# Patient Record
Sex: FEMALE | Race: WHITE | Employment: FULL TIME | URBAN - METROPOLITAN AREA
[De-identification: names, ages, dates, MRNs, and addresses within clinical notes are randomized per-mention and may not be internally consistent; named-entity substitution may affect disease eponyms.]

---

## 2017-02-01 ENCOUNTER — APPOINTMENT (OUTPATIENT)
Dept: GENERAL RADIOLOGY | Facility: HOSPITAL | Age: 52
End: 2017-02-01
Attending: EMERGENCY MEDICINE
Payer: COMMERCIAL

## 2017-02-01 ENCOUNTER — HOSPITAL ENCOUNTER (EMERGENCY)
Facility: HOSPITAL | Age: 52
Discharge: HOME | End: 2017-02-01
Attending: EMERGENCY MEDICINE
Payer: COMMERCIAL

## 2017-02-01 VITALS
BODY MASS INDEX: 49.69 KG/M2 | DIASTOLIC BLOOD PRESSURE: 86 MMHG | WEIGHT: 270 LBS | RESPIRATION RATE: 16 BRPM | HEIGHT: 62 IN | OXYGEN SATURATION: 99 % | HEART RATE: 75 BPM | SYSTOLIC BLOOD PRESSURE: 143 MMHG | TEMPERATURE: 98 F

## 2017-02-01 DIAGNOSIS — S39.012A BACK STRAIN, INITIAL ENCOUNTER: Primary | ICD-10-CM

## 2017-02-01 LAB — B-HCG UR QL: NEGATIVE

## 2017-02-01 PROCEDURE — 81025 URINE PREGNANCY TEST: CPT

## 2017-02-01 PROCEDURE — 99283 EMERGENCY DEPT VISIT LOW MDM: CPT

## 2017-02-01 PROCEDURE — 72120 X-RAY BEND ONLY L-S SPINE: CPT

## 2017-02-01 RX ORDER — METHOCARBAMOL 750 MG/1
750 TABLET, FILM COATED ORAL 4 TIMES DAILY
Qty: 40 TABLET | Refills: 0 | Status: SHIPPED | OUTPATIENT
Start: 2017-02-01 | End: 2017-02-11

## 2017-02-01 RX ORDER — HYDROCODONE BITARTRATE AND ACETAMINOPHEN 5; 325 MG/1; MG/1
1-2 TABLET ORAL EVERY 4 HOURS PRN
Qty: 20 TABLET | Refills: 0 | Status: SHIPPED | OUTPATIENT
Start: 2017-02-01 | End: 2017-02-08

## 2017-02-01 RX ORDER — DIAZEPAM 2 MG/1
2 TABLET ORAL ONCE
Status: COMPLETED | OUTPATIENT
Start: 2017-02-01 | End: 2017-02-01

## 2017-02-01 RX ORDER — HYDROCODONE BITARTRATE AND ACETAMINOPHEN 5; 325 MG/1; MG/1
1 TABLET ORAL ONCE
Status: COMPLETED | OUTPATIENT
Start: 2017-02-01 | End: 2017-02-01

## 2017-02-01 RX ORDER — LEVOTHYROXINE SODIUM 0.1 MG/1
TABLET ORAL
COMMUNITY
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
## 2017-02-01 NOTE — ED NOTES
Pt sent home with medication, told no driving, working, or operating heavy machinery with these medications because they may cause drowsiness. Pt verbalized knowledge. Out ambulatory with steady gait.   Told to return to ER with any new or worsening sympto
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
## 2017-02-11 ENCOUNTER — HOSPITAL ENCOUNTER (OUTPATIENT)
Dept: MRI IMAGING | Facility: HOSPITAL | Age: 52
Discharge: HOME | End: 2017-02-11
Attending: INTERNAL MEDICINE
Payer: COMMERCIAL

## 2017-02-11 DIAGNOSIS — M54.10 RADICULOPATHY: ICD-10-CM

## 2017-02-11 PROCEDURE — 72148 MRI LUMBAR SPINE W/O DYE: CPT

## 2017-02-11 PROCEDURE — 74177 CT ABD & PELVIS W/CONTRAST: CPT

## 2017-02-11 PROCEDURE — 82565 ASSAY OF CREATININE: CPT

## 2017-02-16 ENCOUNTER — HOSPITAL ENCOUNTER (OUTPATIENT)
Dept: CT IMAGING | Facility: HOSPITAL | Age: 52
Discharge: HOME | End: 2017-02-16
Attending: INTERNAL MEDICINE
Payer: COMMERCIAL

## 2017-02-16 DIAGNOSIS — R10.9 ABDOMINAL PAIN: ICD-10-CM

## 2017-02-16 LAB — CREAT BLD-MCNC: 0.8 MG/DL (ref 0.5–1.5)

## 2017-02-16 PROCEDURE — 74177 CT ABD & PELVIS W/CONTRAST: CPT

## 2017-02-16 PROCEDURE — 82565 ASSAY OF CREATININE: CPT

## 2017-04-22 ENCOUNTER — LAB ENCOUNTER (OUTPATIENT)
Dept: LAB | Facility: HOSPITAL | Age: 52
End: 2017-04-22
Attending: PHYSICAL MEDICINE & REHABILITATION
Payer: COMMERCIAL

## 2017-04-22 DIAGNOSIS — Z51.81 ENCOUNTER FOR THERAPEUTIC DRUG MONITORING: Primary | ICD-10-CM

## 2017-04-22 PROCEDURE — 36415 COLL VENOUS BLD VENIPUNCTURE: CPT

## 2017-04-22 PROCEDURE — 85730 THROMBOPLASTIN TIME PARTIAL: CPT

## 2017-04-22 PROCEDURE — 85610 PROTHROMBIN TIME: CPT

## 2017-06-05 ENCOUNTER — HOSPITAL ENCOUNTER (EMERGENCY)
Facility: HOSPITAL | Age: 52
Discharge: HOME | End: 2017-06-05
Attending: EMERGENCY MEDICINE
Payer: COMMERCIAL

## 2017-06-05 ENCOUNTER — APPOINTMENT (OUTPATIENT)
Dept: CT IMAGING | Facility: HOSPITAL | Age: 52
End: 2017-06-05
Attending: EMERGENCY MEDICINE
Payer: COMMERCIAL

## 2017-06-05 VITALS
SYSTOLIC BLOOD PRESSURE: 126 MMHG | TEMPERATURE: 98 F | DIASTOLIC BLOOD PRESSURE: 78 MMHG | HEART RATE: 68 BPM | HEIGHT: 62 IN | OXYGEN SATURATION: 98 % | RESPIRATION RATE: 14 BRPM | BODY MASS INDEX: 51.53 KG/M2 | WEIGHT: 280 LBS

## 2017-06-05 DIAGNOSIS — N20.0 KIDNEY STONE: Primary | ICD-10-CM

## 2017-06-05 DIAGNOSIS — N30.00 ACUTE CYSTITIS WITHOUT HEMATURIA: ICD-10-CM

## 2017-06-05 PROCEDURE — 87086 URINE CULTURE/COLONY COUNT: CPT | Performed by: EMERGENCY MEDICINE

## 2017-06-05 PROCEDURE — 83690 ASSAY OF LIPASE: CPT

## 2017-06-05 PROCEDURE — 81001 URINALYSIS AUTO W/SCOPE: CPT | Performed by: EMERGENCY MEDICINE

## 2017-06-05 PROCEDURE — 99284 EMERGENCY DEPT VISIT MOD MDM: CPT

## 2017-06-05 PROCEDURE — 80053 COMPREHEN METABOLIC PANEL: CPT

## 2017-06-05 PROCEDURE — 96361 HYDRATE IV INFUSION ADD-ON: CPT

## 2017-06-05 PROCEDURE — 96374 THER/PROPH/DIAG INJ IV PUSH: CPT

## 2017-06-05 PROCEDURE — 74176 CT ABD & PELVIS W/O CONTRAST: CPT | Performed by: EMERGENCY MEDICINE

## 2017-06-05 PROCEDURE — 85025 COMPLETE CBC W/AUTO DIFF WBC: CPT

## 2017-06-05 RX ORDER — HYDROMORPHONE HYDROCHLORIDE 1 MG/ML
0.5 INJECTION, SOLUTION INTRAMUSCULAR; INTRAVENOUS; SUBCUTANEOUS ONCE
Status: COMPLETED | OUTPATIENT
Start: 2017-06-05 | End: 2017-06-05

## 2017-06-05 RX ORDER — SULFAMETHOXAZOLE AND TRIMETHOPRIM 800; 160 MG/1; MG/1
1 TABLET ORAL 2 TIMES DAILY
Qty: 6 TABLET | Refills: 0 | Status: SHIPPED | OUTPATIENT
Start: 2017-06-05 | End: 2017-06-08

## 2017-06-05 RX ORDER — KETOROLAC TROMETHAMINE 30 MG/ML
30 INJECTION, SOLUTION INTRAMUSCULAR; INTRAVENOUS ONCE
Status: COMPLETED | OUTPATIENT
Start: 2017-06-05 | End: 2017-06-05

## 2017-06-05 NOTE — ED PROVIDER NOTES
Patient Seen in: [facility] AND [facility] Emergency Department    History   Patient presents with:  Flank Pain    Stated Complaint: Kidney stones    HPI    The patient is a 51-year-old female who presents with sudden onset of left flank pain 3 AM today.   The pa supple. Cardiovascular: Normal rate, regular rhythm, normal heart sounds and intact distal pulses. No murmur heard. Pulmonary/Chest: Effort normal and breath sounds normal.   Abdominal: Soft.  Normal appearance and bowel sounds are normal. She exhibit Abdomen+pelvis Kidneystone 2d Rndr(no Iv,no Oral)(cpt=74176)    6/5/2017  CONCLUSION: Mild left hydroureteronephrosis without a radiopaque calculus visualized. Findings are suggestive of a recently passed stone.            Radiology exams  Viewed and review

## 2017-11-18 ENCOUNTER — APPOINTMENT (OUTPATIENT)
Dept: CT IMAGING | Facility: HOSPITAL | Age: 52
End: 2017-11-18
Attending: PHYSICIAN ASSISTANT
Payer: COMMERCIAL

## 2017-11-18 ENCOUNTER — HOSPITAL ENCOUNTER (EMERGENCY)
Facility: HOSPITAL | Age: 52
Discharge: HOME | End: 2017-11-18
Attending: PHYSICIAN ASSISTANT
Payer: COMMERCIAL

## 2017-11-18 VITALS
WEIGHT: 280 LBS | SYSTOLIC BLOOD PRESSURE: 160 MMHG | TEMPERATURE: 98 F | DIASTOLIC BLOOD PRESSURE: 72 MMHG | RESPIRATION RATE: 18 BRPM | BODY MASS INDEX: 51 KG/M2 | HEART RATE: 90 BPM | OXYGEN SATURATION: 96 %

## 2017-11-18 DIAGNOSIS — R11.0 NAUSEA: ICD-10-CM

## 2017-11-18 DIAGNOSIS — R10.12 ABDOMINAL PAIN, LEFT UPPER QUADRANT: Primary | ICD-10-CM

## 2017-11-18 PROCEDURE — 96360 HYDRATION IV INFUSION INIT: CPT

## 2017-11-18 PROCEDURE — 99284 EMERGENCY DEPT VISIT MOD MDM: CPT

## 2017-11-18 PROCEDURE — 80048 BASIC METABOLIC PNL TOTAL CA: CPT

## 2017-11-18 PROCEDURE — 80076 HEPATIC FUNCTION PANEL: CPT | Performed by: PHYSICIAN ASSISTANT

## 2017-11-18 PROCEDURE — 80048 BASIC METABOLIC PNL TOTAL CA: CPT | Performed by: PHYSICIAN ASSISTANT

## 2017-11-18 PROCEDURE — 81001 URINALYSIS AUTO W/SCOPE: CPT | Performed by: PHYSICIAN ASSISTANT

## 2017-11-18 PROCEDURE — 83690 ASSAY OF LIPASE: CPT | Performed by: PHYSICIAN ASSISTANT

## 2017-11-18 PROCEDURE — 74177 CT ABD & PELVIS W/CONTRAST: CPT | Performed by: PHYSICIAN ASSISTANT

## 2017-11-18 PROCEDURE — 96361 HYDRATE IV INFUSION ADD-ON: CPT

## 2017-11-18 PROCEDURE — 85025 COMPLETE CBC W/AUTO DIFF WBC: CPT

## 2017-11-18 PROCEDURE — 85025 COMPLETE CBC W/AUTO DIFF WBC: CPT | Performed by: PHYSICIAN ASSISTANT

## 2017-11-18 RX ORDER — ONDANSETRON 4 MG/1
4 TABLET, ORALLY DISINTEGRATING ORAL EVERY 6 HOURS PRN
Qty: 10 TABLET | Refills: 0 | Status: SHIPPED | OUTPATIENT
Start: 2017-11-18 | End: 2017-11-21

## 2017-11-18 RX ORDER — FAMOTIDINE 20 MG/1
20 TABLET ORAL 2 TIMES DAILY
Qty: 28 TABLET | Refills: 0 | Status: SHIPPED | OUTPATIENT
Start: 2017-11-18 | End: 2017-12-02

## 2017-11-18 NOTE — ED NOTES
Pt to ED via steady gait with c/o LUQ pain and tenderness x 1 week- states hernia repair with mesh in hx and has hx of diverticulitis- wants to make sure it is not that. +nausea, - vomiting. Denies fevers, chills, urinary complaints.  Pt refusing any medica

## 2017-11-18 NOTE — ED PROVIDER NOTES
Patient Seen in: [facility] AND [facility] Emergency Department    History   Patient presents with:  Abdomen/Flank Pain (GI/)    Stated Complaint:     HPI    Kyle Valdez is a 46year old female who presents with chief complaint of left upper quadrant abd 134/88   Pulse 77   Temp 98.2 °F (36.8 °C) (Tympanic)   Resp 20   Wt 127 kg   SpO2 98%   BMI 51.21 kg/m²     PULSE OX within normal limits on room air as interpreted by this provider. Physical Exam    Constitutional: The patient is cooperative.  Appe PLATELET    Narrative: The following orders were created for panel order CBC WITH DIFFERENTIAL WITH PLATELET.   Procedure                               Abnormality         Status                     ---------                               ----------- tablet (4 mg total) by mouth every 6 (six) hours as needed for Nausea.   Qty: 10 tablet Refills: 0

## 2018-02-10 ENCOUNTER — APPOINTMENT (OUTPATIENT)
Dept: GENERAL RADIOLOGY | Age: 53
End: 2018-02-10
Attending: NURSE PRACTITIONER
Payer: COMMERCIAL

## 2018-02-10 ENCOUNTER — HOSPITAL ENCOUNTER (OUTPATIENT)
Age: 53
Discharge: HOME | End: 2018-02-10
Payer: COMMERCIAL

## 2018-02-10 VITALS
WEIGHT: 270 LBS | SYSTOLIC BLOOD PRESSURE: 165 MMHG | TEMPERATURE: 98 F | DIASTOLIC BLOOD PRESSURE: 96 MMHG | BODY MASS INDEX: 49 KG/M2 | HEART RATE: 77 BPM | RESPIRATION RATE: 18 BRPM | OXYGEN SATURATION: 97 %

## 2018-02-10 DIAGNOSIS — M25.552 PAIN OF LEFT HIP JOINT: ICD-10-CM
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
## 2018-02-10 PROCEDURE — 99213 OFFICE O/P EST LOW 20 MIN: CPT

## 2018-02-10 PROCEDURE — 73552 X-RAY EXAM OF FEMUR 2/>: CPT | Performed by: NURSE PRACTITIONER

## 2018-02-10 PROCEDURE — 73502 X-RAY EXAM HIP UNI 2-3 VIEWS: CPT | Performed by: NURSE PRACTITIONER

## 2018-02-10 PROCEDURE — 99214 OFFICE O/P EST MOD 30 MIN: CPT

## 2018-02-10 RX ORDER — CYCLOBENZAPRINE HCL 10 MG
10 TABLET ORAL 3 TIMES DAILY PRN
Qty: 20 TABLET | Refills: 0 | Status: SHIPPED | OUTPATIENT
Start: 2018-02-10 | End: 2018-02-17

## 2018-02-10 NOTE — ED NOTES
xrays negative for fx. Prescriptions given and reviewed aleve for pain rest call and follow up with pcp on Monday for eval and recheck. If worse go to the emergency department for pain swelling increased immobility.

## 2018-02-10 NOTE — ED PROVIDER NOTES
Patient presents with:  Fall (musculoskeletal, neurologic)      HPI:     Jackelin Munroe is a 46year old female presents with left hip and femur pain following a fall on Thursday.   Patient reports she slipped on ice on Thursday night landing on her left Min 2 Views Left (ynj=[id number])    Result Date: 2/10/2018  CONCLUSION: Normal examination. Xr Hip W Or Wo Pelvis 2 Or 3 Views, Left (cpt=73502)    Result Date: 2/10/2018  CONCLUSION: Normal examination.           Discussed with patient pain control as

## 2018-07-24 ENCOUNTER — HOSPITAL ENCOUNTER (OUTPATIENT)
Age: 53
Discharge: HOME | End: 2018-07-24
Attending: EMERGENCY MEDICINE
Payer: COMMERCIAL

## 2018-07-24 ENCOUNTER — APPOINTMENT (OUTPATIENT)
Dept: GENERAL RADIOLOGY | Age: 53
End: 2018-07-24
Attending: EMERGENCY MEDICINE
Payer: COMMERCIAL

## 2018-07-24 VITALS
TEMPERATURE: 98 F | OXYGEN SATURATION: 97 % | RESPIRATION RATE: 18 BRPM | DIASTOLIC BLOOD PRESSURE: 95 MMHG | HEART RATE: 78 BPM | SYSTOLIC BLOOD PRESSURE: 155 MMHG

## 2018-07-24 DIAGNOSIS — S93.601A RIGHT FOOT SPRAIN, INITIAL ENCOUNTER: Primary | ICD-10-CM

## 2018-07-24 PROCEDURE — 99213 OFFICE O/P EST LOW 20 MIN: CPT

## 2018-07-24 PROCEDURE — 73630 X-RAY EXAM OF FOOT: CPT | Performed by: EMERGENCY MEDICINE

## 2018-07-24 NOTE — ED PROVIDER NOTES
Patient Seen in: [facility] AND [facility] [address] [place] [address]    History   Patient presents with:  Lower Extremity Injury (musculoskeletal)    Stated Complaint: r foot injury    HPI    The patient is a 26-year-old female with a past history of thyroid pr bilaterally  Extremities: There is minimal tenderness and swelling to the dorsal aspect of the right foot, most pronounced over the right second/third/fourth metatarsals.   Skin: No pallor, no redness or warmth to the touch      ED Course   Labs Reviewed -

## 2019-01-21 ENCOUNTER — LAB REQUISITION (OUTPATIENT)
Dept: LAB | Facility: HOSPITAL | Age: 54
End: 2019-01-21
Payer: COMMERCIAL

## 2019-01-21 DIAGNOSIS — Z01.89 ENCOUNTER FOR OTHER SPECIFIED SPECIAL EXAMINATIONS: ICD-10-CM

## 2019-01-21 PROCEDURE — 88312 SPECIAL STAINS GROUP 1: CPT | Performed by: SURGERY

## 2019-01-21 PROCEDURE — 88305 TISSUE EXAM BY PATHOLOGIST: CPT | Performed by: SURGERY

## 2020-08-31 ENCOUNTER — LAB REQUISITION (OUTPATIENT)
Dept: LAB | Facility: HOSPITAL | Age: 55
End: 2020-08-31
Payer: COMMERCIAL

## 2020-08-31 DIAGNOSIS — Z20.828 CONTACT WITH AND (SUSPECTED) EXPOSURE TO OTHER VIRAL COMMUNICABLE DISEASES: ICD-10-CM

## 2020-09-01 LAB — SARS-COV-2 RNA RESP QL NAA+PROBE: NOT DETECTED

## 2020-09-02 ENCOUNTER — LAB REQUISITION (OUTPATIENT)
Dept: LAB | Facility: HOSPITAL | Age: 55
End: 2020-09-02
Payer: COMMERCIAL

## 2020-09-02 DIAGNOSIS — K80.10 CALCULUS OF GALLBLADDER WITH CHRONIC CHOLECYSTITIS WITHOUT OBSTRUCTION: ICD-10-CM

## 2020-09-02 PROCEDURE — 88307 TISSUE EXAM BY PATHOLOGIST: CPT | Performed by: SURGERY

## 2020-09-02 PROCEDURE — 88304 TISSUE EXAM BY PATHOLOGIST: CPT | Performed by: SURGERY

## 2020-11-17 ENCOUNTER — HOSPITAL ENCOUNTER (OUTPATIENT)
Age: 55
Discharge: HOME | End: 2020-11-17
Attending: EMERGENCY MEDICINE
Payer: COMMERCIAL

## 2020-11-17 VITALS
OXYGEN SATURATION: 99 % | HEART RATE: 72 BPM | HEIGHT: 62 IN | TEMPERATURE: 98 F | WEIGHT: 280 LBS | RESPIRATION RATE: 20 BRPM | DIASTOLIC BLOOD PRESSURE: 88 MMHG | SYSTOLIC BLOOD PRESSURE: 145 MMHG | BODY MASS INDEX: 51.53 KG/M2

## 2020-11-17 DIAGNOSIS — B34.9 VIRAL SYNDROME: ICD-10-CM

## 2020-11-17 DIAGNOSIS — Z20.822 ENCOUNTER FOR LABORATORY TESTING FOR COVID-19 VIRUS: Primary | ICD-10-CM

## 2020-11-17 PROCEDURE — 99213 OFFICE O/P EST LOW 20 MIN: CPT | Performed by: EMERGENCY MEDICINE

## 2020-11-17 NOTE — ED PROVIDER NOTES
Patient Seen in: [facility]      History   Patient presents with:  Headache    Stated Complaint: HA CHILLS     HPI  Patient's had 2-3 days of a runny nose, postnasal drip, headache, chills, body aches and fatigue. She works at a school.   No erythema. Eyes:      Conjunctiva/sclera: Conjunctivae normal.   Neck:      Musculoskeletal: Normal range of motion and neck supple. Cardiovascular:      Rate and Rhythm: Normal rate and regular rhythm. Heart sounds: Normal heart sounds.    Pulmonar

## 2020-11-17 NOTE — ED INITIAL ASSESSMENT (HPI)
Pt presents to the IC with c/o headaches and chills for a couple days. No known positive exposure. Pt has a hx of ear infections.

## 2021-04-18 ENCOUNTER — LAB REQUISITION (OUTPATIENT)
Dept: LAB | Facility: HOSPITAL | Age: 56
End: 2021-04-18
Payer: COMMERCIAL

## 2021-04-18 DIAGNOSIS — Z01.818 ENCOUNTER FOR OTHER PREPROCEDURAL EXAMINATION: ICD-10-CM

## 2021-09-27 ENCOUNTER — APPOINTMENT (OUTPATIENT)
Dept: CT IMAGING | Facility: HOSPITAL | Age: 56
End: 2021-09-27
Attending: EMERGENCY MEDICINE
Payer: COMMERCIAL

## 2021-09-27 ENCOUNTER — HOSPITAL ENCOUNTER (EMERGENCY)
Facility: HOSPITAL | Age: 56
Discharge: HOME | End: 2021-09-27
Attending: EMERGENCY MEDICINE
Payer: COMMERCIAL

## 2021-09-27 VITALS
OXYGEN SATURATION: 98 % | SYSTOLIC BLOOD PRESSURE: 127 MMHG | BODY MASS INDEX: 52 KG/M2 | TEMPERATURE: 98 F | DIASTOLIC BLOOD PRESSURE: 50 MMHG | HEART RATE: 80 BPM | WEIGHT: 285 LBS | RESPIRATION RATE: 18 BRPM

## 2021-09-27 DIAGNOSIS — R10.9 ABDOMINAL PAIN, ACUTE: Primary | ICD-10-CM

## 2021-09-27 LAB
ALBUMIN SERPL-MCNC: 3.4 G/DL (ref 3.4–5)
ALP LIVER SERPL-CCNC: 122 U/L
ALT SERPL-CCNC: 46 U/L
ANION GAP SERPL CALC-SCNC: 5 MMOL/L (ref 0–18)
AST SERPL-CCNC: 31 U/L (ref 15–37)
BASOPHILS # BLD AUTO: 0.05 X10(3) UL (ref 0–0.2)
BASOPHILS NFR BLD AUTO: 0.8 %
BILIRUB DIRECT SERPL-MCNC: <0.1 MG/DL (ref 0–0.2)
BILIRUB SERPL-MCNC: 0.3 MG/DL (ref 0.1–2)
BILIRUB UR QL: NEGATIVE
BUN BLD-MCNC: 15 MG/DL (ref 7–18)
BUN/CREAT SERPL: 16.9 (ref 10–20)
CALCIUM BLD-MCNC: 8.9 MG/DL (ref 8.5–10.1)
CHLORIDE SERPL-SCNC: 109 MMOL/L (ref 98–112)
CLARITY UR: CLEAR
CO2 SERPL-SCNC: 25 MMOL/L (ref 21–32)
COLOR UR: YELLOW
CREAT BLD-MCNC: 0.89 MG/DL
DEPRECATED RDW RBC AUTO: 40.5 FL (ref 35.1–46.3)
EOSINOPHIL # BLD AUTO: 0.24 X10(3) UL (ref 0–0.7)
EOSINOPHIL NFR BLD AUTO: 3.6 %
ERYTHROCYTE [DISTWIDTH] IN BLOOD BY AUTOMATED COUNT: 13 % (ref 11–15)
GLUCOSE BLD-MCNC: 96 MG/DL (ref 70–99)
GLUCOSE UR-MCNC: NEGATIVE MG/DL
HCT VFR BLD AUTO: 42.2 %
HGB BLD-MCNC: 13.9 G/DL
HGB UR QL STRIP.AUTO: NEGATIVE
IMM GRANULOCYTES # BLD AUTO: 0.02 X10(3) UL (ref 0–1)
IMM GRANULOCYTES NFR BLD: 0.3 %
LIPASE SERPL-CCNC: 93 U/L (ref 73–393)
LYMPHOCYTES # BLD AUTO: 3.06 X10(3) UL (ref 1–4)
LYMPHOCYTES NFR BLD AUTO: 46.1 %
MCH RBC QN AUTO: 28.5 PG (ref 26–34)
MCHC RBC AUTO-ENTMCNC: 32.9 G/DL (ref 31–37)
MCV RBC AUTO: 86.7 FL
MONOCYTES # BLD AUTO: 0.69 X10(3) UL (ref 0.1–1)
MONOCYTES NFR BLD AUTO: 10.4 %
NEUTROPHILS # BLD AUTO: 2.58 X10 (3) UL (ref 1.5–7.7)
NEUTROPHILS # BLD AUTO: 2.58 X10(3) UL (ref 1.5–7.7)
NEUTROPHILS NFR BLD AUTO: 38.8 %
NITRITE UR QL STRIP.AUTO: NEGATIVE
OSMOLALITY SERPL CALC.SUM OF ELEC: 289 MOSM/KG (ref 275–295)
PH UR: 5 [PH] (ref 5–8)
PLATELET # BLD AUTO: 311 10(3)UL (ref 150–450)
POTASSIUM SERPL-SCNC: 4 MMOL/L (ref 3.5–5.1)
PROT SERPL-MCNC: 7.2 G/DL (ref 6.4–8.2)
PROT UR-MCNC: NEGATIVE MG/DL
RBC # BLD AUTO: 4.87 X10(6)UL
SODIUM SERPL-SCNC: 139 MMOL/L (ref 136–145)
SP GR UR STRIP: 1.01 (ref 1–1.03)
UROBILINOGEN UR STRIP-ACNC: <2
WBC # BLD AUTO: 6.6 X10(3) UL (ref 4–11)

## 2021-09-27 PROCEDURE — 36415 COLL VENOUS BLD VENIPUNCTURE: CPT

## 2021-09-27 PROCEDURE — 99284 EMERGENCY DEPT VISIT MOD MDM: CPT

## 2021-09-27 PROCEDURE — 81001 URINALYSIS AUTO W/SCOPE: CPT | Performed by: EMERGENCY MEDICINE

## 2021-09-27 PROCEDURE — 85025 COMPLETE CBC W/AUTO DIFF WBC: CPT | Performed by: EMERGENCY MEDICINE

## 2021-09-27 PROCEDURE — 74177 CT ABD & PELVIS W/CONTRAST: CPT | Performed by: EMERGENCY MEDICINE

## 2021-09-27 PROCEDURE — 80048 BASIC METABOLIC PNL TOTAL CA: CPT | Performed by: EMERGENCY MEDICINE

## 2021-09-27 PROCEDURE — 83690 ASSAY OF LIPASE: CPT | Performed by: EMERGENCY MEDICINE

## 2021-09-27 PROCEDURE — 80076 HEPATIC FUNCTION PANEL: CPT | Performed by: EMERGENCY MEDICINE

## 2021-09-27 RX ORDER — IBUPROFEN 600 MG/1
600 TABLET ORAL EVERY 8 HOURS PRN
Qty: 15 TABLET | Refills: 0 | Status: SHIPPED | OUTPATIENT
Start: 2021-09-27 | End: 2021-10-02

## 2021-09-27 RX ORDER — DICYCLOMINE HCL 20 MG
20 TABLET ORAL 4 TIMES DAILY PRN
Qty: 20 TABLET | Refills: 0 | Status: SHIPPED | OUTPATIENT
Start: 2021-09-27 | End: 2021-10-17

## 2021-09-27 NOTE — ED INITIAL ASSESSMENT (HPI)
Patient complains of abd pain for about a week, states she was placed on abs for this four days ago, states it is getting worse

## 2021-09-27 NOTE — ED PROVIDER NOTES
Patient Seen in: [facility] AND [facility] Emergency Department      History   Patient presents with:  Abdomen/Flank Pain    Stated Complaint: abd pain    Subjective:   HPI    55-year-old female with remote history of hysterectomy as well as cholecystectomy 1 ye Soft.  Mild diffuse more focal upper abdominal pain. No guarding or peritoneal signs. Musculoskeletal: Normal range of motion. No edema or tenderness. Neurological: Alert and oriented to person, place, and time. Skin: Skin is warm and dry.    Nursing dose reduction was used. Adjustment of the mA and/or kV was done based on the patient's size. Use of iterative reconstruction technique for dose reduction was used.   Dose information is transmitted to the [facility]) Ankita Villanueva [address] [place] [address] BASES: No visible pulmonary or pleural disease. CONCLUSION:   Hepatic steatosis. Mild wall thickening of the distal esophagus at the gastroesophageal junction may be related to esophagitis.  If clinically indicated, further evaluation with direct v

## 2021-11-27 ENCOUNTER — LAB REQUISITION (OUTPATIENT)
Dept: SURGERY | Age: 56
End: 2021-11-27
Payer: COMMERCIAL

## 2021-11-27 DIAGNOSIS — Z01.818 PREOP EXAMINATION: ICD-10-CM

## 2021-12-15 ENCOUNTER — LAB ENCOUNTER (OUTPATIENT)
Dept: LAB | Facility: HOSPITAL | Age: 56
End: 2021-12-15
Attending: INTERNAL MEDICINE
Payer: COMMERCIAL

## 2021-12-15 ENCOUNTER — OFFICE VISIT (OUTPATIENT)
Dept: GASTROENTEROLOGY | Facility: CLINIC | Age: 56
End: 2021-12-15
Payer: COMMERCIAL

## 2021-12-15 VITALS
SYSTOLIC BLOOD PRESSURE: 136 MMHG | DIASTOLIC BLOOD PRESSURE: 77 MMHG | BODY MASS INDEX: 53 KG/M2 | HEIGHT: 62 IN | WEIGHT: 288 LBS | HEART RATE: 80 BPM

## 2021-12-15 DIAGNOSIS — R19.7 DIARRHEA, UNSPECIFIED TYPE: ICD-10-CM

## 2021-12-15 DIAGNOSIS — K58.2 IRRITABLE BOWEL SYNDROME WITH BOTH CONSTIPATION AND DIARRHEA: ICD-10-CM

## 2021-12-15 DIAGNOSIS — R14.0 BLOATING: ICD-10-CM

## 2021-12-15 DIAGNOSIS — R14.0 BLOATING: Primary | ICD-10-CM

## 2021-12-15 PROCEDURE — 83516 IMMUNOASSAY NONANTIBODY: CPT

## 2021-12-15 PROCEDURE — 3075F SYST BP GE 130 - 139MM HG: CPT | Performed by: INTERNAL MEDICINE

## 2021-12-15 PROCEDURE — 3078F DIAST BP <80 MM HG: CPT | Performed by: INTERNAL MEDICINE

## 2021-12-15 PROCEDURE — 82784 ASSAY IGA/IGD/IGG/IGM EACH: CPT

## 2021-12-15 PROCEDURE — 99243 OFF/OP CNSLTJ NEW/EST LOW 30: CPT | Performed by: INTERNAL MEDICINE

## 2021-12-15 PROCEDURE — 36415 COLL VENOUS BLD VENIPUNCTURE: CPT

## 2021-12-15 PROCEDURE — 3008F BODY MASS INDEX DOCD: CPT | Performed by: INTERNAL MEDICINE

## 2021-12-15 RX ORDER — ESOMEPRAZOLE MAGNESIUM 40 MG/1
CAPSULE, DELAYED RELEASE ORAL
COMMUNITY
Start: 2021-12-03

## 2021-12-15 RX ORDER — NAPROXEN 500 MG/1
500 TABLET ORAL AS DIRECTED
COMMUNITY

## 2021-12-15 RX ORDER — CEPHALEXIN 500 MG/1
CAPSULE ORAL EVERY 6 HOURS
COMMUNITY

## 2021-12-15 RX ORDER — DICYCLOMINE HYDROCHLORIDE 10 MG/1
10 CAPSULE ORAL 4 TIMES DAILY PRN
Qty: 30 CAPSULE | Refills: 1 | Status: SHIPPED | OUTPATIENT
Start: 2021-12-15

## 2021-12-15 RX ORDER — FLUTICASONE PROPIONATE 50 MCG
2 SPRAY, SUSPENSION (ML) NASAL DAILY
COMMUNITY

## 2021-12-16 ENCOUNTER — TELEPHONE (OUTPATIENT)
Dept: GASTROENTEROLOGY | Facility: CLINIC | Age: 56
End: 2021-12-16

## 2021-12-16 NOTE — PROGRESS NOTES
Subjective:   Patient ID: Nathalie Goins is a 64year old female. HPI  The patient is seen today at the request of Dr. Dave Rosas for evaluation of chronic abdominal symptoms. She has been under the gastrointestinal and surgical care of Dr. Leann Leal. nocturnal symptoms. She denies dysphagia. Stools at times can be \"squiggly\", hard or loose. She has noted no bleeding.     The patient was treated in October with Linzess which resulted in worsening abdominal cramping and twisting \"horrible pain\" in t No oropharyngeal exudate. Eyes:      General: No scleral icterus. Conjunctiva/sclera: Conjunctivae normal.   Neck:      Thyroid: No thyromegaly. Cardiovascular:      Rate and Rhythm: Normal rate and regular rhythm.       Heart sounds: Normal heart s finding post cholecystectomy. No gross intra-or extrahepatic biliary ductal dilation. SPLEEN: Unremarkable   PANCREAS: The pancreas enhances symmetrically. No ductal dilation. ADRENALS: Unremarkable   KIDNEYS: The kidneys enhance symmetrically.  There i     Finalized by (CST): Jakob Gibbons MD on 9/27/2021 at 5:37 PM        Component      Latest Ref Rng & Units 9/27/2021   WBC      4.0 - 11.0 x10(3) uL 6.6   RBC      3.80 - 5.30 x10(6)uL 4.87   Hemoglobin      12.0 - 16.0 g/dL 13.9   Hematocrit      35.0 - type  Irritable bowel syndrome with both constipation and diarrhea  The patient presents with postprandial upper abdominal pain, bloating and erratic bowel movements with alternating constipation and diarrhea.   The patient's symptoms are likely due to IBS-

## 2021-12-16 NOTE — TELEPHONE ENCOUNTER
GI RNs: Can we obtain a copy of the patient's upper and lower endoscopy reports from Dr. Tana Hartmann office.

## 2021-12-16 NOTE — PATIENT INSTRUCTIONS
1.  Please obtain blood work. 2.  May take dicyclomine/Bentyl as needed for abdominal pain. 3.  Begin a fiber supplement containing methylcellulose, calcium polycarbophil or wheat dextrin once daily. 4.  We will obtain records from Dr. Ange Bahena.   5.

## 2021-12-17 NOTE — TELEPHONE ENCOUNTER
Fax sent to Dr. Addi Hanson requesting upper and lower endoscopy medical records to be faxed back to GI office.     Fax #: [phone]

## 2021-12-21 NOTE — TELEPHONE ENCOUNTER
Fax received from Dr. Hargis Apgar. Operative report placed on Dr. Cas Alcantar' desk to be reviewed upon return.

## 2022-01-01 NOTE — TELEPHONE ENCOUNTER
I reviewed the patient's colonoscopy report which will be sent for scanning. The colonoscopy was performed on November 30, 2021 for screening and epigastric abdominal pain. The examination was complete by the report to the cecum. The ileum was not visualized. Random biopsies were not performed. A 10-year screening examination was advised. I do not see results of an upper endoscopy. GI RNs: Did the patient have an upper endoscopy performed at some point by Dr. Beverly Peter?

## 2022-01-03 NOTE — TELEPHONE ENCOUNTER
I contacted Dr. Frankey Conrad office(Phone #:[phone]) and spoke with Dottie Landry who assisted me in obtaining information regarding patient's previously performed endoscopic procedures with Dr. Rajesh Guy. Patient had a colonoscopy performed on 11-30-21 (as received via fax as seen below). She also had an upper endoscopy performed in May 2020. Dottie Landry will be faxing the upper endoscopy and pathology reports to the GI office fax number: [phone]. Will await the faxed operative & pathology reports from upper endoscopy from May 2020 and provide to Dr. Rk Fernandez for review as requested.

## 2022-01-03 NOTE — TELEPHONE ENCOUNTER
Dr. Randi Greenwood    Records received from Dr. Joel Barker and placed on you keyboard for review.     Thank you

## 2022-04-23 NOTE — TELEPHONE ENCOUNTER
Please see 12/15/2021 result note with documentation from today's date: \"Hi Ms. Jona Dakins,    In reviewing our records, I realize that I did not formally get back to you regarding your blood test results. I sincerely apologize for the delay. As you know the blood test for celiac disease (gluten allergy) was negative. I reviewed an endoscopy report from Dr. Rosales Potter performed in January 2019 which was normal including a negative biopsy for H. pylori. I believe you had mentioned to me that you had a colonoscopy performed sometime in 2021 which I presume was negative/normal, however, I do not have a copy of this report. If your symptoms have improved/resolved, I would not recommend any further testing. If, however, you are having continued symptoms or if you have any questions, please do not hesitate to contact me. I again apologize for the delay in getting back to you.     Sincerely,    Dr. Sayra Brock"

## 2022-09-18 ENCOUNTER — APPOINTMENT (OUTPATIENT)
Dept: CT IMAGING | Facility: HOSPITAL | Age: 57
End: 2022-09-18
Attending: EMERGENCY MEDICINE

## 2022-09-18 ENCOUNTER — HOSPITAL ENCOUNTER (EMERGENCY)
Facility: HOSPITAL | Age: 57
Discharge: HOME | End: 2022-09-18
Attending: EMERGENCY MEDICINE

## 2022-09-18 VITALS
HEIGHT: 62 IN | HEART RATE: 70 BPM | WEIGHT: 285 LBS | SYSTOLIC BLOOD PRESSURE: 132 MMHG | RESPIRATION RATE: 16 BRPM | DIASTOLIC BLOOD PRESSURE: 76 MMHG | TEMPERATURE: 98 F | BODY MASS INDEX: 52.44 KG/M2 | OXYGEN SATURATION: 97 %

## 2022-09-18 DIAGNOSIS — R10.11 RUQ ABDOMINAL PAIN: Primary | ICD-10-CM

## 2022-09-18 LAB
ALBUMIN SERPL-MCNC: 3.4 G/DL (ref 3.4–5)
ALP LIVER SERPL-CCNC: 131 U/L
ALT SERPL-CCNC: 50 U/L
ANION GAP SERPL CALC-SCNC: 6 MMOL/L (ref 0–18)
AST SERPL-CCNC: 45 U/L (ref 15–37)
BASOPHILS # BLD AUTO: 0.06 X10(3) UL (ref 0–0.2)
BASOPHILS NFR BLD AUTO: 1.2 %
BILIRUB DIRECT SERPL-MCNC: <0.1 MG/DL (ref 0–0.2)
BILIRUB SERPL-MCNC: 0.3 MG/DL (ref 0.1–2)
BILIRUB UR QL: NEGATIVE
BUN BLD-MCNC: 13 MG/DL (ref 7–18)
BUN/CREAT SERPL: 11 (ref 10–20)
CALCIUM BLD-MCNC: 8.8 MG/DL (ref 8.5–10.1)
CHLORIDE SERPL-SCNC: 108 MMOL/L (ref 98–112)
CLARITY UR: CLEAR
CO2 SERPL-SCNC: 27 MMOL/L (ref 21–32)
COLOR UR: YELLOW
CREAT BLD-MCNC: 1.18 MG/DL
DEPRECATED RDW RBC AUTO: 40.1 FL (ref 35.1–46.3)
EOSINOPHIL # BLD AUTO: 0.18 X10(3) UL (ref 0–0.7)
EOSINOPHIL NFR BLD AUTO: 3.5 %
ERYTHROCYTE [DISTWIDTH] IN BLOOD BY AUTOMATED COUNT: 12.7 % (ref 11–15)
GFR SERPLBLD BASED ON 1.73 SQ M-ARVRAT: 54 ML/MIN/1.73M2 (ref 60–?)
GLUCOSE BLD-MCNC: 114 MG/DL (ref 70–99)
GLUCOSE UR-MCNC: NEGATIVE MG/DL
HCT VFR BLD AUTO: 42.2 %
HGB BLD-MCNC: 14 G/DL
HGB UR QL STRIP.AUTO: NEGATIVE
IMM GRANULOCYTES # BLD AUTO: 0.01 X10(3) UL (ref 0–1)
IMM GRANULOCYTES NFR BLD: 0.2 %
KETONES UR-MCNC: NEGATIVE MG/DL
LIPASE SERPL-CCNC: 127 U/L (ref 73–393)
LYMPHOCYTES # BLD AUTO: 2.5 X10(3) UL (ref 1–4)
LYMPHOCYTES NFR BLD AUTO: 48.4 %
MCH RBC QN AUTO: 28.7 PG (ref 26–34)
MCHC RBC AUTO-ENTMCNC: 33.2 G/DL (ref 31–37)
MCV RBC AUTO: 86.5 FL
MONOCYTES # BLD AUTO: 0.58 X10(3) UL (ref 0.1–1)
MONOCYTES NFR BLD AUTO: 11.2 %
NEUTROPHILS # BLD AUTO: 1.83 X10 (3) UL (ref 1.5–7.7)
NEUTROPHILS # BLD AUTO: 1.83 X10(3) UL (ref 1.5–7.7)
NEUTROPHILS NFR BLD AUTO: 35.5 %
NITRITE UR QL STRIP.AUTO: NEGATIVE
OSMOLALITY SERPL CALC.SUM OF ELEC: 293 MOSM/KG (ref 275–295)
PH UR: 5.5 [PH] (ref 5–8)
PLATELET # BLD AUTO: 284 10(3)UL (ref 150–450)
POTASSIUM SERPL-SCNC: 4.2 MMOL/L (ref 3.5–5.1)
PROT SERPL-MCNC: 7.2 G/DL (ref 6.4–8.2)
PROT UR-MCNC: NEGATIVE MG/DL
RBC # BLD AUTO: 4.88 X10(6)UL
SODIUM SERPL-SCNC: 141 MMOL/L (ref 136–145)
SP GR UR STRIP: >=1.03 (ref 1–1.03)
UROBILINOGEN UR STRIP-ACNC: 0.2
WBC # BLD AUTO: 5.2 X10(3) UL (ref 4–11)

## 2022-09-18 PROCEDURE — 81015 MICROSCOPIC EXAM OF URINE: CPT | Performed by: EMERGENCY MEDICINE

## 2022-09-18 PROCEDURE — 87086 URINE CULTURE/COLONY COUNT: CPT | Performed by: EMERGENCY MEDICINE

## 2022-09-18 PROCEDURE — 99284 EMERGENCY DEPT VISIT MOD MDM: CPT

## 2022-09-18 PROCEDURE — 80048 BASIC METABOLIC PNL TOTAL CA: CPT | Performed by: EMERGENCY MEDICINE

## 2022-09-18 PROCEDURE — 81001 URINALYSIS AUTO W/SCOPE: CPT | Performed by: EMERGENCY MEDICINE

## 2022-09-18 PROCEDURE — 96374 THER/PROPH/DIAG INJ IV PUSH: CPT

## 2022-09-18 PROCEDURE — 96361 HYDRATE IV INFUSION ADD-ON: CPT

## 2022-09-18 PROCEDURE — 85025 COMPLETE CBC W/AUTO DIFF WBC: CPT | Performed by: EMERGENCY MEDICINE

## 2022-09-18 PROCEDURE — 83690 ASSAY OF LIPASE: CPT | Performed by: EMERGENCY MEDICINE

## 2022-09-18 PROCEDURE — 74177 CT ABD & PELVIS W/CONTRAST: CPT | Performed by: EMERGENCY MEDICINE

## 2022-09-18 PROCEDURE — 80076 HEPATIC FUNCTION PANEL: CPT | Performed by: EMERGENCY MEDICINE

## 2022-09-18 RX ORDER — DICYCLOMINE HCL 20 MG
20 TABLET ORAL 4 TIMES DAILY PRN
Qty: 30 TABLET | Refills: 0 | Status: SHIPPED | OUTPATIENT
Start: 2022-09-18 | End: 2022-10-18

## 2022-09-18 RX ORDER — ONDANSETRON 4 MG/1
4 TABLET, ORALLY DISINTEGRATING ORAL EVERY 8 HOURS PRN
Qty: 10 TABLET | Refills: 0 | Status: SHIPPED | OUTPATIENT
Start: 2022-09-18 | End: 2022-09-25

## 2022-09-18 RX ORDER — ONDANSETRON 2 MG/ML
4 INJECTION INTRAMUSCULAR; INTRAVENOUS ONCE
Status: COMPLETED | OUTPATIENT
Start: 2022-09-18 | End: 2022-09-18

## 2022-09-18 NOTE — ED INITIAL ASSESSMENT (HPI)
Patient to ER from home with c/o right upper quadrant abdominal pain starting on tuesday. Patient saw her doctor and prescribed ciproflaxacin, metronidazole, and famotidine for diverticulitis. + nausea, denies vomiting diarrhea. Also c/o headache.  History of gall bladder removal.

## 2022-09-19 ENCOUNTER — TELEPHONE (OUTPATIENT)
Dept: GASTROENTEROLOGY | Facility: CLINIC | Age: 57
End: 2022-09-19

## 2022-09-19 NOTE — ED QUICK NOTES
Patient safe to discharge home per ER MD. Discharge education provided, including follow up instructions. IV removed by this RN. Patient verbalizes understanding.

## 2022-09-22 ENCOUNTER — OFFICE VISIT (OUTPATIENT)
Dept: GASTROENTEROLOGY | Facility: CLINIC | Age: 57
End: 2022-09-22

## 2022-09-22 VITALS
DIASTOLIC BLOOD PRESSURE: 92 MMHG | SYSTOLIC BLOOD PRESSURE: 138 MMHG | WEIGHT: 288 LBS | HEART RATE: 77 BPM | HEIGHT: 62 IN | BODY MASS INDEX: 53 KG/M2

## 2022-09-22 DIAGNOSIS — R10.11 RUQ PAIN: Primary | ICD-10-CM

## 2022-09-22 DIAGNOSIS — R74.8 ELEVATED LIVER ENZYMES: ICD-10-CM

## 2022-09-22 PROCEDURE — 3008F BODY MASS INDEX DOCD: CPT | Performed by: INTERNAL MEDICINE

## 2022-09-22 PROCEDURE — 3075F SYST BP GE 130 - 139MM HG: CPT | Performed by: INTERNAL MEDICINE

## 2022-09-22 PROCEDURE — 3080F DIAST BP >= 90 MM HG: CPT | Performed by: INTERNAL MEDICINE

## 2022-09-22 PROCEDURE — 99213 OFFICE O/P EST LOW 20 MIN: CPT | Performed by: INTERNAL MEDICINE

## 2022-09-22 RX ORDER — DICYCLOMINE HYDROCHLORIDE 10 MG/1
10 CAPSULE ORAL 4 TIMES DAILY PRN
Qty: 30 CAPSULE | Refills: 1 | Status: SHIPPED | OUTPATIENT
Start: 2022-09-22

## 2022-09-25 NOTE — PATIENT INSTRUCTIONS
1.  May use dicyclomine for the symptoms. 2.  Schedule MRI/MRCP to exclude a stone in the bile duct. 3.  Further recommendations pending the above result.

## 2022-09-30 ENCOUNTER — TELEPHONE (OUTPATIENT)
Dept: GASTROENTEROLOGY | Facility: CLINIC | Age: 57
End: 2022-09-30

## 2022-09-30 NOTE — TELEPHONE ENCOUNTER
Managed Care-    Please advise on status of referral placed for MRI MRCP by Dr. Gali Katz.     Dx: RUQ pain, elevated liver enzymes     Thank you

## 2022-09-30 NOTE — TELEPHONE ENCOUNTER
Patient is calling to follow up on referral for mri. Patient is unable to schedule at this time and would like to know what the status.

## 2022-10-07 ENCOUNTER — TELEPHONE (OUTPATIENT)
Dept: CASE MANAGEMENT | Age: 57
End: 2022-10-07

## 2022-10-07 NOTE — TELEPHONE ENCOUNTER
I conducted a peer to peer review. The [facility]/MRCP was approved. Approval #[id number] valid at [address] [place] [address] until 12-1-2021.

## 2022-10-07 NOTE — TELEPHONE ENCOUNTER
Hi Dr. Ken Arreola,    The MRI/MRCP you ordered for Paola Waldrop has been denied by her insurance. If you would like to do a peer to peer you can call [facility]/[phone], and use Order# [id number] as your reference.     Thank you  Christophe Rubalcava

## 2022-10-07 NOTE — TELEPHONE ENCOUNTER
Radha Olivas's MRI/MRCP has been denied. I sent Dr. Kevin Brunson a message.     Thank you  Uriel Hdez

## 2022-10-11 ENCOUNTER — PATIENT MESSAGE (OUTPATIENT)
Dept: GASTROENTEROLOGY | Facility: CLINIC | Age: 57
End: 2022-10-11

## 2022-10-11 NOTE — TELEPHONE ENCOUNTER
From: Siva Langston  To: Petey Greenfield MD  Sent: 10/11/2022 9:35 AM CDT  Subject: MRI    Morning Doctor, My question to you is may I please leave the order to do an MRI open for a couple of week/ months. I currently do not have any pain and I'm not taking any medicine. I would like to take the test when there is a flare up so we can actually see what is going on. Please let me know. I appreciate all that you do!   Cecy Hudson

## 2022-11-08 ENCOUNTER — HOSPITAL ENCOUNTER (OUTPATIENT)
Dept: MRI IMAGING | Facility: HOSPITAL | Age: 57
Discharge: HOME | End: 2022-11-08
Attending: INTERNAL MEDICINE
Payer: COMMERCIAL

## 2022-11-08 DIAGNOSIS — R74.8 ELEVATED LIVER ENZYMES: ICD-10-CM

## 2022-11-08 DIAGNOSIS — R10.11 RUQ PAIN: ICD-10-CM

## 2022-11-08 PROCEDURE — 74183 MRI ABD W/O CNTR FLWD CNTR: CPT | Performed by: INTERNAL MEDICINE

## 2022-11-08 PROCEDURE — A9575 INJ GADOTERATE MEGLUMI 0.1ML: HCPCS | Performed by: INTERNAL MEDICINE

## 2022-11-08 RX ORDER — GADOTERATE MEGLUMINE 376.9 MG/ML
20 INJECTION INTRAVENOUS
Status: COMPLETED | OUTPATIENT
Start: 2022-11-08 | End: 2022-11-08

## 2022-11-08 RX ADMIN — GADOTERATE MEGLUMINE 20 ML: 376.9 INJECTION INTRAVENOUS at 08:14:00

## 2022-11-18 ENCOUNTER — APPOINTMENT (OUTPATIENT)
Dept: CT IMAGING | Facility: HOSPITAL | Age: 57
End: 2022-11-18
Attending: STUDENT IN AN ORGANIZED HEALTH CARE EDUCATION/TRAINING PROGRAM
Payer: COMMERCIAL

## 2022-11-18 ENCOUNTER — HOSPITAL ENCOUNTER (EMERGENCY)
Facility: HOSPITAL | Age: 57
Discharge: HOME | End: 2022-11-18
Attending: STUDENT IN AN ORGANIZED HEALTH CARE EDUCATION/TRAINING PROGRAM
Payer: COMMERCIAL

## 2022-11-18 VITALS
OXYGEN SATURATION: 99 % | DIASTOLIC BLOOD PRESSURE: 83 MMHG | HEART RATE: 54 BPM | BODY MASS INDEX: 51.53 KG/M2 | RESPIRATION RATE: 18 BRPM | SYSTOLIC BLOOD PRESSURE: 145 MMHG | HEIGHT: 62 IN | TEMPERATURE: 98 F | WEIGHT: 280 LBS

## 2022-11-18 DIAGNOSIS — M54.50 ACUTE LEFT-SIDED LOW BACK PAIN WITHOUT SCIATICA: Primary | ICD-10-CM

## 2022-11-18 LAB
ALBUMIN SERPL-MCNC: 3.4 G/DL (ref 3.4–5)
ALP LIVER SERPL-CCNC: 122 U/L
ALT SERPL-CCNC: 42 U/L
ANION GAP SERPL CALC-SCNC: 5 MMOL/L (ref 0–18)
AST SERPL-CCNC: 32 U/L (ref 15–37)
BASOPHILS # BLD AUTO: 0.04 X10(3) UL (ref 0–0.2)
BASOPHILS NFR BLD AUTO: 0.8 %
BILIRUB DIRECT SERPL-MCNC: <0.1 MG/DL (ref 0–0.2)
BILIRUB SERPL-MCNC: 0.4 MG/DL (ref 0.1–2)
BILIRUB UR QL: NEGATIVE
BUN BLD-MCNC: 14 MG/DL (ref 7–18)
BUN/CREAT SERPL: 16.9 (ref 10–20)
CALCIUM BLD-MCNC: 8.9 MG/DL (ref 8.5–10.1)
CHLORIDE SERPL-SCNC: 108 MMOL/L (ref 98–112)
CLARITY UR: CLEAR
CO2 SERPL-SCNC: 26 MMOL/L (ref 21–32)
COLOR UR: YELLOW
CREAT BLD-MCNC: 0.83 MG/DL
DEPRECATED RDW RBC AUTO: 41.6 FL (ref 35.1–46.3)
EOSINOPHIL # BLD AUTO: 0.25 X10(3) UL (ref 0–0.7)
EOSINOPHIL NFR BLD AUTO: 5.1 %
ERYTHROCYTE [DISTWIDTH] IN BLOOD BY AUTOMATED COUNT: 12.9 % (ref 11–15)
GFR SERPLBLD BASED ON 1.73 SQ M-ARVRAT: 83 ML/MIN/1.73M2 (ref 60–?)
GLUCOSE BLD-MCNC: 110 MG/DL (ref 70–99)
GLUCOSE UR-MCNC: NEGATIVE MG/DL
HCT VFR BLD AUTO: 42.8 %
HGB BLD-MCNC: 13.8 G/DL
HGB UR QL STRIP.AUTO: NEGATIVE
IMM GRANULOCYTES # BLD AUTO: 0.02 X10(3) UL (ref 0–1)
IMM GRANULOCYTES NFR BLD: 0.4 %
KETONES UR-MCNC: NEGATIVE MG/DL
LEUKOCYTE ESTERASE UR QL STRIP.AUTO: NEGATIVE
LIPASE SERPL-CCNC: 114 U/L (ref 73–393)
LYMPHOCYTES # BLD AUTO: 2.27 X10(3) UL (ref 1–4)
LYMPHOCYTES NFR BLD AUTO: 45.9 %
MCH RBC QN AUTO: 28.2 PG (ref 26–34)
MCHC RBC AUTO-ENTMCNC: 32.2 G/DL (ref 31–37)
MCV RBC AUTO: 87.5 FL
MONOCYTES # BLD AUTO: 0.55 X10(3) UL (ref 0.1–1)
MONOCYTES NFR BLD AUTO: 11.1 %
NEUTROPHILS # BLD AUTO: 1.82 X10 (3) UL (ref 1.5–7.7)
NEUTROPHILS # BLD AUTO: 1.82 X10(3) UL (ref 1.5–7.7)
NEUTROPHILS NFR BLD AUTO: 36.7 %
NITRITE UR QL STRIP.AUTO: NEGATIVE
OSMOLALITY SERPL CALC.SUM OF ELEC: 289 MOSM/KG (ref 275–295)
PH UR: 5 [PH] (ref 5–8)
PLATELET # BLD AUTO: 319 10(3)UL (ref 150–450)
POTASSIUM SERPL-SCNC: 4.2 MMOL/L (ref 3.5–5.1)
PROT SERPL-MCNC: 6.7 G/DL (ref 6.4–8.2)
PROT UR-MCNC: NEGATIVE MG/DL
RBC # BLD AUTO: 4.89 X10(6)UL
SODIUM SERPL-SCNC: 139 MMOL/L (ref 136–145)
SP GR UR STRIP: 1.02 (ref 1–1.03)
UROBILINOGEN UR STRIP-ACNC: <2
VIT C UR-MCNC: NEGATIVE MG/DL
WBC # BLD AUTO: 5 X10(3) UL (ref 4–11)

## 2022-11-18 PROCEDURE — 85025 COMPLETE CBC W/AUTO DIFF WBC: CPT | Performed by: STUDENT IN AN ORGANIZED HEALTH CARE EDUCATION/TRAINING PROGRAM

## 2022-11-18 PROCEDURE — 96374 THER/PROPH/DIAG INJ IV PUSH: CPT

## 2022-11-18 PROCEDURE — 80076 HEPATIC FUNCTION PANEL: CPT | Performed by: STUDENT IN AN ORGANIZED HEALTH CARE EDUCATION/TRAINING PROGRAM

## 2022-11-18 PROCEDURE — 83690 ASSAY OF LIPASE: CPT | Performed by: STUDENT IN AN ORGANIZED HEALTH CARE EDUCATION/TRAINING PROGRAM

## 2022-11-18 PROCEDURE — 99284 EMERGENCY DEPT VISIT MOD MDM: CPT

## 2022-11-18 PROCEDURE — 80048 BASIC METABOLIC PNL TOTAL CA: CPT | Performed by: STUDENT IN AN ORGANIZED HEALTH CARE EDUCATION/TRAINING PROGRAM

## 2022-11-18 PROCEDURE — 96361 HYDRATE IV INFUSION ADD-ON: CPT

## 2022-11-18 PROCEDURE — 96375 TX/PRO/DX INJ NEW DRUG ADDON: CPT

## 2022-11-18 PROCEDURE — 74176 CT ABD & PELVIS W/O CONTRAST: CPT | Performed by: STUDENT IN AN ORGANIZED HEALTH CARE EDUCATION/TRAINING PROGRAM

## 2022-11-18 PROCEDURE — 81003 URINALYSIS AUTO W/O SCOPE: CPT | Performed by: STUDENT IN AN ORGANIZED HEALTH CARE EDUCATION/TRAINING PROGRAM

## 2022-11-18 RX ORDER — LIDOCAINE 50 MG/G
1 PATCH TOPICAL EVERY 24 HOURS
Qty: 7 PATCH | Refills: 0 | Status: SHIPPED | OUTPATIENT
Start: 2022-11-18 | End: 2022-11-25

## 2022-11-18 RX ORDER — MORPHINE SULFATE 4 MG/ML
4 INJECTION, SOLUTION INTRAMUSCULAR; INTRAVENOUS ONCE
Status: DISCONTINUED | OUTPATIENT
Start: 2022-11-18 | End: 2022-11-18

## 2022-11-18 RX ORDER — KETOROLAC TROMETHAMINE 15 MG/ML
15 INJECTION, SOLUTION INTRAMUSCULAR; INTRAVENOUS ONCE
Status: COMPLETED | OUTPATIENT
Start: 2022-11-18 | End: 2022-11-18

## 2022-11-18 RX ORDER — CYCLOBENZAPRINE HCL 10 MG
10 TABLET ORAL 3 TIMES DAILY PRN
Qty: 15 TABLET | Refills: 0 | Status: SHIPPED | OUTPATIENT
Start: 2022-11-18 | End: 2022-11-23

## 2022-11-18 RX ORDER — NAPROXEN 500 MG/1
500 TABLET ORAL 2 TIMES DAILY PRN
Qty: 10 TABLET | Refills: 0 | Status: SHIPPED | OUTPATIENT
Start: 2022-11-18 | End: 2022-11-23

## 2022-11-18 NOTE — ED QUICK NOTES
Pt ambulatory from triage with steady gait. Reports hx of kidney stones, +CVA tenderness. Abdomen soft, round, nondistended.

## 2022-11-18 NOTE — DISCHARGE INSTRUCTIONS
.Thank you for seeking care at [facility] Emergency Department. You have been seen and evaluated for back pain. We discussed the results of your workup   Please read the instructions provided   If given prescriptions, take as instructed    Remember, your care process does not end after your visit today. Please follow-up with your doctor within 1-2 days for a follow-up check to ensure you are  improving, to see if you need any further evaluation/testing, or to evaluate for any alternate diagnoses. Please return to the emergency department if you develop severe pain that is not controlled by pain medications, loss of control of your legs, if you are unable to walk because of pain or weakness, worsening numbness or weakness, loss of bowel or bladder control (dribbling of urine or having accidents you wouldn't normally have), inability to urinate, numbness of your genital or anal area, fevers, abdominal pain, change in urination, or as these could all be signs of a serious medical emergency. Call or return to the ER for any other new or worsening symptoms.

## 2023-01-11 NOTE — TELEPHONE ENCOUNTER
Dr. Estefany Kan-    Please advise on when you would like to see patient in office for ED f/u visit. Patient was seen in the [facility] ED on 9/18/22 r/t RUQ pain x 5 days. Had labs drawn + CT ABD + Pelvis. Discharged on Zofran + Dicyclomine PRN.
Patient calling to schedule hospital follow up, informed no openings. Patient requesting any day after 2 PM.  Please call at [phone].
Patient checking status on ER follow up. Please call. Thank you.
Pt accepted appt this Thursday at 2:15.  Date time and location verified    Your Appointments    Thursday September 22, 2022  2:15 PM  Follow Up Visit with Shayy Thomas MD  [facility], [facility], [address] [place] [address] ([address] [place] [address]) [address] [phone]
You may add the patient onto the office schedule this Thursday at 2:15 PM.
within functional limits

## 2023-04-25 ENCOUNTER — OFFICE VISIT (OUTPATIENT)
Dept: INTERNAL MEDICINE CLINIC | Facility: CLINIC | Age: 58
End: 2023-04-25

## 2023-04-25 VITALS
BODY MASS INDEX: 52.26 KG/M2 | HEART RATE: 84 BPM | WEIGHT: 284 LBS | HEIGHT: 62 IN | DIASTOLIC BLOOD PRESSURE: 82 MMHG | SYSTOLIC BLOOD PRESSURE: 133 MMHG

## 2023-04-25 DIAGNOSIS — I10 ESSENTIAL HYPERTENSION: ICD-10-CM

## 2023-04-25 DIAGNOSIS — E03.9 HYPOTHYROIDISM, UNSPECIFIED TYPE: Primary | ICD-10-CM

## 2023-04-25 DIAGNOSIS — E66.01 MORBID OBESITY (HCC): ICD-10-CM

## 2023-04-25 PROCEDURE — 3075F SYST BP GE 130 - 139MM HG: CPT | Performed by: INTERNAL MEDICINE

## 2023-04-25 PROCEDURE — 99204 OFFICE O/P NEW MOD 45 MIN: CPT | Performed by: INTERNAL MEDICINE

## 2023-04-25 PROCEDURE — 3008F BODY MASS INDEX DOCD: CPT | Performed by: INTERNAL MEDICINE

## 2023-04-25 PROCEDURE — 3079F DIAST BP 80-89 MM HG: CPT | Performed by: INTERNAL MEDICINE

## 2023-04-25 RX ORDER — LEVOTHYROXINE SODIUM 0.15 MG/1
TABLET ORAL
COMMUNITY
Start: 2023-04-03

## 2023-04-29 ENCOUNTER — LAB ENCOUNTER (OUTPATIENT)
Dept: LAB | Age: 58
End: 2023-04-29
Attending: INTERNAL MEDICINE
Payer: COMMERCIAL

## 2023-04-29 DIAGNOSIS — E03.9 HYPOTHYROIDISM, UNSPECIFIED TYPE: ICD-10-CM

## 2023-04-29 DIAGNOSIS — E66.01 MORBID OBESITY (HCC): ICD-10-CM

## 2023-04-29 LAB
ALBUMIN SERPL-MCNC: 3.6 G/DL (ref 3.4–5)
ALBUMIN/GLOB SERPL: 1.1 {RATIO} (ref 1–2)
ALP LIVER SERPL-CCNC: 114 U/L
ALT SERPL-CCNC: 40 U/L
ANION GAP SERPL CALC-SCNC: 8 MMOL/L (ref 0–18)
AST SERPL-CCNC: 26 U/L (ref 15–37)
BASOPHILS # BLD AUTO: 0.06 X10(3) UL (ref 0–0.2)
BASOPHILS NFR BLD AUTO: 1.4 %
BILIRUB SERPL-MCNC: 0.4 MG/DL (ref 0.1–2)
BILIRUB UR QL: NEGATIVE
BUN BLD-MCNC: 14 MG/DL (ref 7–18)
BUN/CREAT SERPL: 17.1 (ref 10–20)
CALCIUM BLD-MCNC: 9.3 MG/DL (ref 8.5–10.1)
CHLORIDE SERPL-SCNC: 106 MMOL/L (ref 98–112)
CHOLEST SERPL-MCNC: 130 MG/DL (ref ?–200)
CLARITY UR: CLEAR
CO2 SERPL-SCNC: 26 MMOL/L (ref 21–32)
COLOR UR: COLORLESS
CREAT BLD-MCNC: 0.82 MG/DL
DEPRECATED RDW RBC AUTO: 40.3 FL (ref 35.1–46.3)
EOSINOPHIL # BLD AUTO: 0.21 X10(3) UL (ref 0–0.7)
EOSINOPHIL NFR BLD AUTO: 5 %
ERYTHROCYTE [DISTWIDTH] IN BLOOD BY AUTOMATED COUNT: 12.7 % (ref 11–15)
EST. AVERAGE GLUCOSE BLD GHB EST-MCNC: 134 MG/DL (ref 68–126)
FASTING PATIENT LIPID ANSWER: YES
FASTING STATUS PATIENT QL REPORTED: YES
GFR SERPLBLD BASED ON 1.73 SQ M-ARVRAT: 83 ML/MIN/1.73M2 (ref 60–?)
GLOBULIN PLAS-MCNC: 3.4 G/DL (ref 2.8–4.4)
GLUCOSE BLD-MCNC: 112 MG/DL (ref 70–99)
GLUCOSE UR-MCNC: NORMAL MG/DL
HBA1C MFR BLD: 6.3 % (ref ?–5.7)
HCT VFR BLD AUTO: 42.3 %
HDLC SERPL-MCNC: 42 MG/DL (ref 40–59)
HGB BLD-MCNC: 14 G/DL
HGB UR QL STRIP.AUTO: NEGATIVE
IMM GRANULOCYTES # BLD AUTO: 0.02 X10(3) UL (ref 0–1)
IMM GRANULOCYTES NFR BLD: 0.5 %
KETONES UR-MCNC: NEGATIVE MG/DL
LDLC SERPL CALC-MCNC: 69 MG/DL (ref ?–100)
LEUKOCYTE ESTERASE UR QL STRIP.AUTO: NEGATIVE
LYMPHOCYTES # BLD AUTO: 1.89 X10(3) UL (ref 1–4)
LYMPHOCYTES NFR BLD AUTO: 45.2 %
MCH RBC QN AUTO: 28.8 PG (ref 26–34)
MCHC RBC AUTO-ENTMCNC: 33.1 G/DL (ref 31–37)
MCV RBC AUTO: 87 FL
MONOCYTES # BLD AUTO: 0.45 X10(3) UL (ref 0.1–1)
MONOCYTES NFR BLD AUTO: 10.8 %
NEUTROPHILS # BLD AUTO: 1.55 X10 (3) UL (ref 1.5–7.7)
NEUTROPHILS # BLD AUTO: 1.55 X10(3) UL (ref 1.5–7.7)
NEUTROPHILS NFR BLD AUTO: 37.1 %
NITRITE UR QL STRIP.AUTO: NEGATIVE
NONHDLC SERPL-MCNC: 88 MG/DL (ref ?–130)
OSMOLALITY SERPL CALC.SUM OF ELEC: 291 MOSM/KG (ref 275–295)
PH UR: 6.5 [PH] (ref 5–8)
PLATELET # BLD AUTO: 274 10(3)UL (ref 150–450)
POTASSIUM SERPL-SCNC: 4.5 MMOL/L (ref 3.5–5.1)
PROT SERPL-MCNC: 7 G/DL (ref 6.4–8.2)
PROT UR-MCNC: NEGATIVE MG/DL
RBC # BLD AUTO: 4.86 X10(6)UL
SODIUM SERPL-SCNC: 140 MMOL/L (ref 136–145)
SP GR UR STRIP: 1.01 (ref 1–1.03)
T4 FREE SERPL-MCNC: 1.3 NG/DL (ref 0.8–1.7)
TRIGL SERPL-MCNC: 103 MG/DL (ref 30–149)
TSI SER-ACNC: 1.28 MIU/ML (ref 0.36–3.74)
UROBILINOGEN UR STRIP-ACNC: NORMAL
VLDLC SERPL CALC-MCNC: 16 MG/DL (ref 0–30)
WBC # BLD AUTO: 4.2 X10(3) UL (ref 4–11)

## 2023-04-29 PROCEDURE — 84439 ASSAY OF FREE THYROXINE: CPT

## 2023-04-29 PROCEDURE — 80053 COMPREHEN METABOLIC PANEL: CPT

## 2023-04-29 PROCEDURE — 84443 ASSAY THYROID STIM HORMONE: CPT

## 2023-04-29 PROCEDURE — 85025 COMPLETE CBC W/AUTO DIFF WBC: CPT

## 2023-04-29 PROCEDURE — 83036 HEMOGLOBIN GLYCOSYLATED A1C: CPT

## 2023-04-29 PROCEDURE — 36415 COLL VENOUS BLD VENIPUNCTURE: CPT

## 2023-04-29 PROCEDURE — 80061 LIPID PANEL: CPT

## 2023-05-09 PROBLEM — K57.30 DIVERTICULOSIS OF COLON: Status: ACTIVE | Noted: 2020-08-01

## 2023-05-09 PROBLEM — I10 ESSENTIAL HYPERTENSION: Status: ACTIVE | Noted: 2023-05-09

## 2023-05-09 PROBLEM — E66.01 MORBID OBESITY (HCC): Status: ACTIVE | Noted: 2023-05-09

## 2023-05-30 ENCOUNTER — HOSPITAL ENCOUNTER (OUTPATIENT)
Age: 58
Discharge: HOME | End: 2023-05-30
Payer: COMMERCIAL

## 2023-05-30 VITALS
TEMPERATURE: 97 F | DIASTOLIC BLOOD PRESSURE: 78 MMHG | HEART RATE: 75 BPM | SYSTOLIC BLOOD PRESSURE: 148 MMHG | RESPIRATION RATE: 20 BRPM | OXYGEN SATURATION: 96 %

## 2023-05-30 DIAGNOSIS — B97.89 VIRAL SINUSITIS: ICD-10-CM

## 2023-05-30 DIAGNOSIS — H60.502 ACUTE OTITIS EXTERNA OF LEFT EAR, UNSPECIFIED TYPE: Primary | ICD-10-CM

## 2023-05-30 DIAGNOSIS — J32.9 VIRAL SINUSITIS: ICD-10-CM

## 2023-05-30 PROCEDURE — 99203 OFFICE O/P NEW LOW 30 MIN: CPT | Performed by: NURSE PRACTITIONER

## 2023-05-30 RX ORDER — CIPROFLOXACIN AND DEXAMETHASONE 3; 1 MG/ML; MG/ML
4 SUSPENSION/ DROPS AURICULAR (OTIC) 2 TIMES DAILY
Qty: 1 EACH | Refills: 0 | Status: SHIPPED | OUTPATIENT
Start: 2023-05-30

## 2023-05-30 NOTE — DISCHARGE INSTRUCTIONS
Ciprodex ear drops, 4 drops to left ear twice per day for 7 days.  Keep ears as dry as possible  Start flonase nasal spray 2 sprays in each nostril daily for 2 weeks  Obie Kat Sinus rinse 2-3 times per day  Push fluids  Steam showers   Return for any new/worsening symptoms

## 2023-06-26 ENCOUNTER — ORDER TRANSCRIPTION (OUTPATIENT)
Dept: ADMINISTRATIVE | Facility: HOSPITAL | Age: 58
End: 2023-06-26

## 2023-06-26 DIAGNOSIS — Z12.31 ENCOUNTER FOR SCREENING MAMMOGRAM FOR MALIGNANT NEOPLASM OF BREAST: Primary | ICD-10-CM

## 2023-06-29 ENCOUNTER — HOSPITAL ENCOUNTER (OUTPATIENT)
Dept: MAMMOGRAPHY | Age: 58
Discharge: HOME | End: 2023-06-29
Attending: INTERNAL MEDICINE
Payer: COMMERCIAL

## 2023-06-29 DIAGNOSIS — Z12.31 ENCOUNTER FOR SCREENING MAMMOGRAM FOR MALIGNANT NEOPLASM OF BREAST: ICD-10-CM

## 2023-06-29 PROCEDURE — 77067 SCR MAMMO BI INCL CAD: CPT | Performed by: INTERNAL MEDICINE

## 2023-06-29 PROCEDURE — 77063 BREAST TOMOSYNTHESIS BI: CPT | Performed by: INTERNAL MEDICINE

## 2023-08-25 ENCOUNTER — NURSE TRIAGE (OUTPATIENT)
Dept: INTERNAL MEDICINE CLINIC | Facility: CLINIC | Age: 58
End: 2023-08-25

## 2023-08-25 ENCOUNTER — OFFICE VISIT (OUTPATIENT)
Dept: INTERNAL MEDICINE CLINIC | Facility: CLINIC | Age: 58
End: 2023-08-25

## 2023-08-25 VITALS
DIASTOLIC BLOOD PRESSURE: 82 MMHG | OXYGEN SATURATION: 95 % | BODY MASS INDEX: 52.48 KG/M2 | WEIGHT: 285.19 LBS | HEIGHT: 62 IN | SYSTOLIC BLOOD PRESSURE: 138 MMHG | HEART RATE: 68 BPM

## 2023-08-25 DIAGNOSIS — M54.42 CHRONIC BILATERAL LOW BACK PAIN WITH BILATERAL SCIATICA: Primary | ICD-10-CM

## 2023-08-25 DIAGNOSIS — M79.601 BILATERAL ARM PAIN: ICD-10-CM

## 2023-08-25 DIAGNOSIS — M79.602 BILATERAL ARM PAIN: ICD-10-CM

## 2023-08-25 DIAGNOSIS — G89.29 CHRONIC BILATERAL LOW BACK PAIN WITH BILATERAL SCIATICA: Primary | ICD-10-CM

## 2023-08-25 DIAGNOSIS — M54.41 CHRONIC BILATERAL LOW BACK PAIN WITH BILATERAL SCIATICA: Primary | ICD-10-CM

## 2023-08-25 PROCEDURE — 99214 OFFICE O/P EST MOD 30 MIN: CPT

## 2023-08-25 PROCEDURE — 3008F BODY MASS INDEX DOCD: CPT

## 2023-08-25 PROCEDURE — 3075F SYST BP GE 130 - 139MM HG: CPT

## 2023-08-25 PROCEDURE — 3079F DIAST BP 80-89 MM HG: CPT

## 2023-08-25 RX ORDER — METHYLPREDNISOLONE 4 MG/1
TABLET ORAL
Qty: 1 EACH | Refills: 0 | Status: SHIPPED | OUTPATIENT
Start: 2023-08-25

## 2023-08-25 RX ORDER — GABAPENTIN 100 MG/1
100 CAPSULE ORAL 3 TIMES DAILY PRN
Qty: 30 CAPSULE | Refills: 1 | Status: SHIPPED | OUTPATIENT
Start: 2023-08-25

## 2023-08-25 NOTE — TELEPHONE ENCOUNTER
Action Requested: Summary for Provider     []  Critical Lab, Recommendations Needed  [] Need Additional Advice  [x]   FYI    []   Need Orders  [] Need Medications Sent to Pharmacy  []  Other     SUMMARY:   Spoke with pt,  verified, pt c/io back pain, she was seen by specialist before and was told nothing else they can do for her. Pt has damage on her back L4-L5  Pt has severe back pain with rate 8/10,  nerve pain goes down to her leg and arm, she can't even walk, hard to sleep, she is under a lot of stress. She need to lay on her side for flare to subside out. Pt tried Naproxen 500 mg BID, ineffective, Pt req prednisone or muscle relaxant. Pt is looking for appt. appt made with Kavitha Chong for eval & treat. LOV 23  See [employer] message below. Reason for call: back pain  Onset: 2 days         Future Appointments   Date Time Provider Sydney Botello   2023  4:00 PM ANGELA Sorianoph Asper   10/30/2023  2:30 PM Jade Davis MD [address] [facility] THE [facility]         Reason for Disposition   Age > 48 and no history of prior similar back pain    Protocols used: Back Pain-A-[place]              I've been having severe back pain. It is sending nerve pain down my legs and arms. Is there any medicine you can please give. Steroids? I've been taking the Naproxen with no relief. The next appointment available is September. Can call me at [phone]. I appreciate all you do!   Cecy Hudson

## 2023-09-11 DIAGNOSIS — G89.29 CHRONIC BILATERAL LOW BACK PAIN WITH BILATERAL SCIATICA: ICD-10-CM

## 2023-09-11 DIAGNOSIS — M54.42 CHRONIC BILATERAL LOW BACK PAIN WITH BILATERAL SCIATICA: ICD-10-CM

## 2023-09-11 DIAGNOSIS — M54.41 CHRONIC BILATERAL LOW BACK PAIN WITH BILATERAL SCIATICA: ICD-10-CM

## 2023-09-12 RX ORDER — GABAPENTIN 100 MG/1
CAPSULE ORAL
Qty: 30 CAPSULE | Refills: 0 | Status: SHIPPED | OUTPATIENT
Start: 2023-09-12 | End: 2023-09-16 | Stop reason: ALTCHOICE

## 2023-09-15 ENCOUNTER — PATIENT MESSAGE (OUTPATIENT)
Dept: INTERNAL MEDICINE CLINIC | Facility: CLINIC | Age: 58
End: 2023-09-15

## 2023-09-16 ENCOUNTER — OFFICE VISIT (OUTPATIENT)
Dept: INTERNAL MEDICINE CLINIC | Facility: CLINIC | Age: 58
End: 2023-09-16

## 2023-09-16 VITALS
HEIGHT: 62 IN | HEART RATE: 75 BPM | BODY MASS INDEX: 53.15 KG/M2 | SYSTOLIC BLOOD PRESSURE: 154 MMHG | DIASTOLIC BLOOD PRESSURE: 86 MMHG | WEIGHT: 288.81 LBS

## 2023-09-16 DIAGNOSIS — M77.11 RIGHT LATERAL EPICONDYLITIS: Primary | ICD-10-CM

## 2023-09-16 PROCEDURE — 99213 OFFICE O/P EST LOW 20 MIN: CPT | Performed by: INTERNAL MEDICINE

## 2023-09-16 PROCEDURE — 3008F BODY MASS INDEX DOCD: CPT | Performed by: INTERNAL MEDICINE

## 2023-09-16 PROCEDURE — 3079F DIAST BP 80-89 MM HG: CPT | Performed by: INTERNAL MEDICINE

## 2023-09-16 PROCEDURE — 3077F SYST BP >= 140 MM HG: CPT | Performed by: INTERNAL MEDICINE

## 2023-09-19 DIAGNOSIS — M54.41 CHRONIC BILATERAL LOW BACK PAIN WITH BILATERAL SCIATICA: ICD-10-CM

## 2023-09-19 DIAGNOSIS — G89.29 CHRONIC BILATERAL LOW BACK PAIN WITH BILATERAL SCIATICA: ICD-10-CM

## 2023-09-19 DIAGNOSIS — M54.42 CHRONIC BILATERAL LOW BACK PAIN WITH BILATERAL SCIATICA: ICD-10-CM

## 2023-09-19 RX ORDER — GABAPENTIN 100 MG/1
CAPSULE ORAL
Qty: 30 CAPSULE | Refills: 0 | OUTPATIENT
Start: 2023-09-19

## 2023-09-19 NOTE — TELEPHONE ENCOUNTER
Please review. Protocol failed / No Protocol.     Requested Prescriptions   Pending Prescriptions Disp Refills    GABAPENTIN 100 MG Oral Cap [Pharmacy Med Name: Gabapentin 100 Mg Cap Nort] 30 capsule 0     Sig: TAKE ONE CAPSULE BY MOUTH THREE TIMES DAILY AS NEEDED FOR NERVE PAIN       Neurology Medications Passed - 9/19/2023 10:06 AM        Passed - In person appointment or virtual visit in the past 6 mos or appointment in next 3 mos     Recent Outpatient Visits              3 days ago Right lateral epicondylitis    Deisy Parham MD    Office Visit    3 weeks ago Chronic bilateral low back pain with bilateral sciatica    Alexandria Parham APRN    Office Visit    4 months ago Hypothyroidism, unspecified type    Dicie Eisenmenger, MD    Office Visit    12 months ago RUQ pain    Luis Miguel Grant MD    Office Visit    1 year ago Bloating    Amarjit Urbina, Aicha Main MD    Office Visit          Future Appointments         Provider Department Appt Notes    In 1 month MD Amarjit Wiseman, [facility] weight loss without surgery

## 2023-09-25 NOTE — TELEPHONE ENCOUNTER
Dr. Stevenson Pi:   Patient established care 4/29/23. Labs tsh normal.     Please advise on new RX. This has never been prescribed by you yet.

## 2023-09-26 RX ORDER — LEVOTHYROXINE SODIUM 0.15 MG/1
150 TABLET ORAL DAILY
Qty: 90 TABLET | Refills: 3 | Status: SHIPPED | OUTPATIENT
Start: 2023-09-26

## 2023-10-30 ENCOUNTER — OFFICE VISIT (OUTPATIENT)
Dept: SURGERY | Facility: CLINIC | Age: 58
End: 2023-10-30

## 2023-10-30 VITALS
BODY MASS INDEX: 52.81 KG/M2 | HEIGHT: 62 IN | SYSTOLIC BLOOD PRESSURE: 110 MMHG | HEART RATE: 74 BPM | OXYGEN SATURATION: 99 % | DIASTOLIC BLOOD PRESSURE: 80 MMHG | WEIGHT: 287 LBS

## 2023-10-30 DIAGNOSIS — R73.03 PRE-DIABETES: Primary | ICD-10-CM

## 2023-10-30 DIAGNOSIS — E66.01 MORBID OBESITY WITH BMI OF 50.0-59.9, ADULT (HCC): ICD-10-CM

## 2023-10-30 DIAGNOSIS — F43.9 STRESS: ICD-10-CM

## 2023-10-30 PROCEDURE — 99205 OFFICE O/P NEW HI 60 MIN: CPT | Performed by: INTERNAL MEDICINE

## 2023-10-30 PROCEDURE — 3074F SYST BP LT 130 MM HG: CPT | Performed by: INTERNAL MEDICINE

## 2023-10-30 PROCEDURE — 3079F DIAST BP 80-89 MM HG: CPT | Performed by: INTERNAL MEDICINE

## 2023-10-30 PROCEDURE — 3008F BODY MASS INDEX DOCD: CPT | Performed by: INTERNAL MEDICINE

## 2023-11-01 ENCOUNTER — TELEPHONE (OUTPATIENT)
Dept: SURGERY | Facility: CLINIC | Age: 58
End: 2023-11-01

## 2023-11-01 DIAGNOSIS — E66.01 MORBID OBESITY WITH BMI OF 50.0-59.9, ADULT (HCC): Primary | ICD-10-CM

## 2023-11-01 RX ORDER — PHENTERMINE HYDROCHLORIDE 15 MG/1
15 CAPSULE ORAL EVERY MORNING
Qty: 30 CAPSULE | Refills: 2 | Status: SHIPPED | OUTPATIENT
Start: 2023-11-01

## 2023-11-07 ENCOUNTER — HOSPITAL ENCOUNTER (OUTPATIENT)
Age: 58
Discharge: HOME | End: 2023-11-07
Payer: COMMERCIAL

## 2023-11-07 VITALS
TEMPERATURE: 98 F | DIASTOLIC BLOOD PRESSURE: 78 MMHG | BODY MASS INDEX: 51.53 KG/M2 | OXYGEN SATURATION: 97 % | HEART RATE: 90 BPM | RESPIRATION RATE: 18 BRPM | SYSTOLIC BLOOD PRESSURE: 142 MMHG | WEIGHT: 280 LBS | HEIGHT: 62 IN

## 2023-11-07 DIAGNOSIS — J02.9 VIRAL PHARYNGITIS: Primary | ICD-10-CM

## 2023-11-07 LAB
POCT INFLUENZA A: NEGATIVE
POCT INFLUENZA B: NEGATIVE
S PYO AG THROAT QL: NEGATIVE
SARS-COV-2 RNA RESP QL NAA+PROBE: NOT DETECTED

## 2023-11-07 NOTE — DISCHARGE INSTRUCTIONS
Push fluids. Tylenol or ibuprofen as needed for pain or fever. Rest.  Follow-up with your doctor. Return for any concerns.

## 2023-11-13 ENCOUNTER — OFFICE VISIT (OUTPATIENT)
Dept: INTERNAL MEDICINE CLINIC | Facility: CLINIC | Age: 58
End: 2023-11-13
Payer: COMMERCIAL

## 2023-11-13 VITALS
HEART RATE: 86 BPM | OXYGEN SATURATION: 99 % | WEIGHT: 282 LBS | BODY MASS INDEX: 51.89 KG/M2 | HEIGHT: 62 IN | TEMPERATURE: 98 F | SYSTOLIC BLOOD PRESSURE: 133 MMHG | DIASTOLIC BLOOD PRESSURE: 85 MMHG

## 2023-11-13 DIAGNOSIS — J21.9 ACUTE BRONCHIOLITIS DUE TO UNSPECIFIED ORGANISM: ICD-10-CM

## 2023-11-13 DIAGNOSIS — J01.90 ACUTE BACTERIAL SINUSITIS: Primary | ICD-10-CM

## 2023-11-13 DIAGNOSIS — B96.89 ACUTE BACTERIAL SINUSITIS: Primary | ICD-10-CM

## 2023-11-13 PROCEDURE — 3079F DIAST BP 80-89 MM HG: CPT | Performed by: STUDENT IN AN ORGANIZED HEALTH CARE EDUCATION/TRAINING PROGRAM

## 2023-11-13 PROCEDURE — 99214 OFFICE O/P EST MOD 30 MIN: CPT | Performed by: STUDENT IN AN ORGANIZED HEALTH CARE EDUCATION/TRAINING PROGRAM

## 2023-11-13 PROCEDURE — 3008F BODY MASS INDEX DOCD: CPT | Performed by: STUDENT IN AN ORGANIZED HEALTH CARE EDUCATION/TRAINING PROGRAM

## 2023-11-13 PROCEDURE — 3075F SYST BP GE 130 - 139MM HG: CPT | Performed by: STUDENT IN AN ORGANIZED HEALTH CARE EDUCATION/TRAINING PROGRAM

## 2023-11-13 RX ORDER — FLUTICASONE PROPIONATE 50 MCG
2 SPRAY, SUSPENSION (ML) NASAL DAILY
Qty: 3 EACH | Refills: 3 | Status: SHIPPED | OUTPATIENT
Start: 2023-11-13

## 2023-11-13 RX ORDER — BENZONATATE 200 MG/1
200 CAPSULE ORAL 3 TIMES DAILY PRN
Qty: 90 CAPSULE | Refills: 0 | Status: SHIPPED | OUTPATIENT
Start: 2023-11-13 | End: 2023-12-13

## 2023-11-13 RX ORDER — AZITHROMYCIN 250 MG/1
TABLET, FILM COATED ORAL
Qty: 6 TABLET | Refills: 0 | Status: SHIPPED | OUTPATIENT
Start: 2023-11-13 | End: 2023-11-18

## 2023-11-13 RX ORDER — CODEINE PHOSPHATE AND GUAIFENESIN 10; 100 MG/5ML; MG/5ML
5 SOLUTION ORAL EVERY 6 HOURS PRN
Qty: 30 ML | Refills: 0 | Status: SHIPPED | OUTPATIENT
Start: 2023-11-13 | End: 2023-11-20

## 2023-11-13 RX ORDER — ALBUTEROL SULFATE 90 UG/1
AEROSOL, METERED RESPIRATORY (INHALATION)
Qty: 3 EACH | Refills: 9 | Status: SHIPPED | OUTPATIENT
Start: 2023-11-13

## 2023-11-13 RX ORDER — FLUTICASONE PROPIONATE 50 MCG
2 SPRAY, SUSPENSION (ML) NASAL DAILY
Qty: 16 G | Refills: 0 | Status: SHIPPED | OUTPATIENT
Start: 2023-11-13

## 2023-11-13 RX ORDER — AZELASTINE HYDROCHLORIDE 137 UG/1
1-2 SPRAY, METERED NASAL 2 TIMES DAILY
Qty: 30 ML | Refills: 3 | Status: SHIPPED | OUTPATIENT
Start: 2023-11-13

## 2023-11-13 RX ORDER — ESOMEPRAZOLE MAGNESIUM 40 MG/1
40 CAPSULE, DELAYED RELEASE ORAL 2 TIMES DAILY
Qty: 180 CAPSULE | Refills: 0 | Status: SHIPPED | OUTPATIENT
Start: 2023-11-13

## 2023-11-13 RX ORDER — PREDNISONE 10 MG/1
TABLET ORAL
Qty: 30 TABLET | Refills: 0 | Status: SHIPPED | OUTPATIENT
Start: 2023-11-13 | End: 2023-11-24

## 2023-11-13 NOTE — TELEPHONE ENCOUNTER
Refill passed per [facility] protocol. Medications listed as patient reported.     Requested Prescriptions   Pending Prescriptions Disp Refills    ESOMEPRAZOLE MAGNESIUM 40 MG Oral Capsule Delayed Release [Pharmacy Med Name: Esomeprazole Magnesium Dr 40 Mg Cap Nort] 180 capsule 0     Sig: TAKE ONE CAPSULE BY MOUTH TWICE DAILY       Gastrointestional Medication Protocol Passed - 11/11/2023 11:29 AM        Passed - In person appointment or virtual visit in the past 12 mos or appointment in next 3 mos     Recent Outpatient Visits              2 weeks ago Pre-diabetes    [address] [place],[address], [address], Gisel Flower MD    Office Visit    1 month ago Right lateral epicondylitis    Mitra Snowden MD    Office Visit    2 months ago Chronic bilateral low back pain with bilateral sciatica    Anamaria November, [address] [facility],[address], APRN    Office Visit    6 months ago Hypothyroidism, unspecified type    Brian Mead MD    Office Visit    1 year ago RUQ pain    Danielle Hernandez MD    Office Visit          Future Appointments         Provider Department Appt Notes    Today Vilma Boland MD [address] [facility] follow up - still not feeling better    In 3 months Perlita Roe MD [facility], [address], [place] [employer] P[employer]  NON SX F/U                 FLUTICASONE PROPIONATE 50 MCG/ACT Nasal Suspension [Pharmacy Med Name: Fluticasone Propionate 50 Mcg/Act Spr Hikm] 16 g 0     Sig: SPRAY 1 SPRAYS IN EACH NOSTRIL daily       Allergy Medication Protocol Passed - 11/11/2023 11:29 AM        Passed - In person appointment or virtual visit in the past 12 mos or appointment in next 3 mos     Recent Outpatient Visits              2 weeks ago Pre-diabetes Roni Greco MD    Office Visit    1 month ago Right lateral epicondylitis    Christoph Camarillo MD    Office Visit    2 months ago Chronic bilateral low back pain with bilateral sciatica    [facility], [address] [place] [address], [facility] [address], APRN    Office Visit    6 months ago Hypothyroidism, unspecified type    Gigi Estrada MD    Office Visit    1 year ago RUQ pain    Maximo Yee MD    Office Visit          Future Appointments         Provider Department Appt Notes    Today MD Joe Spangler [facility] follow up - still not feeling better    In 3 months Ca Borjas MD [place] [address], [facility] PPO  NON SX F/U                  Recent Outpatient Visits              2 weeks ago Pre-diabetes    Crystal MD Angus    Office Visit    1 month ago Right lateral epicondylitis    Christoph Camarillo MD    Office Visit    2 months ago Chronic bilateral low back pain with bilateral sciatica    [facility], [address] [place] [address] Remington Henning, APRN    Office Visit    6 months ago Hypothyroidism, unspecified type    Gigi Estrada MD    Office Visit    1 year ago RUQ pain    Maximo Yee MD    Office Visit          Future Appointments         Provider Department Appt Notes    Today MD Joe Spangler [facility] follow up - still not feeling better    In 3 months Ca Borjas MD [address], [facility], [facility] PPO  NON SX F/U

## 2023-11-21 ENCOUNTER — PATIENT MESSAGE (OUTPATIENT)
Dept: INTERNAL MEDICINE CLINIC | Facility: CLINIC | Age: 58
End: 2023-11-21

## 2023-11-21 NOTE — TELEPHONE ENCOUNTER
From: Kathi Rios  To: Guille QEABOO  Sent: 11/21/2023 6:20 AM CST  Subject: Melba Morrison    Morning Doctor, First I'd like to say Thank you for seeing me as fast as you did and prescribing me all the medicine needed to get me back on my feet. I 'd like to ask you about this post nasal drip that is keeping my throat raw. Is there anything else I could take? Do I need a stronger antibiotic? Please advise, Thank you for all you do and have a wonderful holiday!     Melba Morrison

## 2023-11-30 ENCOUNTER — OFFICE VISIT (OUTPATIENT)
Dept: OTOLARYNGOLOGY | Facility: CLINIC | Age: 58
End: 2023-11-30

## 2023-11-30 ENCOUNTER — OFFICE VISIT (OUTPATIENT)
Dept: INTERNAL MEDICINE CLINIC | Facility: CLINIC | Age: 58
End: 2023-11-30
Payer: COMMERCIAL

## 2023-11-30 VITALS
HEART RATE: 91 BPM | BODY MASS INDEX: 51.89 KG/M2 | WEIGHT: 282 LBS | TEMPERATURE: 99 F | OXYGEN SATURATION: 98 % | HEIGHT: 62 IN | DIASTOLIC BLOOD PRESSURE: 87 MMHG | SYSTOLIC BLOOD PRESSURE: 142 MMHG

## 2023-11-30 DIAGNOSIS — J32.1 CHRONIC FRONTAL SINUSITIS: Primary | ICD-10-CM

## 2023-11-30 DIAGNOSIS — J32.9 CHRONIC SINUSITIS, UNSPECIFIED LOCATION: Primary | ICD-10-CM

## 2023-11-30 DIAGNOSIS — H10.33 ACUTE BACTERIAL CONJUNCTIVITIS OF BOTH EYES: ICD-10-CM

## 2023-11-30 DIAGNOSIS — R09.82 PND (POST-NASAL DRIP): ICD-10-CM

## 2023-11-30 DIAGNOSIS — R51.9 FACIAL PAIN: ICD-10-CM

## 2023-11-30 DIAGNOSIS — R09.89 PHLEGM IN THROAT: ICD-10-CM

## 2023-11-30 DIAGNOSIS — J18.9 ATYPICAL PNEUMONIA: ICD-10-CM

## 2023-11-30 DIAGNOSIS — R09.81 NASAL CONGESTION: ICD-10-CM

## 2023-11-30 PROCEDURE — 99243 OFF/OP CNSLTJ NEW/EST LOW 30: CPT | Performed by: STUDENT IN AN ORGANIZED HEALTH CARE EDUCATION/TRAINING PROGRAM

## 2023-11-30 PROCEDURE — 3008F BODY MASS INDEX DOCD: CPT | Performed by: STUDENT IN AN ORGANIZED HEALTH CARE EDUCATION/TRAINING PROGRAM

## 2023-11-30 PROCEDURE — 99214 OFFICE O/P EST MOD 30 MIN: CPT | Performed by: STUDENT IN AN ORGANIZED HEALTH CARE EDUCATION/TRAINING PROGRAM

## 2023-11-30 PROCEDURE — 31231 NASAL ENDOSCOPY DX: CPT | Performed by: STUDENT IN AN ORGANIZED HEALTH CARE EDUCATION/TRAINING PROGRAM

## 2023-11-30 PROCEDURE — 3077F SYST BP >= 140 MM HG: CPT | Performed by: STUDENT IN AN ORGANIZED HEALTH CARE EDUCATION/TRAINING PROGRAM

## 2023-11-30 PROCEDURE — 3079F DIAST BP 80-89 MM HG: CPT | Performed by: STUDENT IN AN ORGANIZED HEALTH CARE EDUCATION/TRAINING PROGRAM

## 2023-11-30 RX ORDER — PREDNISONE 20 MG/1
TABLET ORAL
Qty: 15 TABLET | Refills: 0 | Status: SHIPPED | OUTPATIENT
Start: 2023-11-30 | End: 2023-12-10

## 2023-11-30 RX ORDER — GUAIFENESIN AND DEXTROMETHORPHAN HYDROBROMIDE 100; 10 MG/5ML; MG/5ML
5 SOLUTION ORAL EVERY 12 HOURS
Qty: 150 ML | Refills: 0 | Status: SHIPPED | OUTPATIENT
Start: 2023-11-30 | End: 2023-12-15

## 2023-11-30 RX ORDER — DOXYCYCLINE HYCLATE 100 MG/1
100 CAPSULE ORAL 2 TIMES DAILY WITH MEALS
Qty: 20 CAPSULE | Refills: 0 | Status: SHIPPED | OUTPATIENT
Start: 2023-11-30 | End: 2023-12-04

## 2023-11-30 RX ORDER — POLYMYXIN B SULFATE AND TRIMETHOPRIM 1; 10000 MG/ML; [USP'U]/ML
1 SOLUTION OPHTHALMIC EVERY 4 HOURS
Qty: 1 EACH | Refills: 0 | Status: SHIPPED | OUTPATIENT
Start: 2023-11-30

## 2023-11-30 RX ORDER — CODEINE PHOSPHATE AND GUAIFENESIN 10; 100 MG/5ML; MG/5ML
5 SOLUTION ORAL EVERY 6 HOURS PRN
Qty: 30 ML | Refills: 0 | Status: SHIPPED | OUTPATIENT
Start: 2023-11-30 | End: 2023-12-04

## 2023-11-30 NOTE — PROGRESS NOTES
Shawnee Torres is a 62year old female. Chief Complaint   Patient presents with    Sinus Problem     Upper respiratory infection, congestion , cough with phlegm, chills. X 4 weeks        ASSESSMENT AND PLAN:   1. Chronic sinusitis, unspecified location  Is a 59-year-old who presents with a persistent upper respiratory infection symptoms possibly sinusitis. She reports nasal congestion, cough with phlegm and chills for about 4 weeks. She was initially given a course of prednisone and azithromycin which did improve her symptoms although they have not returned here recently. She has never had sinus issues in the past.  She notes a productive cough that is clearish in color. On exam on nasal endoscopy she did have thicker secretions in each of her middle meatus and seemingly draining into her nasopharynx. No gross purulence. Although congestion nasal mucosa. Posterior pharyngeal erythema and cobblestoning present. Does look like she is suffering from possibly an acute or subacute sinusitis. Possibly began as a virus. Agree with doxycycline and instructed her that sometimes a longer course is necessary if her symptoms are still not improved after the 10 days. She should come back and see me if not improved after 10 days to consider prolonging the course or adding another antibiotic. The steroids did help her with her symptoms discussed the risks versus benefits of restarting them. She is quite symptomatic would like to restart the steroids at this time. Will give her a 10-day burst and taper. She is also going to continue the nasal saline irrigations with bottled water. Instructed her to return in 2 weeks if not improved may consider CT scan at that point. Consult from Dr. Baldo Olvera regarding sinusitis     2. PND (post-nasal drip)      3. Facial pain    4. Phlegm in throat      5. Nasal congestion        The patient indicates understanding of these issues and agrees to the plan.       EXAM: There were no vitals taken for this visit. Pertinent exam findings may also be noted above in assessment and plan     System Details   Skin Inspection - Normal.   Constitutional Overall appearance - Normal.   Head/Face Symmetric, TMJ tenderness not present    Eyes EOMI, PERRL   Right ear:  Canal clear, TM intact, no ANGEL   Left ear:  Canal clear, TM intact, no ANGEL   Nose: Septum midline, inferior turbinates not enlarged, nasal valves without collapse    Oral cavity/Oropharynx: No lesions or masses on inspection or palpation, tonsils symmetric    Neck: Soft without LAD, thyroid not enlarged  Voice clear/ no stridor   Other:      Scopes and Procedures:     Nasal Endoscopy Procedure Note     Due to inability for adequate examination of the nose and nasopharynx and need for magnification to perform the examination, endoscopy was performed. Risks and benefits were discussed with patient/family and they have given verbal consent to proceed. Pre-operative Diagnosis:   1. Chronic sinusitis, unspecified location    2. PND (post-nasal drip)    3. Facial pain    4. Phlegm in throat    5. Nasal congestion        Post-operative Diagnosis: Same    Procedure: Diagnostic nasal endoscopy    Anesthesia: Topical anesthetic [place]     Surgeon Aki Cartagena MD    EBL: 0cc    Procedure Detail & Findings:     After placement of topical anesthetic intranasally the endoscope was inserted into each nares and driven through the nasal cavity into the nasopharynx. The following findings were noted:    Septum: Midline  Inferior turbinates: Normal  Middle meatus: Patent  Middle turbinates: Normal  Purulence: On exam on nasal endoscopy she did have thicker secretions in each of her middle meatus and seemingly draining into her nasopharynx. No gross purulence. Although congestion nasal mucosa. Polyps: None noted  Nasopharynx and eustachian tube: No masses  Other:  The middle and superior meatus, the turbinates, and the spheno-ethmoid recess were inspected and seen to be without significant abnormal findings. Condition: Stable    Complications: Patient tolerated the procedure well with no immediate complication. Luis Alfredo Adams MD        Current Outpatient Medications   Medication Sig Dispense Refill    predniSONE 20 MG Oral Tab Take 2 tablets (40 mg total) by mouth daily for 5 days, THEN 1 tablet (20 mg total) daily for 5 days. 15 tablet 0    Esomeprazole Magnesium 40 MG Oral Capsule Delayed Release Take 1 capsule (40 mg total) by mouth 2 (two) times daily. 180 capsule 0    fluticasone propionate 50 MCG/ACT Nasal Suspension 2 sprays by Nasal route daily. 16 g 0    Azelastine HCl 137 MCG/SPRAY Nasal Solution 1-2 sprays by Nasal route in the morning and 1-2 sprays before bedtime. FOR SINUS SYMPTOMS/NASAL CONGESTION. . 30 mL 3    albuterol 108 (90 Base) MCG/ACT Inhalation Aero Soln Inhale 2-4 puffs into the lungs every 4 to 6 hours as needed for Wheezing or Shortness of Breath (cough, asthma, chest tightness). FOR ASTHMA. Pharmacist, please switch to formulary alternative per insurance coverage, ok to replace with proair, ventolin, proventil, or any other albuterol inhaler. -drkp 3 each 9    Phentermine HCl 15 MG Oral Cap Take 1 capsule (15 mg total) by mouth every morning. 30 capsule 2    levothyroxine 150 MCG Oral Tab Take 1 tablet (150 mcg total) by mouth daily. 90 tablet 3    cholecalciferol (VITAMIN D3) 125 MCG (5000 UT) Oral Cap Take 1 capsule (5,000 Units total) by mouth daily. naproxen 500 MG Oral Tab Take 1 tablet (500 mg total) by mouth as needed. guaiFENesin-codeine 100-10 MG/5ML Oral Solution Take 5 mL by mouth every 6 (six) hours as needed for cough. 30 mL 0    dextromethorphan-guaiFENesin  MG/5ML Oral Syrup Take 5 mL by mouth every 12 (twelve) hours for 15 days. 150 mL 0    polymyxin B-trimethoprim 65211-7.1 UNIT/ML-% Ophthalmic Solution Place 1 drop into both eyes every 4 (four) hours.  Instill 1 drop in affected eye(s) every 4 hours (maximum: 6 doses per day) for 7-10 days 1 each 0    doxycycline 100 MG Oral Cap Take 1 capsule (100 mg total) by mouth 2 (two) times daily with meals for 10 days. 20 capsule 0    fluticasone propionate 50 MCG/ACT Nasal Suspension 2 sprays by Each Nare route daily. FOR NASAL CONGESTION/SINUS SYMPTOMS. 3 each 3    benzonatate 200 MG Oral Cap Take 1 capsule (200 mg total) by mouth 3 (three) times daily as needed for cough (FOR COUGH).  (Patient not taking: Reported on 11/30/2023) 90 capsule 0      Past Medical History:   Diagnosis Date    Esophageal reflux     Morbid obesity with BMI of 50.0-59.9, adult ([facility] [address]     Pre-diabetes     Thyroid disease       Social History:  Social History     Socioeconomic History    Marital status:    Tobacco Use    Smoking status: Never     Passive exposure: Never    Smokeless tobacco: Never   Vaping Use    Vaping Use: Never used   Substance and Sexual Activity    Alcohol use: Not Currently    Drug use: Never          Aki Cartagena MD  11/30/2023  2:57 PM

## 2023-12-04 ENCOUNTER — HOSPITAL ENCOUNTER (OUTPATIENT)
Dept: GENERAL RADIOLOGY | Age: 58
Discharge: HOME | End: 2023-12-04
Attending: STUDENT IN AN ORGANIZED HEALTH CARE EDUCATION/TRAINING PROGRAM
Payer: COMMERCIAL

## 2023-12-04 ENCOUNTER — OFFICE VISIT (OUTPATIENT)
Dept: INTERNAL MEDICINE CLINIC | Facility: CLINIC | Age: 58
End: 2023-12-04

## 2023-12-04 ENCOUNTER — TELEPHONE (OUTPATIENT)
Dept: INTERNAL MEDICINE CLINIC | Facility: CLINIC | Age: 58
End: 2023-12-04

## 2023-12-04 VITALS
BODY MASS INDEX: 51.89 KG/M2 | HEIGHT: 62 IN | HEART RATE: 71 BPM | SYSTOLIC BLOOD PRESSURE: 138 MMHG | WEIGHT: 282 LBS | OXYGEN SATURATION: 98 % | DIASTOLIC BLOOD PRESSURE: 93 MMHG

## 2023-12-04 DIAGNOSIS — J18.9 ATYPICAL PNEUMONIA: ICD-10-CM

## 2023-12-04 DIAGNOSIS — J32.1 CHRONIC FRONTAL SINUSITIS: Primary | ICD-10-CM

## 2023-12-04 PROCEDURE — 3075F SYST BP GE 130 - 139MM HG: CPT | Performed by: STUDENT IN AN ORGANIZED HEALTH CARE EDUCATION/TRAINING PROGRAM

## 2023-12-04 PROCEDURE — 99214 OFFICE O/P EST MOD 30 MIN: CPT | Performed by: STUDENT IN AN ORGANIZED HEALTH CARE EDUCATION/TRAINING PROGRAM

## 2023-12-04 PROCEDURE — 3008F BODY MASS INDEX DOCD: CPT | Performed by: STUDENT IN AN ORGANIZED HEALTH CARE EDUCATION/TRAINING PROGRAM

## 2023-12-04 PROCEDURE — 3080F DIAST BP >= 90 MM HG: CPT | Performed by: STUDENT IN AN ORGANIZED HEALTH CARE EDUCATION/TRAINING PROGRAM

## 2023-12-04 PROCEDURE — 71046 X-RAY EXAM CHEST 2 VIEWS: CPT | Performed by: STUDENT IN AN ORGANIZED HEALTH CARE EDUCATION/TRAINING PROGRAM

## 2023-12-04 RX ORDER — CODEINE PHOSPHATE AND GUAIFENESIN 10; 100 MG/5ML; MG/5ML
10 SOLUTION ORAL EVERY 6 HOURS PRN
Qty: 118 ML | Refills: 0 | Status: SHIPPED | OUTPATIENT
Start: 2023-12-04 | End: 2023-12-14

## 2023-12-04 RX ORDER — DOXYCYCLINE HYCLATE 100 MG/1
100 CAPSULE ORAL 2 TIMES DAILY WITH MEALS
Qty: 20 CAPSULE | Refills: 0 | Status: SHIPPED | OUTPATIENT
Start: 2023-12-04 | End: 2023-12-14

## 2023-12-04 RX ORDER — CEFPODOXIME PROXETIL 200 MG/1
200 TABLET, FILM COATED ORAL 2 TIMES DAILY
Qty: 10 TABLET | Refills: 0 | Status: SHIPPED | OUTPATIENT
Start: 2023-12-04 | End: 2023-12-09

## 2023-12-04 NOTE — TELEPHONE ENCOUNTER
Patient was seen on 11/30, still not feeling well. Asking to extend work note to return tomorrow 12/5.

## 2023-12-04 NOTE — TELEPHONE ENCOUNTER
Called and spoke with patient: She states she is jittery from prednisone, has back pain from coughing. Afebrile. Throat feels like it is starting to get raw again. Patient states overall she doesn't feel like she has improved at all.  Scheduled for follow up     Future Appointments   Date Time Provider Sydney Botello   12/4/2023  3:20 PM Vilma Boland MD [facility]   2/26/2024  2:30 PM Perlita Roe MD [address] [place] [address]

## 2023-12-05 ENCOUNTER — TELEPHONE (OUTPATIENT)
Dept: INTERNAL MEDICINE CLINIC | Facility: CLINIC | Age: 58
End: 2023-12-05

## 2023-12-05 DIAGNOSIS — J45.991 COUGH VARIANT ASTHMA: Primary | ICD-10-CM

## 2023-12-05 RX ORDER — FLUTICASONE PROPIONATE AND SALMETEROL 100; 50 UG/1; UG/1
1 POWDER RESPIRATORY (INHALATION) 2 TIMES DAILY
Qty: 1 EACH | Refills: 3 | Status: SHIPPED | OUTPATIENT
Start: 2023-12-05 | End: 2023-12-11

## 2023-12-05 NOTE — TELEPHONE ENCOUNTER
Patient called, verified Name and . She states she is trying to upload [place] paperwork to send to PCP. Instruction given on how to upload documents as an attachment through [address] Patient verbalized understanding and had no further questions at this time.

## 2023-12-05 NOTE — TELEPHONE ENCOUNTER
Spoke with patient, verified name/. She states only got cough medication. Doxycycline unavailable until . She is asking Dr Joesph Mc about the plan for once a day inhaler for inflammation. This RN called [facility], spoke with Oran. Cefpodoxime had to be ordered, might come in after 5 pm today. Insurance says too soon to fill doxycycline, until . Dr Joesph Mc:  Please advise on antibiotics and inhaler? Office visit 2023  Assessment & Plan     1. Chronic frontal sinusitis (Primary)  -     CT SINUS (W+WO) (CPT=70488); Future; Expected date: 2023  2. Atypical pneumonia  -     Doxycycline Hyclate; Take 1 capsule (100 mg total) by mouth 2 (two) times daily with meals for 10 days. Dispense: 20 capsule; Refill: 0  -     guaiFENesin-Codeine; Take 10 mL by mouth every 6 (six) hours as needed for cough. Dispense: 118 mL; Refill: 0  -     Pulmonary Referral - In Network  -     XR CHEST PA + LAT CHEST (CPT=71046); Future; Expected date: 2023  Other orders  -     Cefpodoxime Proxetil; Take 1 tablet (200 mg total) by mouth 2 (two) times daily for 5 days. Dispense: 10 tablet; Refill: 0  Given concern of patient may have a mucoid or atypical pneumonia given prolonged subacute cough we will treat with prolonged doxycycline course   -Start Cefpodoxime (200mg) BID for 5 days  -obtain CXR for suspected CAP/Atypical PNA  -Refill Cherry tussin for cough  -ordered CT Sinus   -refer to Pulmonology for further evaluation given ongoing cough and symptoms, but will revisit need next week  -no work till re-evaluation next Monday.  Might need CLIFFORD and ICS/LABA if no improvement

## 2023-12-05 NOTE — TELEPHONE ENCOUNTER
Hello,   I just sent a prescription for Advair Discus. Its ok to wait for doxycyline till 12/8 since insurance wont cover till then. Please relay to patient.

## 2023-12-05 NOTE — TELEPHONE ENCOUNTER
Spoke with patient, (  Name and  verified ) informed of Dr. Kurt Aguero 's  instructions below    Patient verbalizes understanding and agrees with plan.

## 2023-12-05 NOTE — PROGRESS NOTES
Please relay the following to the patient:   Ariane Weber, I reviewed your chest xray there is no signs of any cardiopulmonary disease. I suspect it might be a cough variant asthma and believe the controller inhaler (new one I prescribed twice daily) and rescue inhaler as well. I want to continue with the antibiotics since you do have sinusitis aswell as I discussed with Dr. Ginna Frausto. Follow up with me next Monday to see how you are improving.

## 2023-12-05 NOTE — TELEPHONE ENCOUNTER
Pharmacist is calling and states the patient contacted them in regards to a inhaler for daily use for inflammation. The only inhaler that's on her chart is showing from 11/13/2023, which is listed below.       albuterol 108 (90 Base) MCG/ACT Inhalation Aero Soln

## 2023-12-06 NOTE — TELEPHONE ENCOUNTER
MORGAN received via [employer].  Sent [employer] message for BRITTANY/FCR completion Ambulatory

## 2023-12-11 ENCOUNTER — OFFICE VISIT (OUTPATIENT)
Dept: INTERNAL MEDICINE CLINIC | Facility: CLINIC | Age: 58
End: 2023-12-11

## 2023-12-11 ENCOUNTER — HOSPITAL ENCOUNTER (OUTPATIENT)
Dept: CT IMAGING | Facility: HOSPITAL | Age: 58
Discharge: HOME | End: 2023-12-11
Attending: STUDENT IN AN ORGANIZED HEALTH CARE EDUCATION/TRAINING PROGRAM
Payer: COMMERCIAL

## 2023-12-11 ENCOUNTER — LAB ENCOUNTER (OUTPATIENT)
Dept: LAB | Facility: HOSPITAL | Age: 58
End: 2023-12-11
Attending: STUDENT IN AN ORGANIZED HEALTH CARE EDUCATION/TRAINING PROGRAM
Payer: COMMERCIAL

## 2023-12-11 VITALS
WEIGHT: 281 LBS | HEART RATE: 69 BPM | BODY MASS INDEX: 51.71 KG/M2 | OXYGEN SATURATION: 97 % | TEMPERATURE: 98 F | DIASTOLIC BLOOD PRESSURE: 84 MMHG | HEIGHT: 62 IN | SYSTOLIC BLOOD PRESSURE: 142 MMHG

## 2023-12-11 DIAGNOSIS — J32.1 CHRONIC FRONTAL SINUSITIS: ICD-10-CM

## 2023-12-11 DIAGNOSIS — J21.9 ACUTE BRONCHIOLITIS DUE TO UNSPECIFIED ORGANISM: ICD-10-CM

## 2023-12-11 DIAGNOSIS — K20.80 PILL ESOPHAGITIS DUE TO TETRACYCLINE: ICD-10-CM

## 2023-12-11 DIAGNOSIS — T46.2X5A PILL ESOPHAGITIS DUE TO TETRACYCLINE: ICD-10-CM

## 2023-12-11 DIAGNOSIS — R07.0 THROAT PAIN: ICD-10-CM

## 2023-12-11 DIAGNOSIS — J32.4 CHRONIC PANSINUSITIS: Primary | ICD-10-CM

## 2023-12-11 DIAGNOSIS — J32.4 CHRONIC PANSINUSITIS: ICD-10-CM

## 2023-12-11 LAB
ALBUMIN SERPL-MCNC: 4 G/DL (ref 3.2–4.8)
ALBUMIN/GLOB SERPL: 1.6 {RATIO} (ref 1–2)
ALP LIVER SERPL-CCNC: 110 U/L
ALT SERPL-CCNC: 21 U/L
ANION GAP SERPL CALC-SCNC: 3 MMOL/L (ref 0–18)
AST SERPL-CCNC: 18 U/L (ref ?–34)
BASOPHILS # BLD AUTO: 0.07 X10(3) UL (ref 0–0.2)
BASOPHILS NFR BLD AUTO: 0.6 %
BILIRUB SERPL-MCNC: 0.4 MG/DL (ref 0.3–1.2)
BUN BLD-MCNC: 19 MG/DL (ref 9–23)
BUN/CREAT SERPL: 21.8 (ref 10–20)
CALCIUM BLD-MCNC: 9.7 MG/DL (ref 8.7–10.4)
CHLORIDE SERPL-SCNC: 106 MMOL/L (ref 98–112)
CO2 SERPL-SCNC: 29 MMOL/L (ref 21–32)
CREAT BLD-MCNC: 0.87 MG/DL
CRP SERPL-MCNC: <0.4 MG/DL (ref ?–1)
DEPRECATED RDW RBC AUTO: 41.4 FL (ref 35.1–46.3)
EGFRCR SERPLBLD CKD-EPI 2021: 77 ML/MIN/1.73M2 (ref 60–?)
EOSINOPHIL # BLD AUTO: 0.21 X10(3) UL (ref 0–0.7)
EOSINOPHIL NFR BLD AUTO: 1.8 %
ERYTHROCYTE [DISTWIDTH] IN BLOOD BY AUTOMATED COUNT: 13.4 % (ref 11–15)
ERYTHROCYTE [SEDIMENTATION RATE] IN BLOOD: 16 MM/HR
FASTING STATUS PATIENT QL REPORTED: NO
GLOBULIN PLAS-MCNC: 2.5 G/DL (ref 2.8–4.4)
GLUCOSE BLD-MCNC: 87 MG/DL (ref 70–99)
HCT VFR BLD AUTO: 42.6 %
HGB BLD-MCNC: 14.2 G/DL
IMM GRANULOCYTES # BLD AUTO: 0.15 X10(3) UL (ref 0–1)
IMM GRANULOCYTES NFR BLD: 1.3 %
LYMPHOCYTES # BLD AUTO: 4.27 X10(3) UL (ref 1–4)
LYMPHOCYTES NFR BLD AUTO: 36.9 %
MCH RBC QN AUTO: 28.6 PG (ref 26–34)
MCHC RBC AUTO-ENTMCNC: 33.3 G/DL (ref 31–37)
MCV RBC AUTO: 85.9 FL
MONOCYTES # BLD AUTO: 1.1 X10(3) UL (ref 0.1–1)
MONOCYTES NFR BLD AUTO: 9.5 %
NEUTROPHILS # BLD AUTO: 5.78 X10 (3) UL (ref 1.5–7.7)
NEUTROPHILS # BLD AUTO: 5.78 X10(3) UL (ref 1.5–7.7)
NEUTROPHILS NFR BLD AUTO: 49.9 %
OSMOLALITY SERPL CALC.SUM OF ELEC: 288 MOSM/KG (ref 275–295)
PLATELET # BLD AUTO: 340 10(3)UL (ref 150–450)
POTASSIUM SERPL-SCNC: 3.8 MMOL/L (ref 3.5–5.1)
PROT SERPL-MCNC: 6.5 G/DL (ref 5.7–8.2)
RBC # BLD AUTO: 4.96 X10(6)UL
SODIUM SERPL-SCNC: 138 MMOL/L (ref 136–145)
WBC # BLD AUTO: 11.6 X10(3) UL (ref 4–11)

## 2023-12-11 PROCEDURE — 70486 CT MAXILLOFACIAL W/O DYE: CPT | Performed by: STUDENT IN AN ORGANIZED HEALTH CARE EDUCATION/TRAINING PROGRAM

## 2023-12-11 PROCEDURE — 99213 OFFICE O/P EST LOW 20 MIN: CPT | Performed by: STUDENT IN AN ORGANIZED HEALTH CARE EDUCATION/TRAINING PROGRAM

## 2023-12-11 PROCEDURE — 3079F DIAST BP 80-89 MM HG: CPT | Performed by: STUDENT IN AN ORGANIZED HEALTH CARE EDUCATION/TRAINING PROGRAM

## 2023-12-11 PROCEDURE — 85652 RBC SED RATE AUTOMATED: CPT

## 2023-12-11 PROCEDURE — 85025 COMPLETE CBC W/AUTO DIFF WBC: CPT

## 2023-12-11 PROCEDURE — 3077F SYST BP >= 140 MM HG: CPT | Performed by: STUDENT IN AN ORGANIZED HEALTH CARE EDUCATION/TRAINING PROGRAM

## 2023-12-11 PROCEDURE — 86140 C-REACTIVE PROTEIN: CPT

## 2023-12-11 PROCEDURE — 3008F BODY MASS INDEX DOCD: CPT | Performed by: STUDENT IN AN ORGANIZED HEALTH CARE EDUCATION/TRAINING PROGRAM

## 2023-12-11 PROCEDURE — 80053 COMPREHEN METABOLIC PANEL: CPT

## 2023-12-11 PROCEDURE — 36415 COLL VENOUS BLD VENIPUNCTURE: CPT

## 2023-12-11 RX ORDER — FLUTICASONE PROPIONATE AND SALMETEROL XINAFOATE 115; 21 UG/1; UG/1
2 AEROSOL, METERED RESPIRATORY (INHALATION) 2 TIMES DAILY
Qty: 1 EACH | Refills: 9 | Status: SHIPPED | OUTPATIENT
Start: 2023-12-11

## 2023-12-11 RX ORDER — BUDESONIDE AND FORMOTEROL FUMARATE DIHYDRATE 160; 4.5 UG/1; UG/1
2 AEROSOL RESPIRATORY (INHALATION) 2 TIMES DAILY
Qty: 1 EACH | Refills: 9 | Status: SHIPPED | OUTPATIENT
Start: 2023-12-11

## 2023-12-11 RX ORDER — MOMETASONE FUROATE AND FORMOTEROL FUMARATE DIHYDRATE 100; 5 UG/1; UG/1
2 AEROSOL RESPIRATORY (INHALATION) 2 TIMES DAILY
Qty: 1 EACH | Refills: 9 | Status: SHIPPED | OUTPATIENT
Start: 2023-12-11

## 2023-12-11 RX ORDER — FLUTICASONE PROPIONATE AND SALMETEROL 250; 50 UG/1; UG/1
1 POWDER RESPIRATORY (INHALATION) EVERY 12 HOURS SCHEDULED
Qty: 1 EACH | Refills: 9 | Status: SHIPPED | OUTPATIENT
Start: 2023-12-11

## 2023-12-12 DIAGNOSIS — J06.9 UPPER RESPIRATORY TRACT INFECTION, UNSPECIFIED TYPE: Primary | ICD-10-CM

## 2023-12-12 NOTE — PROGRESS NOTES
Please relay to patient below:  Regla Ms. Brenda Painter, I reviewed your recent blood work. Your CMP shows your kidney and liver function is good as is inflammatory markers. Your Complete Blood count shows a mild leukocytosis, lymphocytosis and monocytosis (this is consistent with your body fighting a ongoing/chronic infection). Given with recent CT sinuses were largely unremarkable, I suspected you might be recovering from Covid, you could do a home antigen test to see if you are still positive or in clinic.

## 2023-12-12 NOTE — PROGRESS NOTES
Good morning RN triage,   Please notify the patient her CT scan of sinuses noted a left deviated septum with small calcifation but overall normal paranasal sinuses. I suspect pt might have had a pneumonia and covid. We could test her or she can do at home test but will not likely  since she has been ill for well over 3 weeks and likely has post viral/reactive airway disease. She can follow up with ENT and myself if no improvement in next 1-2 weeks.

## 2023-12-14 ENCOUNTER — OFFICE VISIT (OUTPATIENT)
Dept: OTOLARYNGOLOGY | Facility: CLINIC | Age: 58
End: 2023-12-14

## 2023-12-14 DIAGNOSIS — K14.0 TONGUE ULCER: Primary | ICD-10-CM

## 2023-12-14 DIAGNOSIS — R09.81 NASAL CONGESTION: ICD-10-CM

## 2023-12-14 PROCEDURE — 99213 OFFICE O/P EST LOW 20 MIN: CPT | Performed by: STUDENT IN AN ORGANIZED HEALTH CARE EDUCATION/TRAINING PROGRAM

## 2023-12-14 NOTE — PROGRESS NOTES
Zuleyma Churchill is a 62year old female. Chief Complaint   Patient presents with    Follow - Up     Pt here to reevaluate on chronic sinusitis, Ct results are in to discuss. ASSESSMENT AND PLAN:   1. Tongue ulcer  51-year-old who I had seen several weeks ago with a possible sinusitis at that time she was started on doxycycline and an oral steroid course. She overall is feeling much better. She had a CT of her sinuses on December 11 that was normal.  She reports some tongue ulcers from the antibiotics possibly    Exam appears that her oral ulcers are healing. Minimal pharyngeal erythema. No rhinorrhea. Either her sinusitis resolved by the time of the CT scan or she did not have a sinusitis, possibly a viral URI. Regardless she is feeling much better and recovering. May have had oral mucosal reaction with antibiotics which is improving. She can see me back as needed. 2. Nasal congestion        The patient indicates understanding of these issues and agrees to the plan. EXAM:   There were no vitals taken for this visit. Pertinent exam findings may also be noted above in assessment and plan     System Details   Skin Inspection - Normal.   Constitutional Overall appearance - Normal.   Head/Face Symmetric, TMJ tenderness not present    Eyes EOMI, PERRL   Right ear:  Canal clear, TM intact, no ANGEL   Left ear:  Canal clear, TM intact, no ANGEL   Nose: Septum midline, inferior turbinates not enlarged, nasal valves without collapse    Oral cavity/Oropharynx: No lesions or masses on inspection or palpation, tonsils symmetric    Neck: Soft without LAD, thyroid not enlarged  Voice clear/ no stridor   Other:      Scopes and Procedures:             Current Outpatient Medications   Medication Sig Dispense Refill    fluticasone-salmeterol (Darra Boxer) 250-50 MCG/ACT Inhalation Aerosol Powder, Breath Activated Inhale 1 puff into the lungs every 12 (twelve) hours.  1 each 9    Mometasone Furo-Formoterol Fum (DULERA) 100-5 MCG/ACT Inhalation Aerosol Inhale 2 puffs into the lungs 2 (two) times daily. 1 each 9    fluticasone-salmeterol 115-21 MCG/ACT Inhalation Aerosol Inhale 2 puffs into the lungs 2 (two) times daily. FOR ASTHMA 1 each 9    Budesonide-Formoterol Fumarate 160-4.5 MCG/ACT Inhalation Aerosol Inhale 2 puffs into the lungs 2 (two) times daily. 1 each 9    Budesonide-Formoterol Fumarate 160-4.5 MCG/ACT Inhalation Aerosol Inhale 2 puffs into the lungs 2 (two) times daily. 1 each 9    nystatin 777635 UNIT/ML Mouth/Throat Suspension Take 5 mL (500,000 Units total) by mouth 4 (four) times daily for 7 days. 140 mL 0    guaiFENesin-codeine 100-10 MG/5ML Oral Solution Take 10 mL by mouth every 6 (six) hours as needed for cough. 118 mL 0    Esomeprazole Magnesium 40 MG Oral Capsule Delayed Release Take 1 capsule (40 mg total) by mouth 2 (two) times daily. 180 capsule 0    fluticasone propionate 50 MCG/ACT Nasal Suspension 2 sprays by Nasal route daily. 16 g 0    Azelastine HCl 137 MCG/SPRAY Nasal Solution 1-2 sprays by Nasal route in the morning and 1-2 sprays before bedtime. FOR SINUS SYMPTOMS/NASAL CONGESTION. . 30 mL 3    albuterol 108 (90 Base) MCG/ACT Inhalation Aero Soln Inhale 2-4 puffs into the lungs every 4 to 6 hours as needed for Wheezing or Shortness of Breath (cough, asthma, chest tightness). FOR ASTHMA. Pharmacist, please switch to formulary alternative per insurance coverage, ok to replace with proair, ventolin, proventil, or any other albuterol inhaler. -drkp 3 each 9    levothyroxine 150 MCG Oral Tab Take 1 tablet (150 mcg total) by mouth daily. 90 tablet 3    cholecalciferol (VITAMIN D3) 125 MCG (5000 UT) Oral Cap Take 1 capsule (5,000 Units total) by mouth daily. doxycycline 100 MG Oral Cap Take 1 capsule (100 mg total) by mouth 2 (two) times daily with meals for 10 days.  (Patient not taking: Reported on 12/11/2023) 20 capsule 0    dextromethorphan-guaiFENesin  MG/5ML Oral Syrup Take 5 mL by mouth every 12 (twelve) hours for 15 days. (Patient not taking: Reported on 12/11/2023) 150 mL 0    polymyxin B-trimethoprim 15750-6.1 UNIT/ML-% Ophthalmic Solution Place 1 drop into both eyes every 4 (four) hours. Instill 1 drop in affected eye(s) every 4 hours (maximum: 6 doses per day) for 7-10 days (Patient not taking: Reported on 12/11/2023) 1 each 0    fluticasone propionate 50 MCG/ACT Nasal Suspension 2 sprays by Each Nare route daily. FOR NASAL CONGESTION/SINUS SYMPTOMS. 3 each 3    Phentermine HCl 15 MG Oral Cap Take 1 capsule (15 mg total) by mouth every morning. (Patient not taking: Reported on 12/11/2023) 30 capsule 2    naproxen 500 MG Oral Tab Take 1 tablet (500 mg total) by mouth as needed.  (Patient not taking: Reported on 12/11/2023)        Past Medical History:   Diagnosis Date    Esophageal reflux     Morbid obesity with BMI of 50.0-59.9, adult ([facility] [address]     Pre-diabetes     Thyroid disease       Social History:  Social History     Socioeconomic History    Marital status:    Tobacco Use    Smoking status: Never     Passive exposure: Never    Smokeless tobacco: Never   Vaping Use    Vaping Use: Never used   Substance and Sexual Activity    Alcohol use: Not Currently    Drug use: Never          Yelena Rosario MD  12/14/2023  3:07 PM

## 2024-02-26 ENCOUNTER — OFFICE VISIT (OUTPATIENT)
Dept: SURGERY | Facility: CLINIC | Age: 59
End: 2024-02-26
Payer: COMMERCIAL

## 2024-02-26 VITALS
BODY MASS INDEX: 52.63 KG/M2 | HEART RATE: 72 BPM | WEIGHT: 286 LBS | SYSTOLIC BLOOD PRESSURE: 126 MMHG | DIASTOLIC BLOOD PRESSURE: 82 MMHG | HEIGHT: 62 IN | OXYGEN SATURATION: 96 %

## 2024-02-26 DIAGNOSIS — E66.01 MORBID OBESITY WITH BMI OF 50.0-59.9, ADULT (HCC): ICD-10-CM

## 2024-02-26 DIAGNOSIS — Z51.81 ENCOUNTER FOR THERAPEUTIC DRUG MONITORING: ICD-10-CM

## 2024-02-26 DIAGNOSIS — E11.9 TYPE 2 DIABETES MELLITUS WITHOUT COMPLICATION, WITHOUT LONG-TERM CURRENT USE OF INSULIN (HCC): Primary | ICD-10-CM

## 2024-02-26 DIAGNOSIS — F43.9 STRESS: ICD-10-CM

## 2024-02-26 PROCEDURE — 3074F SYST BP LT 130 MM HG: CPT | Performed by: INTERNAL MEDICINE

## 2024-02-26 PROCEDURE — 3008F BODY MASS INDEX DOCD: CPT | Performed by: INTERNAL MEDICINE

## 2024-02-26 PROCEDURE — 3079F DIAST BP 80-89 MM HG: CPT | Performed by: INTERNAL MEDICINE

## 2024-02-26 PROCEDURE — 99214 OFFICE O/P EST MOD 30 MIN: CPT | Performed by: INTERNAL MEDICINE

## 2024-02-26 RX ORDER — ESCITALOPRAM OXALATE 5 MG/1
5 TABLET ORAL DAILY
Qty: 90 TABLET | Refills: 1 | Status: SHIPPED | OUTPATIENT
Start: 2024-02-26 | End: 2024-05-26

## 2024-02-26 RX ORDER — SEMAGLUTIDE 0.68 MG/ML
0.25 INJECTION, SOLUTION SUBCUTANEOUS WEEKLY
Qty: 3 ML | Refills: 5 | Status: SHIPPED | OUTPATIENT
Start: 2024-02-26 | End: 2024-03-27

## 2024-02-26 NOTE — PROGRESS NOTES
[facility]  [address] [facility] [address] [place] [id number]  Dept: [phone]       Patient:  Radha Rodrigez  :      1965  MRN:      [id number]    Chief Complaint:    Chief Complaint   Patient presents with    Follow - Up    Weight Management       SUBJECTIVE     History of Present Illness:  Radha is being seen today for a follow-up for non surgical weight loss    Past Medical History:   Past Medical History:   Diagnosis Date    Esophageal reflux     Morbid obesity with BMI of 50.0-59.9, adult (HCC)     Pre-diabetes     Thyroid disease         Comorbidities:  Back pain-Improvement?  yes, Joint pain-Improvement?  yes, VANE-Improvement?  yes, and Snoring-Improvement?  yes    OBJECTIVE     Vitals: /82 (BP Location: Right arm, Patient Position: Sitting, Cuff Size: adult)   Pulse 72   Ht 5' 2\" (1.575 m)   Wt 286 lb (129.7 kg)   SpO2 96%   BMI 52.31 kg/m²     Initial weight loss: -01   Total weight loss: -01    Start weight: 287    Wt Readings from Last 3 Encounters:   24 286 lb (129.7 kg)   23 281 lb (127.5 kg)   23 282 lb (127.9 kg)       Patient Medications:    Current Outpatient Medications   Medication Sig Dispense Refill    semaglutide (OZEMPIC, 0.25 OR 0.5 MG/DOSE,) 2 MG/3ML Subcutaneous Solution Pen-injector Inject 0.25 mg into the skin once a week. 3 mL 5    fluticasone-salmeterol (WIXELA INHUB) 250-50 MCG/ACT Inhalation Aerosol Powder, Breath Activated Inhale 1 puff into the lungs every 12 (twelve) hours. 1 each 9    Mometasone Furo-Formoterol Fum (DULERA) 100-5 MCG/ACT Inhalation Aerosol Inhale 2 puffs into the lungs 2 (two) times daily. 1 each 9    fluticasone-salmeterol 115-21 MCG/ACT Inhalation Aerosol Inhale 2 puffs into the lungs 2 (two) times daily. FOR ASTHMA 1 each 9    Budesonide-Formoterol Fumarate 160-4.5 MCG/ACT Inhalation Aerosol Inhale 2 puffs into the lungs 2 (two) times daily. 1 each 9     Budesonide-Formoterol Fumarate 160-4.5 MCG/ACT Inhalation Aerosol Inhale 2 puffs into the lungs 2 (two) times daily. 1 each 9    polymyxin B-trimethoprim 27022-8.1 UNIT/ML-% Ophthalmic Solution Place 1 drop into both eyes every 4 (four) hours. Instill 1 drop in affected eye(s) every 4 hours (maximum: 6 doses per day) for 7-10 days (Patient not taking: Reported on 12/11/2023) 1 each 0    Esomeprazole Magnesium 40 MG Oral Capsule Delayed Release Take 1 capsule (40 mg total) by mouth 2 (two) times daily. 180 capsule 0    fluticasone propionate 50 MCG/ACT Nasal Suspension 2 sprays by Nasal route daily. 16 g 0    Azelastine HCl 137 MCG/SPRAY Nasal Solution 1-2 sprays by Nasal route in the morning and 1-2 sprays before bedtime. FOR SINUS SYMPTOMS/NASAL CONGESTION.. 30 mL 3    fluticasone propionate 50 MCG/ACT Nasal Suspension 2 sprays by Each Nare route daily. FOR NASAL CONGESTION/SINUS SYMPTOMS. 3 each 3    albuterol 108 (90 Base) MCG/ACT Inhalation Aero Soln Inhale 2-4 puffs into the lungs every 4 to 6 hours as needed for Wheezing or Shortness of Breath (cough, asthma, chest tightness). FOR ASTHMA. Pharmacist, please switch to formulary alternative per insurance coverage, ok to replace with proair, ventolin, proventil, or any other albuterol inhaler. -drkp 3 each 9    levothyroxine 150 MCG Oral Tab Take 1 tablet (150 mcg total) by mouth daily. 90 tablet 3    cholecalciferol (VITAMIN D3) 125 MCG (5000 UT) Oral Cap Take 1 capsule (5,000 Units total) by mouth daily.      naproxen 500 MG Oral Tab Take 1 tablet (500 mg total) by mouth as needed. (Patient not taking: Reported on 12/11/2023)       Allergies:  Augmentin [amoxicillin-pot clavulanate] and Cortisone     Social History:    Social History     Socioeconomic History    Marital status:      Spouse name: Not on file    Number of children: Not on file    Years of education: Not on file    Highest education level: Not on file   Occupational History    Not on file    Tobacco Use    Smoking status: Never     Passive exposure: Never    Smokeless tobacco: Never   Vaping Use    Vaping Use: Never used   Substance and Sexual Activity    Alcohol use: Not Currently    Drug use: Never    Sexual activity: Not on file   Other Topics Concern    Not on file   Social History Narrative    Not on file     Social Determinants of Health     Financial Resource Strain: Not on file   Food Insecurity: Not on file   Transportation Needs: Not on file   Physical Activity: Not on file   Stress: Not on file   Social Connections: Not on file   Housing Stability: Not on file     Surgical History:    Past Surgical History:   Procedure Laterality Date    COLONOSCOPY  11/30/2021    HERNIA SURGERY      HYSTERECTOMY      REMOVAL GALLBLADDER      REPAIR ING HERNIA,5+Y/O,REDUCIBL       Family History:    Family History   Problem Relation Age of Onset    Ovarian Cancer Mother     Breast Cancer Sister 27       Food Journal  Reviewed and Discussed:       Patient has a Food Journal?: yes   Patient is reading nutrition labels?  yes  Average Caloric Intake:     Average CHO Intake: 130  Is patient exercising? no  Type of exercise?     Eating Habits  Patient states the following:  Eats 3 meal(s) per day  Length of time it takes to consume a meal:  20  # of snacks per day: 1 Type of snacks:  popcorn  Amount of soda consumption per day:    Amount of water (in ounces) per day:  64  Drinking between meals only:  yes  Toughest challenge:  exercise    Nutritional Goals  Limit carbohydrates to 100 gms per day, Eat 100-200 calories within 1 hour of waking , and Eat 3-4 cups of fresh fruits or vegetables daily    Behavior Modifications Reviewed and Discussed  Eat breakfast, Eat 3 meals per day, Plan meals in advance, Read nutrition labels, Drink 64 oz of water per day, Maintain a daily food journal, No drinking 30 minutes before or after meals, Utlize portion control strategies to reduce calorie intake, Identify triggers for  eating and manage cues, and Eat slowly and take 20 to 30 minutes to complete each meal    Exercise Goals Reviewed and Discussed    Increase as tolerated    ROS:    Constitutional: negative  Respiratory: negative  Cardiovascular: negative  Gastrointestinal: negative  Musculoskeletal:positive for arthralgias and back pain  Neurological: negative  Behavioral/Psych: positive for stress  Endocrine: negative  All other systems were reviewed and are negative    Physical Exam:   General appearance: alert, appears stated age, cooperative, and morbidly obese  Head: Normocephalic, without obvious abnormality, atraumatic  Back: symmetric, no curvature. ROM normal. No CVA tenderness.  Lungs: clear to auscultation bilaterally  Heart: S1, S2 normal, no murmur, click, rub or gallop, regular rate and rhythm  Abdomen:  soft, obese, non tender  Extremities: extremities normal, atraumatic, no cyanosis or edema  Pulses: 2+ and symmetric  Skin: Skin color, texture, turgor normal. No rashes or lesions  Neurologic: Grossly normal    ASSESSMENT     DIABETES:    The patient denies any episodes of hypoglycemia since her last clinic visit.  she denies any lower extremity skin breakdown or foot ulcers.    Encounter Diagnosis(ses):   Encounter Diagnoses   Name Primary?    Type 2 diabetes mellitus without complication, without long-term current use of insulin ([facility]) Yes    Stress     Encounter for therapeutic drug monitoring     Morbid obesity with BMI of 50.0-59.9, adult (HCC)        PLAN     Patient is not interested in bariatric surgery. Patient desires to pursue traditional weight loss at this time.      DEGENERATIVE JOINT DISEASE: Of the knees is stable. Encourage upper body exercises if unable to perform weight-bearing exercises.      DIABETES: Continue current medications.    Goals for next month:  1. Keep a food log.  2. Drink 48-64 ounces of non-caloric beverages per day. No fruit juices or regular soda.  3. Increase activity-upper body  exercises, walk 10 minutes per day.  4. Increase fruit and vegetable servings to 5-6 per day.      Did not tolerate Phentermine    Will start Ozempic for DM    Recheck A1C        Diagnoses and all orders for this visit:    Type 2 diabetes mellitus without complication, without long-term current use of insulin (HCC)  -     semaglutide (OZEMPIC, 0.25 OR 0.5 MG/DOSE,) 2 MG/3ML Subcutaneous Solution Pen-injector; Inject 0.25 mg into the skin once a week.  -     Hemoglobin A1C; Future    Stress  -     Hemoglobin A1C; Future    Encounter for therapeutic drug monitoring  -     Hemoglobin A1C; Future    Morbid obesity with BMI of 50.0-59.9, adult (HCC)  -     Hemoglobin A1C; Future          Jairo Nelson MD

## 2024-03-01 ENCOUNTER — TELEPHONE (OUTPATIENT)
Dept: SURGERY | Facility: CLINIC | Age: 59
End: 2024-03-01

## 2024-03-01 DIAGNOSIS — E11.9 TYPE 2 DIABETES MELLITUS WITHOUT COMPLICATION, WITHOUT LONG-TERM CURRENT USE OF INSULIN (HCC): ICD-10-CM

## 2024-03-01 RX ORDER — SEMAGLUTIDE 0.68 MG/ML
0.25 INJECTION, SOLUTION SUBCUTANEOUS WEEKLY
Qty: 4.5 ML | Refills: 0 | Status: SHIPPED | OUTPATIENT
Start: 2024-03-01 | End: 2024-05-24

## 2024-03-01 RX ORDER — SEMAGLUTIDE 0.68 MG/ML
0.25 INJECTION, SOLUTION SUBCUTANEOUS WEEKLY
Qty: 3 ML | Refills: 0 | Status: SHIPPED
Start: 2024-03-01 | End: 2024-03-04

## 2024-03-02 ENCOUNTER — HOSPITAL ENCOUNTER (INPATIENT)
Facility: HOSPITAL | Age: 59
LOS: 1 days | Discharge: HOME | End: 2024-03-04
Attending: EMERGENCY MEDICINE | Admitting: STUDENT IN AN ORGANIZED HEALTH CARE EDUCATION/TRAINING PROGRAM
Payer: COMMERCIAL

## 2024-03-02 DIAGNOSIS — N12 PYELONEPHRITIS: ICD-10-CM

## 2024-03-02 DIAGNOSIS — N20.9 UROLITHIASIS: ICD-10-CM

## 2024-03-02 DIAGNOSIS — N20.1 URETEROLITHIASIS: Primary | ICD-10-CM

## 2024-03-02 LAB
ALBUMIN SERPL-MCNC: 4.1 G/DL (ref 3.2–4.8)
ALBUMIN/GLOB SERPL: 1.6 {RATIO} (ref 1–2)
ALP LIVER SERPL-CCNC: 94 U/L
ALT SERPL-CCNC: 35 U/L
ANION GAP SERPL CALC-SCNC: 7 MMOL/L (ref 0–18)
AST SERPL-CCNC: 31 U/L (ref ?–34)
BASOPHILS # BLD AUTO: 0.06 X10(3) UL (ref 0–0.2)
BASOPHILS NFR BLD AUTO: 0.9 %
BILIRUB SERPL-MCNC: 0.3 MG/DL (ref 0.3–1.2)
BILIRUB UR QL: NEGATIVE
BUN BLD-MCNC: 22 MG/DL (ref 9–23)
BUN/CREAT SERPL: 21.2 (ref 10–20)
CALCIUM BLD-MCNC: 9.4 MG/DL (ref 8.7–10.4)
CHLORIDE SERPL-SCNC: 109 MMOL/L (ref 98–112)
CLARITY UR: CLEAR
CO2 SERPL-SCNC: 26 MMOL/L (ref 21–32)
CREAT BLD-MCNC: 1.04 MG/DL
DEPRECATED RDW RBC AUTO: 42.2 FL (ref 35.1–46.3)
EGFRCR SERPLBLD CKD-EPI 2021: 62 ML/MIN/1.73M2 (ref 60–?)
EOSINOPHIL # BLD AUTO: 0.19 X10(3) UL (ref 0–0.7)
EOSINOPHIL NFR BLD AUTO: 3 %
ERYTHROCYTE [DISTWIDTH] IN BLOOD BY AUTOMATED COUNT: 13.2 % (ref 11–15)
GLOBULIN PLAS-MCNC: 2.5 G/DL (ref 2.8–4.4)
GLUCOSE BLD-MCNC: 114 MG/DL (ref 70–99)
GLUCOSE UR-MCNC: NORMAL MG/DL
HCT VFR BLD AUTO: 41.1 %
HGB BLD-MCNC: 13.5 G/DL
IMM GRANULOCYTES # BLD AUTO: 0.01 X10(3) UL (ref 0–1)
IMM GRANULOCYTES NFR BLD: 0.2 %
KETONES UR-MCNC: NEGATIVE MG/DL
LEUKOCYTE ESTERASE UR QL STRIP.AUTO: 500
LYMPHOCYTES # BLD AUTO: 3.21 X10(3) UL (ref 1–4)
LYMPHOCYTES NFR BLD AUTO: 49.8 %
MCH RBC QN AUTO: 28.8 PG (ref 26–34)
MCHC RBC AUTO-ENTMCNC: 32.8 G/DL (ref 31–37)
MCV RBC AUTO: 87.8 FL
MONOCYTES # BLD AUTO: 0.79 X10(3) UL (ref 0.1–1)
MONOCYTES NFR BLD AUTO: 12.3 %
NEUTROPHILS # BLD AUTO: 2.18 X10 (3) UL (ref 1.5–7.7)
NEUTROPHILS # BLD AUTO: 2.18 X10(3) UL (ref 1.5–7.7)
NEUTROPHILS NFR BLD AUTO: 33.8 %
NITRITE UR QL STRIP.AUTO: NEGATIVE
OSMOLALITY SERPL CALC.SUM OF ELEC: 298 MOSM/KG (ref 275–295)
PH UR: 5.5 [PH] (ref 5–8)
PLATELET # BLD AUTO: 317 10(3)UL (ref 150–450)
POTASSIUM SERPL-SCNC: 4.4 MMOL/L (ref 3.5–5.1)
PROT SERPL-MCNC: 6.6 G/DL (ref 5.7–8.2)
RBC # BLD AUTO: 4.68 X10(6)UL
RBC #/AREA URNS AUTO: >10 /HPF
SODIUM SERPL-SCNC: 142 MMOL/L (ref 136–145)
SP GR UR STRIP: 1.02 (ref 1–1.03)
UROBILINOGEN UR STRIP-ACNC: NORMAL
WBC # BLD AUTO: 6.4 X10(3) UL (ref 4–11)
WBC #/AREA URNS AUTO: >50 /HPF

## 2024-03-02 RX ORDER — MORPHINE SULFATE 4 MG/ML
4 INJECTION, SOLUTION INTRAMUSCULAR; INTRAVENOUS ONCE
Status: DISCONTINUED | OUTPATIENT
Start: 2024-03-02 | End: 2024-03-04

## 2024-03-02 RX ORDER — KETOROLAC TROMETHAMINE 15 MG/ML
15 INJECTION, SOLUTION INTRAMUSCULAR; INTRAVENOUS ONCE
Status: COMPLETED | OUTPATIENT
Start: 2024-03-02 | End: 2024-03-02

## 2024-03-02 RX ORDER — ONDANSETRON 2 MG/ML
4 INJECTION INTRAMUSCULAR; INTRAVENOUS ONCE
Status: DISCONTINUED | OUTPATIENT
Start: 2024-03-02 | End: 2024-03-04

## 2024-03-03 ENCOUNTER — APPOINTMENT (OUTPATIENT)
Dept: GENERAL RADIOLOGY | Facility: HOSPITAL | Age: 59
End: 2024-03-03
Attending: SURGERY
Payer: COMMERCIAL

## 2024-03-03 ENCOUNTER — ANESTHESIA EVENT (OUTPATIENT)
Dept: SURGERY | Facility: HOSPITAL | Age: 59
End: 2024-03-03
Payer: COMMERCIAL

## 2024-03-03 ENCOUNTER — ANESTHESIA (OUTPATIENT)
Dept: SURGERY | Facility: HOSPITAL | Age: 59
End: 2024-03-03
Payer: COMMERCIAL

## 2024-03-03 ENCOUNTER — APPOINTMENT (OUTPATIENT)
Dept: CT IMAGING | Facility: HOSPITAL | Age: 59
End: 2024-03-03
Attending: EMERGENCY MEDICINE
Payer: COMMERCIAL

## 2024-03-03 PROBLEM — N20.1 URETEROLITHIASIS: Status: ACTIVE | Noted: 2024-03-03

## 2024-03-03 PROBLEM — N12 PYELONEPHRITIS: Status: ACTIVE | Noted: 2024-03-03

## 2024-03-03 LAB
ANION GAP SERPL CALC-SCNC: 5 MMOL/L (ref 0–18)
ATRIAL RATE: 69 BPM
BASOPHILS # BLD AUTO: 0.06 X10(3) UL (ref 0–0.2)
BASOPHILS NFR BLD AUTO: 1 %
BUN BLD-MCNC: 20 MG/DL (ref 9–23)
BUN/CREAT SERPL: 24.1 (ref 10–20)
CALCIUM BLD-MCNC: 9.1 MG/DL (ref 8.7–10.4)
CHLORIDE SERPL-SCNC: 111 MMOL/L (ref 98–112)
CO2 SERPL-SCNC: 25 MMOL/L (ref 21–32)
CREAT BLD-MCNC: 0.83 MG/DL
DEPRECATED RDW RBC AUTO: 41.1 FL (ref 35.1–46.3)
EGFRCR SERPLBLD CKD-EPI 2021: 82 ML/MIN/1.73M2 (ref 60–?)
EOSINOPHIL # BLD AUTO: 0.2 X10(3) UL (ref 0–0.7)
EOSINOPHIL NFR BLD AUTO: 3.5 %
ERYTHROCYTE [DISTWIDTH] IN BLOOD BY AUTOMATED COUNT: 12.9 % (ref 11–15)
GLUCOSE BLD-MCNC: 104 MG/DL (ref 70–99)
GLUCOSE BLDC GLUCOMTR-MCNC: 102 MG/DL (ref 70–99)
GLUCOSE BLDC GLUCOMTR-MCNC: 116 MG/DL (ref 70–99)
GLUCOSE BLDC GLUCOMTR-MCNC: 87 MG/DL (ref 70–99)
GLUCOSE BLDC GLUCOMTR-MCNC: 99 MG/DL (ref 70–99)
HCT VFR BLD AUTO: 39.3 %
HGB BLD-MCNC: 13 G/DL
IMM GRANULOCYTES # BLD AUTO: 0.02 X10(3) UL (ref 0–1)
IMM GRANULOCYTES NFR BLD: 0.3 %
LYMPHOCYTES # BLD AUTO: 2.34 X10(3) UL (ref 1–4)
LYMPHOCYTES NFR BLD AUTO: 40.5 %
MCH RBC QN AUTO: 28.8 PG (ref 26–34)
MCHC RBC AUTO-ENTMCNC: 33.1 G/DL (ref 31–37)
MCV RBC AUTO: 86.9 FL
MONOCYTES # BLD AUTO: 0.65 X10(3) UL (ref 0.1–1)
MONOCYTES NFR BLD AUTO: 11.2 %
NEUTROPHILS # BLD AUTO: 2.51 X10 (3) UL (ref 1.5–7.7)
NEUTROPHILS # BLD AUTO: 2.51 X10(3) UL (ref 1.5–7.7)
NEUTROPHILS NFR BLD AUTO: 43.5 %
OSMOLALITY SERPL CALC.SUM OF ELEC: 295 MOSM/KG (ref 275–295)
P AXIS: 45 DEGREES
P-R INTERVAL: 130 MS
PLATELET # BLD AUTO: 290 10(3)UL (ref 150–450)
POTASSIUM SERPL-SCNC: 3.9 MMOL/L (ref 3.5–5.1)
Q-T INTERVAL: 424 MS
QRS DURATION: 82 MS
QTC CALCULATION (BEZET): 454 MS
R AXIS: -2 DEGREES
RBC # BLD AUTO: 4.52 X10(6)UL
SODIUM SERPL-SCNC: 141 MMOL/L (ref 136–145)
T AXIS: 38 DEGREES
VENTRICULAR RATE: 69 BPM
WBC # BLD AUTO: 5.8 X10(3) UL (ref 4–11)

## 2024-03-03 PROCEDURE — 0T778DZ DILATION OF LEFT URETER WITH INTRALUMINAL DEVICE, VIA NATURAL OR ARTIFICIAL OPENING ENDOSCOPIC: ICD-10-PCS | Performed by: SURGERY

## 2024-03-03 PROCEDURE — 52332 CYSTOSCOPY AND TREATMENT: CPT | Performed by: SURGERY

## 2024-03-03 PROCEDURE — 74176 CT ABD & PELVIS W/O CONTRAST: CPT | Performed by: EMERGENCY MEDICINE

## 2024-03-03 PROCEDURE — 74420 UROGRAPHY RTRGR +-KUB: CPT | Performed by: SURGERY

## 2024-03-03 PROCEDURE — BT1F1ZZ FLUOROSCOPY OF LEFT KIDNEY, URETER AND BLADDER USING LOW OSMOLAR CONTRAST: ICD-10-PCS | Performed by: SURGERY

## 2024-03-03 PROCEDURE — 99254 IP/OBS CNSLTJ NEW/EST MOD 60: CPT | Performed by: SURGERY

## 2024-03-03 PROCEDURE — 99223 1ST HOSP IP/OBS HIGH 75: CPT | Performed by: STUDENT IN AN ORGANIZED HEALTH CARE EDUCATION/TRAINING PROGRAM

## 2024-03-03 DEVICE — URETERAL STENT
Type: IMPLANTABLE DEVICE | Site: URETER | Status: FUNCTIONAL
Brand: ASCERTA™

## 2024-03-03 RX ORDER — ONDANSETRON 2 MG/ML
4 INJECTION INTRAMUSCULAR; INTRAVENOUS EVERY 6 HOURS PRN
Status: DISCONTINUED | OUTPATIENT
Start: 2024-03-03 | End: 2024-03-04

## 2024-03-03 RX ORDER — OXYBUTYNIN CHLORIDE 5 MG/1
5 TABLET ORAL 3 TIMES DAILY PRN
Qty: 15 TABLET | Refills: 0 | Status: SHIPPED | OUTPATIENT
Start: 2024-03-03

## 2024-03-03 RX ORDER — TAMSULOSIN HYDROCHLORIDE 0.4 MG/1
0.4 CAPSULE ORAL EVERY EVENING
Qty: 14 CAPSULE | Refills: 0 | Status: SHIPPED | OUTPATIENT
Start: 2024-03-03 | End: 2024-03-17

## 2024-03-03 RX ORDER — HYDROMORPHONE HYDROCHLORIDE 1 MG/ML
0.2 INJECTION, SOLUTION INTRAMUSCULAR; INTRAVENOUS; SUBCUTANEOUS EVERY 5 MIN PRN
Status: DISCONTINUED | OUTPATIENT
Start: 2024-03-03 | End: 2024-03-03 | Stop reason: HOSPADM

## 2024-03-03 RX ORDER — NICOTINE POLACRILEX 4 MG
30 LOZENGE BUCCAL
Status: DISCONTINUED | OUTPATIENT
Start: 2024-03-03 | End: 2024-03-03 | Stop reason: HOSPADM

## 2024-03-03 RX ORDER — FEXOFENADINE HCL AND PSEUDOEPHEDRINE HCI 180; 240 MG/1; MG/1
1 TABLET, EXTENDED RELEASE ORAL
Status: ON HOLD | COMMUNITY
End: 2024-03-09

## 2024-03-03 RX ORDER — TAMSULOSIN HYDROCHLORIDE 0.4 MG/1
0.4 CAPSULE ORAL ONCE
Status: COMPLETED | OUTPATIENT
Start: 2024-03-03 | End: 2024-03-03

## 2024-03-03 RX ORDER — NICOTINE POLACRILEX 4 MG
15 LOZENGE BUCCAL
Status: DISCONTINUED | OUTPATIENT
Start: 2024-03-03 | End: 2024-03-03 | Stop reason: HOSPADM

## 2024-03-03 RX ORDER — ENEMA 19; 7 G/133ML; G/133ML
1 ENEMA RECTAL ONCE AS NEEDED
Status: DISCONTINUED | OUTPATIENT
Start: 2024-03-03 | End: 2024-03-04

## 2024-03-03 RX ORDER — SODIUM CHLORIDE 9 MG/ML
INJECTION, SOLUTION INTRAVENOUS CONTINUOUS
Status: DISCONTINUED | OUTPATIENT
Start: 2024-03-03 | End: 2024-03-04

## 2024-03-03 RX ORDER — SODIUM CHLORIDE, SODIUM LACTATE, POTASSIUM CHLORIDE, CALCIUM CHLORIDE 600; 310; 30; 20 MG/100ML; MG/100ML; MG/100ML; MG/100ML
INJECTION, SOLUTION INTRAVENOUS CONTINUOUS
Status: DISCONTINUED | OUTPATIENT
Start: 2024-03-03 | End: 2024-03-03 | Stop reason: HOSPADM

## 2024-03-03 RX ORDER — SODIUM CHLORIDE 9 MG/ML
INJECTION, SOLUTION INTRAVENOUS CONTINUOUS
Status: DISCONTINUED | OUTPATIENT
Start: 2024-03-03 | End: 2024-03-03

## 2024-03-03 RX ORDER — SODIUM CHLORIDE, SODIUM LACTATE, POTASSIUM CHLORIDE, CALCIUM CHLORIDE 600; 310; 30; 20 MG/100ML; MG/100ML; MG/100ML; MG/100ML
INJECTION, SOLUTION INTRAVENOUS CONTINUOUS PRN
Status: DISCONTINUED | OUTPATIENT
Start: 2024-03-03 | End: 2024-03-03 | Stop reason: SURG

## 2024-03-03 RX ORDER — HYDROMORPHONE HYDROCHLORIDE 1 MG/ML
0.6 INJECTION, SOLUTION INTRAMUSCULAR; INTRAVENOUS; SUBCUTANEOUS EVERY 5 MIN PRN
Status: DISCONTINUED | OUTPATIENT
Start: 2024-03-03 | End: 2024-03-03 | Stop reason: HOSPADM

## 2024-03-03 RX ORDER — SENNOSIDES 8.6 MG
17.2 TABLET ORAL NIGHTLY PRN
Status: DISCONTINUED | OUTPATIENT
Start: 2024-03-03 | End: 2024-03-04

## 2024-03-03 RX ORDER — LIDOCAINE HYDROCHLORIDE 10 MG/ML
INJECTION, SOLUTION EPIDURAL; INFILTRATION; INTRACAUDAL; PERINEURAL AS NEEDED
Status: DISCONTINUED | OUTPATIENT
Start: 2024-03-03 | End: 2024-03-03 | Stop reason: SURG

## 2024-03-03 RX ORDER — KETOROLAC TROMETHAMINE 10 MG/1
10 TABLET, FILM COATED ORAL EVERY 8 HOURS PRN
Qty: 10 TABLET | Refills: 0 | Status: SHIPPED | OUTPATIENT
Start: 2024-03-03 | End: 2024-03-06

## 2024-03-03 RX ORDER — MORPHINE SULFATE 2 MG/ML
2 INJECTION, SOLUTION INTRAMUSCULAR; INTRAVENOUS EVERY 10 MIN PRN
Status: DISCONTINUED | OUTPATIENT
Start: 2024-03-03 | End: 2024-03-03 | Stop reason: HOSPADM

## 2024-03-03 RX ORDER — BISACODYL 10 MG
10 SUPPOSITORY, RECTAL RECTAL
Status: DISCONTINUED | OUTPATIENT
Start: 2024-03-03 | End: 2024-03-04

## 2024-03-03 RX ORDER — LEVOTHYROXINE SODIUM 0.15 MG/1
150 TABLET ORAL
Status: DISCONTINUED | OUTPATIENT
Start: 2024-03-03 | End: 2024-03-04

## 2024-03-03 RX ORDER — HYDROMORPHONE HYDROCHLORIDE 1 MG/ML
0.4 INJECTION, SOLUTION INTRAMUSCULAR; INTRAVENOUS; SUBCUTANEOUS EVERY 5 MIN PRN
Status: DISCONTINUED | OUTPATIENT
Start: 2024-03-03 | End: 2024-03-03 | Stop reason: HOSPADM

## 2024-03-03 RX ORDER — ACETAMINOPHEN 10 MG/ML
1000 INJECTION, SOLUTION INTRAVENOUS EVERY 6 HOURS PRN
Status: DISCONTINUED | OUTPATIENT
Start: 2024-03-03 | End: 2024-03-04

## 2024-03-03 RX ORDER — GLYCOPYRROLATE 0.2 MG/ML
INJECTION, SOLUTION INTRAMUSCULAR; INTRAVENOUS AS NEEDED
Status: DISCONTINUED | OUTPATIENT
Start: 2024-03-03 | End: 2024-03-03 | Stop reason: SURG

## 2024-03-03 RX ORDER — ESCITALOPRAM OXALATE 5 MG/1
5 TABLET ORAL DAILY
Status: DISCONTINUED | OUTPATIENT
Start: 2024-03-03 | End: 2024-03-04

## 2024-03-03 RX ORDER — NALOXONE HYDROCHLORIDE 0.4 MG/ML
80 INJECTION, SOLUTION INTRAMUSCULAR; INTRAVENOUS; SUBCUTANEOUS AS NEEDED
Status: DISCONTINUED | OUTPATIENT
Start: 2024-03-03 | End: 2024-03-03 | Stop reason: HOSPADM

## 2024-03-03 RX ORDER — MORPHINE SULFATE 10 MG/ML
6 INJECTION, SOLUTION INTRAMUSCULAR; INTRAVENOUS EVERY 10 MIN PRN
Status: DISCONTINUED | OUTPATIENT
Start: 2024-03-03 | End: 2024-03-03 | Stop reason: HOSPADM

## 2024-03-03 RX ORDER — ALBUTEROL SULFATE 90 UG/1
1 AEROSOL, METERED RESPIRATORY (INHALATION) EVERY 4 HOURS PRN
Status: DISCONTINUED | OUTPATIENT
Start: 2024-03-03 | End: 2024-03-04

## 2024-03-03 RX ORDER — HYDRALAZINE HYDROCHLORIDE 20 MG/ML
10 INJECTION INTRAMUSCULAR; INTRAVENOUS EVERY 4 HOURS PRN
Status: DISCONTINUED | OUTPATIENT
Start: 2024-03-03 | End: 2024-03-04

## 2024-03-03 RX ORDER — MORPHINE SULFATE 4 MG/ML
4 INJECTION, SOLUTION INTRAMUSCULAR; INTRAVENOUS EVERY 10 MIN PRN
Status: DISCONTINUED | OUTPATIENT
Start: 2024-03-03 | End: 2024-03-03 | Stop reason: HOSPADM

## 2024-03-03 RX ORDER — PROCHLORPERAZINE EDISYLATE 5 MG/ML
5 INJECTION INTRAMUSCULAR; INTRAVENOUS EVERY 8 HOURS PRN
Status: DISCONTINUED | OUTPATIENT
Start: 2024-03-03 | End: 2024-03-04

## 2024-03-03 RX ORDER — KETOROLAC TROMETHAMINE 15 MG/ML
15 INJECTION, SOLUTION INTRAMUSCULAR; INTRAVENOUS EVERY 6 HOURS PRN
Status: DISCONTINUED | OUTPATIENT
Start: 2024-03-03 | End: 2024-03-04

## 2024-03-03 RX ORDER — CEFAZOLIN SODIUM 1 G/3ML
INJECTION, POWDER, FOR SOLUTION INTRAMUSCULAR; INTRAVENOUS AS NEEDED
Status: DISCONTINUED | OUTPATIENT
Start: 2024-03-03 | End: 2024-03-03 | Stop reason: SURG

## 2024-03-03 RX ORDER — DEXTROSE MONOHYDRATE 25 G/50ML
50 INJECTION, SOLUTION INTRAVENOUS
Status: DISCONTINUED | OUTPATIENT
Start: 2024-03-03 | End: 2024-03-03 | Stop reason: HOSPADM

## 2024-03-03 RX ORDER — POLYETHYLENE GLYCOL 3350 17 G/17G
17 POWDER, FOR SOLUTION ORAL DAILY PRN
Status: DISCONTINUED | OUTPATIENT
Start: 2024-03-03 | End: 2024-03-04

## 2024-03-03 RX ORDER — DEXAMETHASONE SODIUM PHOSPHATE 4 MG/ML
VIAL (ML) INJECTION AS NEEDED
Status: DISCONTINUED | OUTPATIENT
Start: 2024-03-03 | End: 2024-03-03 | Stop reason: SURG

## 2024-03-03 RX ORDER — ROCURONIUM BROMIDE 10 MG/ML
INJECTION, SOLUTION INTRAVENOUS AS NEEDED
Status: DISCONTINUED | OUTPATIENT
Start: 2024-03-03 | End: 2024-03-03 | Stop reason: SURG

## 2024-03-03 RX ORDER — ONDANSETRON 2 MG/ML
INJECTION INTRAMUSCULAR; INTRAVENOUS AS NEEDED
Status: DISCONTINUED | OUTPATIENT
Start: 2024-03-03 | End: 2024-03-03 | Stop reason: SURG

## 2024-03-03 RX ADMIN — ROCURONIUM BROMIDE 5 MG: 10 INJECTION, SOLUTION INTRAVENOUS at 17:21:00

## 2024-03-03 RX ADMIN — LIDOCAINE HYDROCHLORIDE 50 MG: 10 INJECTION, SOLUTION EPIDURAL; INFILTRATION; INTRACAUDAL; PERINEURAL at 17:21:00

## 2024-03-03 RX ADMIN — CEFAZOLIN SODIUM 1 G: 1 INJECTION, POWDER, FOR SOLUTION INTRAMUSCULAR; INTRAVENOUS at 17:34:00

## 2024-03-03 RX ADMIN — ONDANSETRON 4 MG: 2 INJECTION INTRAMUSCULAR; INTRAVENOUS at 17:22:00

## 2024-03-03 RX ADMIN — GLYCOPYRROLATE 0.2 MG: 0.2 INJECTION, SOLUTION INTRAMUSCULAR; INTRAVENOUS at 17:22:00

## 2024-03-03 RX ADMIN — SODIUM CHLORIDE, SODIUM LACTATE, POTASSIUM CHLORIDE, CALCIUM CHLORIDE: 600; 310; 30; 20 INJECTION, SOLUTION INTRAVENOUS at 17:22:00

## 2024-03-03 RX ADMIN — DEXAMETHASONE SODIUM PHOSPHATE 4 MG: 4 MG/ML VIAL (ML) INJECTION at 17:22:00

## 2024-03-03 RX ADMIN — SODIUM CHLORIDE, SODIUM LACTATE, POTASSIUM CHLORIDE, CALCIUM CHLORIDE: 600; 310; 30; 20 INJECTION, SOLUTION INTRAVENOUS at 17:56:00

## 2024-03-03 NOTE — OPERATIVE REPORT
Urology Operative Note    Attending Surgeon: Scooby Quiles MD    Assistant Surgeon: None    Patient Name: Radha Rodrigez    Date of Surgery: 3/3/2024    Preoperative Diagnosis: Left obstructing ureteral stone    Postoperative Diagnosis: Same    Procedure Performed: Cystoscopy, LEFT retrograde pyelogram and ureteral stent placement    Indication:  Patient is a 58 year old female who presented with a 5mm proximal left obstructing ureteral stone and possible UTI on UA. The patient was counseled on options, risks, and benefits and elected to undergo the above procedure. We discussed risks including, but not limited to, bleeding, infection, damage to surrounding structures, need for repeat procedure(s) (including inability to place a stent and need for nephrostomy tube). The patient understood these risks and wished to proceed with surgery.    Findings:  Mild hydronephrosis. Yellow, slightly cloudy urine color - sent for culture. Mild resistance with passage of 6 Fr stent due to narrow ureter.    Procedure:  The patient was taken to the operating room and a timeout was performed confirming the correct patient and procedure. The patient was prepped and draped in lithotomy position after undergoing general anesthesia. Pre-operative prophylactic antibiotics were given in the form of Ceftriaxone.    The cystoscope was inserted per urethra and the bladder was inspected and drained. The left ureter was cannulated with a Sensor wire, and the wire was passed into the renal pelvis under fluoroscopy. A 5-Ethiopian open ended catheter was passed over the wire and into the kidney, and then the wire was removed. A hydronephrotic drip was  present, and the urine appeared cloudy yellow. The urine was collected and sent for culture.    A retrograde pyelogram was performed with contrast, showing mild hydronephrosis. The sensor wire was reinserted into the open ended catheter and into the kidney. The open ended catheter was then removed.      A 6-Tuvaluan x 24 cm JJ ureteral stent was inserted over the wire. The proximal coil was formed in the kidney under fluoroscopic guidance. The distal coil was formed within the bladder under direct cystoscopic visualization. The stent string was removed prior to stent placement.    The bladder was drained and the cystoscope was removed. The patient was awoken from anesthesia and transferred to PACU in stable condition. The patient tolerated the procedure well. All instrument/supply counts were correct at the end of the case.    Specimens:   Left renal pelvis urine culture    Estimated Blood Loss:  1 mL    Tubes/Drains:  6-Tuvaluan x 24 cm JJ left ureteral stent    Complications:   None immediate    Condition from OR:  Stable    Plan:   The patient will follow up in a few weeks for definitive stone treatment via ureteroscopy, laser lithotripsy, stent exchange.      Scooby Quiles MD  Staff Urologist  [facility]  Office: [phone]

## 2024-03-03 NOTE — ED QUICK NOTES
Orders for admission, patient is aware of plan and ready to go upstairs. Any questions, please call ED RN alyssa at extension [phone].     Patient Covid vaccination status: Fully vaccinated     COVID Test Ordered in ED: None    COVID Suspicion at Admission: N/A    Running Infusions:  None    Mental Status/LOC at time of transport: aox4    Other pertinent information:   CIWA score: N/A   NIH score:  N/A

## 2024-03-03 NOTE — PROGRESS NOTES
[facility]  part of [facility]    Progress Note    Radha Rodrigez Patient Status:  Inpatient    1965 MRN [id number]   Location [facility] 5SW/SE Attending Rayray Thornton,*   Hosp Day # 0 PCP Guille Miramontes MD     Chief Complaint: pain flank    Subjective:     Constitutional:  Positive for appetite change and fatigue.   Cardiovascular:  Negative for leg swelling.   Gastrointestinal:  Negative for anal bleeding.   Genitourinary:  Positive for flank pain and difficulty urinating. Negative for dysuria.   Neurological:  Negative for weakness.   Psychiatric/Behavioral:  Negative for confusion and decreased concentration. The patient is not nervous/anxious and is not hyperactive.        Objective:   Blood pressure 127/59, pulse 69, temperature 97.7 °F (36.5 °C), temperature source Oral, resp. rate 20, height 5' 2\" (1.575 m), weight 279 lb 12.8 oz (126.9 kg), SpO2 94%.  Physical Exam  Vitals and nursing note reviewed.   Constitutional:       Appearance: She is obese.   HENT:      Head: Normocephalic and atraumatic.   Cardiovascular:      Rate and Rhythm: Normal rate and regular rhythm.   Pulmonary:      Effort: Pulmonary effort is normal.      Breath sounds: Rhonchi present.   Abdominal:      General: Bowel sounds are normal.      Palpations: Abdomen is soft.   Musculoskeletal:         General: No swelling.   Skin:     General: Skin is warm and dry.      Capillary Refill: Capillary refill takes less than 2 seconds.   Neurological:      General: No focal deficit present.      Mental Status: She is alert and oriented to person, place, and time.   Psychiatric:         Behavior: Behavior normal.         Judgment: Judgment normal.         Results:   Lab Results   Component Value Date    WBC 5.8 2024    HGB 13.0 2024    HCT 39.3 2024    .0 2024    CREATSERUM 0.83 2024    BUN 20 2024     2024    K 3.9 2024      03/03/2024    CO2 25.0 03/03/2024     (H) 03/03/2024    CA 9.1 03/03/2024    ALB 4.1 03/02/2024    ALKPHO 94 03/02/2024    BILT 0.3 03/02/2024    TP 6.6 03/02/2024    AST 31 03/02/2024    ALT 35 03/02/2024    PTT 32.5 04/22/2017    INR 1.0 04/22/2017    PT 12.0 11/24/2012    T4F 1.3 04/29/2023    TSH 1.280 04/29/2023     11/18/2022    ESRML 16 12/11/2023    CRP <0.40 12/11/2023       CT ABDOMEN+PELVIS KIDNEYSTONE 2D RNDR(NO IV,NO ORAL)(CPT=74176)    Result Date: 3/3/2024  CONCLUSION:  1.  Mild left hydronephrosis related to an obstructing 2 x 5 mm stone at the UPJ, which projects at the same horizontal level as the L3-4 disc.  No additional calculi in the kidneys, ureters, or bladder. 2.  Colonic diverticulosis. 3.  Post cholecystectomy.  No biliary ductal dilatation. 4.  Post hysterectomy.  No adnexal mass. 5.  Bilateral L4 pars defects with grade 1 anterolisthesis of L4 on L5.   [facility] provided a preliminary report for this examination. This final report agrees with their preliminary findings.   Dictated by (CST): Clifton Whitlock MD on 3/03/2024 at 7:40 AM     Finalized by (CST): Clifton Whitlock MD on 3/03/2024 at 7:44 AM               Assessment & Plan:     H/o renal stones  Left flank pain  Obstructive ureterolithiasis  -CT abdomen/pelvis does reveal 4 mm obstructing proximal left ureteral stone.  Nontoxic-appearing.  Pain improved with a dose of Toradol.  -Urology consulted with recommendation for keeping patient n.p.o. as well as straining urine throughout the night.  -Will give a dose of Flomax to hopefully facilitate passage of the stone  -Pain control with Toradol  -Maintain n.p.o.  -Initiate gentle IV fluid hydration  -SCDs for DVT prophylaxis pending urology evaluation; gypsy coker cysto later poss stent     Mild acute kidney injury  -Creatinine noted to be 1.04 (baseline 0.9).  Suspecting secondary to postobstructive etiology.  -Initiate IV fluid hydration and reassess renal function  in the a.m..  -Reassess renal function in the a.m.  Avoid nephrotoxins.  Renally dose medications.     Complicated UTI  -Urinalysis positive for UTI.  Patient received Rocephin 2 g IV in the ED  -Continue with Rocephin daily pending urine culture.     H/o thyroid disease  -Resume Synthroid    Arm swelling; to me not sig swollen in hands/feet will observe check tsh    Low medical risk for cysto under anesthesia; pt had post op n,v from anesthesia after her fede in 2022;  EKG ok, cleared; no hx of cva/mi in pt; tolerated fede well just 2 years ago; low risk procedure where benefit>>>>risk; EKG tracing reviewed     Prophylaxis  SCDs pending urology evaluation subcutaneous heparin after procedure     CODE STATUS  Full     Primary care physician  Guille Miramontes MD    35 min spent post mn        ELLI MENDOZA MD

## 2024-03-03 NOTE — ANESTHESIA PROCEDURE NOTES
Airway  Date/Time: 3/3/2024 5:26 PM  Urgency: Elective      General Information and Staff    Patient location during procedure: OR  Anesthesiologist: Piotr Rodríguez MD  Performed: anesthesiologist   Performed by: Piotr Rodríguez MD  Authorized by: Piotr Rodríguez MD      Indications and Patient Condition  Indications for airway management: anesthesia  Sedation level: deep  Preoxygenated: yes  Patient position: sniffing  Mask difficulty assessment: 1 - vent by mask    Final Airway Details  Final airway type: endotracheal airway      Successful airway: ETT  Cuffed: yes   Successful intubation technique: direct laryngoscopy  Endotracheal tube insertion site: oral  Blade: Jose  Blade size: #4  ETT size (mm): 7.0    Placement verified by: capnometry   Measured from: teeth  Number of attempts at approach: 1

## 2024-03-03 NOTE — CONSULTS
Urology Inpatient Consult Note  [facility]  part of [facility]      Radha Rodrigez Patient Status:  Inpatient    1965 MRN [id number]   Location [facility] 5SW/SE Attending Rayray Thornton,*   Hosp Day # 0 PCP Guille Miramontes MD     Date of Admission:  3/2/2024  Date of Consult: 3/3/2024  Primary Care Provider: Guille Miramontes MD     Consulting Provider: Dr. Thornton    Reason for Consultation:   Obstructing left ureteral stone    History of Present Illness:   Radha Rodrigez is a 58 year old female with history of diabetes, GERD, hypertension, morbid obesity presenting with left flank pain.  She has been afebrile with normal vital signs.  Labs show creatinine 0.83 (baseline 0.8-0.9), WBC 6.4 > 5.8.  Urinalysis shows greater than 50 WBC, greater than 10 RBC, rare bacteria, 500 leukocyte esterase, negative nitrates.  Urine culture is pending.  CT scan shows a 5 mm proximal obstructing left ureteral stone with mild upstream hydronephrosis, no additional stones in either kidney.  She was started on ceftriaxone and admitted for pain control, IV fluids.    She continues to have left flank pain today slightly improved by pain medications.  She has not had fevers or chills. Denies nausea/vomiting. She has had stones in the past but nothing that has required surgery.    I discussed proceeding to the OR for cystoscopy, left ureteral stent placement given obstructing ureteral stone, persistent pain, and possible UTI based on urinalysis.  I discussed the risk, benefits, procedural details.  I discussed that we would need to return in a few weeks for definitive stone treatment once any infection is treated.    History:     Past Medical History:   Diagnosis Date    Diabetes (HCC)     Esophageal reflux     High blood pressure     Morbid obesity with BMI of 50.0-59.9, adult (HCC)     Pneumonia due to organism     Pre-diabetes     Seasonal allergies     Thyroid disease     Vitamin D  deficiency        Past Surgical History:   Procedure Laterality Date    COLONOSCOPY  11/30/2021    HERNIA SURGERY      HYSTERECTOMY      REMOVAL GALLBLADDER      REPAIR ING HERNIA,5+Y/O,REDUCIBL         Family History   Problem Relation Age of Onset    Ovarian Cancer Mother     Breast Cancer Sister 27       Social History     Socioeconomic History    Marital status:    Tobacco Use    Smoking status: Never     Passive exposure: Never    Smokeless tobacco: Never   Vaping Use    Vaping Use: Never used   Substance and Sexual Activity    Alcohol use: Not Currently    Drug use: Never     Social Determinants of Health     Food Insecurity: No Food Insecurity (3/3/2024)    Food Insecurity     Food Insecurity: Never true   Transportation Needs: No Transportation Needs (3/3/2024)    Transportation Needs     Lack of Transportation: No   Housing Stability: Low Risk  (3/3/2024)    Housing Stability     Housing Instability: No       Medications:  No current outpatient medications on file.       Allergies:  Allergies   Allergen Reactions    Augmentin [Amoxicillin-Pot Clavulanate] NAUSEA AND VOMITING    Cortisone DIZZINESS and OTHER (SEE COMMENTS)     Blood drop, dizziness       Review of Systems:   A comprehensive 10-point review of systems was completed.  Pertinent positives and negatives are noted in the the HPI.    Physical Exam:   Vital Signs:  Blood pressure 127/59, pulse 69, temperature 97.7 °F (36.5 °C), temperature source Oral, resp. rate 20, height 5' 2\" (1.575 m), weight 279 lb 12.8 oz (126.9 kg), SpO2 94%.     CONSTITUTIONAL: Well developed, well nourished, in no acute distress  NEUROLOGIC: Alert and oriented  HEAD: Normocephalic, atraumatic  EYES: Sclera non-icteric  ENT: Hearing intact, moist mucous membranes  NECK: No obvious goiter or masses  RESPIRATORY: Normal respiratory effort  SKIN: No evident rashes  ABDOMEN: Soft, non-tender, non-distended    Laboratory Data:  Lab Results   Component Value Date    WBC  5.8 03/03/2024    HGB 13.0 03/03/2024    .0 03/03/2024     Lab Results   Component Value Date     03/03/2024    K 3.9 03/03/2024     03/03/2024    CO2 25.0 03/03/2024    BUN 20 03/03/2024     (H) 03/03/2024    GFRAA 84 09/27/2021    AST 31 03/02/2024    ALT 35 03/02/2024    TP 6.6 03/02/2024    ALB 4.1 03/02/2024    CA 9.1 03/03/2024       Urinalysis Results (last three years):  Recent Labs     09/27/21  1557 09/18/22  1722 11/18/22  0602 04/29/23  0739 03/02/24  2250   COLORUR Yellow Yellow Yellow Colorless* Light-Yellow   CLARITY Clear Clear Clear Clear Clear   SPECGRAVITY 1.013 >=1.030 1.019 1.009 1.023   PHURINE 5.0 5.5 5.0 6.5 5.5   PROUR Negative Negative Negative Negative Trace*   GLUUR Negative Negative Negative Normal Normal   KETUR Trace* Negative Negative Negative Negative   BILUR Negative Negative Negative Negative Negative   BLOODURINE Negative Negative Negative Negative 2+*   NITRITE Negative Negative Negative Negative Negative   UROBILINOGEN <2.0 0.2 <2.0 Normal Normal   LEUUR Trace* Trace* Negative Negative 500*   UASA  --   --  Negative  --   --    WBCUR 1-5 1-5  1-5  --   --  >50*   RBCUR 0-2 None Seen  None Seen  --   --  >10*   BACUR None Seen None Seen  None Seen  --   --  Rare*       Urine Culture Results (last three years):  Lab Results   Component Value Date    URINECUL No Growth at 18-24 hrs. 09/18/2022    URINECUL No Growth at 18-24 hrs. 06/05/2017       PSA:  No results found for: \"PSA\", \"PERCENTPSA\", \"PSAS\", \"PSAULTRA\", \"QPSA\", \"PSATOT\", \"TOTPSADX\", \"TOTPSASCREEN\"     Imaging (last three days):  CT ABDOMEN+PELVIS KIDNEYSTONE 2D RNDR(NO IV,NO ORAL)(CPT=74176)    Result Date: 3/3/2024  CONCLUSION:  1.  Mild left hydronephrosis related to an obstructing 2 x 5 mm stone at the UPJ, which projects at the same horizontal level as the L3-4 disc.  No additional calculi in the kidneys, ureters, or bladder. 2.  Colonic diverticulosis. 3.  Post cholecystectomy.  No  biliary ductal dilatation. 4.  Post hysterectomy.  No adnexal mass. 5.  Bilateral L4 pars defects with grade 1 anterolisthesis of L4 on L5.   [facility] provided a preliminary report for this examination. This final report agrees with their preliminary findings.   Dictated by (CST): Clifton Whitlock MD on 3/03/2024 at 7:40 AM     Finalized by (CST): Clifton Whitlock MD on 3/03/2024 at 7:44 AM              Impression:   Radha Rodrigez is a 58 year old female with history of diabetes, GERD, hypertension, morbid obesity presenting with left flank pain.  She has been afebrile with normal vital signs.  Labs show creatinine 0.83 (baseline 0.8-0.9), WBC 6.4 > 5.8.  Urinalysis shows greater than 50 WBC, greater than 10 RBC, rare bacteria, 500 leukocyte esterase, negative nitrates.  Urine culture is pending.  CT scan shows a 5 mm proximal obstructing left ureteral stone with mild upstream hydronephrosis, no additional stones in either kidney.  She was started on ceftriaxone and admitted for pain control, IV fluids.    She continues to have left flank pain today slightly improved by pain medications.  She has not had fevers or chills. Denies nausea/vomiting. She has had stones in the past but nothing that has required surgery.    I discussed proceeding to the OR for cystoscopy, left ureteral stent placement given obstructing ureteral stone, persistent pain, and possible UTI based on urinalysis.  I discussed the risk, benefits, procedural details.  I discussed that we would need to return in a few weeks for definitive stone treatment once any infection is treated.    Recommendations:   -OR for cystoscopy, left retrograde pyelogram and ureteral stent placement  -Continue antibiotics  -Follow-up urine culture  -Pain control as needed, including Toradol  -Hydrated IV fluids  -Strain urine  -Tamsulosin 0.4 mg daily      I have personally reviewed all relevant medical records, labs, and imaging.    Thank you for this consult.  Please call if there are any questions or concerns.    Medical Decision Making  Nephrolithiasis: Undiagnosed new problem  UTI: Undiagnosed new problem    Amount and/or Complexity of Data Reviewed  External Data Reviewed: labs and notes.  Radiology: independent interpretation performed.    Risk  Prescription drug management.  Minor surgery with identified risk factors.      Scooby Quiles MD  Staff Urologist  [facility]  Office: [phone]

## 2024-03-03 NOTE — H&P
[facility]  part of [facility]    History & Physical    Radha Rodrigez Patient Status:  Inpatient    1965 MRN [id number]   Location [facility] 5SW/SE Attending Rosalva Bradshaw MD   Hosp Day # 0 PCP Guille Miramontes MD     Date:  3/3/2024  Date of Admission:  3/2/2024    Chief Complaint:  Chief Complaint   Patient presents with    Abdomen/Flank Pain       History of Present Illness:  Radha Rodrigez is a(n) 58 year old female, who presents for evaluation of sudden onset of left sided flank pain radiating to the left lower abdomen that started a few hours prior to presentation.  Patient reports history of urinary stones about 5 years ago which she passed on her own.  She denies any symptoms of dysuria or blood in the urine or stool.  Patient denies any chest pain or shortness of breath.  Denies any diarrhea or constipation, last bowel movement day of presentation.  Denies any fever or chills.  Denies any lightheadedness or dizziness.  States that she is able to urinate okay.  Has had adequate p.o. intake.  Patient denies symptoms of change in vision, headache. On presentation to the ED, initial vital signs reassuring with temp 97.4, heart rate 90, respiratory 22, mildly elevated blood pressure 178/85.  Lab work reveals creatinine level 1.04, urinalysis does reveal UTI with leukocyte esterase 500, WBC > 50, + bacteria.  CT abdomen pelvis does reveal 4 mm obstructing proximal left ureteral stone.  Patient was treated with morphine 4 mg, Zofran, Rocephin 2 g IV, Toradol 15 mg IV x 1.  She does report that her pain went away after receiving the Toradol.  Patient was admitted under hospitalist service with consultation to urology (Dr. Quiles notified).     History:  Past Medical History:   Diagnosis Date    Esophageal reflux     Morbid obesity with BMI of 50.0-59.9, adult (HCC)     Pre-diabetes     Thyroid disease      Past Surgical History:   Procedure Laterality Date     COLONOSCOPY  2021    HERNIA SURGERY      HYSTERECTOMY      REMOVAL GALLBLADDER      REPAIR ING HERNIA,5+Y/O,REDUCIBL       Family History   Problem Relation Age of Onset    Ovarian Cancer Mother     Breast Cancer Sister 27      reports that she has never smoked. She has never been exposed to tobacco smoke. She has never used smokeless tobacco. She reports that she does not currently use alcohol. She reports that she does not use drugs.    Allergies:  Allergies   Allergen Reactions    Augmentin [Amoxicillin-Pot Clavulanate] NAUSEA AND VOMITING    Cortisone DIZZINESS and OTHER (SEE COMMENTS)     Blood drop, dizziness       Home Medications:  Prior to Admission Medications   Prescriptions Last Dose Informant Patient Reported? Taking?   Azelastine HCl 137 MCG/SPRAY Nasal Solution   No No   Si-2 sprays by Nasal route in the morning and 1-2 sprays before bedtime. FOR SINUS SYMPTOMS/NASAL CONGESTION..   Budesonide-Formoterol Fumarate 160-4.5 MCG/ACT Inhalation Aerosol   No No   Sig: Inhale 2 puffs into the lungs 2 (two) times daily.   Budesonide-Formoterol Fumarate 160-4.5 MCG/ACT Inhalation Aerosol   No No   Sig: Inhale 2 puffs into the lungs 2 (two) times daily.   Esomeprazole Magnesium 40 MG Oral Capsule Delayed Release   No No   Sig: Take 1 capsule (40 mg total) by mouth 2 (two) times daily.   Mometasone Furo-Formoterol Fum (DULERA) 100-5 MCG/ACT Inhalation Aerosol   No No   Sig: Inhale 2 puffs into the lungs 2 (two) times daily.   albuterol 108 (90 Base) MCG/ACT Inhalation Aero Soln   No No   Sig: Inhale 2-4 puffs into the lungs every 4 to 6 hours as needed for Wheezing or Shortness of Breath (cough, asthma, chest tightness). FOR ASTHMA. Pharmacist, please switch to formulary alternative per insurance coverage, ok to replace with proair, ventolin, proventil, or any other albuterol inhaler. -drkp   benzonatate 200 MG Oral Cap   No No   Sig: Take 1 capsule (200 mg total) by mouth 3 (three) times daily as  needed for cough (FOR COUGH).   Patient not taking: Reported on 2023   cefpodoxime 200 MG Oral Tab   No No   Sig: Take 1 tablet (200 mg total) by mouth 2 (two) times daily for 5 days.   cholecalciferol (VITAMIN D3) 125 MCG (5000 UT) Oral Cap   Yes No   Sig: Take 1 capsule (5,000 Units total) by mouth daily.   dextromethorphan-guaiFENesin  MG/5ML Oral Syrup   No No   Sig: Take 5 mL by mouth every 12 (twelve) hours for 15 days.   Patient not taking: Reported on 2023   doxycycline 100 MG Oral Cap   No No   Sig: Take 1 capsule (100 mg total) by mouth 2 (two) times daily with meals for 10 days.   Patient not taking: Reported on 2023   escitalopram 5 MG Oral Tab   No No   Sig: Take 1 tablet (5 mg total) by mouth daily.   fluticasone propionate 50 MCG/ACT Nasal Suspension   No No   Si sprays by Nasal route daily.   fluticasone propionate 50 MCG/ACT Nasal Suspension   No No   Si sprays by Each Nare route daily. FOR NASAL CONGESTION/SINUS SYMPTOMS.   fluticasone-salmeterol (WIXELA INHUB) 250-50 MCG/ACT Inhalation Aerosol Powder, Breath Activated   No No   Sig: Inhale 1 puff into the lungs every 12 (twelve) hours.   fluticasone-salmeterol 115-21 MCG/ACT Inhalation Aerosol   No No   Sig: Inhale 2 puffs into the lungs 2 (two) times daily. FOR ASTHMA   guaiFENesin-codeine 100-10 MG/5ML Oral Solution   No No   Sig: Take 10 mL by mouth every 6 (six) hours as needed for cough.   levothyroxine 150 MCG Oral Tab   No No   Sig: Take 1 tablet (150 mcg total) by mouth daily.   naproxen 500 MG Oral Tab   Yes No   Sig: Take 1 tablet (500 mg total) by mouth as needed.   Patient not taking: Reported on 2023   nystatin 110492 UNIT/ML Mouth/Throat Suspension   No No   Sig: Take 5 mL (500,000 Units total) by mouth 4 (four) times daily for 7 days.   polymyxin B-trimethoprim 20498-3.1 UNIT/ML-% Ophthalmic Solution   No No   Sig: Place 1 drop into both eyes every 4 (four) hours. Instill 1 drop in affected  eye(s) every 4 hours (maximum: 6 doses per day) for 7-10 days   Patient not taking: Reported on 12/11/2023   predniSONE 20 MG Oral Tab   No No   Sig: Take 2 tablets (40 mg total) by mouth daily for 5 days, THEN 1 tablet (20 mg total) daily for 5 days.   semaglutide (OZEMPIC, 0.25 OR 0.5 MG/DOSE,) 2 MG/3ML Subcutaneous Solution Pen-injector   No No   Sig: Inject 0.25 mg into the skin once a week.   semaglutide (OZEMPIC, 0.25 OR 0.5 MG/DOSE,) 2 MG/3ML Subcutaneous Solution Pen-injector   No No   Sig: Inject 0.25 mg into the skin once a week.      Facility-Administered Medications: None       Review of Systems:  Constitutional: Negative  Eye:  Negative.  Ear/Nose/Mouth/Throat:  Negative.  Respiratory:  Negative  Cardiovascular: Negative  Gastrointestinal:  Negative.  Genitourinary:  + Left flank pain  Endocrine:  Negative.  Immunologic:  Negative.  Musculoskeletal:  Negative.  Integumentary:  Negative.  Neurologic:  Negative.  Psychiatric:  Negative.  ROS reviewed as documented in chart    Physical Exam:  Temp:  [97.4 °F (36.3 °C)] 97.4 °F (36.3 °C)  Pulse:  [57-90] 57  Resp:  [18-22] 20  BP: (138-178)/(69-97) 145/71  SpO2:  [94 %-98 %] 98 %    General:  Alert and oriented.  Diffuse skin problem:  None.  Eye:  Pupils are equal, round and reactive to light, extraocular movements are intact, Normal conjunctiva.  HENT:  Normocephalic, oral mucosa is moist.  Head:  Normocephalic, atraumatic.  Neck:  Supple, non-tender, no carotid bruit, no jugular venous distention, no lymphadenopathy, no thyromegaly.  Respiratory:  Lungs are clear to auscultation, respirations are non-labored, breath sounds are equal, symmetrical chest wall expansion.  Cardiovascular:  Normal rate, regular rhythm, no murmur, no edema.  Gastrointestinal:  Soft, non-tender, non-distended, normal bowel sounds, no organomegaly.  Lymphatics:  No lymphadenopathy neck, axilla, groin.  Musculoskeletal: Normal range of motion.  normal strength.  Feet:  Normal  pulses.  Neurologic:  Alert, oriented, no focal deficits, cranial nerves II-XII are grossly intact.  Cognition and Speech:  Oriented, speech clear and coherent.  Psychiatric:  Cooperative, appropriate mood & affect.      Laboratory Data:   Lab Results   Component Value Date    WBC 6.4 03/02/2024    HGB 13.5 03/02/2024    HCT 41.1 03/02/2024    .0 03/02/2024    CREATSERUM 1.04 03/02/2024    BUN 22 03/02/2024     03/02/2024    K 4.4 03/02/2024     03/02/2024    CO2 26.0 03/02/2024     03/02/2024    CA 9.4 03/02/2024    ALB 4.1 03/02/2024    ALKPHO 94 03/02/2024    BILT 0.3 03/02/2024    TP 6.6 03/02/2024    AST 31 03/02/2024    ALT 35 03/02/2024       Imaging:  No results found.     Assessment and Plan:    H/o renal stones  Left flank pain  Obstructive ureterolithiasis  -CT abdomen/pelvis does reveal 4 mm obstructing proximal left ureteral stone.  Nontoxic-appearing.  Pain improved with a dose of Toradol.  -Urology consulted with recommendation for keeping patient n.p.o. as well as straining urine throughout the night.  -Will give a dose of Flomax to hopefully facilitate passage of the stone  -Pain control with Toradol  -Maintain n.p.o.  -Initiate gentle IV fluid hydration  -SCDs for DVT prophylaxis pending urology evaluation    Mild acute kidney injury  -Creatinine noted to be 1.04 (baseline 0.9).  Suspecting secondary to postobstructive etiology.  -Initiate IV fluid hydration and reassess renal function in the a.m..  -Reassess renal function in the a.m.  Avoid nephrotoxins.  Renally dose medications.    Complicated UTI  -Urinalysis positive for UTI.  Patient received Rocephin 2 g IV in the ED  -Continue with Rocephin daily pending urine culture.    H/o thyroid disease  -Resume Synthroid    Prophylaxis  SCDs pending urology evaluation    CODE STATUS  Full    Primary care physician  Guille Miramontes MD    60 minutes spent on this admission - examining patient, obtaining history, reviewing  previous medical records, going over test results/imaging and discussing plan of care. All questions answered.     Disposition  Clinical course will dictate outcome      Rosalva Bradshaw MD  3/3/2024  1:11 AM

## 2024-03-03 NOTE — H&P (VIEW-ONLY)
Urology Inpatient Consult Note  [facility]  part of [facility]      Radha Rodrigez Patient Status:  Inpatient    1965 MRN [id number]   Location [facility] 5SW/SE Attending Rayray Thornton,*   Hosp Day # 0 PCP Guille Miramontes MD     Date of Admission:  3/2/2024  Date of Consult: 3/3/2024  Primary Care Provider: Guille Miramontes MD     Consulting Provider: Dr. Thornton    Reason for Consultation:   Obstructing left ureteral stone    History of Present Illness:   Radha Rodrigez is a 58 year old female with history of diabetes, GERD, hypertension, morbid obesity presenting with left flank pain.  She has been afebrile with normal vital signs.  Labs show creatinine 0.83 (baseline 0.8-0.9), WBC 6.4 > 5.8.  Urinalysis shows greater than 50 WBC, greater than 10 RBC, rare bacteria, 500 leukocyte esterase, negative nitrates.  Urine culture is pending.  CT scan shows a 5 mm proximal obstructing left ureteral stone with mild upstream hydronephrosis, no additional stones in either kidney.  She was started on ceftriaxone and admitted for pain control, IV fluids.    She continues to have left flank pain today slightly improved by pain medications.  She has not had fevers or chills. Denies nausea/vomiting. She has had stones in the past but nothing that has required surgery.    I discussed proceeding to the OR for cystoscopy, left ureteral stent placement given obstructing ureteral stone, persistent pain, and possible UTI based on urinalysis.  I discussed the risk, benefits, procedural details.  I discussed that we would need to return in a few weeks for definitive stone treatment once any infection is treated.    History:     Past Medical History:   Diagnosis Date    Diabetes (HCC)     Esophageal reflux     High blood pressure     Morbid obesity with BMI of 50.0-59.9, adult (HCC)     Pneumonia due to organism     Pre-diabetes     Seasonal allergies     Thyroid disease     Vitamin D  deficiency        Past Surgical History:   Procedure Laterality Date    COLONOSCOPY  11/30/2021    HERNIA SURGERY      HYSTERECTOMY      REMOVAL GALLBLADDER      REPAIR ING HERNIA,5+Y/O,REDUCIBL         Family History   Problem Relation Age of Onset    Ovarian Cancer Mother     Breast Cancer Sister 27       Social History     Socioeconomic History    Marital status:    Tobacco Use    Smoking status: Never     Passive exposure: Never    Smokeless tobacco: Never   Vaping Use    Vaping Use: Never used   Substance and Sexual Activity    Alcohol use: Not Currently    Drug use: Never     Social Determinants of Health     Food Insecurity: No Food Insecurity (3/3/2024)    Food Insecurity     Food Insecurity: Never true   Transportation Needs: No Transportation Needs (3/3/2024)    Transportation Needs     Lack of Transportation: No   Housing Stability: Low Risk  (3/3/2024)    Housing Stability     Housing Instability: No       Medications:  No current outpatient medications on file.       Allergies:  Allergies   Allergen Reactions    Augmentin [Amoxicillin-Pot Clavulanate] NAUSEA AND VOMITING    Cortisone DIZZINESS and OTHER (SEE COMMENTS)     Blood drop, dizziness       Review of Systems:   A comprehensive 10-point review of systems was completed.  Pertinent positives and negatives are noted in the the HPI.    Physical Exam:   Vital Signs:  Blood pressure 127/59, pulse 69, temperature 97.7 °F (36.5 °C), temperature source Oral, resp. rate 20, height 5' 2\" (1.575 m), weight 279 lb 12.8 oz (126.9 kg), SpO2 94%.     CONSTITUTIONAL: Well developed, well nourished, in no acute distress  NEUROLOGIC: Alert and oriented  HEAD: Normocephalic, atraumatic  EYES: Sclera non-icteric  ENT: Hearing intact, moist mucous membranes  NECK: No obvious goiter or masses  RESPIRATORY: Normal respiratory effort  SKIN: No evident rashes  ABDOMEN: Soft, non-tender, non-distended    Laboratory Data:  Lab Results   Component Value Date    WBC  5.8 03/03/2024    HGB 13.0 03/03/2024    .0 03/03/2024     Lab Results   Component Value Date     03/03/2024    K 3.9 03/03/2024     03/03/2024    CO2 25.0 03/03/2024    BUN 20 03/03/2024     (H) 03/03/2024    GFRAA 84 09/27/2021    AST 31 03/02/2024    ALT 35 03/02/2024    TP 6.6 03/02/2024    ALB 4.1 03/02/2024    CA 9.1 03/03/2024       Urinalysis Results (last three years):  Recent Labs     09/27/21  1557 09/18/22  1722 11/18/22  0602 04/29/23  0739 03/02/24  2250   COLORUR Yellow Yellow Yellow Colorless* Light-Yellow   CLARITY Clear Clear Clear Clear Clear   SPECGRAVITY 1.013 >=1.030 1.019 1.009 1.023   PHURINE 5.0 5.5 5.0 6.5 5.5   PROUR Negative Negative Negative Negative Trace*   GLUUR Negative Negative Negative Normal Normal   KETUR Trace* Negative Negative Negative Negative   BILUR Negative Negative Negative Negative Negative   BLOODURINE Negative Negative Negative Negative 2+*   NITRITE Negative Negative Negative Negative Negative   UROBILINOGEN <2.0 0.2 <2.0 Normal Normal   LEUUR Trace* Trace* Negative Negative 500*   UASA  --   --  Negative  --   --    WBCUR 1-5 1-5  1-5  --   --  >50*   RBCUR 0-2 None Seen  None Seen  --   --  >10*   BACUR None Seen None Seen  None Seen  --   --  Rare*       Urine Culture Results (last three years):  Lab Results   Component Value Date    URINECUL No Growth at 18-24 hrs. 09/18/2022    URINECUL No Growth at 18-24 hrs. 06/05/2017       PSA:  No results found for: \"PSA\", \"PERCENTPSA\", \"PSAS\", \"PSAULTRA\", \"QPSA\", \"PSATOT\", \"TOTPSADX\", \"TOTPSASCREEN\"     Imaging (last three days):  CT ABDOMEN+PELVIS KIDNEYSTONE 2D RNDR(NO IV,NO ORAL)(CPT=74176)    Result Date: 3/3/2024  CONCLUSION:  1.  Mild left hydronephrosis related to an obstructing 2 x 5 mm stone at the UPJ, which projects at the same horizontal level as the L3-4 disc.  No additional calculi in the kidneys, ureters, or bladder. 2.  Colonic diverticulosis. 3.  Post cholecystectomy.  No  biliary ductal dilatation. 4.  Post hysterectomy.  No adnexal mass. 5.  Bilateral L4 pars defects with grade 1 anterolisthesis of L4 on L5.   [facility] provided a preliminary report for this examination. This final report agrees with their preliminary findings.   Dictated by (CST): Clifton Whitlock MD on 3/03/2024 at 7:40 AM     Finalized by (CST): Clifton Whitlock MD on 3/03/2024 at 7:44 AM              Impression:   Radha Rodrigez is a 58 year old female with history of diabetes, GERD, hypertension, morbid obesity presenting with left flank pain.  She has been afebrile with normal vital signs.  Labs show creatinine 0.83 (baseline 0.8-0.9), WBC 6.4 > 5.8.  Urinalysis shows greater than 50 WBC, greater than 10 RBC, rare bacteria, 500 leukocyte esterase, negative nitrates.  Urine culture is pending.  CT scan shows a 5 mm proximal obstructing left ureteral stone with mild upstream hydronephrosis, no additional stones in either kidney.  She was started on ceftriaxone and admitted for pain control, IV fluids.    She continues to have left flank pain today slightly improved by pain medications.  She has not had fevers or chills. Denies nausea/vomiting. She has had stones in the past but nothing that has required surgery.    I discussed proceeding to the OR for cystoscopy, left ureteral stent placement given obstructing ureteral stone, persistent pain, and possible UTI based on urinalysis.  I discussed the risk, benefits, procedural details.  I discussed that we would need to return in a few weeks for definitive stone treatment once any infection is treated.    Recommendations:   -OR for cystoscopy, left retrograde pyelogram and ureteral stent placement  -Continue antibiotics  -Follow-up urine culture  -Pain control as needed, including Toradol  -Hydrated IV fluids  -Strain urine  -Tamsulosin 0.4 mg daily      I have personally reviewed all relevant medical records, labs, and imaging.    Thank you for this consult.  Please call if there are any questions or concerns.    Medical Decision Making  Nephrolithiasis: Undiagnosed new problem  UTI: Undiagnosed new problem    Amount and/or Complexity of Data Reviewed  External Data Reviewed: labs and notes.  Radiology: independent interpretation performed.    Risk  Prescription drug management.  Minor surgery with identified risk factors.      Scooby Quiles MD  Staff Urologist  [facility]  Office: [phone]

## 2024-03-03 NOTE — DISCHARGE INSTRUCTIONS
You had cystoscopy and stent placement in the operating room today.    Instructions:    - No heavy lifting or strenuous activity for 1 day. You may resume regular activity tomorrow.    - The office will contact you to make your next surgery appointment for stone removal in a few weeks. If you do not hear from the clinic after a few days or you need to change your appointment time or date, please contact us at [phone].    - With a ureteral stent in place you may have some discomfort on your side/flank. You can feel pain worsen when you urinate, so try to avoid holding urine and void every 2-3 hours. You also may notice increased blood in the urine as you increase your activity. If this happens increase your hydration and take it easy for a day. Warm baths/hot packs can help with the discomfort as well as your prescribed medications and Advil/Motrin (if you are able to take these).     - You may experience mild pain after the procedure for a few days.  If the pain becomes intolerable please contact our office or go to the nearest Emergency Room or Urgent Care. You should take over the counter ibuprofen (AKA motrin, advil) for mild pain (provided you do not have a medical condition such as stomach ulcers or kidney disease which prohibits you from taking these). You may alternate this with tylenol as well. If pain is still not relieved by tylenol and/or ibuprofen, you may take narcotic pain medication if prescribed (typically oxycodone or tramadol). If you are taking narcotic pain medication this can make you constipated, so you should take over the counter stool softeners or miralax if prescribed.    - Warm pack or hot baths often help with discomfort after cystoscopy.    - If you take blood thinners (such as aspirin or plavix) please hold these medications until 3 days after surgery.     - You may experience burning and frequency of urination over the next few days. This will improve after a few days if you stay  well hydrated.      - You are likely to see some blood in your urine (pink or light red urine) that should clear up within a few days. Staying well hydrated should help this clear up. If you notice the urine stays dark red or there are multiple large blood clots despite good hydration, please call the urology clinic ([phone]).     - Try to abstain from alcohol, coffee, tea, artificial sweeteners, and spicy food for the next 48 hours as these can irritate the bladder.     - If you develop fevers / chills, difficulty urinating, or abdominal pain that does not improve with pain medications, please call the office.     - Drink 1.5 to 2 liters of fluid today (water is preferable). If you are on a fluid restriction due to other medical reasons then you need to adhere to your fluid restriction recommendations.    Scooby Quiles MD  Staff Urologist  [facility]  Office: [phone]    Ok to go home if tolerates soft diet  Please see work note     Medication List        START taking these medications      cephalexin 500 MG Caps  Commonly known as: Keflex  Take 1 capsule (500 mg total) by mouth 3 (three) times daily. One dose tonight and stop if rash; finish all doses; took iv rocephin in hospital no problem     Ketorolac Tromethamine 10 MG Tabs  Commonly known as: TORADOL  Take 1 tablet (10 mg total) by mouth every 8 (eight) hours as needed for Pain (Use sparingly and with plenty of water).     oxybutynin 5 MG Tabs  Commonly known as: Ditropan  Take 1 tablet (5 mg total) by mouth 3 (three) times daily as needed (Bladder spasms). Stop 12 hours before Dominguez catheter removal in clinic (if applicable)     tamsulosin 0.4 MG Caps  Commonly known as: Flomax  Take 1 capsule (0.4 mg total) by mouth every evening for 14 days.            CHANGE how you take these medications      azelastine 137 MCG/SPRAY Soln  1-2 sprays by Nasal route in the morning and 1-2 sprays before bedtime. FOR SINUS SYMPTOMS/NASAL  CONGESTION..  What changed:   when to take this  reasons to take this     Budesonide-Formoterol Fumarate 160-4.5 MCG/ACT Aero  Commonly known as: SYMBICORT  Inhale 2 puffs into the lungs 2 (two) times daily.  What changed:   when to take this  reasons to take this     Esomeprazole Magnesium 40 MG Cpdr  Commonly known as: NEXIUM  Take 1 capsule (40 mg total) by mouth 2 (two) times daily.  What changed: when to take this     fluticasone propionate 50 MCG/ACT Susp  Commonly known as: Flonase  2 sprays by Nasal route daily.  What changed:   when to take this  reasons to take this  Another medication with the same name was removed. Continue taking this medication, and follow the directions you see here.     fluticasone-salmeterol 115-21 MCG/ACT Aero  Commonly known as: ADVAIR HFA  Inhale 2 puffs into the lungs 2 (two) times daily. FOR ASTHMA  What changed: Another medication with the same name was removed. Continue taking this medication, and follow the directions you see here.     Ozempic (0.25 or 0.5 MG/DOSE) 2 MG/3ML Sopn  Generic drug: semaglutide  Inject 0.25 mg into the skin once a week.  What changed: Another medication with the same name was removed. Continue taking this medication, and follow the directions you see here.            CONTINUE taking these medications      albuterol 108 (90 Base) MCG/ACT Aers  Commonly known as: Ventolin HFA  Inhale 2-4 puffs into the lungs every 4 to 6 hours as needed for Wheezing or Shortness of Breath (cough, asthma, chest tightness). FOR ASTHMA. Pharmacist, please switch to formulary alternative per insurance coverage, ok to replace with proair, ventolin, proventil, or any other albuterol inhaler. -kp     escitalopram 5 MG Tabs  Commonly known as: Lexapro  Take 1 tablet (5 mg total) by mouth daily.     Fexofenadine-Pseudoephed -240 MG Tb24  Commonly known as: ALLEGRA-D 24     levothyroxine 150 MCG Tabs  Commonly known as: Synthroid  Take 1 tablet (150 mcg total) by  mouth daily.     Vitamin D3 125 MCG (5000 UT) Caps  Generic drug: cholecalciferol            STOP taking these medications      guaiFENesin-codeine 100-10 MG/5ML Soln  Commonly known as: Robitussin AC     nystatin 397145 UNIT/ML Susp  Commonly known as: Mycostatin               Where to Get Your Medications        These [id number] - [place], [place] - [address] [facility] [phone], [phone] [address] [facility] [address]      Phone: [phone]   cephalexin 500 MG Caps  Ketorolac Tromethamine 10 MG Tabs  oxybutynin 5 MG Tabs  tamsulosin 0.4 MG Caps

## 2024-03-03 NOTE — PLAN OF CARE
Patient vital signs stable. AxO x 4. IV fluids started this evening, NPO for possible procedure in AM. PRN Toradol given for pain. Self in room. No acute changes.    Problem: Patient Centered Care  Goal: Patient preferences are identified and integrated in the patient's plan of care  Description: Interventions:  - What would you like us to know as we care for you? From home with family.  - Provide timely, complete, and accurate information to patient/family  - Incorporate patient and family knowledge, values, beliefs, and cultural backgrounds into the planning and delivery of care  - Encourage patient/family to participate in care and decision-making at the level they choose  - Honor patient and family perspectives and choices  Outcome: Progressing     Problem: Patient/Family Goals  Goal: Patient/Family Long Term Goal  Description: Patient's Long Term Goal: Removal of kidney stones    Interventions:  - NPO for possible procedures  - Antibiotics as necessary  - Tests and labs per MD  - See additional Care Plan goals for specific interventions  Outcome: Progressing  Goal: Patient/Family Short Term Goal  Description: Patient's Short Term Goal: Relief of pain    Interventions:   - PRN pain medication  - Frequent rounding  - See additional Care Plan goals for specific interventions  Outcome: Progressing     Problem: METABOLIC/FLUID AND ELECTROLYTES - ADULT  Goal: Glucose maintained within prescribed range  Description: INTERVENTIONS:  - Monitor Blood Glucose as ordered  - Assess for signs and symptoms of hyperglycemia and hypoglycemia  - Administer ordered medications to maintain glucose within target range  - Assess barriers to adequate nutritional intake and initiate nutrition consult as needed  - Instruct patient on self management of diabetes  Outcome: Progressing     Problem: PAIN - ADULT  Goal: Verbalizes/displays adequate comfort level or patient's stated pain goal  Description: INTERVENTIONS:  - Encourage pt to  monitor pain and request assistance  - Assess pain using appropriate pain scale  - Administer analgesics based on type and severity of pain and evaluate response  - Implement non-pharmacological measures as appropriate and evaluate response  - Consider cultural and social influences on pain and pain management  - Manage/alleviate anxiety  - Utilize distraction and/or relaxation techniques  - Monitor for opioid side effects  - Notify MD/LIP if interventions unsuccessful or patient reports new pain  - Anticipate increased pain with activity and pre-medicate as appropriate  Outcome: Progressing

## 2024-03-03 NOTE — ANESTHESIA PREPROCEDURE EVALUATION
Anesthesia PreOp Note    HPI:     Radha Rodrigez is a 58 year old female who presents for preoperative consultation requested by: Scooby Quiles MD    Date of Surgery: 3/2/2024 - 3/3/2024    Procedure(s):  CYSTOSCOPY, LEFT URETERAL STENT INSERTION  Indication: Urolithiasis [N20.9]    Relevant Problems   No relevant active problems       NPO:  Last Liquid Consumption Date: 03/03/24  Last Liquid Consumption Time: 1100 (sip w/ meds)  Last Solid Consumption Date: 03/02/24  Last Solid Consumption Time: 1500  Last Liquid Consumption Date: 03/03/24          History Review:  Patient Active Problem List    Diagnosis Date Noted    Ureterolithiasis 03/03/2024    Pyelonephritis 03/03/2024    Encounter for therapeutic drug monitoring 02/26/2024    Type 2 diabetes mellitus without complication, without long-term current use of insulin (HCC) 02/26/2024    Pre-diabetes 10/30/2023    Stress 10/30/2023    Essential hypertension 05/09/2023    Morbid obesity with BMI of 50.0-59.9, adult (HCC) 05/09/2023    Diverticulosis of colon 08/01/2020    Esophageal reflux 07/29/2013    Seasonal allergies 07/29/2013    Hypothyroidism 11/12/2012    Leiomyoma of uterus 10/23/2012       Past Medical History:   Diagnosis Date    Diabetes (HCC)     Esophageal reflux     High blood pressure     Morbid obesity with BMI of 50.0-59.9, adult (HCC)     Pneumonia due to organism     Pre-diabetes     Seasonal allergies     Thyroid disease     Vitamin D deficiency        Past Surgical History:   Procedure Laterality Date    COLONOSCOPY  11/30/2021    HERNIA SURGERY      HYSTERECTOMY      REMOVAL GALLBLADDER      REPAIR ING HERNIA,5+Y/O,REDUCIBL         Medications Prior to Admission   Medication Sig Dispense Refill Last Dose    Fexofenadine-Pseudoephed -240 MG Oral Tablet 24 Hr Take 1 tablet by mouth daily.   3/2/2024    semaglutide (OZEMPIC, 0.25 OR 0.5 MG/DOSE,) 2 MG/3ML Subcutaneous Solution Pen-injector Inject 0.25 mg into the skin once a week. 4.5  mL 0     semaglutide (OZEMPIC, 0.25 OR 0.5 MG/DOSE,) 2 MG/3ML Subcutaneous Solution Pen-injector Inject 0.25 mg into the skin once a week. 3 mL 0     Budesonide-Formoterol Fumarate 160-4.5 MCG/ACT Inhalation Aerosol Inhale 2 puffs into the lungs 2 (two) times daily. (Patient taking differently: Inhale 2 puffs into the lungs 2 (two) times daily as needed.) 1 each 9     Esomeprazole Magnesium 40 MG Oral Capsule Delayed Release Take 1 capsule (40 mg total) by mouth 2 (two) times daily. (Patient taking differently: Take 1 capsule (40 mg total) by mouth every morning before breakfast.) 180 capsule 0 3/2/2024    fluticasone propionate 50 MCG/ACT Nasal Suspension 2 sprays by Nasal route daily. (Patient taking differently: 2 sprays by Nasal route daily as needed.) 16 g 0     Azelastine HCl 137 MCG/SPRAY Nasal Solution 1-2 sprays by Nasal route in the morning and 1-2 sprays before bedtime. FOR SINUS SYMPTOMS/NASAL CONGESTION.. (Patient taking differently: 1-2 sprays by Nasal route 2 (two) times daily as needed. FOR SINUS SYMPTOMS/NASAL CONGESTION.) 30 mL 3     albuterol 108 (90 Base) MCG/ACT Inhalation Aero Soln Inhale 2-4 puffs into the lungs every 4 to 6 hours as needed for Wheezing or Shortness of Breath (cough, asthma, chest tightness). FOR ASTHMA. Pharmacist, please switch to formulary alternative per insurance coverage, ok to replace with proair, ventolin, proventil, or any other albuterol inhaler. -jovan 3 each 9     levothyroxine 150 MCG Oral Tab Take 1 tablet (150 mcg total) by mouth daily. 90 tablet 3 3/2/2024    cholecalciferol (VITAMIN D3) 125 MCG (5000 UT) Oral Cap Take 1 capsule (5,000 Units total) by mouth daily.   3/2/2024    escitalopram 5 MG Oral Tab Take 1 tablet (5 mg total) by mouth daily. 90 tablet 1     fluticasone-salmeterol (WIXELA INHUB) 250-50 MCG/ACT Inhalation Aerosol Powder, Breath Activated Inhale 1 puff into the lungs every 12 (twelve) hours. 1 each 9     fluticasone-salmeterol 115-21 MCG/ACT  Inhalation Aerosol Inhale 2 puffs into the lungs 2 (two) times daily. FOR ASTHMA 1 each 9     [] nystatin 419421 UNIT/ML Mouth/Throat Suspension Take 5 mL (500,000 Units total) by mouth 4 (four) times daily for 7 days. 140 mL 0     [] guaiFENesin-codeine 100-10 MG/5ML Oral Solution Take 10 mL by mouth every 6 (six) hours as needed for cough. 118 mL 0     fluticasone propionate 50 MCG/ACT Nasal Suspension 2 sprays by Each Nare route daily. FOR NASAL CONGESTION/SINUS SYMPTOMS. 3 each 3      Current Facility-Administered Medications Ordered in Epic   Medication Dose Route Frequency Provider Last Rate Last Admin    [MAR Hold] albuterol (Ventolin HFA) 108 (90 Base) MCG/ACT inhaler 1 puff  1 puff Inhalation Q4H PRN Rosalva Bradshaw MD        [MAR Hold] escitalopram (Lexapro) tab 5 mg  5 mg Oral Daily Rosalva Bradshaw MD   5 mg at 24 0813    [MAR Hold] levothyroxine (Synthroid) tab 150 mcg  150 mcg Oral Daily @ 0700 Rosalva Bradshaw MD   150 mcg at 24 0517    sodium chloride 0.9% infusion   Intravenous Continuous Rosalva Bradshaw  mL/hr at 24 0205 New Bag at 24 0205    [MAR Hold] polyethylene glycol (PEG 3350) (Miralax) 17 g oral packet 17 g  17 g Oral Daily PRN Rosalva Bradshaw MD        [MAR Hold] sennosides (Senokot) tab 17.2 mg  17.2 mg Oral Nightly PRN Rosalva Bradshaw MD        [MAR Hold] bisacodyl (Dulcolax) 10 MG rectal suppository 10 mg  10 mg Rectal Daily PRN Rosalva Bradshaw MD        [MAR Hold] fleet enema (Fleet) 7-19 GM/118ML rectal enema 133 mL  1 enema Rectal Once PRN Rosalva Bradshaw MD        [MAR Hold] ondansetron (Zofran) 4 MG/2ML injection 4 mg  4 mg Intravenous Q6H PRN Rosalva Bradshaw MD        [MAR Hold] prochlorperazine (Compazine) 10 MG/2ML injection 5 mg  5 mg Intravenous Q8H PRN Rosalva Bradshaw MD        [START ON 3/4/2024] cefTRIAXone (Rocephin) 1 g in D5W 100 mL IVPB-ADD  1 g Intravenous Q24H Leeann  MD Rosalva        [MAR Hold] ketorolac (Toradol) 15 MG/ML injection 15 mg  15 mg Intravenous Q6H PRN Rosalva Bradshaw MD   15 mg at 03/03/24 1306    [MAR Hold] acetaminophen (Ofirmev) 10 mg/mL infusion premix 1,000 mg  1,000 mg Intravenous Q6H PRN Rayray Thornton  mL/hr at 03/03/24 1502 1,000 mg at 03/03/24 1502    [MAR Hold] morphINE PF 4 MG/ML injection 4 mg  4 mg Intravenous Once Raffy Sarmiento MD        [MAR Hold] ondansetron (Zofran) 4 MG/2ML injection 4 mg  4 mg Intravenous Once Raffy Sarmiento MD         No current Epic-ordered outpatient medications on file.       Allergies   Allergen Reactions    Augmentin [Amoxicillin-Pot Clavulanate] NAUSEA AND VOMITING    Cortisone DIZZINESS and OTHER (SEE COMMENTS)     Blood drop, dizziness       Family History   Problem Relation Age of Onset    Ovarian Cancer Mother     Breast Cancer Sister 27     Social History     Socioeconomic History    Marital status:    Tobacco Use    Smoking status: Never     Passive exposure: Never    Smokeless tobacco: Never   Vaping Use    Vaping Use: Never used   Substance and Sexual Activity    Alcohol use: Not Currently    Drug use: Never       Available pre-op labs reviewed.  Lab Results   Component Value Date    WBC 5.8 03/03/2024    RBC 4.52 03/03/2024    HGB 13.0 03/03/2024    HCT 39.3 03/03/2024    MCV 86.9 03/03/2024    MCH 28.8 03/03/2024    MCHC 33.1 03/03/2024    RDW 12.9 03/03/2024    .0 03/03/2024     Lab Results   Component Value Date     03/03/2024    K 3.9 03/03/2024     03/03/2024    CO2 25.0 03/03/2024    BUN 20 03/03/2024    CREATSERUM 0.83 03/03/2024     (H) 03/03/2024    PGLU 87 03/03/2024    CA 9.1 03/03/2024          Vital Signs:  Body mass index is 51.18 kg/m².   height is 1.575 m (5' 2\") and weight is 126.9 kg (279 lb 12.8 oz). Her oral temperature is 98.2 °F (36.8 °C). Her blood pressure is 134/65 and her pulse is 76. Her respiration is 18 and oxygen  saturation is 95%.   Vitals:    03/03/24 0100 03/03/24 0116 03/03/24 0514 03/03/24 1200   BP: 145/71 153/63 127/59 134/65   Pulse: 57 65 69 76   Resp: 20 20 20 18   Temp:  97.8 °F (36.6 °C) 97.7 °F (36.5 °C) 98.2 °F (36.8 °C)   TempSrc:  Oral Oral Oral   SpO2: 98% 93% 94% 95%   Weight:  126.9 kg (279 lb 12.8 oz)     Height:  1.575 m (5' 2\")          Anesthesia Evaluation     Patient summary reviewed and Nursing notes reviewed    No history of anesthetic complications   Airway   Mallampati: II  Neck ROM: full  Dental      Pulmonary - negative ROS and normal exam   Cardiovascular - negative ROS and normal exam  (+) hypertension    Neuro/Psych - negative ROS     GI/Hepatic/Renal - negative ROS   (+) GERD    Endo/Other - negative ROS   (+) diabetes mellitus  Abdominal  - normal exam  (+) obese                 Anesthesia Plan:   ASA:  3  Plan:   General  Airway:  ETT and Video laryngoscope  Post-op Pain Management: IV analgesics  Informed Consent Plan and Risks Discussed With:  Patient      I have informed Radha Rodrigez and/or legal guardian or family member of the nature of the anesthetic plan, benefits, risks including possible dental damage if relevant, major complications, and any alternative forms of anesthetic management.   All of the patient's questions were answered to the best of my ability. The patient desires the anesthetic management as planned.  ISRAEL NGUYEN MD  3/3/2024 4:32 PM  Present on Admission:  **None**

## 2024-03-03 NOTE — ED INITIAL ASSESSMENT (HPI)
Patient c/o left sided flank pain that radiates into the lower abdomen that started tonight. Denies hematuria or urinary symptoms.

## 2024-03-03 NOTE — ED PROVIDER NOTES
Patient Seen in: [facility] Emergency Department    History     Chief Complaint   Patient presents with    Abdomen/Flank Pain       HPI    The patient presents to the ED complaining of left-sided flank pain that radiates into her left lower abdomen.  Pain started this evening and feels like past kidney stone pain.  She denies any hematuria or urinary symptoms.  Denies fevers chills or other complaints.  No vomiting or diarrhea.    History reviewed.   Past Medical History:   Diagnosis Date    Esophageal reflux     Morbid obesity with BMI of 50.0-59.9, adult (HCC)     Pre-diabetes     Thyroid disease        History reviewed.   Past Surgical History:   Procedure Laterality Date    COLONOSCOPY  11/30/2021    HERNIA SURGERY      HYSTERECTOMY      REMOVAL GALLBLADDER      REPAIR ING HERNIA,5+Y/O,REDUCIBL           Medications :  (Not in a hospital admission)       Family History   Problem Relation Age of Onset    Ovarian Cancer Mother     Breast Cancer Sister 27       Smoking Status:   Social History     Socioeconomic History    Marital status:    Tobacco Use    Smoking status: Never     Passive exposure: Never    Smokeless tobacco: Never   Vaping Use    Vaping Use: Never used   Substance and Sexual Activity    Alcohol use: Not Currently    Drug use: Never       Constitutional and vital signs reviewed.      Social History and Family History elements reviewed from today, pertinent positives to the presenting problem noted.    Physical Exam     ED Triage Vitals [03/02/24 2212]   BP (!) 178/85   Pulse 90   Resp 22   Temp 97.4 °F (36.3 °C)   Temp src Oral   SpO2 96 %   O2 Device None (Room air)       All measures to prevent infection transmission during my interaction with the patient were taken. Handwashing was performed prior to and after the exam.  Stethoscope and any equipment used during my examination was cleaned with super sani-cloth germicidal wipes following the exam.     Physical Exam  Vitals and  nursing note reviewed.   Constitutional:       General: She is not in acute distress.     Comments: Morbidly obese   HENT:      Head: Normocephalic and atraumatic.   Eyes:      General:         Right eye: No discharge.         Left eye: No discharge.      Conjunctiva/sclera: Conjunctivae normal.   Neck:      Trachea: No tracheal deviation.   Cardiovascular:      Rate and Rhythm: Normal rate.   Pulmonary:      Effort: Pulmonary effort is normal. No respiratory distress.      Breath sounds: No stridor.   Abdominal:      General: There is no distension.      Palpations: Abdomen is soft.      Tenderness: There is no abdominal tenderness. There is no guarding or rebound.   Musculoskeletal:         General: No deformity.   Skin:     General: Skin is warm and dry.   Neurological:      Mental Status: She is alert and oriented to person, place, and time.   Psychiatric:         Mood and Affect: Mood normal.         Behavior: Behavior normal.         ED Course        Labs Reviewed   COMP METABOLIC PANEL (14) - Abnormal; Notable for the following components:       Result Value    Glucose 114 (*)     Creatinine 1.04 (*)     BUN/CREA Ratio 21.2 (*)     Calculated Osmolality 298 (*)     Globulin  2.5 (*)     All other components within normal limits   URINALYSIS WITH CULTURE REFLEX - Abnormal; Notable for the following components:    Blood Urine 2+ (*)     Protein Urine Trace (*)     Leukocyte Esterase Urine 500 (*)     WBC Urine >50 (*)     RBC Urine >10 (*)     Bacteria Urine Rare (*)     Renal Tubular Epithelial Cells Few (*)     All other components within normal limits   CBC WITH DIFFERENTIAL WITH PLATELET    Narrative:     The following orders were created for panel order CBC With Differential With Platelet.                  Procedure                               Abnormality         Status                                     ---------                               -----------         ------                                      CBC W/ DIFFERENTIAL[[id number]]                              Final result                                                 Please view results for these tests on the individual orders.   URINE CULTURE, ROUTINE   CBC W/ DIFFERENTIAL       As Interpreted by me    Imaging Results Available and Reviewed while in ED: CT abdomen pelvis kidney stone protocol  ED Medications Administered:   Medications   morphINE PF 4 MG/ML injection 4 mg (4 mg Intravenous Not Given 3/2/24 2354)   ondansetron (Zofran) 4 MG/2ML injection 4 mg (4 mg Intravenous Not Given 3/2/24 2354)   cefTRIAXone (Rocephin) 2 g in D5W 100 mL IVPB-ADD (2 g Intravenous Handoff 3/3/24 0051)   ketorolac (Toradol) 15 MG/ML injection 15 mg (15 mg Intravenous Given 3/2/24 2350)         MDM     Vitals:    03/02/24 2327 03/02/24 2345 03/03/24 0000 03/03/24 0045   BP: (!) 164/97 138/72 147/83 152/69   Pulse: 64 61 60 64   Resp: 22 20 18 20   Temp:       TempSrc:       SpO2: 98% 98% 94% 98%   Weight:       Height:         *I personally reviewed and interpreted all ED vitals.    Pulse Ox: 98%, Room air, Normal     Monitor Interpretation:   normal sinus rhythm as interpreted by me.  The cardiac monitor was ordered to monitor heart rate.    Differential Diagnosis/ Diagnostic Considerations: Ureterolithiasis, pyelonephritis, AAA, other    Complicating Factors: The patient already has does not have any pertinent problems on file. to contribute to the complexity of this ED evaluation.    Medical Decision Making  The patient presents to the ED with left flank pain.  In significant pain distress on arrival.  Pain improved in the ED with morphine and Toradol.  Laboratory testing with concern for urinary tract infection.  CT abdomen pelvis shows a 4 mm obstructing proximal left ureteral stone.  Given patient's obstructing stone and associated urine tract infection I feel she will need admission for IV antibiotics.  No current systemic infectious symptoms.  I discussed with   Kb for urology consultation and Dr. Bradshaw for admission.    Problems Addressed:  Pyelonephritis: acute illness or injury that poses a threat to life or bodily functions  Ureterolithiasis: acute illness or injury    Amount and/or Complexity of Data Reviewed  Labs: ordered. Decision-making details documented in ED Course.  Radiology: ordered and independent interpretation performed. Decision-making details documented in ED Course.     Details: I personally viewed the patient's CT abdomen pelvis images and noted a proximal left-sided ureteral stone with hydroureter and hydronephrosis.  Discussion of management or test interpretation with external provider(s):   I discussed with Dr. Quiles for urology consultation and Dr. Bradshaw for admission.    Risk  Parenteral controlled substances.        Condition upon leaving the department: Stable    Disposition and Plan     Clinical Impression:  1. Ureterolithiasis    2. Pyelonephritis        Disposition:  Admit    Follow-up:  No follow-up provider specified.    Medications Prescribed:  Current Discharge Medication List          Hospital Problems       Present on Admission  Date Reviewed: 2/26/2024            ICD-10-CM Noted POA    * (Principal) Ureterolithiasis N20.1 3/3/2024 Unknown

## 2024-03-04 ENCOUNTER — TELEPHONE (OUTPATIENT)
Dept: SURGERY | Facility: CLINIC | Age: 59
End: 2024-03-04

## 2024-03-04 VITALS
BODY MASS INDEX: 51.49 KG/M2 | DIASTOLIC BLOOD PRESSURE: 77 MMHG | WEIGHT: 279.81 LBS | SYSTOLIC BLOOD PRESSURE: 147 MMHG | TEMPERATURE: 98 F | HEIGHT: 62 IN | OXYGEN SATURATION: 96 % | RESPIRATION RATE: 18 BRPM | HEART RATE: 68 BPM

## 2024-03-04 DIAGNOSIS — N39.0 RECURRENT UTI: Primary | ICD-10-CM

## 2024-03-04 LAB
ANION GAP SERPL CALC-SCNC: 8 MMOL/L (ref 0–18)
BASOPHILS # BLD AUTO: 0.02 X10(3) UL (ref 0–0.2)
BASOPHILS NFR BLD AUTO: 0.4 %
BUN BLD-MCNC: 17 MG/DL (ref 9–23)
BUN/CREAT SERPL: 20.7 (ref 10–20)
CALCIUM BLD-MCNC: 9 MG/DL (ref 8.7–10.4)
CHLORIDE SERPL-SCNC: 109 MMOL/L (ref 98–112)
CO2 SERPL-SCNC: 21 MMOL/L (ref 21–32)
CREAT BLD-MCNC: 0.82 MG/DL
DEPRECATED RDW RBC AUTO: 42.5 FL (ref 35.1–46.3)
EGFRCR SERPLBLD CKD-EPI 2021: 83 ML/MIN/1.73M2 (ref 60–?)
EOSINOPHIL # BLD AUTO: 0 X10(3) UL (ref 0–0.7)
EOSINOPHIL NFR BLD AUTO: 0 %
ERYTHROCYTE [DISTWIDTH] IN BLOOD BY AUTOMATED COUNT: 12.8 % (ref 11–15)
GLUCOSE BLD-MCNC: 186 MG/DL (ref 70–99)
HCT VFR BLD AUTO: 39.5 %
HGB BLD-MCNC: 12.6 G/DL
IMM GRANULOCYTES # BLD AUTO: 0.01 X10(3) UL (ref 0–1)
IMM GRANULOCYTES NFR BLD: 0.2 %
LYMPHOCYTES # BLD AUTO: 1.04 X10(3) UL (ref 1–4)
LYMPHOCYTES NFR BLD AUTO: 20 %
MAGNESIUM SERPL-MCNC: 1.7 MG/DL (ref 1.6–2.6)
MCH RBC QN AUTO: 28.7 PG (ref 26–34)
MCHC RBC AUTO-ENTMCNC: 31.9 G/DL (ref 31–37)
MCV RBC AUTO: 90 FL
MONOCYTES # BLD AUTO: 0.16 X10(3) UL (ref 0.1–1)
MONOCYTES NFR BLD AUTO: 3.1 %
NEUTROPHILS # BLD AUTO: 3.96 X10 (3) UL (ref 1.5–7.7)
NEUTROPHILS # BLD AUTO: 3.96 X10(3) UL (ref 1.5–7.7)
NEUTROPHILS NFR BLD AUTO: 76.3 %
OSMOLALITY SERPL CALC.SUM OF ELEC: 292 MOSM/KG (ref 275–295)
PHOSPHATE SERPL-MCNC: 3.2 MG/DL (ref 2.4–5.1)
PLATELET # BLD AUTO: 309 10(3)UL (ref 150–450)
POTASSIUM SERPL-SCNC: 4 MMOL/L (ref 3.5–5.1)
RBC # BLD AUTO: 4.39 X10(6)UL
SODIUM SERPL-SCNC: 138 MMOL/L (ref 136–145)
WBC # BLD AUTO: 5.2 X10(3) UL (ref 4–11)

## 2024-03-04 PROCEDURE — 99239 HOSP IP/OBS DSCHRG MGMT >30: CPT | Performed by: HOSPITALIST

## 2024-03-04 RX ORDER — CEPHALEXIN 500 MG/1
500 CAPSULE ORAL 3 TIMES DAILY
Qty: 16 CAPSULE | Refills: 0 | Status: SHIPPED
Start: 2024-03-04 | End: 2024-03-09

## 2024-03-04 NOTE — TELEPHONE ENCOUNTER
Hi!  This patient needs a ureteroscopy. Should be booked for 1 hours in the next 2-4 weeks. Needs labs as ordered below.    Thanks!  AR    Urology Surgery Scheduling Request    Location: [facility] OR    Surgeon: MEGAN Gonzalez MD    Asst. Surgeon: N/A    Diagnosis: Nephrolithiasis    Procedure: Cystoscopy left ureteroscopy, laser lithotripsy, retrograde pyelogram, stent placement     Anesthesia: General     Time Frame: 2-4    Time needed: 1 hours    Special Equipment: Holmium Laser    On Call to [facility]: [facility]    Admission: Day Surgery    Pre-op Testing: Urine Culture 10 days prior    Need Pre-op Clearance: none    Estimated Post Op/Follow Up Appt: tbd.    Priscilla Gonzalez MD

## 2024-03-04 NOTE — PLAN OF CARE
Patient alert and oriented, NPO prior to procedure in afternoon. Blood pressure elevated post-op, PRN medication added per MD. Family at bedside. Pain controlled with PRN medications, IV fluids infusing. Ambulating independently, straining urine, awaiting stool sample. Call light within reach, fall precautions in place, patient calling appropriately.     Problem: Patient Centered Care  Goal: Patient preferences are identified and integrated in the patient's plan of care  Description: Interventions:  - What would you like us to know as we care for you?   - Provide timely, complete, and accurate information to patient/family  - Incorporate patient and family knowledge, values, beliefs, and cultural backgrounds into the planning and delivery of care  - Encourage patient/family to participate in care and decision-making at the level they choose  - Honor patient and family perspectives and choices  Outcome: Progressing     Problem: Patient/Family Goals  Goal: Patient/Family Long Term Goal  Description: Patient's Long Term Goal: Removal of kidney stones    Interventions:  - NPO for possible procedures  - Antibiotics as necessary  - Tests and labs per MD  - See additional Care Plan goals for specific interventions  Outcome: Progressing  Goal: Patient/Family Short Term Goal  Description: Patient's Short Term Goal: Relief of pain    Interventions:   - PRN pain medication  - Frequent rounding  - See additional Care Plan goals for specific interventions  Outcome: Progressing     Problem: METABOLIC/FLUID AND ELECTROLYTES - ADULT  Goal: Glucose maintained within prescribed range  Description: INTERVENTIONS:  - Monitor Blood Glucose as ordered  - Assess for signs and symptoms of hyperglycemia and hypoglycemia  - Administer ordered medications to maintain glucose within target range  - Assess barriers to adequate nutritional intake and initiate nutrition consult as needed  - Instruct patient on self management of  diabetes  Outcome: Progressing     Problem: PAIN - ADULT  Goal: Verbalizes/displays adequate comfort level or patient's stated pain goal  Description: INTERVENTIONS:  - Encourage pt to monitor pain and request assistance  - Assess pain using appropriate pain scale  - Administer analgesics based on type and severity of pain and evaluate response  - Implement non-pharmacological measures as appropriate and evaluate response  - Consider cultural and social influences on pain and pain management  - Manage/alleviate anxiety  - Utilize distraction and/or relaxation techniques  - Monitor for opioid side effects  - Notify MD/LIP if interventions unsuccessful or patient reports new pain  - Anticipate increased pain with activity and pre-medicate as appropriate  Outcome: Progressing

## 2024-03-04 NOTE — TELEPHONE ENCOUNTER
Urology Surgery Scheduling Request    Location: [facility] OR    Surgeon: Dr. Carlos Moreno. Surgeon: N/A    Diagnosis: obstructing 2x5 mm stone at the left UPJ with hydronephrosis     Procedure: Cystoscopy, left retrograde pyelogram, laser lithotripsy, stone removal, ureteral stent exchange.     Procedure CPT Code (if known):     Anesthesia: General     Time Frame: 1- 2 weeks     Time needed: 1 hour    Special Equipment: Holmium Laser, Fluroscopy C-arm.     On Call to OR: [facility]    Admission: Day Surgery    Pre-op Testing: Urine Culture 10 days before surgery     Need Pre-op Clearance:     Estimated Post Op/Follow Up Appt: 2 weeks.    Darshan Ochoa PA-C  3/4/2024

## 2024-03-04 NOTE — ANESTHESIA POSTPROCEDURE EVALUATION
Patient: Radha Rodrigez    Procedure Summary       Date: 03/03/24 Room / Location: [facility] MAIN OR  / [facility] MAIN OR    Anesthesia Start: 1717 Anesthesia Stop: 1800    Procedure: CYSTOSCOPY, LEFT URETERAL STENT INSERTION (Left: Ureter) Diagnosis:       Urolithiasis      (Urolithiasis [N20.9])    Surgeons: Scooby Quiles MD Anesthesiologist: Israel Nguyen MD    Anesthesia Type: general ASA Status: 3            Anesthesia Type: general    Vitals Value Taken Time   /92 03/03/24 1759   Temp 98 03/03/24 1800   Pulse 110 03/03/24 1800   Resp 16 03/03/24 1800   SpO2 90 % 03/03/24 1800   Vitals shown include unfiled device data.    [facility] AN Post Evaluation:   Patient Evaluated in PACU  Patient Participation: complete - patient participated  Level of Consciousness: awake  Pain Management: adequate  Airway Patency:patent  Dental exam unchanged from preop  Yes    Cardiovascular Status: acceptable  Respiratory Status: acceptable  Postoperative Hydration acceptable      ISRAEL NGUYEN MD  3/3/2024 6:00 PM

## 2024-03-04 NOTE — PLAN OF CARE
Pt ambulating at night, complains of abdominal cramping and mild pain controlled with prn meds. Pt voiding, IVF continued, abx continued. Plan to discharge once medically cleared. Call light within reach, fall precautions in place.    Problem: Patient Centered Care  Goal: Patient preferences are identified and integrated in the patient's plan of care  Description: Interventions:  - What would you like us to know as we care for you?   - Provide timely, complete, and accurate information to patient/family  - Incorporate patient and family knowledge, values, beliefs, and cultural backgrounds into the planning and delivery of care  - Encourage patient/family to participate in care and decision-making at the level they choose  - Honor patient and family perspectives and choices  Outcome: Progressing     Problem: Patient/Family Goals  Goal: Patient/Family Long Term Goal  Description: Patient's Long Term Goal: Removal of kidney stones    Interventions:  - NPO for possible procedures  - Antibiotics as necessary  - Tests and labs per MD  - See additional Care Plan goals for specific interventions  Outcome: Progressing  Goal: Patient/Family Short Term Goal  Description: Patient's Short Term Goal: Relief of pain    Interventions:   - PRN pain medication  - Frequent rounding  - See additional Care Plan goals for specific interventions  Outcome: Progressing     Problem: METABOLIC/FLUID AND ELECTROLYTES - ADULT  Goal: Glucose maintained within prescribed range  Description: INTERVENTIONS:  - Monitor Blood Glucose as ordered  - Assess for signs and symptoms of hyperglycemia and hypoglycemia  - Administer ordered medications to maintain glucose within target range  - Assess barriers to adequate nutritional intake and initiate nutrition consult as needed  - Instruct patient on self management of diabetes  Outcome: Progressing     Problem: PAIN - ADULT  Goal: Verbalizes/displays adequate comfort level or patient's stated pain  goal  Description: INTERVENTIONS:  - Encourage pt to monitor pain and request assistance  - Assess pain using appropriate pain scale  - Administer analgesics based on type and severity of pain and evaluate response  - Implement non-pharmacological measures as appropriate and evaluate response  - Consider cultural and social influences on pain and pain management  - Manage/alleviate anxiety  - Utilize distraction and/or relaxation techniques  - Monitor for opioid side effects  - Notify MD/LIP if interventions unsuccessful or patient reports new pain  - Anticipate increased pain with activity and pre-medicate as appropriate  Outcome: Progressing

## 2024-03-04 NOTE — TELEPHONE ENCOUNTER
----- Message from Scooby Quiles MD sent at 3/3/2024  4:55 PM CST -----  Can you add this patient on for left ureteroscopy in a few weeks? Stenting her now for 5mm proximal stone.    Thanks,  MB

## 2024-03-04 NOTE — PAYOR COMM NOTE
--------------  ADMISSION REVIEW     Payor: [employer]  Subscriber #:  [id number]  Authorization Number: [id number]    Admit date: 3/3/24  Admit time:  1:09 AM       REVIEW DOCUMENTATION:     ED Provider Notes        ED Provider Notes signed by Raffy Sarmiento MD at 3/3/2024 12:58 AM       Author: Raffy Sarmiento MD Service: -- Author Type: Physician    Filed: 3/3/2024 12:58 AM Date of Service: 3/3/2024 12:56 AM Status: Signed    : Raffy Sarmiento MD (Physician)           Patient Seen in: [facility] Emergency Department    History     Chief Complaint   Patient presents with    Abdomen/Flank Pain       HPI    The patient presents to the ED complaining of left-sided flank pain that radiates into her left lower abdomen.  Pain started this evening and feels like past kidney stone pain.  She denies any hematuria or urinary symptoms.  Denies fevers chills or other complaints.  No vomiting or diarrhea.    History reviewed.   Past Medical History:   Diagnosis Date    Esophageal reflux     Morbid obesity with BMI of 50.0-59.9, adult (HCC)     Pre-diabetes     Thyroid disease        History reviewed.   Past Surgical History:   Procedure Laterality Date    COLONOSCOPY  11/30/2021    HERNIA SURGERY      HYSTERECTOMY      REMOVAL GALLBLADDER      REPAIR ING HERNIA,5+Y/O,REDUCIBL     rtinent positives to the presenting problem noted.    Physical Exam     ED Triage Vitals [03/02/24 2212]   BP (!) 178/85   Pulse 90   Resp 22   Temp 97.4 °F (36.3 °C)   Temp src Oral   SpO2 96 %   O2 Device None (Room air)         Physical Exam  Vitals and nursing note reviewed.   Constitutional:       General: She is not in acute distress.     Comments: Morbidly obese   HENT:      Head: Normocephalic and atraumatic.   Eyes:      General:         Right eye: No discharge.         Left eye: No discharge.      Conjunctiva/sclera: Conjunctivae normal.   Neck:      Trachea: No tracheal deviation.   Cardiovascular:      Rate and  Rhythm: Normal rate.   Pulmonary:      Effort: Pulmonary effort is normal. No respiratory distress.      Breath sounds: No stridor.   Abdominal:      General: There is no distension.      Palpations: Abdomen is soft.      Tenderness: There is no abdominal tenderness. There is no guarding or rebound.   Musculoskeletal:         General: No deformity.   Skin:     General: Skin is warm and dry.   Neurological:      Mental Status: She is alert and oriented to person, place, and time.   Psychiatric:         Mood and Affect: Mood normal.         Behavior: Behavior normal.         ED Course        Labs Reviewed   COMP METABOLIC PANEL (14) - Abnormal; Notable for the following components:       Result Value    Glucose 114 (*)     Creatinine 1.04 (*)     BUN/CREA Ratio 21.2 (*)     Calculated Osmolality 298 (*)     Globulin  2.5 (*)     All other components within normal limits   URINALYSIS WITH CULTURE REFLEX - Abnormal; Notable for the following components:    Blood Urine 2+ (*)     Protein Urine Trace (*)     Leukocyte Esterase Urine 500 (*)     WBC Urine >50 (*)     RBC Urine >10 (*)     Bacteria Urine Rare (*)     Renal Tubular Epithelial Cells Few (*)      ED Medications Administered:   Medications   morphINE PF 4 MG/ML injection 4 mg (4 mg Intravenous Not Given 3/2/24 2354)   ondansetron (Zofran) 4 MG/2ML injection 4 mg (4 mg Intravenous Not Given 3/2/24 2354)   cefTRIAXone (Rocephin) 2 g in D5W 100 mL IVPB-ADD (2 g Intravenous Handoff 3/3/24 0051)   ketorolac (Toradol) 15 MG/ML injection 15 mg (15 mg Intravenous Given 3/2/24 2350)         MDM     Vitals:    03/02/24 2327 03/02/24 2345 03/03/24 0000 03/03/24 0045   BP: (!) 164/97 138/72 147/83 152/69   Pulse: 64 61 60 64   Resp: 22 20 18 20   Temp:       TempSrc:       SpO2: 98% 98% 94% 98%   Weight:       Height:           Disposition and Plan     Clinical Impression:  1. Ureterolithiasis    2. Pyelonephritis        Disposition:  Admit      Signed by Raffy Sarmiento  MD SANJIV on 3/3/2024 12:58 AM           UROLOGY CONSULT      History of Present Illness:   Radha Rodrigez is a 58 year old female with history of diabetes, GERD, hypertension, morbid obesity presenting with left flank pain.  She has been afebrile with normal vital signs.  Labs show creatinine 0.83 (baseline 0.8-0.9), WBC 6.4 > 5.8.  Urinalysis shows greater than 50 WBC, greater than 10 RBC, rare bacteria, 500 leukocyte esterase, negative nitrates.  Urine culture is pending.  CT scan shows a 5 mm proximal obstructing left ureteral stone with mild upstream hydronephrosis, no additional stones in either kidney.  She was started on ceftriaxone and admitted for pain control, IV fluids.     She continues to have left flank pain today slightly improved by pain medications.  She has not had fevers or chills. Denies nausea/vomiting. She has had stones in the past but nothing that has required surgery.     I discussed proceeding to the OR for cystoscopy, left ureteral stent placement given obstructing ureteral stone, persistent pain, and possible UTI based on urinalysis.  I discussed the risk, benefits, procedural details.  I discussed that we would need to return in a few weeks for definitive stone treatment once any infection is treated.    CT ABDOMEN+PELVIS KIDNEYSTONE 2D RNDR(NO IV,NO ORAL)(CPT=74176)     Result Date: 3/3/2024  CONCLUSION:         1.  Mild left hydronephrosis related to an obstructing 2 x 5 mm stone at the UPJ, which projects at the same horizontal level as the L3-4 disc.  No additional calculi in the kidneys, ureters, or bladder. 2.  Colonic diverticulosis. 3.  Post cholecystectomy.  No biliary ductal dilatation. 4.  Post hysterectomy.  No adnexal mass. 5.  Bilateral L4 pars defects with grade 1 anterolisthesis of L4 on L5.   [facility] provided a preliminary report for this examination. This final report agrees with their preliminary findings.   Dictated by (CST): Clifton Whitlock MD on 3/03/2024 at  7:40 AM     Finalized by (CST): Clifton Whitlock MD on 3/03/2024 at 7:44 AM                Recommendations:   -OR for cystoscopy, left retrograde pyelogram and ureteral stent placement  -Continue antibiotics  -Follow-up urine culture  -Pain control as needed, including Toradol  -Hydrated IV fluids  -Strain urine  -Tamsulosin 0.4 mg daily    Date of Surgery: 3/3/2024     Preoperative Diagnosis: Left obstructing ureteral stone     Postoperative Diagnosis: Same     Procedure Performed: Cystoscopy, LEFT retrograde pyelogram and ureteral stent placement    Findings:  Mild hydronephrosis. Yellow, slightly cloudy urine color - sent for culture. Mild resistance with passage of 6 Fr stent due to narrow ureter.      MEDICATIONS ADMINISTERED IN LAST 1 DAY:  ceFAZolin (Ancef) injection       Date Action Dose Route User    3/3/2024 1734 Given 1 g Intravenous Piotr Rodríguez MD          cefTRIAXone (Rocephin) 1 g in D5W 100 mL IVPB-ADD       Date Action Dose Route User    3/3/2024 2334 New Bag 1 g Intravenous Jade Sevilla RN          dexamethasone (Decadron) 4 MG/ML injection       Date Action Dose Route User    3/3/2024 1722 Given 4 mg Intravenous Piotr Rodríguez MD          escitalopram (Lexapro) tab 5 mg       Date Action Dose Route User    3/4/2024 1022 Given 5 mg Oral Catrina Tabares RN          fentaNYL (Sublimaze) 50 mcg/mL injection       Date Action Dose Route User    3/3/2024 1717 Given 100 mcg Intravenous Piotr Rodríguez MD          glycopyrrolate (Robinul) 0.2 MG/ML injection       Date Action Dose Route User    3/3/2024 1722 Given 0.2 mg Intravenous Piotr Rodríguez MD          ketorolac (Toradol) 15 MG/ML injection 15 mg       Date Action Dose Route User    3/4/2024 0624 Given 15 mg Intravenous Jade Sevilla RN    3/3/2024 2346 Given 15 mg Intravenous Jade Sevilla RN          lactated ringers infusion       Date Action Dose Route User    3/3/2024 1722 New Bag (none) Intravenous Piotr Rodríguez MD           levothyroxine (Synthroid) tab 150 mcg       Date Action Dose Route User    3/4/2024 0615 Given 150 mcg Oral Jade Sevilla RN          lidocaine PF (Xylocaine-MPF) 1% injection       Date Action Dose Route User    3/3/2024 1721 Given 50 mg Intravenous Piotr Rodríguez MD          ondansetron (Zofran) 4 MG/2ML injection       Date Action Dose Route User    3/3/2024 1722 Given 4 mg Intravenous Piotr Rodríguez MD          propofol (Diprivan) 10 MG/ML injection       Date Action Dose Route User    3/3/2024 1721 Given 200 mg Intravenous Piotr Rodríguez MD          rocuronium (Zemuron) 50 mg/5mL injection       Date Action Dose Route User    3/3/2024 1721 Given 5 mg Intravenous Piotr Rodríguez MD          sodium chloride 0.9% infusion   100 ML HR        Date Action Dose Route User    3/4/2024 1006 New Bag (none) Intravenous Marisol Barrientos RN    3/3/2024 2227 New Bag (none) Intravenous Jade Sevilla RN          succinylcholine (Anectine) 20 MG/ML injection       Date Action Dose Route User    3/3/2024 1722 Given 200 mg Intravenous Piotr Rodríguez MD            Vitals (last day)       Date/Time Temp Pulse Resp BP SpO2 Weight O2 Device O2 Flow Rate (L/min) [facility]    03/04/24 1017 98 °F (36.7 °C) 68 18 147/77 96 % -- None (Room air) -- CH    03/04/24 0613 98.1 °F (36.7 °C) 66 15 111/76 -- -- None (Room air) -- MW    03/03/24 2126 97.8 °F (36.6 °C) 73 16 153/89 92 % -- None (Room air) -- MW    03/03/24 1849 -- 74 16 153/97 97 % -- None (Room air) -- [place]    03/03/24 1829 97.5 °F (36.4 °C) 85 15 159/81 97 % -- Nasal cannula 2 L/min     03/03/24 1819 -- 87 13 156/91 97 % -- Nasal cannula 2 L/min     03/03/24 1809 -- 105 10 140/95 96 % -- Nasal cannula 3 L/min     03/03/24 1759 98 °F (36.7 °C) 115 19 146/92 96 % -- Nasal cannula 4 L/min     03/03/24 1200 98.2 °F (36.8 °C) 76 18 134/65 95 % -- -- -- AS    03/03/24 0514 97.7 °F (36.5 °C) 69 20 127/59 94 % -- None (Room air) -- IB    03/03/24 0116 97.8 °F  (36.6 °C) 65 20 153/63 93 % 279 lb 12.8 oz None (Room air) -- IB    03/03/24 0100 -- 57 20 145/71 98 % -- None (Room air) -- LN    03/03/24 0045 -- 64 20 152/69 98 % -- None (Room air) -- LN    03/03/24 0000 -- 60 18 147/83 94 % -- None (Room air) -- LN

## 2024-03-04 NOTE — DISCHARGE SUMMARY
Dc summary#[id number]  > 30 min spent on dc    Hospital Discharge Diagnoses: obs kidney stone    Lace+ Score: 39  59-90 High Risk  29-58 Medium Risk  0-28   Low Risk.    TCM Follow-Up Recommendation:  LACE < 29: Low Risk of readmission after discharge from the hospital; Still recommend for TCM follow-up.

## 2024-03-04 NOTE — PLAN OF CARE
AVS reviewed with patient. All questions answered. Paper script and dr note given to patient.    Problem: Patient Centered Care  Goal: Patient preferences are identified and integrated in the patient's plan of care  Description: Interventions:  - Provide timely, complete, and accurate information to patient/family  - Incorporate patient and family knowledge, values, beliefs, and cultural backgrounds into the planning and delivery of care  - Encourage patient/family to participate in care and decision-making at the level they choose  - Honor patient and family perspectives and choices  Outcome: Adequate for Discharge     Problem: Patient/Family Goals  Goal: Patient/Family Long Term Goal  Description: Patient's Long Term Goal: Removal of kidney stones    Interventions:  - NPO for possible procedures  - Antibiotics as necessary  - Tests and labs per MD  - See additional Care Plan goals for specific interventions  Outcome: Adequate for Discharge  Goal: Patient/Family Short Term Goal  Description: Patient's Short Term Goal: Relief of pain    Interventions:   - PRN pain medication  - Frequent rounding  - See additional Care Plan goals for specific interventions  Outcome: Adequate for Discharge     Problem: METABOLIC/FLUID AND ELECTROLYTES - ADULT  Goal: Glucose maintained within prescribed range  Description: INTERVENTIONS:  - Monitor Blood Glucose as ordered  - Assess for signs and symptoms of hyperglycemia and hypoglycemia  - Administer ordered medications to maintain glucose within target range  - Assess barriers to adequate nutritional intake and initiate nutrition consult as needed  - Instruct patient on self management of diabetes  Outcome: Adequate for Discharge     Problem: PAIN - ADULT  Goal: Verbalizes/displays adequate comfort level or patient's stated pain goal  Description: INTERVENTIONS:  - Encourage pt to monitor pain and request assistance  - Assess pain using appropriate pain scale  - Administer  analgesics based on type and severity of pain and evaluate response  - Implement non-pharmacological measures as appropriate and evaluate response  - Consider cultural and social influences on pain and pain management  - Manage/alleviate anxiety  - Utilize distraction and/or relaxation techniques  - Monitor for opioid side effects  - Notify MD/LIP if interventions unsuccessful or patient reports new pain  - Anticipate increased pain with activity and pre-medicate as appropriate  Outcome: Adequate for Discharge

## 2024-03-04 NOTE — PROGRESS NOTES
S/p ureteral stent placement. Mildly cloudy urine. Recommend continued antibiotics, follow up cultures, monitor overnight. If feeling well with normal vitals and labs tomorrow OK for discharge.

## 2024-03-05 ENCOUNTER — PATIENT OUTREACH (OUTPATIENT)
Dept: CASE MANAGEMENT | Age: 59
End: 2024-03-05

## 2024-03-05 DIAGNOSIS — Z02.9 ENCOUNTERS FOR UNSPECIFIED ADMINISTRATIVE PURPOSE: Primary | ICD-10-CM

## 2024-03-05 NOTE — DISCHARGE SUMMARY
[facility]    PATIENT'S NAME: SHANEKA CERVANTES   ATTENDING PHYSICIAN: Rayray Thornton MD   PATIENT ACCOUNT#:   [id number]    LOCATION:  [address] [place] [address]  MEDICAL RECORD #:   [id number]       YOB: 1965  ADMISSION DATE:       03/02/2024      DISCHARGE DATE:  03/04/2024    DISCHARGE SUMMARY    About 35 minutes were spent preparing this discharge.    DISCHARGE DIAGNOSIS:  Status post cystoscopy for obstructive left ureteral stone, awaiting laser lithotripsy in the future, status post stent placement.    HISTORY AND HOSPITAL COURSE:  This is a very pleasant 58-year-old white female who appears much younger than her stated age who presents with a history of having abdominal and flank pain several hours before admission.  The patient was admitted to the hospital and found to have a 2 x 5 mm stone at the UPJ.  She was seen by Urology and cleared by me for a cystoscopy.  She had a cystoscopy with stent placement performed.  Her urine culture was completely negative.  She was placed on antibiotics nonetheless.  She did well today, and if she could tolerate a soft diet and her pain was controlled, she would be able to go home.    PHYSICAL EXAMINATION:    VITAL SIGNS:  On discharge temperature 98, pulse 68, respiratory rate 18, blood pressure 147/77, 96%.  LUNGS:  Occasional rhonchi.  HEART:  Normal S1, S2.  No S3.  ABDOMEN:  Soft and completely nontender.  Normal bowel sounds.  EXTREMITIES:  Without calf tenderness.  NEUROLOGIC:  She is alert, oriented, friendly, and cooperative.    LABORATORY STUDIES:  Please see chart.    ASSESSMENT AND PLAN:    1.   Kidney stone.  The patient doing well.  Will return for laser lithotripsy, and treatment as per Urology.  2.   Mild worsening of kidney function.  IV fluids improved her creatinine from 1.04 to 0.82.  3.   Possible urinary tract infection.  No growth on a urine culture obtained here.  We will cover with antibiotics and see how she does.  4.   Morbid  obesity, body mass index 51.18.  May need DOROTHY workup.  5.   History of thyroid disease.  Continue medication.  May need recheck of TSH in a few weeks.  6.   Medical risk for cystoscopy low.  Medically cleared to have laser lithotripsy and stent removal later.  7.   Arm swelling.  It is subjective.  I feel that her hands are not swollen, and her forearms are within normal limits.  I checked T4 and TSH here, and they are within normal limits.    CONDITION ON DISCHARGE:  Stable.    CODE STATUS:  Not discussed during this hospitalization.    DISCHARGE MEDICATIONS:    1.   Azelastine 1 to 2 sprays before bedtime.  2.   Budesonide/formoterol 160/4.5, 2 puffs twice a day.  Rinse mouth after use.  3.   Vitamin D3, 5000 units daily.  4.   Esomeprazole 40 mg twice a day.  5.   Fexofenadine 180/240 as per her home dose.  6.   Fluticasone 2 sprays each nostril daily.  7.   Fluticasone/salmeterol 115/21, 2 puffs twice a day in case budesonide not covered.  8.   Ozempic 0.25 mg once a week.  9.   Ventolin 4 puffs q.4-6 h. p.r.n.  10.   Escitalopram 5 mg daily.  Watch for severe muscle stiffness and fever.  11.   Synthroid 150 mcg daily.  12.   Keflex 500 mg 3 times a day for 16 doses.  Start tonight.  Stop if rash.  13.   Toradol 10 mg every 8 hours as needed for pain.  Use sparingly with plenty of water.  Use Tylenol otherwise up to total daily Tylenol limit of 3 g.  14.   Oxybutynin 5 mg 3 times a day as needed for bladder spasm.  15.   Tamsulosin 0.4 mg every evening for 14 days.    ACTIVITY:  No work until cleared by home MD.    DIET:  Low fat.    FOLLOWUP:  Dr. Miramontes in 3 days, call for followup advice.  Follow up with Dr. Quiles in 2 weeks.  Return if fever, chills, sweats, nausea, vomiting, chest pain, shortness of breath, or other complaints.    RISK OF READMISSION:  Low.  TCM followup is recommended as this patient is outside our system.    Dictated By Rayray Thornton MD  d: 03/04/2024 17:30:24  t: 03/05/2024  08:09:35  [facility] [id number]  [facility]/

## 2024-03-05 NOTE — PROGRESS NOTES
NCM attempted to reach the patient to complete a TCM/HFU call. Left message to call back. [facility] provided direct contact info at [phone].   Future Appointments   Date Time Provider Department Center   3/6/2024  9:00 AM Guille Miramontes MD [facility]   8/20/2024  2:30 PM [id number]

## 2024-03-05 NOTE — PAYOR COMM NOTE
--------------  DISCHARGE REVIEW    Payor: [facility]  Subscriber #:  [id number]  Authorization Number: [id number]    Admit date: 3/3/24  Admit time:   1:09 AM  Discharge Date: 3/4/2024  6:36 PM     Admitting Physician:   Attending Physician:  Carly att. providers found  Primary Care Physician: Guille Miramontes MD          Discharge Summary Notes        Discharge Summary signed by Rayray Thornton MD at 3/5/2024 10:13 AM        Author: Rayray Thornton MD Specialty: HOSPITALIST Author Type: Physician    Filed: 3/5/2024 10:13 AM Status: Signed    : Rayray Thornton MD (Physician)         DISCHARGE SUMMARY    HELEN SHANEKA ROQUE [id number]   YOB: 1965 [id number]         [facility]    PATIENT'S NAME: SHANEKA CERVANTES   ATTENDING PHYSICIAN: Rayray Thornton MD   PATIENT ACCOUNT#:   [id number]    LOCATION:  [address] [place] [address]  MEDICAL RECORD #:   [id number]       YOB: 1965  ADMISSION DATE:       03/02/2024      DISCHARGE DATE:  03/04/2024    DISCHARGE SUMMARY    About 35 minutes were spent preparing this discharge.    DISCHARGE DIAGNOSIS:  Status post cystoscopy for obstructive left ureteral stone, awaiting laser lithotripsy in the future, status post stent placement.    HISTORY AND HOSPITAL COURSE:  This is a very pleasant 58-year-old white female who appears much younger than her stated age who presents with a history of having abdominal and flank pain several hours before admission.  The patient was admitted to the hospital and found to have a 2 x 5 mm stone at the [facility].  She was seen by Urology and cleared by me for a cystoscopy.  She had a cystoscopy with stent placement performed.  Her urine culture was completely negative.  She was placed on antibiotics nonetheless.  She did well today, and if she could tolerate a soft diet and her pain was controlled, she would be able to go home.    PHYSICAL EXAMINATION:    VITAL SIGNS:  On discharge temperature 98,  pulse 68, respiratory rate 18, blood pressure 147/77, 96%.  LUNGS:  Occasional rhonchi.  HEART:  Normal S1, S2.  No S3.  ABDOMEN:  Soft and completely nontender.  Normal bowel sounds.  EXTREMITIES:  Without calf tenderness.  NEUROLOGIC:  She is alert, oriented, friendly, and cooperative.    LABORATORY STUDIES:  Please see chart.    ASSESSMENT AND PLAN:    1.   Kidney stone.  The patient doing well.  Will return for laser lithotripsy, and treatment as per Urology.  2.   Mild worsening of kidney function.  IV fluids improved her creatinine from 1.04 to 0.82.  3.   Possible urinary tract infection.  No growth on a urine culture obtained here.  We will cover with antibiotics and see how she does.  4.   Morbid obesity, body mass index 51.18.  May need DOROTHY workup.  5.   History of thyroid disease.  Continue medication.  May need recheck of TSH in a few weeks.  6.   Medical risk for cystoscopy low.  Medically cleared to have laser lithotripsy and stent removal later.  7.   Arm swelling.  It is subjective.  I feel that her hands are not swollen, and her forearms are within normal limits.  I checked T4 and TSH here, and they are within normal limits.    CONDITION ON DISCHARGE:  Stable.    CODE STATUS:  Not discussed during this hospitalization.    DISCHARGE MEDICATIONS:    1.   Azelastine 1 to 2 sprays before bedtime.  2.   Budesonide/formoterol 160/4.5, 2 puffs twice a day.  Rinse mouth after use.  3.   Vitamin D3, 5000 units daily.  4.   Esomeprazole 40 mg twice a day.  5.   Fexofenadine 180/240 as per her home dose.  6.   Fluticasone 2 sprays each nostril daily.  7.   Fluticasone/salmeterol 115/21, 2 puffs twice a day in case budesonide not covered.  8.   Ozempic 0.25 mg once a week.  9.   Ventolin 4 puffs q.4-6 h. p.r.n.  10.   Escitalopram 5 mg daily.  Watch for severe muscle stiffness and fever.  11.   Synthroid 150 mcg daily.  12.   Keflex 500 mg 3 times a day for 16 doses.  Start tonight.  Stop if rash.  13.    Toradol 10 mg every 8 hours as needed for pain.  Use sparingly with plenty of water.  Use Tylenol otherwise up to total daily Tylenol limit of 3 g.  14.   Oxybutynin 5 mg 3 times a day as needed for bladder spasm.  15.   Tamsulosin 0.4 mg every evening for 14 days.    ACTIVITY:  No work until cleared by home MD.    DIET:  Low fat.    FOLLOWUP:  Dr. Miramontes in 3 days, call for followup advice.  Follow up with Dr. Quiles in 2 weeks.  Return if fever, chills, sweats, nausea, vomiting, chest pain, shortness of breath, or other complaints.    RISK OF READMISSION:  Low.  TCM followup is recommended as this patient is outside our system.    Dictated By Rayray Thornton MD  d: 03/04/2024 17:30:24  t: 03/05/2024 08:09:35  Job [id number]  LAS/    Electronically signed by Rayray Thornton MD on 3/5/2024 10:13 AM         REVIEWER COMMENTS

## 2024-03-05 NOTE — PROGRESS NOTES
Please relay to pt (Needs TCM visit tomorrow 3/6/2024)  Good morning Armina, Your urine culture showed now growth. After your stent placed you have a moderate risk of Re-admission and want to follow up. I see you have an appointment tomorrow, 3/6 please keep this and will discuss your hospitalization and after care.

## 2024-03-06 ENCOUNTER — TELEPHONE (OUTPATIENT)
Dept: INTERNAL MEDICINE CLINIC | Facility: CLINIC | Age: 59
End: 2024-03-06

## 2024-03-06 ENCOUNTER — OFFICE VISIT (OUTPATIENT)
Dept: INTERNAL MEDICINE CLINIC | Facility: CLINIC | Age: 59
End: 2024-03-06

## 2024-03-06 ENCOUNTER — LAB ENCOUNTER (OUTPATIENT)
Dept: LAB | Age: 59
End: 2024-03-06
Attending: UROLOGY
Payer: COMMERCIAL

## 2024-03-06 VITALS
HEART RATE: 76 BPM | BODY MASS INDEX: 52.26 KG/M2 | SYSTOLIC BLOOD PRESSURE: 129 MMHG | DIASTOLIC BLOOD PRESSURE: 74 MMHG | WEIGHT: 284 LBS | HEIGHT: 62 IN

## 2024-03-06 DIAGNOSIS — Z09 HOSPITAL DISCHARGE FOLLOW-UP: Primary | ICD-10-CM

## 2024-03-06 DIAGNOSIS — N20.0 LEFT NEPHROLITHIASIS: ICD-10-CM

## 2024-03-06 DIAGNOSIS — N39.0 RECURRENT UTI: ICD-10-CM

## 2024-03-06 PROCEDURE — 99496 TRANSJ CARE MGMT HIGH F2F 7D: CPT | Performed by: STUDENT IN AN ORGANIZED HEALTH CARE EDUCATION/TRAINING PROGRAM

## 2024-03-06 PROCEDURE — 3078F DIAST BP <80 MM HG: CPT | Performed by: STUDENT IN AN ORGANIZED HEALTH CARE EDUCATION/TRAINING PROGRAM

## 2024-03-06 PROCEDURE — 3074F SYST BP LT 130 MM HG: CPT | Performed by: STUDENT IN AN ORGANIZED HEALTH CARE EDUCATION/TRAINING PROGRAM

## 2024-03-06 PROCEDURE — 3008F BODY MASS INDEX DOCD: CPT | Performed by: STUDENT IN AN ORGANIZED HEALTH CARE EDUCATION/TRAINING PROGRAM

## 2024-03-06 PROCEDURE — 87086 URINE CULTURE/COLONY COUNT: CPT

## 2024-03-06 RX ORDER — KETOROLAC TROMETHAMINE 10 MG/1
10 TABLET, FILM COATED ORAL EVERY 8 HOURS PRN
Qty: 60 TABLET | Refills: 0 | Status: SHIPPED | OUTPATIENT
Start: 2024-03-06 | End: 2024-03-26

## 2024-03-06 NOTE — PROGRESS NOTES
OFFICE NOTE     Patient ID: Radha Rodrigez is a 58 year old female.  Today's Date: 03/06/24  Chief Complaint: TCM (Transition Of Care Management)        Pt is a a 58y/o female with PMHx HTN, Hypothyroidism, Class 2 obesity (JHT21-27), pre-diabetes, ALYSSIA, GERD,  leiomyoma of uterus and chronic sinusitis (followed by Dr. Lora), presents to clinic for Hospital TCM follow up after having two 5cm nephrolithaisis stones of left renal pelvis.      DISCHARGE DIAGNOSIS:  Status post cystoscopy for obstructive left ureteral stone, awaiting laser lithotripsy in the future, status post stent placement.     HISTORY AND HOSPITAL COURSE:  This is a very pleasant 58-year-old white female who appears much younger than her stated age who presents with a history of having abdominal and flank pain several hours before admission.  The patient was admitted to the hospital and found to have a 2 x 5 mm stone at the [facility].  She was seen by Urology and cleared by me for a cystoscopy.  She had a cystoscopy with stent placement performed.  Her urine culture was completely negative.  She was placed on antibiotics nonetheless.  She did well today, and if she could tolerate a soft diet and her pain was controlled, she would be able to go home.    DISCHARGE MEDICATIONS:    1.       Azelastine 1 to 2 sprays before bedtime.  2.       Budesonide/formoterol 160/4.5, 2 puffs twice a day.  Rinse mouth after use.  3.       Vitamin D3, 5000 units daily.  4.       Esomeprazole 40 mg twice a day.  5.       Fexofenadine 180/240 as per her home dose.  6.       Fluticasone 2 sprays each nostril daily.  7.       Fluticasone/salmeterol 115/21, 2 puffs twice a day in case budesonide not covered.  8.       Ozempic 0.25 mg once a week.  9.       Ventolin 4 puffs q.4-6 h. p.r.n.  10.     Escitalopram 5 mg daily.  Watch for severe muscle stiffness and fever.  11.     Synthroid 150 mcg daily.  12.     Keflex 500 mg 3 times a day for 16 doses.  Start  tonight.  Stop if rash.  13.     Toradol 10 mg every 8 hours as needed for pain.  Use sparingly with plenty of water.  Use Tylenol otherwise up to total daily Tylenol limit of 3 g.  14.     Oxybutynin 5 mg 3 times a day as needed for bladder spasm.  15.     Tamsulosin 0.4 mg every evening for 14 days.     ACTIVITY:  No work until cleared by home MD.     DIET:  Low fat.     FOLLOWUP:  Dr. Miramontes in 3 days, call for followup advice.  Follow up with Dr. Quiles in 2 weeks.  Return if fever, chills, sweats, nausea, vomiting, chest pain, shortness of breath, or other complaints.       Nephrolithiasis  This is a new problem. The current episode started in the past 7 days. The problem has been unchanged. Associated symptoms include abdominal pain, chills, fatigue, headaches, myalgias, urinary symptoms and weakness. Pertinent negatives include no anorexia, arthralgias, change in bowel habit, chest pain, congestion, coughing, diaphoresis, fever, joint swelling, nausea, neck pain, numbness, rash, sore throat, swollen glands, visual change or vomiting.     Patient states that since she has been discharged from the hospital she still has some laryngeal sore throat irritation from the anesthesia/intubation.  She also states that she is has significant fatigue and weakness and unable to stand for prolonged period of time and is overall abdominal pelvic discomfort from her stent.  The pain is tolerable however it is 8 out of 10 she is that is relieved with Toradol, and apprehensive of opiates.    Vitals:    03/06/24 0849   BP: 129/74   Pulse: 76   Weight: 284 lb (128.8 kg)   Height: 5' 2\" (1.575 m)     body mass index is 51.94 kg/m².  BP Readings from Last 3 Encounters:   03/06/24 129/74   03/04/24 147/77   02/26/24 126/82     The 10-year ASCVD risk score (Tri RYAN, et al., 2019) is: 4.4%    Values used to calculate the score:      Age: 58 years      Sex: Female      Is Non- : No      Diabetic: Yes       Tobacco smoker: No      Systolic Blood Pressure: 129 mmHg      Is BP treated: No      HDL Cholesterol: 42 mg/dL      Total Cholesterol: 130 mg/dL      Medications reviewed:  Current Outpatient Medications   Medication Sig Dispense Refill    Ketorolac Tromethamine 10 MG Oral Tab Take 1 tablet (10 mg total) by mouth every 8 (eight) hours as needed for Pain (Use sparingly and with plenty of water). 60 tablet 0    cephalexin 500 MG Oral Cap Take 1 capsule (500 mg total) by mouth 3 (three) times daily. One dose tonight and stop if rash; finish all doses; took iv rocephin in hospital no problem 16 capsule 0    tamsulosin 0.4 MG Oral Cap Take 1 capsule (0.4 mg total) by mouth every evening for 14 days. 14 capsule 0    oxybutynin 5 MG Oral Tab Take 1 tablet (5 mg total) by mouth 3 (three) times daily as needed (Bladder spasms). Stop 12 hours before Dominguez catheter removal in clinic (if applicable) 15 tablet 0    escitalopram 5 MG Oral Tab Take 1 tablet (5 mg total) by mouth daily. 90 tablet 1    Esomeprazole Magnesium 40 MG Oral Capsule Delayed Release Take 1 capsule (40 mg total) by mouth 2 (two) times daily. 180 capsule 0    fluticasone propionate 50 MCG/ACT Nasal Suspension 2 sprays by Nasal route daily. 16 g 0    Azelastine HCl 137 MCG/SPRAY Nasal Solution 1-2 sprays by Nasal route in the morning and 1-2 sprays before bedtime. FOR SINUS SYMPTOMS/NASAL CONGESTION.. 30 mL 3    levothyroxine 150 MCG Oral Tab Take 1 tablet (150 mcg total) by mouth daily. 90 tablet 3    cholecalciferol (VITAMIN D3) 125 MCG (5000 UT) Oral Cap Take 1 capsule (5,000 Units total) by mouth daily.      Fexofenadine-Pseudoephed -240 MG Oral Tablet 24 Hr Take 1 tablet by mouth daily. (Patient not taking: Reported on 3/6/2024)      semaglutide (OZEMPIC, 0.25 OR 0.5 MG/DOSE,) 2 MG/3ML Subcutaneous Solution Pen-injector Inject 0.25 mg into the skin once a week. (Patient not taking: Reported on 3/6/2024) 4.5 mL 0    fluticasone-salmeterol 115-21  MCG/ACT Inhalation Aerosol Inhale 2 puffs into the lungs 2 (two) times daily. FOR ASTHMA (Patient not taking: Reported on 3/6/2024) 1 each 9    Budesonide-Formoterol Fumarate 160-4.5 MCG/ACT Inhalation Aerosol Inhale 2 puffs into the lungs 2 (two) times daily. (Patient not taking: Reported on 3/6/2024) 1 each 9    albuterol 108 (90 Base) MCG/ACT Inhalation Aero Soln Inhale 2-4 puffs into the lungs every 4 to 6 hours as needed for Wheezing or Shortness of Breath (cough, asthma, chest tightness). FOR ASTHMA. Pharmacist, please switch to formulary alternative per insurance coverage, ok to replace with proair, ventolin, proventil, or any other albuterol inhaler. -jovan (Patient not taking: Reported on 3/6/2024) 3 each 9         Assessment & Plan    1. Hospital discharge follow-up (Primary)  -     Ketorolac Tromethamine; Take 1 tablet (10 mg total) by mouth every 8 (eight) hours as needed for Pain (Use sparingly and with plenty of water).  Dispense: 60 tablet; Refill: 0  2. Left nephrolithiasis  -     Ketorolac Tromethamine; Take 1 tablet (10 mg total) by mouth every 8 (eight) hours as needed for Pain (Use sparingly and with plenty of water).  Dispense: 60 tablet; Refill: 0  As previously stated in HPI patient well-known to me who recently had a left nephrolithiasis with two 5 mm stones in her left renal pelvis has a ureter stent in her left side.  However he still having ongoing fatigue pain and weakness, due to her ongoing medical condition and unable to work at this time.  Patient has a left nephrolithotripsy scheduled for the 12th and 2 weeks after that we will have her stent removed if all goes according to plan.  Plan:  -Continue management for left nephrolithiasis per urology surgery next week and stent removal anticipated by end of the month.  -Refilled ketorolac 10 mg p.o. every 8 hours as needed for the next 3 weeks dispense 60 tablets  -Patient to follow-up with myself next week post procedure and at the end of  the month to ensure she is clinically improving and anticipate hopefully return to work April 1, 2024 pending for clinical improvement      Follow Up: As needed/if symptoms worsen or Return in 8 days (on 3/14/2024) for Return March 14th, 2024 and March 28th, 2024..         Objective/ Results:   Physical Exam  Constitutional:       Appearance: She is well-developed. She is obese.   Cardiovascular:      Rate and Rhythm: Normal rate and regular rhythm.      Heart sounds: Normal heart sounds.   Pulmonary:      Effort: Pulmonary effort is normal.      Breath sounds: Normal breath sounds.   Abdominal:      General: Bowel sounds are normal.      Palpations: Abdomen is soft.   Skin:     General: Skin is warm and dry.   Neurological:      Mental Status: She is alert and oriented to person, place, and time.      Deep Tendon Reflexes: Reflexes are normal and symmetric.        Reviewed:    Patient Active Problem List    Diagnosis    Ureterolithiasis    Pyelonephritis    Encounter for therapeutic drug monitoring    Type 2 diabetes mellitus without complication, without long-term current use of insulin ([facility])    Pre-diabetes    Stress    Essential hypertension    Morbid obesity with BMI of 50.0-59.9, adult ([facility])    Diverticulosis of colon    Esophageal reflux    Seasonal allergies    Hypothyroidism    Leiomyoma of uterus      Allergies   Allergen Reactions    Augmentin [Amoxicillin-Pot Clavulanate] NAUSEA AND VOMITING    Cortisone DIZZINESS and OTHER (SEE COMMENTS)     Blood drop, dizziness        Social History     Socioeconomic History    Marital status:    Tobacco Use    Smoking status: Never     Passive exposure: Never    Smokeless tobacco: Never   Vaping Use    Vaping Use: Never used   Substance and Sexual Activity    Alcohol use: Not Currently    Drug use: Never     Social Determinants of Health     Food Insecurity: No Food Insecurity (3/3/2024)    Food Insecurity     Food Insecurity: Never true   Transportation  Needs: No Transportation Needs (3/3/2024)    Transportation Needs     Lack of Transportation: No   Housing Stability: Low Risk  (3/3/2024)    Housing Stability     Housing Instability: No      Review of Systems   Constitutional:  Positive for chills and fatigue. Negative for diaphoresis and fever.   HENT:  Negative for congestion and sore throat.    Respiratory: Negative.  Negative for cough.    Cardiovascular: Negative.  Negative for chest pain.   Gastrointestinal:  Positive for abdominal pain. Negative for anorexia, change in bowel habit, nausea and vomiting.   Genitourinary:  Positive for dysuria and frequency. Negative for flank pain.   Musculoskeletal:  Positive for myalgias. Negative for arthralgias, joint swelling and neck pain.   Skin: Negative.  Negative for rash.   Neurological:  Positive for weakness and headaches. Negative for numbness.     All other systems negative unless otherwise stated in ROS or HPI above.       Guille Miramontes MD  Internal Medicine       Call office with any questions or seek emergency care if necessary.   Patient understands and agrees to follow directions and advice.      ----------------------------------------- PATIENT INSTRUCTIONS-----------------------------------------     There are no Patient Instructions on file for this visit.

## 2024-03-06 NOTE — TELEPHONE ENCOUNTER
FMLA forms received. Patient signed BRITTANY and paid fee. Forms were emailed to Forms Department.

## 2024-03-07 ENCOUNTER — HOSPITAL ENCOUNTER (OUTPATIENT)
Facility: HOSPITAL | Age: 59
Setting detail: OBSERVATION
Discharge: HOME | End: 2024-03-09
Attending: EMERGENCY MEDICINE | Admitting: STUDENT IN AN ORGANIZED HEALTH CARE EDUCATION/TRAINING PROGRAM
Payer: COMMERCIAL

## 2024-03-07 ENCOUNTER — APPOINTMENT (OUTPATIENT)
Dept: CT IMAGING | Facility: HOSPITAL | Age: 59
End: 2024-03-07
Attending: EMERGENCY MEDICINE
Payer: COMMERCIAL

## 2024-03-07 DIAGNOSIS — N30.01 ACUTE CYSTITIS WITH HEMATURIA: Primary | ICD-10-CM

## 2024-03-07 DIAGNOSIS — T83.84XA PAIN DUE TO URETERAL STENT, INITIAL ENCOUNTER (HCC): ICD-10-CM

## 2024-03-07 DIAGNOSIS — R10.9 LEFT FLANK PAIN: ICD-10-CM

## 2024-03-07 LAB
ALBUMIN SERPL-MCNC: 4.4 G/DL (ref 3.2–4.8)
ALBUMIN/GLOB SERPL: 1.7 {RATIO} (ref 1–2)
ALP LIVER SERPL-CCNC: 96 U/L
ALT SERPL-CCNC: 29 U/L
ANION GAP SERPL CALC-SCNC: 5 MMOL/L (ref 0–18)
AST SERPL-CCNC: 24 U/L (ref ?–34)
BASOPHILS # BLD AUTO: 0.06 X10(3) UL (ref 0–0.2)
BASOPHILS NFR BLD AUTO: 0.6 %
BILIRUB SERPL-MCNC: 0.3 MG/DL (ref 0.3–1.2)
BUN BLD-MCNC: 19 MG/DL (ref 9–23)
BUN/CREAT SERPL: 18.3 (ref 10–20)
CALCIUM BLD-MCNC: 9.4 MG/DL (ref 8.7–10.4)
CHLORIDE SERPL-SCNC: 109 MMOL/L (ref 98–112)
CO2 SERPL-SCNC: 26 MMOL/L (ref 21–32)
CREAT BLD-MCNC: 1.04 MG/DL
DEPRECATED RDW RBC AUTO: 38.5 FL (ref 35.1–46.3)
EGFRCR SERPLBLD CKD-EPI 2021: 62 ML/MIN/1.73M2 (ref 60–?)
EOSINOPHIL # BLD AUTO: 0.21 X10(3) UL (ref 0–0.7)
EOSINOPHIL NFR BLD AUTO: 2.3 %
ERYTHROCYTE [DISTWIDTH] IN BLOOD BY AUTOMATED COUNT: 12.4 % (ref 11–15)
GLOBULIN PLAS-MCNC: 2.6 G/DL (ref 2.8–4.4)
GLUCOSE BLD-MCNC: 104 MG/DL (ref 70–99)
HCT VFR BLD AUTO: 41 %
HGB BLD-MCNC: 14.3 G/DL
IMM GRANULOCYTES # BLD AUTO: 0.03 X10(3) UL (ref 0–1)
IMM GRANULOCYTES NFR BLD: 0.3 %
LACTATE SERPL-SCNC: 1.1 MMOL/L (ref 0.5–2)
LYMPHOCYTES # BLD AUTO: 3.04 X10(3) UL (ref 1–4)
LYMPHOCYTES NFR BLD AUTO: 32.6 %
MCH RBC QN AUTO: 29.8 PG (ref 26–34)
MCHC RBC AUTO-ENTMCNC: 34.9 G/DL (ref 31–37)
MCV RBC AUTO: 85.4 FL
MONOCYTES # BLD AUTO: 0.81 X10(3) UL (ref 0.1–1)
MONOCYTES NFR BLD AUTO: 8.7 %
NEUTROPHILS # BLD AUTO: 5.17 X10 (3) UL (ref 1.5–7.7)
NEUTROPHILS # BLD AUTO: 5.17 X10(3) UL (ref 1.5–7.7)
NEUTROPHILS NFR BLD AUTO: 55.5 %
OSMOLALITY SERPL CALC.SUM OF ELEC: 293 MOSM/KG (ref 275–295)
PLATELET # BLD AUTO: 313 10(3)UL (ref 150–450)
POTASSIUM SERPL-SCNC: 3.9 MMOL/L (ref 3.5–5.1)
PROT SERPL-MCNC: 7 G/DL (ref 5.7–8.2)
RBC # BLD AUTO: 4.8 X10(6)UL
RBC #/AREA URNS AUTO: >10 /HPF
SODIUM SERPL-SCNC: 140 MMOL/L (ref 136–145)
SP GR UR REFRACTOMETRY: 1.02 (ref 1–1.03)
WBC # BLD AUTO: 9.3 X10(3) UL (ref 4–11)
WBC #/AREA URNS AUTO: >50 /HPF

## 2024-03-07 PROCEDURE — 74176 CT ABD & PELVIS W/O CONTRAST: CPT | Performed by: EMERGENCY MEDICINE

## 2024-03-07 RX ORDER — ONDANSETRON 2 MG/ML
4 INJECTION INTRAMUSCULAR; INTRAVENOUS ONCE
Status: COMPLETED | OUTPATIENT
Start: 2024-03-07 | End: 2024-03-07

## 2024-03-07 RX ORDER — METOCLOPRAMIDE 10 MG/1
10 TABLET ORAL ONCE
Status: CANCELLED | OUTPATIENT
Start: 2024-03-07 | End: 2024-03-07

## 2024-03-07 RX ORDER — SODIUM CHLORIDE, SODIUM LACTATE, POTASSIUM CHLORIDE, CALCIUM CHLORIDE 600; 310; 30; 20 MG/100ML; MG/100ML; MG/100ML; MG/100ML
INJECTION, SOLUTION INTRAVENOUS CONTINUOUS
Status: CANCELLED | OUTPATIENT
Start: 2024-03-07

## 2024-03-07 RX ORDER — FAMOTIDINE 20 MG/1
20 TABLET, FILM COATED ORAL ONCE
Status: CANCELLED | OUTPATIENT
Start: 2024-03-07 | End: 2024-03-07

## 2024-03-07 RX ORDER — ACETAMINOPHEN 500 MG
1000 TABLET ORAL ONCE
Status: CANCELLED | OUTPATIENT
Start: 2024-03-07 | End: 2024-03-07

## 2024-03-07 RX ORDER — KETOROLAC TROMETHAMINE 15 MG/ML
15 INJECTION, SOLUTION INTRAMUSCULAR; INTRAVENOUS ONCE
Status: DISCONTINUED | OUTPATIENT
Start: 2024-03-07 | End: 2024-03-07

## 2024-03-07 RX ORDER — MORPHINE SULFATE 4 MG/ML
4 INJECTION, SOLUTION INTRAMUSCULAR; INTRAVENOUS ONCE
Status: COMPLETED | OUTPATIENT
Start: 2024-03-07 | End: 2024-03-07

## 2024-03-07 RX ORDER — METOCLOPRAMIDE HYDROCHLORIDE 5 MG/ML
10 INJECTION INTRAMUSCULAR; INTRAVENOUS ONCE
Status: CANCELLED | OUTPATIENT
Start: 2024-03-07

## 2024-03-07 RX ORDER — FAMOTIDINE 10 MG/ML
20 INJECTION, SOLUTION INTRAVENOUS ONCE
Status: CANCELLED | OUTPATIENT
Start: 2024-03-07

## 2024-03-08 PROBLEM — T83.84XA PAIN DUE TO URETERAL STENT, INITIAL ENCOUNTER (HCC): Status: ACTIVE | Noted: 2024-03-08

## 2024-03-08 PROBLEM — T83.84XA: Status: ACTIVE | Noted: 2024-03-08

## 2024-03-08 PROBLEM — R10.9 LEFT FLANK PAIN: Status: ACTIVE | Noted: 2024-03-08

## 2024-03-08 LAB
ANION GAP SERPL CALC-SCNC: 8 MMOL/L (ref 0–18)
BASOPHILS # BLD AUTO: 0.07 X10(3) UL (ref 0–0.2)
BASOPHILS NFR BLD AUTO: 1 %
BUN BLD-MCNC: 15 MG/DL (ref 9–23)
BUN/CREAT SERPL: 16.1 (ref 10–20)
CALCIUM BLD-MCNC: 8.4 MG/DL (ref 8.7–10.4)
CHLORIDE SERPL-SCNC: 110 MMOL/L (ref 98–112)
CO2 SERPL-SCNC: 23 MMOL/L (ref 21–32)
CREAT BLD-MCNC: 0.93 MG/DL
DEPRECATED RDW RBC AUTO: 42.1 FL (ref 35.1–46.3)
EGFRCR SERPLBLD CKD-EPI 2021: 71 ML/MIN/1.73M2 (ref 60–?)
EOSINOPHIL # BLD AUTO: 0.22 X10(3) UL (ref 0–0.7)
EOSINOPHIL NFR BLD AUTO: 3.2 %
ERYTHROCYTE [DISTWIDTH] IN BLOOD BY AUTOMATED COUNT: 13.2 % (ref 11–15)
GLUCOSE BLD-MCNC: 101 MG/DL (ref 70–99)
GLUCOSE BLDC GLUCOMTR-MCNC: 110 MG/DL (ref 70–99)
HCT VFR BLD AUTO: 35.7 %
HGB BLD-MCNC: 12.3 G/DL
IMM GRANULOCYTES # BLD AUTO: 0.02 X10(3) UL (ref 0–1)
IMM GRANULOCYTES NFR BLD: 0.3 %
LYMPHOCYTES # BLD AUTO: 2.35 X10(3) UL (ref 1–4)
LYMPHOCYTES NFR BLD AUTO: 34.1 %
MCH RBC QN AUTO: 30.4 PG (ref 26–34)
MCHC RBC AUTO-ENTMCNC: 34.5 G/DL (ref 31–37)
MCV RBC AUTO: 88.4 FL
MONOCYTES # BLD AUTO: 0.67 X10(3) UL (ref 0.1–1)
MONOCYTES NFR BLD AUTO: 9.7 %
NEUTROPHILS # BLD AUTO: 3.56 X10 (3) UL (ref 1.5–7.7)
NEUTROPHILS # BLD AUTO: 3.56 X10(3) UL (ref 1.5–7.7)
NEUTROPHILS NFR BLD AUTO: 51.7 %
OSMOLALITY SERPL CALC.SUM OF ELEC: 293 MOSM/KG (ref 275–295)
PLATELET # BLD AUTO: 239 10(3)UL (ref 150–450)
POTASSIUM SERPL-SCNC: 4.1 MMOL/L (ref 3.5–5.1)
RBC # BLD AUTO: 4.04 X10(6)UL
SODIUM SERPL-SCNC: 141 MMOL/L (ref 136–145)
WBC # BLD AUTO: 6.9 X10(3) UL (ref 4–11)

## 2024-03-08 PROCEDURE — 99223 1ST HOSP IP/OBS HIGH 75: CPT | Performed by: STUDENT IN AN ORGANIZED HEALTH CARE EDUCATION/TRAINING PROGRAM

## 2024-03-08 PROCEDURE — 99244 OFF/OP CNSLTJ NEW/EST MOD 40: CPT | Performed by: PHYSICIAN ASSISTANT

## 2024-03-08 RX ORDER — LEVOTHYROXINE SODIUM 0.07 MG/1
150 TABLET ORAL
Status: DISCONTINUED | OUTPATIENT
Start: 2024-03-08 | End: 2024-03-09

## 2024-03-08 RX ORDER — ALBUTEROL SULFATE 90 UG/1
1 AEROSOL, METERED RESPIRATORY (INHALATION) EVERY 4 HOURS PRN
Status: DISCONTINUED | OUTPATIENT
Start: 2024-03-08 | End: 2024-03-09

## 2024-03-08 RX ORDER — FAMOTIDINE 20 MG/1
20 TABLET, FILM COATED ORAL ONCE
Status: DISCONTINUED | OUTPATIENT
Start: 2024-03-08 | End: 2024-03-08

## 2024-03-08 RX ORDER — FLUTICASONE PROPIONATE 50 MCG
2 SPRAY, SUSPENSION (ML) NASAL DAILY
Status: DISCONTINUED | OUTPATIENT
Start: 2024-03-08 | End: 2024-03-09

## 2024-03-08 RX ORDER — BISACODYL 10 MG
10 SUPPOSITORY, RECTAL RECTAL
Status: DISCONTINUED | OUTPATIENT
Start: 2024-03-08 | End: 2024-03-09

## 2024-03-08 RX ORDER — SODIUM CHLORIDE 9 MG/ML
INJECTION, SOLUTION INTRAVENOUS CONTINUOUS
Status: ACTIVE | OUTPATIENT
Start: 2024-03-08 | End: 2024-03-08

## 2024-03-08 RX ORDER — ENEMA 19; 7 G/133ML; G/133ML
1 ENEMA RECTAL ONCE AS NEEDED
Status: DISCONTINUED | OUTPATIENT
Start: 2024-03-08 | End: 2024-03-09

## 2024-03-08 RX ORDER — TAMSULOSIN HYDROCHLORIDE 0.4 MG/1
0.4 CAPSULE ORAL EVERY EVENING
Status: DISCONTINUED | OUTPATIENT
Start: 2024-03-08 | End: 2024-03-09

## 2024-03-08 RX ORDER — FAMOTIDINE 10 MG/ML
20 INJECTION, SOLUTION INTRAVENOUS ONCE
Status: DISCONTINUED | OUTPATIENT
Start: 2024-03-08 | End: 2024-03-08

## 2024-03-08 RX ORDER — ESCITALOPRAM OXALATE 5 MG/1
5 TABLET ORAL DAILY
Status: DISCONTINUED | OUTPATIENT
Start: 2024-03-08 | End: 2024-03-09

## 2024-03-08 RX ORDER — METOCLOPRAMIDE HYDROCHLORIDE 5 MG/ML
10 INJECTION INTRAMUSCULAR; INTRAVENOUS ONCE
Status: DISCONTINUED | OUTPATIENT
Start: 2024-03-08 | End: 2024-03-08

## 2024-03-08 RX ORDER — PROCHLORPERAZINE EDISYLATE 5 MG/ML
5 INJECTION INTRAMUSCULAR; INTRAVENOUS EVERY 8 HOURS PRN
Status: DISCONTINUED | OUTPATIENT
Start: 2024-03-08 | End: 2024-03-09

## 2024-03-08 RX ORDER — SENNOSIDES 8.6 MG
17.2 TABLET ORAL NIGHTLY PRN
Status: DISCONTINUED | OUTPATIENT
Start: 2024-03-08 | End: 2024-03-09

## 2024-03-08 RX ORDER — ONDANSETRON 2 MG/ML
4 INJECTION INTRAMUSCULAR; INTRAVENOUS EVERY 6 HOURS PRN
Status: DISCONTINUED | OUTPATIENT
Start: 2024-03-08 | End: 2024-03-09

## 2024-03-08 RX ORDER — POLYETHYLENE GLYCOL 3350 17 G/17G
17 POWDER, FOR SOLUTION ORAL DAILY PRN
Status: DISCONTINUED | OUTPATIENT
Start: 2024-03-08 | End: 2024-03-09

## 2024-03-08 RX ORDER — SODIUM CHLORIDE, SODIUM LACTATE, POTASSIUM CHLORIDE, CALCIUM CHLORIDE 600; 310; 30; 20 MG/100ML; MG/100ML; MG/100ML; MG/100ML
INJECTION, SOLUTION INTRAVENOUS CONTINUOUS
Status: DISCONTINUED | OUTPATIENT
Start: 2024-03-08 | End: 2024-03-08

## 2024-03-08 RX ORDER — ACETAMINOPHEN 500 MG
1000 TABLET ORAL ONCE
Status: DISCONTINUED | OUTPATIENT
Start: 2024-03-08 | End: 2024-03-09

## 2024-03-08 RX ORDER — PANTOPRAZOLE SODIUM 40 MG/1
40 TABLET, DELAYED RELEASE ORAL
Status: DISCONTINUED | OUTPATIENT
Start: 2024-03-08 | End: 2024-03-09

## 2024-03-08 RX ORDER — METOCLOPRAMIDE 5 MG/1
10 TABLET ORAL ONCE
Status: DISCONTINUED | OUTPATIENT
Start: 2024-03-08 | End: 2024-03-08

## 2024-03-08 RX ORDER — HEPARIN SODIUM 5000 [USP'U]/ML
5000 INJECTION, SOLUTION INTRAVENOUS; SUBCUTANEOUS EVERY 12 HOURS SCHEDULED
Status: DISCONTINUED | OUTPATIENT
Start: 2024-03-08 | End: 2024-03-09

## 2024-03-08 RX ORDER — OXYBUTYNIN CHLORIDE 5 MG/1
5 TABLET ORAL 3 TIMES DAILY PRN
Status: DISCONTINUED | OUTPATIENT
Start: 2024-03-08 | End: 2024-03-09

## 2024-03-08 NOTE — PLAN OF CARE
Problem: Patient Centered Care  Goal: Patient preferences are identified and integrated in the patient's plan of care  Description: Interventions:  - What would you like us to know as we care for you? From home with family.   - Provide timely, complete, and accurate information to patient/family  - Incorporate patient and family knowledge, values, beliefs, and cultural backgrounds into the planning and delivery of care  - Encourage patient/family to participate in care and decision-making at the level they choose  - Honor patient and family perspectives and choices  3/8/2024 0420 by Chago Barnes  Outcome: Progressing  3/8/2024 0420 by Chago Barnes  Outcome: Progressing     Problem: Patient/Family Goals  Goal: Patient/Family Long Term Goal  Description: Patient's Long Term Goal: To discharge from hospital     Interventions:  -  Monitor vital signs   - Monitor appropriate labs   - Pain management   - Administer medications per order   - Follow MD orders   - Diagnostics per order   - Update/inform patient and family on plan of care   - Discharge planning    - See additional Care Plan goals for specific interventions  3/8/2024 0420 by Chago Barnes  Outcome: Progressing  3/8/2024 0420 by Chago Barnes  Outcome: Progressing  Goal: Patient/Family Short Term Goal  Description: Patient's Short Term Goal: To have absence of pain    Interventions:   - Monitor vital signs   - Monitor appropriate labs   - Pain management   - Administer medications per order   - Follow MD orders   - Diagnostics per order   - Update/inform patient and family on plan of care   - See additional Care Plan goals for specific interventions  3/8/2024 0420 by Chago Barnes  Outcome: Progressing  3/8/2024 0420 by Chago Barnes  Outcome: Progressing     Problem: PAIN - ADULT  Goal: Verbalizes/displays adequate comfort level or patient's stated pain goal  Description: INTERVENTIONS:  - Encourage pt to monitor pain and request  assistance  - Assess pain using appropriate pain scale  - Administer analgesics based on type and severity of pain and evaluate response  - Implement non-pharmacological measures as appropriate and evaluate response  - Consider cultural and social influences on pain and pain management  - Manage/alleviate anxiety  - Utilize distraction and/or relaxation techniques  - Monitor for opioid side effects  - Notify MD/LIP if interventions unsuccessful or patient reports new pain  - Anticipate increased pain with activity and pre-medicate as appropriate  3/8/2024 0420 by Chago Barnes  Outcome: Progressing  3/8/2024 0420 by Chago Barnes  Outcome: Progressing     Problem: RISK FOR INFECTION - ADULT  Goal: Absence of fever/infection during anticipated neutropenic period  Description: INTERVENTIONS  - Monitor WBC  - Administer growth factors as ordered  - Implement neutropenic guidelines  3/8/2024 0420 by Chago Barnes  Outcome: Progressing  3/8/2024 0420 by Chago Barnes  Outcome: Progressing     Problem: SAFETY ADULT - FALL  Goal: Free from fall injury  Description: INTERVENTIONS:  - Assess pt frequently for physical needs  - Identify cognitive and physical deficits and behaviors that affect risk of falls.  - [place] fall precautions as indicated by assessment.  - Educate pt/family on patient safety including physical limitations  - Instruct pt to call for assistance with activity based on assessment  - Modify environment to reduce risk of injury  - Provide assistive devices as appropriate  - Consider OT/PT consult to assist with strengthening/mobility  - Encourage toileting schedule  3/8/2024 0420 by Chago Barnes  Outcome: Progressing  3/8/2024 0420 by Chago Barnes  Outcome: Progressing     Problem: GASTROINTESTINAL - ADULT  Goal: Minimal or absence of nausea and vomiting  Description: INTERVENTIONS:  - Maintain adequate hydration with IV or PO as ordered and tolerated  - Nasogastric tube to low  intermittent suction as ordered  - Evaluate effectiveness of ordered antiemetic medications  - Provide nonpharmacologic comfort measures as appropriate  - Advance diet as tolerated, if ordered  - Obtain nutritional consult as needed  - Evaluate fluid balance  3/8/2024 0420 by Chago Barnes  Outcome: Progressing  3/8/2024 0420 by Chago Barnes  Outcome: Progressing     Problem: GENITOURINARY - ADULT  Goal: Absence of urinary retention  Description: INTERVENTIONS:  - Assess patient’s ability to void and empty bladder  - Monitor intake/output and perform bladder scan as needed  - Follow urinary retention protocol/standard of care  - Consider collaborating with pharmacy to review patient's medication profile  - Implement strategies to promote bladder emptying  3/8/2024 0420 by Chago Barnes  Outcome: Progressing  3/8/2024 0420 by Chago Barnes  Outcome: Progressing     Problem: HEMATOLOGIC - ADULT  Goal: Maintains hematologic stability  Description: INTERVENTIONS  - Assess for signs and symptoms of bleeding or hemorrhage  - Monitor labs and vital signs for trends  - Administer supportive blood products/factors, fluids and medications as ordered and appropriate  - Administer supportive blood products/factors as ordered and appropriate  3/8/2024 0420 by Chago Barnes  Outcome: Progressing  3/8/2024 0420 by Chago Barnes  Outcome: Progressing     Monitoring vital signs, stable at this time. No acute changes at this moment. Fall precautions in place- ibed awareness on, bed locked in lowest position, call light within reach. Frequent rounding by nursing staff.

## 2024-03-08 NOTE — CONSULTS
[facility]  part of [facility] Urology  Report of Initial Consultation    Radha Rodrigez Patient Status:  Observation    1965 MRN [id number]   Location [facility]W Attending Karen Jason MD   Hosp Day # 0 PCP Guille Miramontes MD     Date of Admission: 3/7/2024  Date of Consult: 24   Reason for Consultation:   Left sided abdominal pain s/p stent.     History of Present Illness:   Patient is a 58 year old female who was first seen by urology on 2024 when she came to the ED complaining of left sided flank pain that radiated into her left lower abdomen. The patient was found to have a 4mm obstructing left ureteral stone. She underwent a cystoscopy with stent placement on 2024. She does report a history of stones in the past, about 5 years ago which she passed on her own. She was discharged patrick with tamsulosin, oxybutynin for bladder spasms, toradol, keflex. She was scheduled for lithotripsy for 2024.   She presented to the ER on 2024 with complaints of left anterior abdominal pain and reports she had decreased urination. She reports her urine became dark in color as well. She denies passing any blood clots.   The patient denies fever, chills, nausea, vomiting. She had a urine culture completed on 2024 that returned negative for bacterial growth.     Past Medical History:  Past Medical History:   Diagnosis Date    Back problem     Calculus of kidney     Disorder of thyroid     Diverticulosis of large intestine     Esophageal reflux     Hx of motion sickness     Morbid obesity with BMI of 50.0-59.9, adult (HCC)     Pneumonia due to organism     PONV (postoperative nausea and vomiting)     Pre-diabetes     Seasonal allergies     Thyroid disease     Vitamin D deficiency        Past Surgical History:  Past Surgical History:   Procedure Laterality Date    COLONOSCOPY  2021    HERNIA SURGERY      HYSTERECTOMY      REMOVAL GALLBLADDER       REPAIR ING HERNIA,5+Y/O,REDUCIBL         Family History:  Family History   Problem Relation Age of Onset    Ovarian Cancer Mother     Breast Cancer Sister 27       Social History:  Social History     Socioeconomic History    Marital status:    Tobacco Use    Smoking status: Never     Passive exposure: Never    Smokeless tobacco: Never   Vaping Use    Vaping Use: Never used   Substance and Sexual Activity    Alcohol use: Not Currently    Drug use: Never     Social Determinants of Health     Food Insecurity: No Food Insecurity (3/8/2024)    Food Insecurity     Food Insecurity: Never true   Transportation Needs: No Transportation Needs (3/8/2024)    Transportation Needs     Lack of Transportation: No   Housing Stability: Low Risk  (3/8/2024)    Housing Stability     Housing Instability: No           Current Medications:  Current Facility-Administered Medications   Medication Dose Route Frequency    albuterol (Ventolin HFA) 108 (90 Base) MCG/ACT inhaler 1 puff  1 puff Inhalation Q4H PRN    cholecalciferol (Vitamin D3) tab 5,000 Units  5,000 Units Oral Daily    escitalopram (Lexapro) tab 5 mg  5 mg Oral Daily    pantoprazole (Protonix) DR tab 40 mg  40 mg Oral QAM AC    fluticasone propionate (Flonase) 50 MCG/ACT nasal suspension 2 spray  2 spray Nasal Daily    levothyroxine (Synthroid) tab 150 mcg  150 mcg Oral Daily @ 0700    oxybutynin (Ditropan) tab 5 mg  5 mg Oral TID PRN    sodium chloride 0.9% infusion   Intravenous Continuous    heparin (Porcine) 5000 UNIT/ML injection 5,000 Units  5,000 Units Subcutaneous 2 times per day    polyethylene glycol (PEG 3350) (Miralax) 17 g oral packet 17 g  17 g Oral Daily PRN    sennosides (Senokot) tab 17.2 mg  17.2 mg Oral Nightly PRN    bisacodyl (Dulcolax) 10 MG rectal suppository 10 mg  10 mg Rectal Daily PRN    fleet enema (Fleet) 7-19 GM/118ML rectal enema 133 mL  1 enema Rectal Once PRN    ondansetron (Zofran) 4 MG/2ML injection 4 mg  4 mg Intravenous Q6H PRN     prochlorperazine (Compazine) 10 MG/2ML injection 5 mg  5 mg Intravenous Q8H PRN    cefTRIAXone (Rocephin) 2 g in D5W 100 mL IVPB-ADD  2 g Intravenous Q24H    acetaminophen (Tylenol Extra Strength) tab 1,000 mg  1,000 mg Oral Once    tamsulosin (Flomax) cap 0.4 mg  0.4 mg Oral QPM       Allergies:   Allergies   Allergen Reactions    Augmentin [Amoxicillin-Pot Clavulanate] NAUSEA AND VOMITING    Cortisone DIZZINESS and OTHER (SEE COMMENTS)     Blood drop, dizziness, flushed    Adhesive Tape RASH     Needs paper tape         Review of Systems:   Pertinent positives and negatives per HPI. A 12 point ROS was performed and is otherwise negative.       Physical Exam:   Vital Signs: Blood pressure 142/68, pulse 65, temperature 98 °F (36.7 °C), temperature source Oral, resp. rate 17, height 5' 2\" (1.575 m), weight 280 lb 8 oz (127.2 kg), SpO2 95%.  General: No acute distress. Alert and oriented x 3  HEENT: Moist mucous membranes  Respiratory: Breathing comfortably  Abdomen: soft-non distended. Reports tenderness over the LLW.   Genitourinary: no CVA tenderness.   Neurologic: No focal neurological deficits.  Integument: No lesions. No erythema.  Psychiatric: Appropriate mood and affect.    Results:     Laboratory Data:  Recent Labs   Lab 03/04/24  0611 03/07/24  2223 03/08/24  0522   WBC 5.2 9.3 6.9   HGB 12.6 14.3 12.3   HCT 39.5 41.0 35.7   .0 313.0 239.0     Recent Labs   Lab 03/04/24  0814 03/07/24  2223 03/08/24  0522    140 141   K 4.0 3.9 4.1    109 110   CO2 21.0 26.0 23.0   BUN 17 19 15   CREATSERUM 0.82 1.04* 0.93   * 104* 101*   CA 9.0 9.4 8.4*     Recent Labs   Lab 03/02/24  2250 03/07/24  2142   COLORUR Light-Yellow Light-Red*   CLARITY Clear Ex.Turbid*   SPECGRAVITY 1.023 1.025   GLUUR Normal  --    BILUR Negative  --    KETUR Negative  --    BLOODURINE 2+*  --    PHURINE 5.5  --    PROUR Trace*  --    UROBILINOGEN Normal  --    NITRITE Negative  --    LEUUR 500*  --    WBCUR >50* >50*    RBCUR >10* >10*   BACUR Rare* None Seen   EPIUR None Seen None Seen     No results found for this visit on 03/07/24.     Imaging:  CT ABDOMEN+PELVIS(CPT=74176)    Result Date: 3/8/2024  CONCLUSION:  1. Interval placement of left-sided double-J stent.  Minimal residual left hydroureteronephrosis.  Mild haziness to the periureteric fat is probably reactive to the stent.  Suggest correlation with urinalysis to exclude UTI.  5 mm calculus has been pushed back into the left lower pole renal collecting system.    Dictated by (CST): Gasper Roy MD on 3/08/2024 at 5:45 AM     Finalized by (CST): Gasper Roy MD on 3/08/2024 at 5:55 AM            Impression:   58 year old female with left ureteral stone, s/p stent placement on 03/03/2024. Presented to ED 03/08/2024 with LLQ pain. CT scan shows minimal residual left hydroureteronephrosis. Urine culture is pending. Patient without fevers, chills, dysuria.     Recommendations:  1) Continue Ceftriaxone, Await urine cultures from 03/08/2024. If negative may consider URS while inpatient. If urine culture positive will need course of ABX, and likely will continue with plans for URS as scheduled for 03/12/2024.  2) Continue with pain management per primary. Keep patient on flomax.     Thank you for allowing me to participate in the care of your patient.   Darshan Ochoa PA-C  3/8/2024

## 2024-03-08 NOTE — ED QUICK NOTES
Orders for admission, patient is aware of plan and ready to go upstairs. Any questions, please call ED GOYO Dwyer at extension [phone].     Patient Covid vaccination status: Fully vaccinated     COVID Test Ordered in ED: None    COVID Suspicion at Admission: N/A    Running Infusions:      Mental Status/LOC at time of transport: a&Ox4    Other pertinent information:   CIWA score: N/A   NIH score:  N/A

## 2024-03-08 NOTE — ED QUICK NOTES
Orders for admission, patient is aware of plan and ready to go upstairs. Any questions, please call ED GOYO Dwyer at extension [phone].     Patient Covid vaccination status: Fully vaccinated     COVID Test Ordered in ED: None    COVID Suspicion at Admission: N/A    Running Infusions:    sodium chloride 2,000 mL (03/08/24 0209)        Mental Status/LOC at time of transport: a&ox4    Other pertinent information: Hx of recent ureteral stent r/t kidney stones.       CIWA score: N/A   NIH score:  N/A

## 2024-03-08 NOTE — ED PROVIDER NOTES
[place] Emergency Department Note  Patient: Radha Rodrigez Age: 58 year old Sex: female      MRN: [id number]  : 1965    Patient Seen in: [facility]    History     Chief Complaint   Patient presents with    Postop/Procedure Problem    Urinary Symptoms    Stones     Stated Complaint: Post op complication-stent put in kidney geraldine night    History obtained from: patient     This is a very pleasant 58-year-old history of GERD, diabetes, kidney stones, presenting to the ER 3 days status post a cystoscopy and left-sided ureteral stent placement by urology coming in with left-sided flank pain, abdominal fullness, dark urine, subjective fevers and chills for the past day.  She reports nausea without vomiting, no diarrhea or bloody stools.  She is not on blood thinners. No chest pain or shortness of breath. She has been taking PO abx since the procedure.       Review of Systems:  Review of Systems  Positive for stated complaint: Post op complication-stent put in kidney geraldine night. Constitutional and vital signs reviewed. All other systems reviewed and negative except as noted above.    Patient History:  Past Medical History:   Diagnosis Date    Back problem     Calculus of kidney     Disorder of thyroid     Diverticulosis of large intestine     Esophageal reflux     Hx of motion sickness     Morbid obesity with BMI of 50.0-59.9, adult (HCC)     Pneumonia due to organism     PONV (postoperative nausea and vomiting)     Pre-diabetes     Seasonal allergies     Thyroid disease     Vitamin D deficiency        Past Surgical History:   Procedure Laterality Date    COLONOSCOPY  2021    HERNIA SURGERY      HYSTERECTOMY      REMOVAL GALLBLADDER      REPAIR ING HERNIA,5+Y/O,REDUCIBL          Family History   Problem Relation Age of Onset    Ovarian Cancer Mother     Breast Cancer Sister 27       Specific Social Determinants of Health:   Social History     Socioeconomic History    Marital status:     Tobacco Use    Smoking status: Never     Passive exposure: Never    Smokeless tobacco: Never   Vaping Use    Vaping Use: Never used   Substance and Sexual Activity    Alcohol use: Not Currently    Drug use: Never     Social Determinants of Health     Food Insecurity: No Food Insecurity (3/8/2024)    Food Insecurity     Food Insecurity: Never true   Transportation Needs: No Transportation Needs (3/8/2024)    Transportation Needs     Lack of Transportation: No   Housing Stability: Low Risk  (3/8/2024)    Housing Stability     Housing Instability: No           PSFH elements reviewed from today and agreed except as otherwise stated in HPI.    Physical Exam     ED Triage Vitals [03/07/24 2032]   BP (!) 156/97   Pulse 80   Resp 19   Temp 96.7 °F (35.9 °C)   Temp src Temporal   SpO2 96 %   O2 Device None (Room air)       Current:/60   Pulse 65   Temp 98 °F (36.7 °C) (Oral)   Resp 16   Ht 157.5 cm (5' 2\")   Wt 127.2 kg   SpO2 94%   BMI 51.30 kg/m²         Physical Exam  Vitals and nursing note reviewed.   Constitutional:       General: She is not in acute distress.     Appearance: She is obese. She is not ill-appearing.   HENT:      Head: Normocephalic and atraumatic.      Right Ear: External ear normal.      Left Ear: External ear normal.      Nose: Nose normal.      Mouth/Throat:      Mouth: Mucous membranes are moist.   Eyes:      Conjunctiva/sclera: Conjunctivae normal.   Cardiovascular:      Rate and Rhythm: Normal rate and regular rhythm.      Heart sounds: No murmur heard.  Pulmonary:      Effort: Pulmonary effort is normal. No respiratory distress.      Breath sounds: No wheezing or rales.   Abdominal:      General: There is no distension.      Palpations: Abdomen is soft.      Tenderness: There is abdominal tenderness. There is left CVA tenderness. There is no right CVA tenderness, guarding or rebound.      Comments: LUQ and LLQ TTP.    Musculoskeletal:         General: No deformity.      Right lower  leg: No edema.      Left lower leg: No edema.   Skin:     General: Skin is warm and dry.      Capillary Refill: Capillary refill takes less than 2 seconds.   Neurological:      General: No focal deficit present.      Mental Status: She is alert.         ED Course   Labs:   Labs Reviewed   COMP METABOLIC PANEL (14) - Abnormal; Notable for the following components:       Result Value    Glucose 104 (*)     Creatinine 1.04 (*)     Globulin  2.6 (*)     All other components within normal limits   URINALYSIS WITH CULTURE REFLEX - Abnormal; Notable for the following components:    Urine Color Light-Red (*)     Clarity Urine Ex.Turbid (*)     WBC Urine >50 (*)     RBC Urine >10 (*)     All other components within normal limits   POCT GLUCOSE - Abnormal; Notable for the following components:    POC Glucose  110 (*)     All other components within normal limits   LACTIC ACID, PLASMA - Normal   SPECIFIC GRAVITY, URINE - Normal   CBC WITH DIFFERENTIAL WITH PLATELET    Narrative:     The following orders were created for panel order CBC With Differential With Platelet.  Procedure                               Abnormality         Status                     ---------                               -----------         ------                     CBC W/ DIFFERENTIAL[[id number]]                              Final result                 Please view results for these tests on the individual orders.   BASIC METABOLIC PANEL (8)   CBC WITH DIFFERENTIAL WITH PLATELET    Narrative:     The following orders were created for panel order CBC With Differential With Platelet.  Procedure                               Abnormality         Status                     ---------                               -----------         ------                     CBC W/ DIFFERENTIAL[[id number]]                              In process                   Please view results for these tests on the individual orders.   BLOOD CULTURE   BLOOD CULTURE   URINE CULTURE,  ROUTINE   CBC W/ DIFFERENTIAL   CBC W/ DIFFERENTIAL     Radiology findings:  I personally reviewed the images.   No results found.    Cardiac Monitor: Interpreted by me.   Pulse Readings from Last 1 Encounters:   03/08/24 65   , sinus,       MDM   This patient presents with abdominal pain on the left side as well as flank pain and suprapubic fullness in setting of recent left ureteral stent about 3 days ago done here.     Reviewed pt discharge summary: pt admitted 3/2 due to obstructing left UVJ stone 2x5 mm with pain, got cystoscopy and placement of L ureteral stent, negative urine culture though placed on empiric cephalexin which she has been taking     Ddx  UTI  Pyelonephritis  Recurrent renal stone  Sepsis less likely given stable vital signs but considered this   Dehydration  Less likely diverticulitis, intraabdominal abscess    Plan: cbc, cmp, bcx, lactic, UA, Ucx, plain CTAP. Pt in severe pain will give IV morphine, zofran, IV NS bolus 1L and reevalutae. Dispo pending discussion with urology and workup in the ED.     Bladder scan <3cc after urinating, urine is dark. UA c/w hematuria and UTI, IV rocephin ordered.       ED Course as of 03/08/24 0534  ------------------------------------------------------------  Time: 03/07 2328  Value: LACTIC ACID: 1.1  Comment: (Reviewed)  ------------------------------------------------------------  Time: 03/07 2328  Value: CREATININE(!): 1.04  Comment: (Reviewed)  ------------------------------------------------------------  Time: 03/07 2329  Value: Urinalysis with Culture Reflex(!)  Comment: UA no bacteria, showing hematuria   ------------------------------------------------------------  Time: 03/07 2344  Value: CT ABDOMEN+PELVIS(CPT=74176)  Comment: CT ABDOMEN PELVIS WITHOUT CONTRAST    COMPARISON: 3/3/2024    IMPRESSION:    Left double-J ureter stent appropriately positioned.  Previously seen 4 mm left UPJ stone now nonobstructing and positioned in the left kidney lower  calyceal system.  Improved left hydronephrosis.  No right ureter stone or right hydronephrosis.  Bladder decompressed and mildly thickened.  Recommend correlation for cystitis/UTI.    Otherwise, no acute findings.  Cholecystectomy.  Normal appendix.  Hysterectomy.  No diverticulitis.  Normal caliber aorta.  Small hiatal hernia.  Lung bases clear.  Chronic L4 bilateral pars defects similar anterolisthesis.    ------------------------------------------------------------  Time: 03/07 2351  Comment: Case d/w urology Dr. Palm, discussed pt presentation labs that are essentially unremarkable but UA concerning for UTI, and CT showing no obstruction to the stent, however recommends observation for IV abx and will see in AM. Case d/w hospitalist Dr. Bradshaw accepts for observation.   ------------------------------------------------------------  Time: 03/07 2355  Comment: Cbc unremarkable   ------------------------------------------------------------  Time: 03/08 0200  Comment: BP now stable 100/60 pt in no distress. Stable for telemetry floor.   ------------------------------------------------------------  Time: 03/08 0205  Comment: Patient's blood pressure is now 80s over 50s.  She is satting well and otherwise not tachycardic.  Will give 2L NS.  Patient reassessed sitting up in no distress states she just feels tired right now.  Will continue to monitor            Procedures:  Procedures        Disposition and Plan     Clinical Impression:  1. Acute cystitis with hematuria    2. Left flank pain    3. Pain due to ureteral stent, initial encounter ([facility], located [facility])        Disposition:  Admit      Medications Prescribed:  Current Discharge Medication List          Hospital Problems       Present on Admission  Date Reviewed: 3/6/2024            ICD-10-CM Noted POA    * (Principal) Acute cystitis with hematuria N30.01 3/7/2024 Unknown    Left flank pain R10.9 3/8/2024 Unknown    Pain due to ureteral stent, initial encounter ([facility])  T83.84XA 3/8/2024 Unknown        This note may have been created using voice dictation technology and may include inadvertent errors.      Colleen Shoemaker DO  Attending Physician   Emergency Medicine

## 2024-03-08 NOTE — ED INITIAL ASSESSMENT (HPI)
Pt arrives ambulatory to ED after L ureter stent placed on Sunday d/t kidney stones in L side. Pt states now experiencing pain to L flank and LLQ, described as sharp, rated as a 10/10. Pt also endorses to little urine production. +nausea and diarrhea, denies vomiting. Pt states dysuria and hematuria. Aox4, speaking in full sentences.

## 2024-03-08 NOTE — PROGRESS NOTES
Progress Note     Radha Rodrigez Patient Status:  Observation    1965 MRN [id number]   Location [facility]W Attending Alfreda Blankenship MD   Hosp Day # 0 PCP Guille Miramontes MD   Please refer to H+P by my partner  Plan follow-up cultures   ALFREDA BLANKENSHIP MD    Supplementary Documentation:

## 2024-03-08 NOTE — DISCHARGE INSTRUCTIONS
You should expect to have some blood in your urine, burning when you pee, urgency and frequency. This is normal. If you have a fever >101F, chills, nausea, vomiting, inability to urinate please go to the emergency room for evaluation.   Pain is normal after this procedure. Try to take Tylenol ibuprofen and use hot packs. Drink as much water as possible. Additionally you can try over the counter pyridium if you notice bladder discomfort.    We have also prescribed you 3 days of an antibiotic that I request that you take.    You have a follow up for stent removal in 7-14 days. If the appointment has not been made please contact us at [phone]  You have a follow up in 6 to 8 weeks with a renal bladder ultrasound and follow-up with your physician or a nurse practitioner. This is to make sure that your kidney still looks healthy.    Stone prevention methods including hydration (until urine is clear), limiting salt and red meat/meat intake, eating a normal calcium diet, and drinking lemon with water. We also talked about reasons to go to the emergency room - namely acute flank pain associated with fevers, chills, nausea or vomiting.   Call T: [phone] if you have any questions or concerns       HOME INSTRUCTIONS  [facility] HOME CARE INSTRUCTIONS: POST-OP ANESTHESIA  The medication that you received for sedation or general anesthesia can last up to 24 hours. Your judgment and reflexes may be altered, even if you feel like your normal self.      We Recommend:   Do not drive any motor vehicle or bicycle   Avoid mowing the lawn, playing sports, or working with power tools/applicances (power saws, electric knives or mixers)   That you have someone stay with you on your first night home   Do not drink alcohol or take sleeping pills or tranquilizers   Do not sign legal documents within 24 hours of your procedure   If you had a nerve block for your surgery, take extra care not to put any pressure on your arm or hand for 24  hours    It is normal:  For you to have a sore throat if you had a breathing tube during surgery (while you were asleep!). The sore throat should get better within 48 hours. You can gargle with warm salt water (1/2 tsp in 4 oz warm water) or use a throat lozenge for comfort  To feel muscle aches or soreness especially in the abdomen, chest or neck. The achy feeling should go away in the next 24 hours  To feel weak, sleepy or \"wiped out\". Your should start feeling better in the next 24 hours.   To experience mild discomforts such as sore lip or tongue, headache, cramps, gas pains or a bloated feeling in your abdomen.   To experience mild back pain or soreness for a day or two if you had spinal or epidural anesthesia.   If you had laparoscopic surgery, to feel shoulder pain or discomfort on the day of surgery.   For some patients to have nausea after surgery/anesthesia    If you feel nausea or experience vomiting:   Try to move around less.   Eat less than usual or drink only liquids until the next morning   Nausea should resolve in about 24 hours    If you have a problem when you are at home:    Call your surgeons office   Discharge Instructions: After Your Surgery  You’ve just had surgery. During surgery, you were given medicine called anesthesia to keep you relaxed and free of pain. After surgery, you may have some pain or nausea. This is common. Here are some tips for feeling better and getting well after surgery.   Going home  Your healthcare provider will show you how to take care of yourself when you go home. They'll also answer your questions. Have an adult family member or friend drive you home. For the first 24 hours after your surgery:   Don't drive or use heavy equipment.  Don't make important decisions or sign legal papers.  Take medicines as directed.  Don't drink alcohol.  Have someone stay with you, if needed. They can watch for problems and help keep you safe.  Be sure to go to all follow-up visits  with your healthcare provider. And rest after your surgery for as long as your provider tells you to.   Coping with pain  If you have pain after surgery, pain medicine will help you feel better. Take it as directed, before pain becomes severe. Also, ask your healthcare provider or pharmacist about other ways to control pain. This might be with heat, ice, or relaxation. And follow any other instructions your surgeon or nurse gives you.      Stay on schedule with your medicine.     Tips for taking pain medicine  To get the best relief possible, remember these points:   Pain medicines can upset your stomach. Taking them with a little food may help.  Most pain relievers taken by mouth need at least 20 to 30 minutes to start to work.  Don't wait till your pain becomes severe before you take your medicine. Try to time your medicine so that you can take it before starting an activity. This might be before you get dressed, go for a walk, or sit down for dinner.  Constipation is a common side effect of some pain medicines. Call your healthcare provider before taking any medicines such as laxatives or stool softeners to help ease constipation. Also ask if you should skip any foods. Drinking lots of fluids and eating foods such as fruits and vegetables that are high in fiber can also help. Remember, don't take laxatives unless your surgeon has prescribed them.  Drinking alcohol and taking pain medicine can cause dizziness and slow your breathing. It can even be deadly. Don't drink alcohol while taking pain medicine.  Pain medicine can make you react more slowly to things. Don't drive or run machinery while taking pain medicine.  Your healthcare provider may tell you to take acetaminophen to help ease your pain. Ask them how much you're supposed to take each day. Acetaminophen or other pain relievers may interact with your prescription medicines or other over-the-counter (OTC) medicines. Some prescription medicines have  acetaminophen and other ingredients in them. Using both prescription and OTC acetaminophen for pain can cause you to accidentally overdose. Read the labels on your OTC medicines with care. This will help you to clearly know the list of ingredients, how much to take, and any warnings. It may also help you not take too much acetaminophen. If you have questions or don't understand the information, ask your pharmacist or healthcare provider to explain it to you before you take the OTC medicine.   Managing nausea  Some people have an upset stomach (nausea) after surgery. This is often because of anesthesia, pain, or pain medicine, less movement of food in the stomach, or the stress of surgery. These tips will help you handle nausea and eat healthy foods as you get better. If you were on a special food plan before surgery, ask your healthcare provider if you should follow it while you get better. Check with your provider on how your eating should progress. It may depend on the surgery you had. These general tips may help:   Don't push yourself to eat. Your body will tell you when to eat and how much.  Start off with clear liquids and soup. They're easier to digest.  Next try semi-solid foods as you feel ready. These include mashed potatoes, applesauce, and gelatin.  Slowly move to solid foods. Don’t eat fatty, rich, or spicy foods at first.  Don't force yourself to have 3 large meals a day. Instead eat smaller amounts more often.  Take pain medicines with a small amount of solid food, such as crackers or toast. This helps prevent nausea.  When to call your healthcare provider  Call your healthcare provider right away if you have any of these:   You still have too much pain, or the pain gets worse, after taking the medicine. The medicine may not be strong enough. Or there may be a complication from the surgery.  You feel too sleepy, dizzy, or groggy. The medicine may be too strong.  Side effects such as nausea or vomiting.  Your healthcare provider may advise taking other medicines to .  Skin changes such as rash, itching, or hives. This may mean you have an allergic reaction. Your provider may advise taking other medicines.  The incision looks different (for instance, part of it opens up).  Bleeding or fluid leaking from the incision site, and weren't told to expect that.  Fever of 100.4°F (38°C) or higher, or as directed by your provider.  Call 911  Call 911 right away if you have:   Trouble breathing  Facial swelling    If you have obstructive sleep apnea   You were given anesthesia medicine during surgery to keep you comfortable and free of pain. After surgery, you may have more apnea spells because of this medicine and other medicines you were given. The spells may last longer than normal.    At home:  Keep using the continuous positive airway pressure (CPAP) device when you sleep. Unless your healthcare provider tells you not to, use it when you sleep, day or night. CPAP is a common device used to treat obstructive sleep apnea.  Talk with your provider before taking any pain medicine, muscle relaxants, or sedatives. Your provider will tell you about the possible dangers of taking these medicines.  Contact your provider if your sleeping changes a lot even when taking medicines as directed.  [employer] last reviewed this educational content on 10/1/2021  © 6903-6450 [employer]. All rights reserved. This information is not intended as a substitute for professional medical care. Always follow your healthcare professional's instructions.

## 2024-03-08 NOTE — H&P
[facility]  part of [facility]    History & Physical    Radha Rodrigez Patient Status:  Emergency    1965 MRN [id number]   Location [facility] EMERGENCY DEPARTMENT Attending Colleen Shoemaker DO   Hosp Day # 0 PCP Guille Miramontes MD     Date:  3/8/2024  Date of Admission:  3/7/2024    Chief Complaint:  Chief Complaint   Patient presents with    Postop/Procedure Problem    Urinary Symptoms    Stones       History of Present Illness:  Radha Rodrigez is a(n) 58 year old female, who presents for evaluation of sudden onset of left lower quadrant pain as well as decreasing urine output from earlier today.  Patient underwent cystoscopy with left stent placement for left ureteral obstructing stone on 3/3/24 after presenting with left flank pain at [facility].  She was discharged 3/4/24 and reports that after the discharge she was doing fine.  She does report peeing a lot but then all of a sudden she stopped making any urine a few hours prior to presentation and started developing left flank pain.  Patient denies any diarrhea and reports having regular bowel movements.  Denies any fevers but does have subjective chills.  She also noticed burning on urination and blood in her urine.  Patient denies chest pain or shortness of breath.  Denies any lightheadedness or dizziness.  On presentation to the ED, initial vital signs reveal temp 96.7, heart rate 80, respiratory rate 19, blood pressure 156/97, SpO2 96% on room air.  Urinalysis consistent with UTI.  CT abdomen/pelvis does reveal improvement of previous hydronephrosis on the left side, left double-J ureteral stent appropriately positioned, decompressed bladder.  Patient was treated with Rocephin 1 g IV x 1, morphine 4 mg IV x 2, Zofran 4 mg IV x 1, 1 L IV fluid in the ED.  She was admitted under hospitalist service with consultation to urology    History:  Past Medical History:   Diagnosis Date    Back problem     Calculus  of kidney     Diabetes (HCC)     Disorder of thyroid     Diverticulosis of large intestine     Esophageal reflux     High blood pressure     Hx of motion sickness     Morbid obesity with BMI of 50.0-59.9, adult (HCC)     Pneumonia due to organism     PONV (postoperative nausea and vomiting)     Pre-diabetes     Seasonal allergies     Thyroid disease     Vitamin D deficiency      Past Surgical History:   Procedure Laterality Date    COLONOSCOPY  2021    HERNIA SURGERY      HYSTERECTOMY      REMOVAL GALLBLADDER      REPAIR ING HERNIA,5+Y/O,REDUCIBL       Family History   Problem Relation Age of Onset    Ovarian Cancer Mother     Breast Cancer Sister 27      reports that she has never smoked. She has never been exposed to tobacco smoke. She has never used smokeless tobacco. She reports that she does not currently use alcohol. She reports that she does not use drugs.    Allergies:  Allergies   Allergen Reactions    Augmentin [Amoxicillin-Pot Clavulanate] NAUSEA AND VOMITING    Cortisone DIZZINESS and OTHER (SEE COMMENTS)     Blood drop, dizziness, flushed    Adhesive Tape RASH     Needs paper tape       Home Medications:  Prior to Admission Medications   Prescriptions Last Dose Informant Patient Reported? Taking?   Azelastine HCl 137 MCG/SPRAY Nasal Solution   No No   Si-2 sprays by Nasal route in the morning and 1-2 sprays before bedtime. FOR SINUS SYMPTOMS/NASAL CONGESTION..   Esomeprazole Magnesium 40 MG Oral Capsule Delayed Release   No No   Sig: Take 1 capsule (40 mg total) by mouth 2 (two) times daily.   Patient taking differently: Take 1 capsule (40 mg total) by mouth every morning before breakfast.   Fexofenadine-Pseudoephed -240 MG Oral Tablet 24 Hr   Yes No   Sig: Take 1 tablet by mouth daily as needed.   Ketorolac Tromethamine 10 MG Oral Tab   No No   Sig: Take 1 tablet (10 mg total) by mouth every 8 (eight) hours as needed for Pain (Use sparingly and with plenty of water).   albuterol 108 (90  Base) MCG/ACT Inhalation Aero Soln   No No   Sig: Inhale 2-4 puffs into the lungs every 4 to 6 hours as needed for Wheezing or Shortness of Breath (cough, asthma, chest tightness). FOR ASTHMA. Pharmacist, please switch to formulary alternative per insurance coverage, ok to replace with proair, ventolin, proventil, or any other albuterol inhaler. -drkp   cephalexin 500 MG Oral Cap   No No   Sig: Take 1 capsule (500 mg total) by mouth 3 (three) times daily. One dose tonight and stop if rash; finish all doses; took iv rocephin in hospital no problem   cholecalciferol (VITAMIN D3) 125 MCG (5000 UT) Oral Cap   Yes No   Sig: Take 1 capsule (5,000 Units total) by mouth daily.   escitalopram 5 MG Oral Tab   No No   Sig: Take 1 tablet (5 mg total) by mouth daily.   fluticasone propionate 50 MCG/ACT Nasal Suspension   No No   Si sprays by Nasal route daily.   fluticasone-salmeterol 115-21 MCG/ACT Inhalation Aerosol   No No   Sig: Inhale 2 puffs into the lungs 2 (two) times daily. FOR ASTHMA   levothyroxine 150 MCG Oral Tab   No No   Sig: Take 1 tablet (150 mcg total) by mouth daily.   oxybutynin 5 MG Oral Tab   No No   Sig: Take 1 tablet (5 mg total) by mouth 3 (three) times daily as needed (Bladder spasms). Stop 12 hours before Dominguez catheter removal in clinic (if applicable)   semaglutide (OZEMPIC, 0.25 OR 0.5 MG/DOSE,) 2 MG/3ML Subcutaneous Solution Pen-injector   No No   Sig: Inject 0.25 mg into the skin once a week.   Patient not taking: Reported on 3/6/2024   tamsulosin 0.4 MG Oral Cap   No No   Sig: Take 1 capsule (0.4 mg total) by mouth every evening for 14 days.      Facility-Administered Medications: None       Review of Systems:  Constitutional:  Weakness, Fatigue.  Eye:  Negative.  Ear/Nose/Mouth/Throat:  Negative.  Respiratory:  Negative  Cardiovascular: Negative  Gastrointestinal:  + Abdominal pain  Genitourinary:  Negative  Endocrine:  Negative.  Immunologic:  Negative.  Musculoskeletal:   Negative.  Integumentary:  Negative.  Neurologic:  Negative.  Psychiatric:  Negative.  ROS reviewed as documented in chart    Physical Exam:  Temp:  [96.7 °F (35.9 °C)] 96.7 °F (35.9 °C)  Pulse:  [62-80] 65  Resp:  [19] 19  BP: (103-157)/(51-97) 103/51  SpO2:  [93 %-96 %] 96 %    General:  Alert and oriented.  Diffuse skin problem:  None.  Eye:  Pupils are equal, round and reactive to light, extraocular movements are intact, Normal conjunctiva.  HENT:  Normocephalic, oral mucosa is moist.  Head:  Normocephalic, atraumatic.  Neck:  Supple, non-tender, no carotid bruit, no jugular venous distention, no lymphadenopathy, no thyromegaly.  Respiratory:  Lungs are clear to auscultation, respirations are non-labored, breath sounds are equal, symmetrical chest wall expansion.  Cardiovascular:  Normal rate, regular rhythm, no murmur, no edema.  Gastrointestinal:  Soft, tender in the abdomen, non-distended, normal bowel sounds, no organomegaly.  Lymphatics:  No lymphadenopathy neck, axilla, groin.  Musculoskeletal: Normal range of motion.  normal strength.  Feet:  Normal pulses.  Neurologic:  Alert, oriented, no focal deficits, cranial nerves II-XII are grossly intact.  Cognition and Speech:  Oriented, speech clear and coherent.  Psychiatric:  Cooperative, appropriate mood & affect.      Laboratory Data:   Lab Results   Component Value Date    WBC 9.3 03/07/2024    HGB 14.3 03/07/2024    HCT 41.0 03/07/2024    .0 03/07/2024    CREATSERUM 1.04 03/07/2024    BUN 19 03/07/2024     03/07/2024    K 3.9 03/07/2024     03/07/2024    CO2 26.0 03/07/2024     03/07/2024    CA 9.4 03/07/2024    ALB 4.4 03/07/2024    ALKPHO 96 03/07/2024    BILT 0.3 03/07/2024    TP 7.0 03/07/2024    AST 24 03/07/2024    ALT 29 03/07/2024       Imaging:  No results found.     Assessment and Plan:    H/o Left ureteral stone s/p lithotripsy with stent placement 3/3/24  Left flank pain  Complicated UTI w/ hematuria  -CT  abdomen/pelvis reviewed. Reveals improvement of previous hydronephrosis on the left side, left double-J ureteral stent appropriately positioned, decompressed bladder.  No acute findings  -Urinalysis remarkable for UTI, given her recent urological procedure.  Patient received Rocephin 1 g IV x 1 in the ED  -Continue empiric antibiotic pending urine culture  -Urology consulted (Dr. Palm notified), pending evaluation    Mild acute kidney injury  -Creatinine noted to be 1.09, at discharge 0.9.  Patient was given 1 L IV fluid in the ED. CT abdomen/pelvis does not reveal any type of obstruction.  -Initiate gentle IV fluid hydration.  Reassess renal function in the a.m.  Avoid nephrotoxins.  Renally dose medications.    H/o thyroid disease  -Resume Synthroid    Morbid obesity, BMI 51.18.  -DOROTHY workup as outpatient  -Counseled on the importance of diet/exercise    Prophylaxis  Subcutaneous heparin    CODE STATUS  Full    Primary care physician  Guille Miramontes MD    60 minutes spent on this admission - examining patient, obtaining history, reviewing previous medical records, going over test results/imaging and discussing plan of care. All questions answered.     Disposition  Clinical course will dictate outcome      Rosalva Bradshaw MD  3/8/2024  12:54 AM

## 2024-03-09 VITALS
HEART RATE: 73 BPM | DIASTOLIC BLOOD PRESSURE: 76 MMHG | OXYGEN SATURATION: 95 % | WEIGHT: 285.63 LBS | BODY MASS INDEX: 52.56 KG/M2 | RESPIRATION RATE: 17 BRPM | HEIGHT: 62 IN | SYSTOLIC BLOOD PRESSURE: 159 MMHG | TEMPERATURE: 98 F

## 2024-03-09 LAB
ANION GAP SERPL CALC-SCNC: 5 MMOL/L (ref 0–18)
BASOPHILS # BLD AUTO: 0.06 X10(3) UL (ref 0–0.2)
BASOPHILS NFR BLD AUTO: 1.1 %
BUN BLD-MCNC: 15 MG/DL (ref 9–23)
BUN/CREAT SERPL: 18.5 (ref 10–20)
CALCIUM BLD-MCNC: 8.8 MG/DL (ref 8.7–10.4)
CHLORIDE SERPL-SCNC: 110 MMOL/L (ref 98–112)
CO2 SERPL-SCNC: 26 MMOL/L (ref 21–32)
CREAT BLD-MCNC: 0.81 MG/DL
DEPRECATED RDW RBC AUTO: 40.1 FL (ref 35.1–46.3)
EGFRCR SERPLBLD CKD-EPI 2021: 84 ML/MIN/1.73M2 (ref 60–?)
EOSINOPHIL # BLD AUTO: 0.34 X10(3) UL (ref 0–0.7)
EOSINOPHIL NFR BLD AUTO: 6.3 %
ERYTHROCYTE [DISTWIDTH] IN BLOOD BY AUTOMATED COUNT: 12.6 % (ref 11–15)
GLUCOSE BLD-MCNC: 96 MG/DL (ref 70–99)
HCT VFR BLD AUTO: 37.3 %
HGB BLD-MCNC: 12.3 G/DL
IMM GRANULOCYTES # BLD AUTO: 0.01 X10(3) UL (ref 0–1)
IMM GRANULOCYTES NFR BLD: 0.2 %
LYMPHOCYTES # BLD AUTO: 2.25 X10(3) UL (ref 1–4)
LYMPHOCYTES NFR BLD AUTO: 41.4 %
MCH RBC QN AUTO: 28.5 PG (ref 26–34)
MCHC RBC AUTO-ENTMCNC: 33 G/DL (ref 31–37)
MCV RBC AUTO: 86.5 FL
MONOCYTES # BLD AUTO: 0.56 X10(3) UL (ref 0.1–1)
MONOCYTES NFR BLD AUTO: 10.3 %
NEUTROPHILS # BLD AUTO: 2.22 X10 (3) UL (ref 1.5–7.7)
NEUTROPHILS # BLD AUTO: 2.22 X10(3) UL (ref 1.5–7.7)
NEUTROPHILS NFR BLD AUTO: 40.7 %
OSMOLALITY SERPL CALC.SUM OF ELEC: 293 MOSM/KG (ref 275–295)
PLATELET # BLD AUTO: 261 10(3)UL (ref 150–450)
POTASSIUM SERPL-SCNC: 4 MMOL/L (ref 3.5–5.1)
RBC # BLD AUTO: 4.31 X10(6)UL
SODIUM SERPL-SCNC: 141 MMOL/L (ref 136–145)
WBC # BLD AUTO: 5.4 X10(3) UL (ref 4–11)

## 2024-03-09 PROCEDURE — 99232 SBSQ HOSP IP/OBS MODERATE 35: CPT | Performed by: UROLOGY

## 2024-03-09 PROCEDURE — 99239 HOSP IP/OBS DSCHRG MGMT >30: CPT | Performed by: HOSPITALIST

## 2024-03-09 RX ORDER — CEFADROXIL 500 MG/1
500 CAPSULE ORAL 2 TIMES DAILY
Qty: 8 CAPSULE | Refills: 0 | Status: SHIPPED | OUTPATIENT
Start: 2024-03-09 | End: 2024-03-13

## 2024-03-09 NOTE — DISCHARGE SUMMARY
[facility]  part of [facility]    Discharge Summary    Radha Rodrigez Patient Status:  Observation    1965 MRN [id number]   Location [facility]5W Attending Karen Jason MD   Hosp Day # 0 PCP Guille Miramontes MD     Date of Admission: 3/7/2024 Disposition: Home or Self Care     Date of Discharge: 24      Admitting Diagnosis: Left flank pain [R10.9]  Acute cystitis with hematuria [N30.01]  Pain due to ureteral stent, initial encounter ([facility]) [T83.84XA]    Hospital Discharge Diagnoses:  lf. Flank Pain    Lace+ Score: 60  59-90 High Risk  29-58 Medium Risk  0-28   Low Risk.    TCM Follow-Up Recommendation:  LACE > 58: High Risk of readmission after discharge from the hospital.      Problem List:   Patient Active Problem List   Diagnosis    Diverticulosis of colon    Esophageal reflux    Essential hypertension    Hypothyroidism    Leiomyoma of uterus    Morbid obesity with BMI of 50.0-59.9, adult ([facility])    Seasonal allergies    Pre-diabetes    Stress    Encounter for therapeutic drug monitoring    Type 2 diabetes mellitus without complication, without long-term current use of insulin ([facility])    Ureterolithiasis    Pyelonephritis    Acute cystitis with hematuria    Left flank pain    Pain due to ureteral stent, initial encounter ([facility])       Reason for Admission:   H/o Left ureteral stone s/p lithotripsy with stent placement 3/3/24  Left flank pain  Complicated UTI w/ hematuria  H/o thyroid disease  Morbid obesity, BMI 51.18.    Physical Exam:   General appearance: alert, appears stated age and cooperative  Pulmonary:  clear to auscultation bilaterally  Cardiovascular: S1, S2 normal, no murmur, click, rub or gallop, regular rate and rhythm  Abdominal: soft, non-tender; bowel sounds normal; no masses,  no organomegaly  Extremities: extremities normal, atraumatic, no cyanosis or edema  Psychiatric: calm      History of Present Illness:   Per Dr. Leeann Rodrigez is a(n) 58  year old female, who presents for evaluation of sudden onset of left lower quadrant pain as well as decreasing urine output from earlier today.  Patient underwent cystoscopy with left stent placement for left ureteral obstructing stone on 3/3/24 after presenting with left flank pain at [facility].  She was discharged 3/4/24 and reports that after the discharge she was doing fine.  She does report peeing a lot but then all of a sudden she stopped making any urine a few hours prior to presentation and started developing left flank pain.  Patient denies any diarrhea and reports having regular bowel movements.  Denies any fevers but does have subjective chills.  She also noticed burning on urination and blood in her urine.  Patient denies chest pain or shortness of breath.  Denies any lightheadedness or dizziness.  On presentation to the ED, initial vital signs reveal temp 96.7, heart rate 80, respiratory rate 19, blood pressure 156/97, SpO2 96% on room air.  Urinalysis consistent with UTI.  CT abdomen/pelvis does reveal improvement of previous hydronephrosis on the left side, left double-J ureteral stent appropriately positioned, decompressed bladder.  Patient was treated with Rocephin 1 g IV x 1, morphine 4 mg IV x 2, Zofran 4 mg IV x 1, 1 L IV fluid in the ED.  She was admitted under hospitalist service with consultation to urology     Hospital Course:   H/o Left ureteral stone s/p lithotripsy with stent placement 3/3/24  Left flank pain  Complicated UTI w/ hematuria  -CT abdomen/pelvis reviewed. Reveals improvement of previous hydronephrosis on the left side, left double-J ureteral stent appropriately positioned, decompressed bladder.  No acute findings  -Urinalysis remarkable for UTI, given her recent urological procedure.  Patient received Rocephin 1 g IV x 1 in the ED  -urine culture negative  -Urology consulted (Dr. Palm) evaluation appreciated  -plan home with cefdroxil  -follow-up on 3/12 for  stent exchange     H/o thyroid disease  -Resume Synthroid     Morbid obesity, BMI 51.18.  -DOROTHY workup as outpatient  -Counseled on the importance of diet/exercise     Consultations:   urology    Procedures: n/a    Complications: n/a    Discharge Condition: Good    Discharge Medications:      Discharge Medications        START taking these medications        Instructions Prescription details   cefadroxil 500 MG Caps  Commonly known as: DURICEF      Take 1 capsule (500 mg total) by mouth 2 (two) times daily for 8 doses.   Stop taking on: March 13, 2024  Quantity: 8 capsule  Refills: 0            ASK your doctor about these medications        Instructions Prescription details   albuterol 108 (90 Base) MCG/ACT Aers  Commonly known as: Ventolin HFA      Inhale 2-4 puffs into the lungs every 4 to 6 hours as needed for Wheezing or Shortness of Breath (cough, asthma, chest tightness). FOR ASTHMA. Pharmacist, please switch to formulary alternative per insurance coverage, ok to replace with proair, ventolin, proventil, or any other albuterol inhaler. -drkp   Quantity: 3 each  Refills: 9     cephalexin 500 MG Caps  Commonly known as: Keflex      Take 1 capsule (500 mg total) by mouth 3 (three) times daily. One dose tonight and stop if rash; finish all doses; took iv rocephin in hospital no problem   Quantity: 16 capsule  Refills: 0     escitalopram 5 MG Tabs  Commonly known as: Lexapro      Take 1 tablet (5 mg total) by mouth daily.   Stop taking on: May 26, 2024  Quantity: 90 tablet  Refills: 1     Esomeprazole Magnesium 40 MG Cpdr  Commonly known as: NEXIUM      Take 1 capsule (40 mg total) by mouth 2 (two) times daily.   Quantity: 180 capsule  Refills: 0     fluticasone propionate 50 MCG/ACT Susp  Commonly known as: Flonase      2 sprays by Nasal route daily.   Quantity: 16 g  Refills: 0     fluticasone-salmeterol 115-21 MCG/ACT Aero  Commonly known as: ADVAIR HFA      Inhale 2 puffs into the lungs 2 (two) times daily. FOR  ASTHMA   Quantity: 1 each  Refills: 9     Ketorolac Tromethamine 10 MG Tabs  Commonly known as: TORADOL      Take 1 tablet (10 mg total) by mouth every 8 (eight) hours as needed for Pain (Use sparingly and with plenty of water).   Stop taking on: March 26, 2024  Quantity: 60 tablet  Refills: 0     levothyroxine 150 MCG Tabs  Commonly known as: Synthroid      Take 1 tablet (150 mcg total) by mouth daily.   Quantity: 90 tablet  Refills: 3     oxybutynin 5 MG Tabs  Commonly known as: Ditropan      Take 1 tablet (5 mg total) by mouth 3 (three) times daily as needed (Bladder spasms). Stop 12 hours before Dominguez catheter removal in clinic (if applicable)   Quantity: 15 tablet  Refills: 0     Ozempic (0.25 or 0.5 MG/DOSE) 2 MG/3ML Sopn  Generic drug: semaglutide      Inject 0.25 mg into the skin once a week.   Quantity: 4.5 mL  Refills: 0     tamsulosin 0.4 MG Caps  Commonly known as: Flomax      Take 1 capsule (0.4 mg total) by mouth every evening for 14 days.   Stop taking on: March 17, 2024  Quantity: 14 capsule  Refills: 0     Vitamin D3 125 MCG (5000 UT) Caps  Generic drug: cholecalciferol      Take 1 capsule (5,000 Units total) by mouth daily.   Refills: 0               Where to Get Your Medications        These medications were sent to [id number] - [place], [place] - [address] [facility] [phone], [phone]  [address] [facility] [address]      Phone: [phone]   cefadroxil 500 MG Caps         Follow up Visits: Follow-up with pcp in 1 week    Follow up Labs: n/a     Other Discharge Instructions: follow-up with urology as instructed    ALFREDA BLANKENSHIP MD  3/9/2024  12:45 PM    > 35 min

## 2024-03-09 NOTE — CM/SW NOTE
03/09/24 1400   Discharge disposition   Expected discharge disposition Home or Self   Post Acute Care Provider Home   Home services after discharge None   Discharge transportation Private car     The patient received a MDO for discharge.    The patient will be transported home via private car.    The patient has no questions or concerns at this time.    SW/CM to remain available for support and/or discharge planning.     Alicia Jean MSW, LSW  Discharge Planner [id number]

## 2024-03-09 NOTE — PLAN OF CARE
PT resting in bed overnight. VSS. No significant events. Up to BR independently, urine yellow/ирина. All safety measures maintained, call light within reach.    Problem: Patient Centered Care  Goal: Patient preferences are identified and integrated in the patient's plan of care  Description: Interventions:  - What would you like us to know as we care for you?   - Provide timely, complete, and accurate information to patient/family  - Incorporate patient and family knowledge, values, beliefs, and cultural backgrounds into the planning and delivery of care  - Encourage patient/family to participate in care and decision-making at the level they choose  - Honor patient and family perspectives and choices  Outcome: Progressing     Problem: Patient/Family Goals  Goal: Patient/Family Long Term Goal  Description: Patient's Long Term Goal:     Interventions:  - See additional Care Plan goals for specific interventions  Outcome: Progressing  Goal: Patient/Family Short Term Goal  Description: Patient's Short Term Goal:     Interventions:  - See additional Care Plan goals for specific interventions  Outcome: Progressing     Problem: PAIN - ADULT  Goal: Verbalizes/displays adequate comfort level or patient's stated pain goal  Description: INTERVENTIONS:  - Encourage pt to monitor pain and request assistance  - Assess pain using appropriate pain scale  - Administer analgesics based on type and severity of pain and evaluate response  - Implement non-pharmacological measures as appropriate and evaluate response  - Consider cultural and social influences on pain and pain management  - Manage/alleviate anxiety  - Utilize distraction and/or relaxation techniques  - Monitor for opioid side effects  - Notify MD/LIP if interventions unsuccessful or patient reports new pain  - Anticipate increased pain with activity and pre-medicate as appropriate  Outcome: Progressing     Problem: RISK FOR INFECTION - ADULT  Goal: Absence of  fever/infection during anticipated neutropenic period  Description: INTERVENTIONS  - Monitor WBC  - Administer growth factors as ordered  - Implement neutropenic guidelines  Outcome: Progressing     Problem: SAFETY ADULT - FALL  Goal: Free from fall injury  Description: INTERVENTIONS:  - Assess pt frequently for physical needs  - Identify cognitive and physical deficits and behaviors that affect risk of falls.  - [place] fall precautions as indicated by assessment.  - Educate pt/family on patient safety including physical limitations  - Instruct pt to call for assistance with activity based on assessment  - Modify environment to reduce risk of injury  - Provide assistive devices as appropriate  - Consider OT/PT consult to assist with strengthening/mobility  - Encourage toileting schedule  Outcome: Progressing     Problem: GASTROINTESTINAL - ADULT  Goal: Minimal or absence of nausea and vomiting  Description: INTERVENTIONS:  - Maintain adequate hydration with IV or PO as ordered and tolerated  - Nasogastric tube to low intermittent suction as ordered  - Evaluate effectiveness of ordered antiemetic medications  - Provide nonpharmacologic comfort measures as appropriate  - Advance diet as tolerated, if ordered  - Obtain nutritional consult as needed  - Evaluate fluid balance  Outcome: Progressing     Problem: GENITOURINARY - ADULT  Goal: Absence of urinary retention  Description: INTERVENTIONS:  - Assess patient’s ability to void and empty bladder  - Monitor intake/output and perform bladder scan as needed  - Follow urinary retention protocol/standard of care  - Consider collaborating with pharmacy to review patient's medication profile  - Implement strategies to promote bladder emptying  Outcome: Progressing     Problem: HEMATOLOGIC - ADULT  Goal: Maintains hematologic stability  Description: INTERVENTIONS  - Assess for signs and symptoms of bleeding or hemorrhage  - Monitor labs and vital signs for trends  -  Administer supportive blood products/factors, fluids and medications as ordered and appropriate  - Administer supportive blood products/factors as ordered and appropriate  Outcome: Progressing  Goal: Free from bleeding injury  Description: (Example usage: patient with low platelets)  INTERVENTIONS:  - Avoid intramuscular injections, enemas and rectal medication administration  - Ensure safe mobilization of patient  - Hold pressure on venipuncture sites to achieve adequate hemostasis  - Assess for signs and symptoms of internal bleeding  - Monitor lab trends  - Patient is to report abnormal signs of bleeding to staff  - Avoid use of toothpicks and dental floss  - Use electric shaver for shaving  - Use soft bristle tooth brush  - Limit straining and forceful nose blowing  Outcome: Progressing

## 2024-03-09 NOTE — PLAN OF CARE
Pt ambulating in room freely, daughter at bedside. Pt okay to discharge per MD, IV removed by RN. AVS reviewed with pt all questions answered. Pt declined wheelchair assistance to lobby.       Problem: Patient Centered Care  Goal: Patient preferences are identified and integrated in the patient's plan of care  Description: Interventions  - What would you like us to know as we care for you?   - Provide timely, complete, and accurate information to patient/family  - Incorporate patient and family knowledge, values, beliefs, and cultural backgrounds into the planning and delivery of care  - Encourage patient/family to participate in care and decision-making at the level they choose  - Honor patient and family perspectives and choices  Outcome: Adequate for Discharge     Problem: Patient/Family Goals  Goal: Patient/Family Long Term Goal  Description: Patient's Long Term Goal:     Interventions:  - See additional Care Plan goals for specific interventions  Outcome: Adequate for Discharge  Goal: Patient/Family Short Term Goal  Description: Patient's Short Term Goal:     Interventions:   - See additional Care Plan goals for specific interventions  Outcome: Adequate for Discharge     Problem: PAIN - ADULT  Goal: Verbalizes/displays adequate comfort level or patient's stated pain goal  Description: INTERVENTIONS:  - Encourage pt to monitor pain and request assistance  - Assess pain using appropriate pain scale  - Administer analgesics based on type and severity of pain and evaluate response  - Implement non-pharmacological measures as appropriate and evaluate response  - Consider cultural and social influences on pain and pain management  - Manage/alleviate anxiety  - Utilize distraction and/or relaxation techniques  - Monitor for opioid side effects  - Notify MD/LIP if interventions unsuccessful or patient reports new pain  - Anticipate increased pain with activity and pre-medicate as appropriate  Outcome: Adequate for  Discharge     Problem: RISK FOR INFECTION - ADULT  Goal: Absence of fever/infection during anticipated neutropenic period  Description: INTERVENTIONS  - Monitor WBC  - Administer growth factors as ordered  - Implement neutropenic guidelines  Outcome: Adequate for Discharge     Problem: SAFETY ADULT - FALL  Goal: Free from fall injury  Description: INTERVENTIONS:  - Assess pt frequently for physical needs  - Identify cognitive and physical deficits and behaviors that affect risk of falls.  - [place] fall precautions as indicated by assessment.  - Educate pt/family on patient safety including physical limitations  - Instruct pt to call for assistance with activity based on assessment  - Modify environment to reduce risk of injury  - Provide assistive devices as appropriate  - Consider OT/PT consult to assist with strengthening/mobility  - Encourage toileting schedule  Outcome: Adequate for Discharge     Problem: GASTROINTESTINAL - ADULT  Goal: Minimal or absence of nausea and vomiting  Description: INTERVENTIONS:  - Maintain adequate hydration with IV or PO as ordered and tolerated  - Nasogastric tube to low intermittent suction as ordered  - Evaluate effectiveness of ordered antiemetic medications  - Provide nonpharmacologic comfort measures as appropriate  - Advance diet as tolerated, if ordered  - Obtain nutritional consult as needed  - Evaluate fluid balance  Outcome: Adequate for Discharge     Problem: GENITOURINARY - ADULT  Goal: Absence of urinary retention  Description: INTERVENTIONS:  - Assess patient’s ability to void and empty bladder  - Monitor intake/output and perform bladder scan as needed  - Follow urinary retention protocol/standard of care  - Consider collaborating with pharmacy to review patient's medication profile  - Implement strategies to promote bladder emptying  Outcome: Adequate for Discharge     Problem: HEMATOLOGIC - ADULT  Goal: Maintains hematologic stability  Description:  INTERVENTIONS  - Assess for signs and symptoms of bleeding or hemorrhage  - Monitor labs and vital signs for trends  - Administer supportive blood products/factors, fluids and medications as ordered and appropriate  - Administer supportive blood products/factors as ordered and appropriate  Outcome: Adequate for Discharge  Goal: Free from bleeding injury  Description: (Example usage: patient with low platelets)  INTERVENTIONS:  - Avoid intramuscular injections, enemas and rectal medication administration  - Ensure safe mobilization of patient  - Hold pressure on venipuncture sites to achieve adequate hemostasis  - Assess for signs and symptoms of internal bleeding  - Monitor lab trends  - Patient is to report abnormal signs of bleeding to staff  - Avoid use of toothpicks and dental floss  - Use electric shaver for shaving  - Use soft bristle tooth brush  - Limit straining and forceful nose blowing  Outcome: Adequate for Discharge

## 2024-03-09 NOTE — PROGRESS NOTES
[facility]  part of [facility]    Progress Note    Radha Rodrigez Patient Status:  Observation    1965 MRN [id number]   Location [facility]5W Attending Karen Jason MD   Hosp Day # 0 PCP Guille Miramontes MD       Subjective:   Radha Rodrigez is a(n) 58 year old female   Feels w patientell.  No pain.    Objective:   Blood pressure (!) 173/89, pulse 77, temperature 98.2 °F (36.8 °C), temperature source Oral, resp. rate 16, height 5' 2\" (1.575 m), weight 280 lb 8 oz (127.2 kg), SpO2 98%.    Abdomen soft nontender nondistended    Assessment and Plan:     Acute cystitis with hematuria  Urine cultures are negative.  Suggest sending her home on Duricef 500 mg p.o. twice daily x 4 days to cover her until her scheduled surgery on .      Left flank pain  Resolved.  Etiology unclear.  Likely related to her stent    Findings with patient,and Dr. Jason.          Results:     Lab Results   Component Value Date    WBC 5.4 2024    HGB 12.3 2024    HCT 37.3 2024    .0 2024    CREATSERUM 0.81 2024    BUN 15 2024     2024    K 4.0 2024     2024    CO2 26.0 2024    GLU 96 2024    CA 8.8 2024    ALB 4.4 2024    ALKPHO 96 2024    AST 24 2024    ALT 29 2024    PTT 32.5 2017    INR 1.0 2017    PT 12.0 2012         CT ABDOMEN+PELVIS(CPT=74176)    Result Date: 3/8/2024  CONCLUSION:  1. Interval placement of left-sided double-J stent.  Minimal residual left hydroureteronephrosis.  Mild haziness to the periureteric fat is probably reactive to the stent.  Suggest correlation with urinalysis to exclude UTI.  5 mm calculus has been pushed back into the left lower pole renal collecting system.    Dictated by (CST): Gasper Roy MD on 3/08/2024 at 5:45 AM     Finalized by (CST): Gasper Roy MD on 3/08/2024 at 5:55 AM               Zenon Palm MD  3/9/2024

## 2024-03-09 NOTE — PLAN OF CARE
Patient alert, oriented, vitals stable. Denies pain/discomfort. Up and ambulatory, self with adls.. call light within reach, able to make needs known. Urine cultures pending.       Problem: Patient Centered Care  Goal: Patient preferences are identified and integrated in the patient's plan of care  Description: Interventions:  - What would you like us to know as we care for you? From home with family   - Provide timely, complete, and accurate information to patient/family  - Incorporate patient and family knowledge, values, beliefs, and cultural backgrounds into the planning and delivery of care  - Encourage patient/family to participate in care and decision-making at the level they choose  - Honor patient and family perspectives and choices  Outcome: Progressing     Problem: Patient/Family Goals  Goal: Patient/Family Long Term Goal  Description: Patient's Long Term Goal: discharge     Interventions:  - monitor labs, monitor vitals   - See additional Care Plan goals for specific interventions  Outcome: Progressing  Goal: Patient/Family Short Term Goal  Description: Patient's Short Term Goal: feel better     Interventions:   - urology consult, iv abx per orders  - See additional Care Plan goals for specific interventions  Outcome: Progressing     Problem: PAIN - ADULT  Goal: Verbalizes/displays adequate comfort level or patient's stated pain goal  Description: INTERVENTIONS:  - Encourage pt to monitor pain and request assistance  - Assess pain using appropriate pain scale  - Administer analgesics based on type and severity of pain and evaluate response  - Implement non-pharmacological measures as appropriate and evaluate response  - Consider cultural and social influences on pain and pain management  - Manage/alleviate anxiety  - Utilize distraction and/or relaxation techniques  - Monitor for opioid side effects  - Notify MD/LIP if interventions unsuccessful or patient reports new pain  - Anticipate increased pain  with activity and pre-medicate as appropriate  Outcome: Progressing     Problem: RISK FOR INFECTION - ADULT  Goal: Absence of fever/infection during anticipated neutropenic period  Description: INTERVENTIONS  - Monitor WBC  - Administer growth factors as ordered  - Implement neutropenic guidelines  Outcome: Progressing     Problem: SAFETY ADULT - FALL  Goal: Free from fall injury  Description: INTERVENTIONS:  - Assess pt frequently for physical needs  - Identify cognitive and physical deficits and behaviors that affect risk of falls.  - [place] fall precautions as indicated by assessment.  - Educate pt/family on patient safety including physical limitations  - Instruct pt to call for assistance with activity based on assessment  - Modify environment to reduce risk of injury  - Provide assistive devices as appropriate  - Consider OT/PT consult to assist with strengthening/mobility  - Encourage toileting schedule  Outcome: Progressing     Problem: GASTROINTESTINAL - ADULT  Goal: Minimal or absence of nausea and vomiting  Description: INTERVENTIONS:  - Maintain adequate hydration with IV or PO as ordered and tolerated  - Nasogastric tube to low intermittent suction as ordered  - Evaluate effectiveness of ordered antiemetic medications  - Provide nonpharmacologic comfort measures as appropriate  - Advance diet as tolerated, if ordered  - Obtain nutritional consult as needed  - Evaluate fluid balance  Outcome: Progressing     Problem: GENITOURINARY - ADULT  Goal: Absence of urinary retention  Description: INTERVENTIONS:  - Assess patient’s ability to void and empty bladder  - Monitor intake/output and perform bladder scan as needed  - Follow urinary retention protocol/standard of care  - Consider collaborating with pharmacy to review patient's medication profile  - Implement strategies to promote bladder emptying  Outcome: Progressing     Problem: HEMATOLOGIC - ADULT  Goal: Maintains hematologic stability  Description:  INTERVENTIONS  - Assess for signs and symptoms of bleeding or hemorrhage  - Monitor labs and vital signs for trends  - Administer supportive blood products/factors, fluids and medications as ordered and appropriate  - Administer supportive blood products/factors as ordered and appropriate  Outcome: Progressing  Goal: Free from bleeding injury  Description: (Example usage: patient with low platelets)  INTERVENTIONS:  - Avoid intramuscular injections, enemas and rectal medication administration  - Ensure safe mobilization of patient  - Hold pressure on venipuncture sites to achieve adequate hemostasis  - Assess for signs and symptoms of internal bleeding  - Monitor lab trends  - Patient is to report abnormal signs of bleeding to staff  - Avoid use of toothpicks and dental floss  - Use electric shaver for shaving  - Use soft bristle tooth brush  - Limit straining and forceful nose blowing  Outcome: Progressing

## 2024-03-11 ENCOUNTER — PATIENT OUTREACH (OUTPATIENT)
Dept: CASE MANAGEMENT | Age: 59
End: 2024-03-11

## 2024-03-11 ENCOUNTER — TELEPHONE (OUTPATIENT)
Dept: INTERNAL MEDICINE CLINIC | Facility: CLINIC | Age: 59
End: 2024-03-11

## 2024-03-11 DIAGNOSIS — R10.9 LEFT FLANK PAIN: Primary | ICD-10-CM

## 2024-03-11 PROCEDURE — 1111F DSCHRG MED/CURRENT MED MERGE: CPT

## 2024-03-11 NOTE — PROGRESS NOTES
Initial Post Discharge Follow Up   Discharge Date: 3/9/24  Contact Date: 3/11/2024    Consent Verification:  Assessment Completed With: Patient  HIPAA Verified?  Yes    Discharge Dx:   lf. Flank Pain     General:   How have you been since your discharge from the hospital? Pt feeling better, since hospital discharge--tolerating Cefadroxil, as prescribed, appetite adequate, staying hydrated, voiding well, independent with ADLs. Patient reports feeling bloated and slight nausea from abx, denies fever, chills, vomiting, diarrhea, abdominal or flank pain, hematuria, chest pain or shortness of breath at this time.  Do you have any pain since discharge?  No    How well was your pain managed while in the hospital?   On a scale of 1-5   1- Very Poor and 5- Very well   Very Well  When you were leaving the hospital were your discharge instructions reviewed with you? Yes  How well were your discharge instructions explained to you?   On a scale of 1-5   1- Very Poor and 5- Very well   Very Well  Do you have any questions about your discharge instructions?  No  Before leaving the hospital was your diagnoses explained to you? Yes  Do you have any questions about your diagnoses? No  Are you able to perform normal daily activities of living as you have prior to your hospital stay (dressing, bathing, ambulating to the bathroom, etc)? yes  (NCM) Was patient given a different diet per AVS? no    Medications:   Current Outpatient Medications   Medication Sig Dispense Refill    cefadroxil 500 MG Oral Cap Take 1 capsule (500 mg total) by mouth 2 (two) times daily for 8 doses. 8 capsule 0    Ketorolac Tromethamine 10 MG Oral Tab Take 1 tablet (10 mg total) by mouth every 8 (eight) hours as needed for Pain (Use sparingly and with plenty of water). 60 tablet 0    tamsulosin 0.4 MG Oral Cap Take 1 capsule (0.4 mg total) by mouth every evening for 14 days. 14 capsule 0    oxybutynin 5 MG Oral Tab Take 1 tablet (5 mg total) by mouth 3 (three)  times daily as needed (Bladder spasms). Stop 12 hours before Dominguez catheter removal in clinic (if applicable) 15 tablet 0    semaglutide (OZEMPIC, 0.25 OR 0.5 MG/DOSE,) 2 MG/3ML Subcutaneous Solution Pen-injector Inject 0.25 mg into the skin once a week. 4.5 mL 0    escitalopram 5 MG Oral Tab Take 1 tablet (5 mg total) by mouth daily. 90 tablet 1    fluticasone-salmeterol 115-21 MCG/ACT Inhalation Aerosol Inhale 2 puffs into the lungs 2 (two) times daily. FOR ASTHMA 1 each 9    Esomeprazole Magnesium 40 MG Oral Capsule Delayed Release Take 1 capsule (40 mg total) by mouth 2 (two) times daily. (Patient taking differently: Take 1 capsule (40 mg total) by mouth every morning before breakfast.) 180 capsule 0    fluticasone propionate 50 MCG/ACT Nasal Suspension 2 sprays by Nasal route daily. 16 g 0    albuterol 108 (90 Base) MCG/ACT Inhalation Aero Soln Inhale 2-4 puffs into the lungs every 4 to 6 hours as needed for Wheezing or Shortness of Breath (cough, asthma, chest tightness). FOR ASTHMA. Pharmacist, please switch to formulary alternative per insurance coverage, ok to replace with proair, ventolin, proventil, or any other albuterol inhaler. -jovan 3 each 9    levothyroxine 150 MCG Oral Tab Take 1 tablet (150 mcg total) by mouth daily. 90 tablet 3    cholecalciferol (VITAMIN D3) 125 MCG (5000 UT) Oral Cap Take 1 capsule (5,000 Units total) by mouth daily.       Were there any changes to your current medication(s) noted on the AVS? Yes  START taking:  cefadroxil (DURICEF)  Ozempic (0.25 or 0.5 MG/DOSE) (semaglutide)  STOP taking:  cephalexin 500 MG Caps (Keflex)  If so, were these medication changes discussed with you prior to leaving the hospital? Yes  If a new medication was prescribed:    Was the new medication's purpose & side effects reviewed? Yes  Do you have any questions about your new medication? No  Did you  your discharge medications when you left the hospital? Yes  Let's go over your medications  together to make sure we are not missing anything. Medications Reviewed  Are there any reasons that keep you from taking your medication as prescribed? No  Are you having any concerns with constipation? No  Did patient receive their flu shot (Sept-March)? No    Discharge medications reviewed/discussed/and reconciled against outpatient medications with patient.  Any changes or updates to medications sent to PCP.  Patient Acknowledged     Referrals/orders at D/C:  Referrals/orders placed at D/C? no  DME ordered at D/C? No    Discharge orders, AVS reviewed and discussed with patient. Any changes or updates to orders sent to PCP.  Patient Acknowledged      SDOH:   Transportation:    Transportation Needs: No Transportation Needs (3/11/2024)    Transportation Needs     Lack of Transportation: No     Financial Strain:   Financial Resource Strain: Low Risk  (3/11/2024)    Financial Resource Strain     Difficulty of Paying Living Expenses: Not very hard     Med Affordability: No     Diagnosis specifics:   Re-admit: Re-Admit:  Tell me what led up to your readmission:     Pt arrives ambulatory to ED after L ureter stent placed on Sunday d/t kidney stones in L side. Pt states now experiencing pain to L flank and LLQ, described as sharp, rated as a 10/10. Pt also endorses to little urine production. +nausea and diarrhea, denies vomiting. Pt states dysuria and hematuria. Aox4, speaking in full sentences.             Electronically signed by Aniya Betts RN at 3/7/2024  8:32 PM  Did you call your PCP office first? no  Did you attempt to get in with your PCP?  no  Do you feel this could have been prevented? no        Follow up appointments:    Follow-up with PCP per routine  Follow-up with urology as scheduled    Date: 3/12/2024 Time: 11:30 AM Status: Scheduled   Location: [facility] MAIN OR Room: OR 14 Service: Urology   Patient class: Hospital Outpatient Surgery Case classification: Elective      Panel Information    Panel  1    Surgeon Role   Priscilla Gonzalez MD Primary    Procedure Laterality Anesthesia   Cystoscopy left ureteroscopy, laser lithotripsy, retrograde pyelogram, stent placement Left General   CYSTOSCOPY URETEROSCOPY Left General   CYSTOSCOPY WITH HOLMIUM LASER Left General   CYSTOSCOPY STENT INSERTION Left General          Follow-up Information    Follow up With Specialties Details Why Contact Info   Guille Miramontes MD Internal Medicine Follow up  [address]  [address]  [place] [address]  [phone]     Your appointments       Date & Time Appointment Department (Center)    Mar 14, 2024  9:20 AM CDT Hospital Follow Up with Guille Miramontes MD [facility] ([facility])        Mar 28, 2024  8:40 AM CDT Follow Up Visit with Guille Miramontes MD [facility] ([facility])        Aug 20, 2024  2:30 PM CDT Follow Up Non Surgical with Jairo Nelson MD [facility] ([facility])    Please arrive 15 minutes prior to your scheduled appointment. Be sure to bring your current Insurance card, photo ID and a list of your current medications.     Please verify with your Primary Care Provider if your insurance requires a referral.     A 24 hour notice is required to cancel any appointment or you may be charged a $40 No Show Fee               [facility]  [address]  [place] [id number] [phone] [facility]  [address]  [place] [place] [address]  [phone]            TCC  Was TCC ordered: No    PCP (If no TCC appointment)  Does patient already have a PCP appointment scheduled? Yes  NCM Confirmed PCP office HFU appointment with patient    Specialist    Does the patient have any other follow up appointment(s) needing to be  scheduled? No    Is there any reason as to why you cannot make your appointment(s)?  No     Needs post D/C:   Now that you are home, are there any needs or concerns you need addressed before your next visit with your PCP?  (DME, meds, questions, etc.): No    Interventions by NCM:   Discussed diet, activity, medications and need for f/u visits. Pt confirms tomorrow's cysto--was told to be there by 10 a.m., and to only take Leveothyroxine in the a.m. by pre-op call today. Pt also confirms non-TCM (readmit) f/u appt with Dr. Miramontes 3/14/2024. Patient aware when to contact PCP/specialists and when to seek emergency care. No further questions/concerns at this time.    Overall Rating:   How would you rate the care you received while in the hospital? excellent    CCM referral placed:    Not Applicable    BOOK BY DATE: 3/23/2024

## 2024-03-11 NOTE — TELEPHONE ENCOUNTER
Guille Miramontes MD  P Em Rn Triage; Kamla Srivastava, MEGAN  Pt was re-admitted to hospital,I believe she has an appointment with me on 3/14 or 3/15 please ensure we convert that to TCM visit

## 2024-03-12 ENCOUNTER — ANESTHESIA EVENT (OUTPATIENT)
Dept: SURGERY | Facility: HOSPITAL | Age: 59
End: 2024-03-12
Payer: COMMERCIAL

## 2024-03-12 ENCOUNTER — ANESTHESIA (OUTPATIENT)
Dept: SURGERY | Facility: HOSPITAL | Age: 59
End: 2024-03-12
Payer: COMMERCIAL

## 2024-03-12 ENCOUNTER — TELEPHONE (OUTPATIENT)
Dept: SURGERY | Facility: CLINIC | Age: 59
End: 2024-03-12

## 2024-03-12 ENCOUNTER — HOSPITAL ENCOUNTER (OUTPATIENT)
Facility: HOSPITAL | Age: 59
Setting detail: HOSPITAL OUTPATIENT SURGERY
Discharge: HOME | End: 2024-03-12
Attending: UROLOGY | Admitting: UROLOGY
Payer: COMMERCIAL

## 2024-03-12 ENCOUNTER — APPOINTMENT (OUTPATIENT)
Dept: GENERAL RADIOLOGY | Facility: HOSPITAL | Age: 59
End: 2024-03-12
Attending: UROLOGY
Payer: COMMERCIAL

## 2024-03-12 VITALS
RESPIRATION RATE: 12 BRPM | DIASTOLIC BLOOD PRESSURE: 62 MMHG | HEIGHT: 62 IN | TEMPERATURE: 97 F | SYSTOLIC BLOOD PRESSURE: 125 MMHG | OXYGEN SATURATION: 99 % | WEIGHT: 284 LBS | BODY MASS INDEX: 52.26 KG/M2 | HEART RATE: 54 BPM

## 2024-03-12 DIAGNOSIS — N20.0 NEPHROLITHIASIS: ICD-10-CM

## 2024-03-12 LAB
GLUCOSE BLDC GLUCOMTR-MCNC: 96 MG/DL (ref 70–99)
GLUCOSE BLDC GLUCOMTR-MCNC: 99 MG/DL (ref 70–99)

## 2024-03-12 PROCEDURE — 0TC18ZZ EXTIRPATION OF MATTER FROM LEFT KIDNEY, VIA NATURAL OR ARTIFICIAL OPENING ENDOSCOPIC: ICD-10-PCS | Performed by: UROLOGY

## 2024-03-12 PROCEDURE — 0T778DZ DILATION OF LEFT URETER WITH INTRALUMINAL DEVICE, VIA NATURAL OR ARTIFICIAL OPENING ENDOSCOPIC: ICD-10-PCS | Performed by: UROLOGY

## 2024-03-12 PROCEDURE — 74420 UROGRAPHY RTRGR +-KUB: CPT | Performed by: UROLOGY

## 2024-03-12 PROCEDURE — BT1F1ZZ FLUOROSCOPY OF LEFT KIDNEY, URETER AND BLADDER USING LOW OSMOLAR CONTRAST: ICD-10-PCS | Performed by: UROLOGY

## 2024-03-12 PROCEDURE — 52356 CYSTO/URETERO W/LITHOTRIPSY: CPT | Performed by: UROLOGY

## 2024-03-12 DEVICE — URETERAL STENT
Type: IMPLANTABLE DEVICE | Site: URETER | Status: FUNCTIONAL
Brand: ASCERTA™

## 2024-03-12 RX ORDER — ONDANSETRON 2 MG/ML
INJECTION INTRAMUSCULAR; INTRAVENOUS AS NEEDED
Status: DISCONTINUED | OUTPATIENT
Start: 2024-03-12 | End: 2024-03-12 | Stop reason: SURG

## 2024-03-12 RX ORDER — ACETAMINOPHEN 500 MG
1000 TABLET ORAL ONCE
Status: COMPLETED | OUTPATIENT
Start: 2024-03-12 | End: 2024-03-12

## 2024-03-12 RX ORDER — HYDROMORPHONE HYDROCHLORIDE 1 MG/ML
0.2 INJECTION, SOLUTION INTRAMUSCULAR; INTRAVENOUS; SUBCUTANEOUS EVERY 5 MIN PRN
Status: DISCONTINUED | OUTPATIENT
Start: 2024-03-12 | End: 2024-03-12

## 2024-03-12 RX ORDER — MORPHINE SULFATE 10 MG/ML
6 INJECTION, SOLUTION INTRAMUSCULAR; INTRAVENOUS EVERY 10 MIN PRN
Status: DISCONTINUED | OUTPATIENT
Start: 2024-03-12 | End: 2024-03-12

## 2024-03-12 RX ORDER — FAMOTIDINE 20 MG/1
20 TABLET, FILM COATED ORAL ONCE
Status: COMPLETED | OUTPATIENT
Start: 2024-03-12 | End: 2024-03-12

## 2024-03-12 RX ORDER — NICOTINE POLACRILEX 4 MG
15 LOZENGE BUCCAL
Status: DISCONTINUED | OUTPATIENT
Start: 2024-03-12 | End: 2024-03-12

## 2024-03-12 RX ORDER — NALOXONE HYDROCHLORIDE 0.4 MG/ML
0.08 INJECTION, SOLUTION INTRAMUSCULAR; INTRAVENOUS; SUBCUTANEOUS AS NEEDED
Status: DISCONTINUED | OUTPATIENT
Start: 2024-03-12 | End: 2024-03-12

## 2024-03-12 RX ORDER — DOCUSATE SODIUM 100 MG/1
100 CAPSULE, LIQUID FILLED ORAL 2 TIMES DAILY
Qty: 30 CAPSULE | Refills: 0 | Status: SHIPPED | OUTPATIENT
Start: 2024-03-12 | End: 2024-03-17

## 2024-03-12 RX ORDER — METOCLOPRAMIDE 10 MG/1
10 TABLET ORAL ONCE
Status: COMPLETED | OUTPATIENT
Start: 2024-03-12 | End: 2024-03-12

## 2024-03-12 RX ORDER — SULFAMETHOXAZOLE AND TRIMETHOPRIM 800; 160 MG/1; MG/1
1 TABLET ORAL 2 TIMES DAILY
Qty: 6 TABLET | Refills: 0 | Status: SHIPPED | OUTPATIENT
Start: 2024-03-12 | End: 2024-03-14

## 2024-03-12 RX ORDER — MORPHINE SULFATE 4 MG/ML
2 INJECTION, SOLUTION INTRAMUSCULAR; INTRAVENOUS EVERY 10 MIN PRN
Status: DISCONTINUED | OUTPATIENT
Start: 2024-03-12 | End: 2024-03-12

## 2024-03-12 RX ORDER — DEXTROSE MONOHYDRATE 25 G/50ML
50 INJECTION, SOLUTION INTRAVENOUS
Status: DISCONTINUED | OUTPATIENT
Start: 2024-03-12 | End: 2024-03-12

## 2024-03-12 RX ORDER — PROCHLORPERAZINE EDISYLATE 5 MG/ML
5 INJECTION INTRAMUSCULAR; INTRAVENOUS EVERY 8 HOURS PRN
Status: DISCONTINUED | OUTPATIENT
Start: 2024-03-12 | End: 2024-03-12

## 2024-03-12 RX ORDER — SODIUM CHLORIDE, SODIUM LACTATE, POTASSIUM CHLORIDE, CALCIUM CHLORIDE 600; 310; 30; 20 MG/100ML; MG/100ML; MG/100ML; MG/100ML
INJECTION, SOLUTION INTRAVENOUS CONTINUOUS
Status: DISCONTINUED | OUTPATIENT
Start: 2024-03-12 | End: 2024-03-12

## 2024-03-12 RX ORDER — IOPAMIDOL 612 MG/ML
INJECTION, SOLUTION INTRATHECAL AS NEEDED
Status: DISCONTINUED | OUTPATIENT
Start: 2024-03-12 | End: 2024-03-12 | Stop reason: HOSPADM

## 2024-03-12 RX ORDER — METOCLOPRAMIDE HYDROCHLORIDE 5 MG/ML
10 INJECTION INTRAMUSCULAR; INTRAVENOUS ONCE
Status: COMPLETED | OUTPATIENT
Start: 2024-03-12 | End: 2024-03-12

## 2024-03-12 RX ORDER — ONDANSETRON 2 MG/ML
4 INJECTION INTRAMUSCULAR; INTRAVENOUS EVERY 6 HOURS PRN
Status: DISCONTINUED | OUTPATIENT
Start: 2024-03-12 | End: 2024-03-12

## 2024-03-12 RX ORDER — NICOTINE POLACRILEX 4 MG
30 LOZENGE BUCCAL
Status: DISCONTINUED | OUTPATIENT
Start: 2024-03-12 | End: 2024-03-12

## 2024-03-12 RX ORDER — FAMOTIDINE 10 MG/ML
20 INJECTION, SOLUTION INTRAVENOUS ONCE
Status: COMPLETED | OUTPATIENT
Start: 2024-03-12 | End: 2024-03-12

## 2024-03-12 RX ORDER — MORPHINE SULFATE 4 MG/ML
4 INJECTION, SOLUTION INTRAMUSCULAR; INTRAVENOUS EVERY 10 MIN PRN
Status: DISCONTINUED | OUTPATIENT
Start: 2024-03-12 | End: 2024-03-12

## 2024-03-12 RX ORDER — SENNA AND DOCUSATE SODIUM 50; 8.6 MG/1; MG/1
1 TABLET, FILM COATED ORAL DAILY
Qty: 30 TABLET | Refills: 0 | Status: SHIPPED | OUTPATIENT
Start: 2024-03-12 | End: 2024-03-17

## 2024-03-12 RX ORDER — ROCURONIUM BROMIDE 10 MG/ML
INJECTION, SOLUTION INTRAVENOUS AS NEEDED
Status: DISCONTINUED | OUTPATIENT
Start: 2024-03-12 | End: 2024-03-12 | Stop reason: SURG

## 2024-03-12 RX ORDER — HYDROMORPHONE HYDROCHLORIDE 1 MG/ML
0.6 INJECTION, SOLUTION INTRAMUSCULAR; INTRAVENOUS; SUBCUTANEOUS EVERY 5 MIN PRN
Status: DISCONTINUED | OUTPATIENT
Start: 2024-03-12 | End: 2024-03-12

## 2024-03-12 RX ORDER — HYDROMORPHONE HYDROCHLORIDE 1 MG/ML
0.4 INJECTION, SOLUTION INTRAMUSCULAR; INTRAVENOUS; SUBCUTANEOUS EVERY 5 MIN PRN
Status: DISCONTINUED | OUTPATIENT
Start: 2024-03-12 | End: 2024-03-12

## 2024-03-12 RX ADMIN — ONDANSETRON 4 MG: 2 INJECTION INTRAMUSCULAR; INTRAVENOUS at 12:00:00

## 2024-03-12 RX ADMIN — ROCURONIUM BROMIDE 40 MG: 10 INJECTION, SOLUTION INTRAVENOUS at 11:29:00

## 2024-03-12 NOTE — ANESTHESIA PREPROCEDURE EVALUATION
Anesthesia PreOp Note    HPI:     Radha Rodrigez is a 58 year old female who presents for preoperative consultation requested by: Priscilla Gonzalez MD    Date of Surgery: 3/12/2024    Procedure(s):  Cystoscopy left ureteroscopy, laser lithotripsy, retrograde pyelogram, stent placement  CYSTOSCOPY URETEROSCOPY  CYSTOSCOPY WITH HOLMIUM LASER  CYSTOSCOPY STENT INSERTION  Indication: Nephrolithiasis [N20.0]    Relevant Problems   No relevant active problems       NPO:  Last Liquid Consumption Date: 03/11/24  Last Liquid Consumption Time: 1900  Last Solid Consumption Date: 03/11/24  Last Solid Consumption Time: 1630  Last Liquid Consumption Date: 03/11/24          History Review:  Patient Active Problem List    Diagnosis Date Noted    Left flank pain 03/08/2024    Pain due to ureteral stent, initial encounter ([facility]) 03/08/2024    Acute cystitis with hematuria 03/07/2024    Ureterolithiasis 03/03/2024    Pyelonephritis 03/03/2024    Encounter for therapeutic drug monitoring 02/26/2024    Type 2 diabetes mellitus without complication, without long-term current use of insulin ([facility]) 02/26/2024    Pre-diabetes 10/30/2023    Stress 10/30/2023    Essential hypertension 05/09/2023    Morbid obesity with BMI of 50.0-59.9, adult ([facility]) 05/09/2023    Diverticulosis of colon 08/01/2020    Esophageal reflux 07/29/2013    Seasonal allergies 07/29/2013    Hypothyroidism 11/12/2012    Leiomyoma of uterus 10/23/2012       Past Medical History:   Diagnosis Date    Back problem     Calculus of kidney     Disorder of thyroid     Diverticulosis of large intestine     Esophageal reflux     Hx of motion sickness     Morbid obesity with BMI of 50.0-59.9, adult ([facility])     Pneumonia due to organism     PONV (postoperative nausea and vomiting)     Pre-diabetes     Seasonal allergies     Thyroid disease     Vitamin D deficiency        Past Surgical History:   Procedure Laterality Date    COLONOSCOPY  11/30/2021    HERNIA SURGERY      HYSTERECTOMY       REMOVAL GALLBLADDER      REPAIR ING HERNIA,5+Y/O,REDUCIBL         Medications Prior to Admission   Medication Sig Dispense Refill Last Dose    Ketorolac Tromethamine 10 MG Oral Tab Take 1 tablet (10 mg total) by mouth every 8 (eight) hours as needed for Pain (Use sparingly and with plenty of water). 60 tablet 0 3/6/2024    tamsulosin 0.4 MG Oral Cap Take 1 capsule (0.4 mg total) by mouth every evening for 14 days. 14 capsule 0 3/11/2024    oxybutynin 5 MG Oral Tab Take 1 tablet (5 mg total) by mouth 3 (three) times daily as needed (Bladder spasms). Stop 12 hours before Dominguez catheter removal in clinic (if applicable) 15 tablet 0 3/11/2024    escitalopram 5 MG Oral Tab Take 1 tablet (5 mg total) by mouth daily. 90 tablet 1 3/12/2024    Esomeprazole Magnesium 40 MG Oral Capsule Delayed Release Take 1 capsule (40 mg total) by mouth 2 (two) times daily. (Patient taking differently: Take 1 capsule (40 mg total) by mouth every morning before breakfast.) 180 capsule 0 3/12/2024    levothyroxine 150 MCG Oral Tab Take 1 tablet (150 mcg total) by mouth daily. 90 tablet 3 3/12/2024    cholecalciferol (VITAMIN D3) 125 MCG (5000 UT) Oral Cap Take 1 capsule (5,000 Units total) by mouth daily.   3/11/2024    cefadroxil 500 MG Oral Cap Take 1 capsule (500 mg total) by mouth 2 (two) times daily for 8 doses. 8 capsule 0     semaglutide (OZEMPIC, 0.25 OR 0.5 MG/DOSE,) 2 MG/3ML Subcutaneous Solution Pen-injector Inject 0.25 mg into the skin once a week. 4.5 mL 0     fluticasone-salmeterol 115-21 MCG/ACT Inhalation Aerosol Inhale 2 puffs into the lungs 2 (two) times daily. FOR ASTHMA 1 each 9 More than a month    fluticasone propionate 50 MCG/ACT Nasal Suspension 2 sprays by Nasal route daily. 16 g 0     albuterol 108 (90 Base) MCG/ACT Inhalation Aero Soln Inhale 2-4 puffs into the lungs every 4 to 6 hours as needed for Wheezing or Shortness of Breath (cough, asthma, chest tightness). FOR ASTHMA. Pharmacist, please switch to  formulary alternative per insurance coverage, ok to replace with proair, ventolin, proventil, or any other albuterol inhaler. -kp 3 each 9 More than a month     Current Facility-Administered Medications Ordered in Epic   Medication Dose Route Frequency Provider Last Rate Last Admin    lactated ringers infusion   Intravenous Continuous Priscilla Gonzalez MD        vancomycin (Vancocin) 2 g/500 mL in NS IVPB  15 mg/kg Intravenous Once Priscilla Gonzalez MD        gentamicin (Garamycin) 400 mg in sodium chloride 0.9% 100 mL IVPB  5 mg/kg (Adjusted) Intravenous Once Priscilla Gonzalez MD         No current [facility]-ordered outpatient medications on file.       Allergies   Allergen Reactions    Augmentin [Amoxicillin-Pot Clavulanate] NAUSEA AND VOMITING    Cortisone DIZZINESS and OTHER (SEE COMMENTS)     Blood drop, dizziness, flushed    Adhesive Tape RASH     Needs paper tape       Family History   Problem Relation Age of Onset    Ovarian Cancer Mother     Breast Cancer Sister 27     Social History     Socioeconomic History    Marital status:    Tobacco Use    Smoking status: Never     Passive exposure: Never    Smokeless tobacco: Never   Vaping Use    Vaping Use: Never used   Substance and Sexual Activity    Alcohol use: Not Currently    Drug use: Never       Available pre-op labs reviewed.  Lab Results   Component Value Date    WBC 5.4 03/09/2024    RBC 4.31 03/09/2024    HGB 12.3 03/09/2024    HCT 37.3 03/09/2024    MCV 86.5 03/09/2024    MCH 28.5 03/09/2024    MCHC 33.0 03/09/2024    RDW 12.6 03/09/2024    .0 03/09/2024     Lab Results   Component Value Date     03/09/2024    K 4.0 03/09/2024     03/09/2024    CO2 26.0 03/09/2024    BUN 15 03/09/2024    CREATSERUM 0.81 03/09/2024    GLU 96 03/09/2024    PGLU 110 (H) 03/08/2024    CA 8.8 03/09/2024          Vital Signs:  Body mass index is 51.94 kg/m².   height is 1.575 m (5' 2\") and weight is 128.8 kg (284 lb). Her oral temperature is 97.5 °F  (36.4 °C). Her blood pressure is 145/86 and her pulse is 74. Her respiration is 16 and oxygen saturation is 96%.   Vitals:    03/07/24 0916 03/12/24 1010   BP:  145/86   Pulse:  74   Resp:  16   Temp:  97.5 °F (36.4 °C)   TempSrc:  Oral   SpO2:  96%   Weight: 128.8 kg (284 lb)    Height: 1.575 m (5' 2\")         Anesthesia Evaluation     Patient summary reviewed and Nursing notes reviewed    History of anesthetic complications   Airway   Mallampati: III  TM distance: <3 FB  Neck ROM: full  Dental      Pulmonary - normal exam   (+) asthma  Cardiovascular - normal exam  Exercise tolerance: good  (+) hypertension    Neuro/Psych      GI/Hepatic/Renal    (+) GERD    Endo/Other    (+) diabetes mellitus    Comments: Morbid obesity    GA March 3 intubated easily with MAC 4  Abdominal   (+) obese                 Anesthesia Plan:   ASA:  3  Plan:   General  Airway:  ETT and Video laryngoscope  Post-op Pain Management: IV analgesics  Informed Consent Plan and Risks Discussed With:  Patient  Use of Blood Products Discussed With:  Patient  Discussed plan with:  Surgeon      I have informed Radha Rodrigez and/or legal guardian or family member of the nature of the anesthetic plan, benefits, risks including possible dental damage if relevant, major complications, and any alternative forms of anesthetic management.   All of the patient's questions were answered to the best of my ability. The patient desires the anesthetic management as planned.  Dary Dixon MD  3/12/2024 10:24 AM  Present on Admission:  **None**

## 2024-03-12 NOTE — ANESTHESIA PROCEDURE NOTES
Airway  Date/Time: 3/12/2024 11:33 AM  Urgency: Elective    Airway not difficult    General Information and Staff    Patient location during procedure: OR  Anesthesiologist: Dary Dixon MD  Performed: anesthesiologist   Performed by: Dary Dixon MD  Authorized by: Dary Dixon MD      Indications and Patient Condition  Indications for airway management: anesthesia  Sedation level: deep  Preoxygenated: yes  Patient position: sniffing  Mask difficulty assessment: 2 - vent by mask + OA or adjuvant +/- NMBA    Final Airway Details  Final airway type: endotracheal airway      Successful airway: ETT  Cuffed: yes   Successful intubation technique: direct laryngoscopy  Endotracheal tube insertion site: oral  Blade: Jose  Blade size: #4  ETT size (mm): 7.5    Cormack-Lehane Classification: grade I - full view of glottis  Placement verified by: capnometry   Measured from: teeth  ETT to teeth (cm): 21  Number of attempts at approach: 1

## 2024-03-12 NOTE — PROGRESS NOTES
[facility] Dosing Service  Antibiotic Dosing    Radha Rodrigez is a 58 year old for whom pharmacy is dosing vancomycin + gentamicin for treatment of  surgical prophylaxis.     Allergies: is allergic to augmentin [amoxicillin-pot clavulanate], cortisone, and adhesive tape.    Vitals: Ht 1.575 m (5' 2\")   Wt 128.8 kg (284 lb)   BMI 51.94 kg/m²     Labs:   WBC   Date Value Ref Range Status   03/09/2024 5.4 4.0 - 11.0 x10(3) uL Final     Creatinine   Date Value Ref Range Status   03/09/2024 0.81 0.55 - 1.02 mg/dL Final     BUN   Date Value Ref Range Status   03/09/2024 15 9 - 23 mg/dL Final     Assessment/Plan: Ordered vancomycin 15mg/kg (2g) + gentamicin 5mg/kg AdjBW (400mg) x 1 doses pre-op.    Pharmacy will continue to follow.  We appreciate the opportunity to assist in the care of this patient.    Thank you,   Janes Garcia, PharmD  3/12/2024  8:11 AM

## 2024-03-12 NOTE — H&P
PRE-OP NOTE     NAME/MRN/PROCEDURE: Radha Rodrigez - cystoscopy, left urs, ll, stent   HPI: 58F with obstructing left ureteral stone s/p ureteral stent on 3/3/2024 presents for definitive ureteroscopy  PMH: T2DM, GERD, HTN, morbid obesity, pna, seasonal allergies, thyroid disease, vit D deficiency  PSH: colonoscopy, hernia, hysterectomy  MEDS: none  ALL: augmentin, cortisone, adhesive tape  LABS: WBC 5.4, plt 261, Cr 0.81, Ucx negative  IMAGING:     OTHER:   CONSTITUTIONAL: Well developed, well nourished, in no acute distress  NEUROLOGIC: Alert and oriented  HEAD: Normocephalic, atraumatic  EYES: Sclera non-icteric  ENT: Hearing intact, moist mucous membranes  NECK: No obvious goiter or masses  RESPIRATORY: Normal respiratory effort  SKIN: No evident rashes  ABDOMEN: Soft, non-tender, non-distended  ABX: vanc gent    Family History   Problem Relation Age of Onset    Ovarian Cancer Mother      Breast Cancer Sister 27         Social History            Socioeconomic History    Marital status:    Tobacco Use    Smoking status: Never       Passive exposure: Never    Smokeless tobacco: Never   Vaping Use    Vaping Use: Never used   Substance and Sexual Activity    Alcohol use: Not Currently    Drug use: Never

## 2024-03-12 NOTE — ANESTHESIA POSTPROCEDURE EVALUATION
Patient: Radha Rodrigez    Procedure Summary       Date: 03/12/24 Room / Location: [facility] MAIN OR  / [facility] MAIN OR    Anesthesia Start: 1125 Anesthesia Stop: 1211    Procedure: Cystoscopy left ureteroscopy, laser lithotripsy, retrograde pyelogram, stent placement (Left: Ureter) Diagnosis:       Nephrolithiasis      (Nephrolithiasis [N20.0])    Surgeons: Priscilla Gonzalez MD Anesthesiologist: Dary Dixon MD    Anesthesia Type: general ASA Status: 3            Anesthesia Type: general    Vitals Value Taken Time   /73 03/12/24 1211   Temp 97.5 °F (36.4 °C) 03/12/24 1211   Pulse 67 03/12/24 1211   Resp 12 03/12/24 1211   SpO2 100 % 03/12/24 1211   Vitals shown include unfiled device data.    [facility] AN Post Evaluation:   Patient Evaluated in PACU  Patient Participation: complete - patient participated  Level of Consciousness: awake  Pain Score: 0  Pain Management: adequate  Airway Patency:patent  Dental exam unchanged from preop  Yes    Cardiovascular Status: acceptable  Respiratory Status: acceptable  Postoperative Hydration acceptable      Dary Dixon MD  3/12/2024 12:11 PM

## 2024-03-12 NOTE — OPERATIVE REPORT
OPERATIVE REPORT  DATE OF SURGERY: 3/12/2024  PREOPERATIVE DIAGNOSIS: left kidney stone  POSTOPERATIVE DIAGNOSIS: same  PROCEDURE: Cystoscopy, left Ureteroscopy, laser lithotripsy, left retrograde pyelogram, placement of 6 x 24 cm double j stent not on a string - retrograde with interpretation  SURGEON: Priscilla Gonzalez MD  ASSISTANT: same  ANESTHESIA: general  FLUIDS: none  URINE OUTPUT: n/a  ESTIMATED BLOOD LOSS: 3cc  SPECIMENS: left kidney stone  CONDITION: Stable to PACU  INDICATIONS: left kidney stone  PROCEDURE: The patient was met in the preoperative holding area. Appropriate informed consent was obtained and the patient was brought to the operative suite. ?Appropriate timeout was performed per hospital protocol. After the successful induction of general anesthesia, the patient was placed in the dorsal lithotomy position. ?Pressure points were meticulously padded. The patient was prepped and draped in a standard sterile fashion and IV vanc and gent was given as preoperative prophylaxis. At this point, we passed a 22-Tamazight cystoscope per urethra into the bladder. ?We identified the left ureteric orifice and used fluoroscopic guidance to cannulate this with a .038 Sensor guidewire to the level of the kidney. ?the wire was pulled to the meatus and a wire was placed through the stent into the kidney. We then removed the stent. We kept one wire as a safety wire and an over our working wire, we passed a 12/14-Tamazight ureteral access sheath keeping one wire behind as a safety wire. We then passed a ureteroscope through the access sheath up into the kidney. The stone was seen and was small in size. We brought in the 200 nanometer Track tip laser fiber on variable settings we ablated into smaller pieces. The tipless nitinol basket was used to completely extract all of the significant fragments and then we confirmed this with ureteroscopy again. ?We brought the laser back in and dusted a few more tiny fragments with  the laser, but then there was nothing significant remaining. A retrograde was performed which demonstrated delicate calyces.?At this point, the laser fiber was removed and the basket was removed. ?The access sheath was removed while scoping the entire ureter without evidence of any trauma or stone. ?Using the safety wire and fluoroscopic guidance, we passed a 6-Macedonian x 24 cm stent with a nice coil in the right kidney and a nice coil in the bladder, the string was not left. ?The procedure was then complete. Patient was awoken from anesthesia and taken to the recovery room in stable condition. All counts were correct x2.    IMPRESSION: s/p left urs all stones treated - stent internal    PLAN:  Stent x 7-10 days  3 days abx  Pain meds, tamsulosin  RBUS 6-8 weeks

## 2024-03-13 NOTE — TELEPHONE ENCOUNTER
Spoke with pt and offered her an appt for cysto stent removal on 03/20/2024 at 08:00am advised pt to arrive at 7:30am. Went over procedure details with pt and pt verbalized understanding.

## 2024-03-14 ENCOUNTER — OFFICE VISIT (OUTPATIENT)
Dept: INTERNAL MEDICINE CLINIC | Facility: CLINIC | Age: 59
End: 2024-03-14

## 2024-03-14 ENCOUNTER — LAB ENCOUNTER (OUTPATIENT)
Dept: LAB | Age: 59
End: 2024-03-14
Attending: INTERNAL MEDICINE
Payer: COMMERCIAL

## 2024-03-14 VITALS
SYSTOLIC BLOOD PRESSURE: 142 MMHG | BODY MASS INDEX: 51.34 KG/M2 | HEART RATE: 62 BPM | WEIGHT: 279 LBS | DIASTOLIC BLOOD PRESSURE: 77 MMHG | HEIGHT: 62 IN | OXYGEN SATURATION: 99 %

## 2024-03-14 DIAGNOSIS — E66.01 MORBID OBESITY WITH BMI OF 50.0-59.9, ADULT (HCC): ICD-10-CM

## 2024-03-14 DIAGNOSIS — Z09 HOSPITAL DISCHARGE FOLLOW-UP: ICD-10-CM

## 2024-03-14 DIAGNOSIS — Z51.81 ENCOUNTER FOR THERAPEUTIC DRUG MONITORING: ICD-10-CM

## 2024-03-14 DIAGNOSIS — N20.0 LEFT NEPHROLITHIASIS: Primary | ICD-10-CM

## 2024-03-14 DIAGNOSIS — E66.01 MORBID OBESITY WITH BMI OF 50.0-59.9, ADULT (HCC): Primary | ICD-10-CM

## 2024-03-14 DIAGNOSIS — N30.90 CYSTITIS: ICD-10-CM

## 2024-03-14 DIAGNOSIS — F43.9 STRESS: ICD-10-CM

## 2024-03-14 DIAGNOSIS — E11.9 TYPE 2 DIABETES MELLITUS WITHOUT COMPLICATION, WITHOUT LONG-TERM CURRENT USE OF INSULIN (HCC): ICD-10-CM

## 2024-03-14 LAB
EST. AVERAGE GLUCOSE BLD GHB EST-MCNC: 131 MG/DL (ref 68–126)
HBA1C MFR BLD: 6.2 % (ref ?–5.7)

## 2024-03-14 PROCEDURE — 3044F HG A1C LEVEL LT 7.0%: CPT | Performed by: STUDENT IN AN ORGANIZED HEALTH CARE EDUCATION/TRAINING PROGRAM

## 2024-03-14 PROCEDURE — 3078F DIAST BP <80 MM HG: CPT | Performed by: STUDENT IN AN ORGANIZED HEALTH CARE EDUCATION/TRAINING PROGRAM

## 2024-03-14 PROCEDURE — 99214 OFFICE O/P EST MOD 30 MIN: CPT | Performed by: STUDENT IN AN ORGANIZED HEALTH CARE EDUCATION/TRAINING PROGRAM

## 2024-03-14 PROCEDURE — 83036 HEMOGLOBIN GLYCOSYLATED A1C: CPT | Performed by: INTERNAL MEDICINE

## 2024-03-14 PROCEDURE — 3077F SYST BP >= 140 MM HG: CPT | Performed by: STUDENT IN AN ORGANIZED HEALTH CARE EDUCATION/TRAINING PROGRAM

## 2024-03-14 PROCEDURE — 3008F BODY MASS INDEX DOCD: CPT | Performed by: STUDENT IN AN ORGANIZED HEALTH CARE EDUCATION/TRAINING PROGRAM

## 2024-03-14 RX ORDER — SULFAMETHOXAZOLE AND TRIMETHOPRIM 800; 160 MG/1; MG/1
1 TABLET ORAL 2 TIMES DAILY
Qty: 6 TABLET | Refills: 0 | Status: SHIPPED | OUTPATIENT
Start: 2024-03-14 | End: 2024-03-17

## 2024-03-14 RX ORDER — TIRZEPATIDE 2.5 MG/.5ML
2.5 INJECTION, SOLUTION SUBCUTANEOUS WEEKLY
Qty: 3 ML | Refills: 2 | Status: SHIPPED | OUTPATIENT
Start: 2024-03-14

## 2024-03-14 NOTE — PROGRESS NOTES
OFFICE NOTE     Patient ID: Radha Rodrigez is a 58 year old female.  Today's Date: 03/14/24  Chief Complaint: Hospital F/U (Post op fu - Cystoscopy left ureteroscopy, laser lithotripsy, retrograde pyelogram, stent placement  on 3/12/24/)      Pt is a a 56y/o female with PMHx HTN, Hypothyroidism, Class 2 obesity ([id number]), pre-diabetes, ALYSSIA, GERD,  leiomyoma of uterus and chronic sinusitis (followed by Dr. Lora), was recently re-admitted to [facility] on 312 for complicated left ureter stent with a 5mm left renal pelvis stone. Was given Abx and discharge. Today in clinic still having some left lower flank discomfort and fatigue.  Afebrile no nausea no vomiting no dysuria      Vitals:    03/14/24 0921   BP: 142/77   Pulse: 62   SpO2: 99%   Weight: 279 lb (126.6 kg)   Height: 5' 2\" (1.575 m)     body mass index is 51.03 kg/m².  BP Readings from Last 3 Encounters:   03/14/24 142/77   03/12/24 125/62   03/09/24 159/76     The 10-year ASCVD risk score (Tri RYAN, et al., 2019) is: 5.3%    Values used to calculate the score:      Age: 58 years      Sex: Female      Is Non- : No      Diabetic: Yes      Tobacco smoker: No      Systolic Blood Pressure: 142 mmHg      Is BP treated: No      HDL Cholesterol: 42 mg/dL      Total Cholesterol: 130 mg/dL      Medications reviewed:  Current Outpatient Medications   Medication Sig Dispense Refill    sulfamethoxazole-trimethoprim -160 MG Oral Tab per tablet Take 1 tablet by mouth 2 (two) times daily for 3 days. 6 tablet 0    Tirzepatide-Weight Management (ZEPBOUND) 2.5 MG/0.5ML Subcutaneous Solution Auto-injector Inject 2.5 mg into the skin once a week. 3 mL 2    escitalopram 5 MG Oral Tab Take 1 tablet (5 mg total) by mouth daily. 90 tablet 1    fluticasone-salmeterol 115-21 MCG/ACT Inhalation Aerosol Inhale 2 puffs into the lungs 2 (two) times daily. FOR ASTHMA 1 each 9    Esomeprazole Magnesium 40 MG Oral Capsule Delayed Release Take 1  capsule (40 mg total) by mouth 2 (two) times daily. (Patient taking differently: Take 1 capsule (40 mg total) by mouth every morning before breakfast.) 180 capsule 0    fluticasone propionate 50 MCG/ACT Nasal Suspension 2 sprays by Nasal route daily. 16 g 0    albuterol 108 (90 Base) MCG/ACT Inhalation Aero Soln Inhale 2-4 puffs into the lungs every 4 to 6 hours as needed for Wheezing or Shortness of Breath (cough, asthma, chest tightness). FOR ASTHMA. Pharmacist, please switch to formulary alternative per insurance coverage, ok to replace with proair, ventolin, proventil, or any other albuterol inhaler. -drkp 3 each 9    levothyroxine 150 MCG Oral Tab Take 1 tablet (150 mcg total) by mouth daily. 90 tablet 3    cholecalciferol (VITAMIN D3) 125 MCG (5000 UT) Oral Cap Take 1 capsule (5,000 Units total) by mouth daily.      docusate sodium 100 MG Oral Cap Take 1 capsule (100 mg total) by mouth 2 (two) times daily. (Patient not taking: Reported on 3/14/2024) 30 capsule 0    senna-docusate 8.6-50 MG Oral Tab Take 1 tablet by mouth daily. (Patient not taking: Reported on 3/14/2024) 30 tablet 0    Ketorolac Tromethamine 10 MG Oral Tab Take 1 tablet (10 mg total) by mouth every 8 (eight) hours as needed for Pain (Use sparingly and with plenty of water). (Patient not taking: Reported on 3/14/2024) 60 tablet 0    tamsulosin 0.4 MG Oral Cap Take 1 capsule (0.4 mg total) by mouth every evening for 14 days. (Patient not taking: Reported on 3/14/2024) 14 capsule 0         Assessment & Plan    1. Left nephrolithiasis (Primary)  -     Sulfamethoxazole-Trimethoprim; Take 1 tablet by mouth 2 (two) times daily for 3 days.  Dispense: 6 tablet; Refill: 0  2. Hospital discharge follow-up  3. Cystitis  -     Sulfamethoxazole-Trimethoprim; Take 1 tablet by mouth 2 (two) times daily for 3 days.  Dispense: 6 tablet; Refill: 0  Patient with complicated left millimeter left renal pelvis stone with left stent placement on March 12.  Afebrile  no nausea or vomiting at this time however still has fever and left flank discomfort/pain.  However vital stable.  -Discussed with Urologist Dr. Gonzalez unless fevers, no need to go to ER. usually hydration, tamsulosin, toradol, PRN to aid with discomfort. Reiterated unless patient develops fevers, nausea/vomiting to go to ER at that time.       Follow Up: As needed/if symptoms worsen or No follow-ups on file..     I spent 38 minutes obtaining pertitent medical history, reviewing pertinent imaging/labs and specialists notes, evaluating patient, discussing differential diagnosis' and various treatment options, reinforcing importance of compliance with treatment plan, and completing documentation.      Objective/ Results:   Physical Exam  Constitutional:       Appearance: She is well-developed.   Cardiovascular:      Rate and Rhythm: Normal rate and regular rhythm.      Heart sounds: Normal heart sounds.   Pulmonary:      Effort: Pulmonary effort is normal.      Breath sounds: Normal breath sounds.   Abdominal:      General: Bowel sounds are normal.      Palpations: Abdomen is soft.   Skin:     General: Skin is warm and dry.      Findings: Bruising (From frequent blood draws) present.   Neurological:      Mental Status: She is alert and oriented to person, place, and time.      Deep Tendon Reflexes: Reflexes are normal and symmetric.        Reviewed:    Patient Active Problem List    Diagnosis    Left flank pain    Pain due to ureteral stent, initial encounter ([facility])    Acute cystitis with hematuria    Ureterolithiasis    Pyelonephritis    Encounter for therapeutic drug monitoring    Type 2 diabetes mellitus without complication, without long-term current use of insulin ([facility])    Pre-diabetes    Stress    Essential hypertension    Morbid obesity with BMI of 50.0-59.9, adult ([facility])    Diverticulosis of colon    Esophageal reflux    Seasonal allergies    Hypothyroidism    Leiomyoma of uterus      Allergies   Allergen  Reactions    Augmentin [Amoxicillin-Pot Clavulanate] NAUSEA AND VOMITING    Cortisone DIZZINESS and OTHER (SEE COMMENTS)     Blood drop, dizziness, flushed    Adhesive Tape RASH     Needs paper tape        Social History     Socioeconomic History    Marital status:    Tobacco Use    Smoking status: Never     Passive exposure: Never    Smokeless tobacco: Never   Vaping Use    Vaping Use: Never used   Substance and Sexual Activity    Alcohol use: Not Currently    Drug use: Never     Social Determinants of Health     Financial Resource Strain: Low Risk  (3/11/2024)    Financial Resource Strain     Difficulty of Paying Living Expenses: Not very hard     Med Affordability: No   Food Insecurity: No Food Insecurity (3/8/2024)    Food Insecurity     Food Insecurity: Never true   Transportation Needs: No Transportation Needs (3/11/2024)    Transportation Needs     Lack of Transportation: No   Housing Stability: Low Risk  (3/8/2024)    Housing Stability     Housing Instability: No      Review of Systems   Constitutional:  Positive for fatigue.   Respiratory: Negative.     Cardiovascular: Negative.    Gastrointestinal:  Positive for abdominal pain.   Genitourinary:  Positive for flank pain. Negative for difficulty urinating.   Skin: Negative.    Neurological: Negative.    Hematological:  Bruises/bleeds easily (new since serial phelebotomy).     All other systems negative unless otherwise stated in ROS or HPI above.       Guille Miramontes MD  Internal Medicine       Call office with any questions or seek emergency care if necessary.   Patient understands and agrees to follow directions and advice.      ----------------------------------------- PATIENT INSTRUCTIONS-----------------------------------------     There are no Patient Instructions on file for this visit.

## 2024-03-17 DIAGNOSIS — Z09 HOSPITAL DISCHARGE FOLLOW-UP: ICD-10-CM

## 2024-03-17 DIAGNOSIS — N20.0 LEFT NEPHROLITHIASIS: ICD-10-CM

## 2024-03-18 DIAGNOSIS — N20.0 LEFT NEPHROLITHIASIS: ICD-10-CM

## 2024-03-18 DIAGNOSIS — N30.90 CYSTITIS: ICD-10-CM

## 2024-03-18 LAB
CAOX DIHYDRATE: 20 %
CAOX MONOHYDRATE: 80 %
WEIGHT-STONE: 36 MG

## 2024-03-18 NOTE — TELEPHONE ENCOUNTER
Dr. Miramontes,     *The ACKNOWLEDGE button has been moved to the top right ribbon*    Please sign off on form if you agree to: [facility] for continuous time off. 3/4/24-3/31/24 RTW: tentatively set for 4/1/24 per you recent letter.  (place your signature on the first page only)    -From your Inbasket, Highlight the patient and click Chart   -Double click the 3/6/24 Forms Completion telephone encounter  -Scroll down to the Media section   -Click the blue Hyperlink: Silvana Miramontes 3/18/24  -Click Acknowledge located in the top right ribbon/menu   -Drag the mouse into the blank space of the document and a + sign will appear. Left click to   electronically sign the document.     Thank you,    Drea POZO

## 2024-03-19 RX ORDER — KETOROLAC TROMETHAMINE 10 MG/1
10 TABLET, FILM COATED ORAL EVERY 8 HOURS PRN
Qty: 60 TABLET | Refills: 0 | Status: SHIPPED | OUTPATIENT
Start: 2024-03-19 | End: 2024-04-08

## 2024-03-20 ENCOUNTER — PROCEDURE (OUTPATIENT)
Dept: SURGERY | Facility: CLINIC | Age: 59
End: 2024-03-20
Payer: COMMERCIAL

## 2024-03-20 VITALS — SYSTOLIC BLOOD PRESSURE: 149 MMHG | DIASTOLIC BLOOD PRESSURE: 89 MMHG | HEART RATE: 82 BPM

## 2024-03-20 DIAGNOSIS — N20.0 NEPHROLITHIASIS: Primary | ICD-10-CM

## 2024-03-20 PROCEDURE — 3077F SYST BP >= 140 MM HG: CPT | Performed by: UROLOGY

## 2024-03-20 PROCEDURE — 3079F DIAST BP 80-89 MM HG: CPT | Performed by: UROLOGY

## 2024-03-20 PROCEDURE — 52310 CYSTOSCOPY AND TREATMENT: CPT | Performed by: UROLOGY

## 2024-03-20 RX ORDER — CIPROFLOXACIN 500 MG/1
500 TABLET, FILM COATED ORAL ONCE
Status: COMPLETED | OUTPATIENT
Start: 2024-03-20 | End: 2024-03-20

## 2024-03-20 RX ORDER — SULFAMETHOXAZOLE AND TRIMETHOPRIM 800; 160 MG/1; MG/1
1 TABLET ORAL 2 TIMES DAILY
Qty: 6 TABLET | Refills: 0 | OUTPATIENT
Start: 2024-03-20

## 2024-03-20 RX ADMIN — CIPROFLOXACIN 500 MG: 500 TABLET, FILM COATED ORAL at 08:33:00

## 2024-03-20 NOTE — TELEPHONE ENCOUNTER
Passes protocol but with POP UP HIGH WARNING  between escitalopram  and Ketorolac Tromethamine =sent to PCP for approval.     [employer] message regarding the Cephalexin refill request .     Current Warnings (1 unfiltered, 1 filtered)[]Show filtered (1)  High  Drug-Drug: escitalopram and Ketorolac TromethamineToxic effects may be increased with concurrent administration of NSAIDs and Selective Serotonin Reuptake Inhibitors. The risk of upper gastrointestinal bleeding may be increased. Patients taking both drugs concurrently should be educated about the signs and symptoms of GI bleeding.      Refill passed per [facility] protocol.     Requested Prescriptions   Pending Prescriptions Disp Refills    Ketorolac Tromethamine 10 MG Oral Tab 60 tablet 0     Sig: Take 1 tablet (10 mg total) by mouth every 8 (eight) hours as needed for Pain (Use sparingly and with plenty of water).       Non-Narcotic Pain Medication Protocol Passed - 3/17/2024  6:09 PM        Passed - In person appointment or virtual visit in the past 6 mos or appointment in next 3 mos     Recent Outpatient Visits              5 days ago Left nephrolithiasis    [facility], Guille Lezama MD    Office Visit    1 week ago Hospital discharge follow-up    [facility], Guille Lezama MD    Office Visit    3 weeks ago Type 2 diabetes mellitus without complication, without long-term current use of insulin ([facility])    [place] Jairo Nelson MD    Office Visit    3 months ago Tongue ulcer    [facility], Luis Alfredo Hale MD    Office Visit    3 months ago Chronic pansinusitis    [facility], Guille Lezama MD    Office Visit          Future Appointments         Provider Department Appt Notes    Tomorrow Priscilla Gonzalez MD [facility],  [place] cysto stent removal    In 1 week Guille Miramontes MD [facility]     In 5 months Jairo Nelson MD [facility]                      Future Appointments         Provider Department Appt Notes    Tomorrow Priscilla Gonzalez MD [facility] cysto stent removal    In 1 week Guille Miramontes MD [facility]     In 5 months Jairo Nelson MD [facility]              Recent Outpatient Visits              5 days ago Left nephrolithiasis    [facility] Guille Miramontes MD    Office Visit    1 week ago Hospital discharge follow-up    [facility] [place], Guille Lezama MD    Office Visit    3 weeks ago Type 2 diabetes mellitus without complication, without long-term current use of insulin (HCC)    [facility] Jairo Nelson MD    Office Visit    3 months ago Tongue ulcer    [facility] Luis Alfredo Lora MD    Office Visit    3 months ago Chronic pansinusitis    [facility] Guille Miramontes MD    Office Visit

## 2024-03-20 NOTE — PROCEDURES
Cysto stent removal procedure  note  CYSTOSCOPY STENT REMOVAL     PRE-OP DIAGNOSIS: Nephrolithiasis, left      POST-OP DIAGNOSIS: Same     PROCEDURE:  1. Cystoscopy, removal of left ureteral stent     SURGEON: Priscilla Gonzalez MD     ASSISTANT: none     EBL: minimal     FINDINGS:  1.  Stent removed in its entirety stent , care taken please see Provation     INDICATIONS: s/p left nephrolithiasis         PROCEDURE: Patient was brought to the procedure suite and an time-out was performed identifiying the patient,  and procedure being performed. The risks of the procedure were once again detailed to patient including bleeding, infection, dysuria. The patient agreed to proceed. cipro was given as antibiotic prophylaxis.     Patient was placed in supine position on the table and a flexible cystoscope was inserted per urethra after lidojet had been instilled for 5 minutes.  We immediately saw the ureteral stent in the bladder emanating from the leftureteral orifice.  We used a stent grasper to grasp the distal coil of the stent and removed it from the body in its entirety.  A picture of the intact stent was taken for documentation purposes in Provation.      There were no complications after this procedure and the patient tolerated the procedure without issue.     IMPRESSION: s/p left stent removal      We will have the patient return in 6 weeks for follow-up with a renal bladder ultrasound to ensure he does not have silent hydronephrosis     PLAN:   1. RBUS 6 weeks

## 2024-03-20 NOTE — TELEPHONE ENCOUNTER
Refusing Bactrim: short supply given.    The patient is a [age] Female complaining of abdominal pain.

## 2024-03-20 NOTE — TELEPHONE ENCOUNTER
Forms completed and faxed to [employer]SEY [phone]. Sent [employer] message.

## 2024-03-24 DIAGNOSIS — E66.01 MORBID OBESITY WITH BMI OF 50.0-59.9, ADULT (HCC): Primary | ICD-10-CM

## 2024-03-25 RX ORDER — PHENTERMINE HYDROCHLORIDE 15 MG/1
15 CAPSULE ORAL EVERY MORNING
Qty: 30 CAPSULE | Refills: 0 | Status: SHIPPED | OUTPATIENT
Start: 2024-03-25

## 2024-03-28 ENCOUNTER — TELEPHONE (OUTPATIENT)
Dept: INTERNAL MEDICINE CLINIC | Facility: CLINIC | Age: 59
End: 2024-03-28

## 2024-03-28 ENCOUNTER — OFFICE VISIT (OUTPATIENT)
Dept: INTERNAL MEDICINE CLINIC | Facility: CLINIC | Age: 59
End: 2024-03-28

## 2024-03-28 ENCOUNTER — LAB ENCOUNTER (OUTPATIENT)
Dept: LAB | Age: 59
End: 2024-03-28
Attending: STUDENT IN AN ORGANIZED HEALTH CARE EDUCATION/TRAINING PROGRAM
Payer: COMMERCIAL

## 2024-03-28 VITALS
HEART RATE: 65 BPM | SYSTOLIC BLOOD PRESSURE: 125 MMHG | OXYGEN SATURATION: 98 % | WEIGHT: 278 LBS | BODY MASS INDEX: 51.16 KG/M2 | DIASTOLIC BLOOD PRESSURE: 82 MMHG | HEIGHT: 62 IN

## 2024-03-28 DIAGNOSIS — R19.7 DIARRHEA, UNSPECIFIED TYPE: ICD-10-CM

## 2024-03-28 DIAGNOSIS — E66.01 MORBID OBESITY (HCC): ICD-10-CM

## 2024-03-28 DIAGNOSIS — E83.59 CALCIUM OXALATE CALCULUS: ICD-10-CM

## 2024-03-28 DIAGNOSIS — K57.90 DIVERTICULOSIS: ICD-10-CM

## 2024-03-28 DIAGNOSIS — B37.31 YEAST INFECTION INVOLVING THE VAGINA AND SURROUNDING AREA: Primary | ICD-10-CM

## 2024-03-28 DIAGNOSIS — R30.0 DYSURIA: ICD-10-CM

## 2024-03-28 DIAGNOSIS — R73.03 PRE-DIABETES: ICD-10-CM

## 2024-03-28 LAB
APPEARANCE: CLEAR
BILIRUBIN: NEGATIVE
GLUCOSE (URINE DIPSTICK): NEGATIVE MG/DL
IGA SERPL-MCNC: 183.2 MG/DL (ref 70–312)
KETONES (URINE DIPSTICK): NEGATIVE MG/DL
LEUKOCYTES: NEGATIVE
MULTISTIX EXPIRATION DATE: NORMAL DATE
MULTISTIX LOT#: NORMAL NUMERIC
NITRITE, URINE: NEGATIVE
OCCULT BLOOD: NEGATIVE
PH, URINE: 5.5 (ref 4.5–8)
PROTEIN (URINE DIPSTICK): NEGATIVE MG/DL
SPECIFIC GRAVITY: >=1.03 (ref 1–1.03)
URINE-COLOR: YELLOW
UROBILINOGEN,SEMI-QN: 0.2 MG/DL (ref 0–1.9)

## 2024-03-28 PROCEDURE — 99215 OFFICE O/P EST HI 40 MIN: CPT | Performed by: STUDENT IN AN ORGANIZED HEALTH CARE EDUCATION/TRAINING PROGRAM

## 2024-03-28 PROCEDURE — 3008F BODY MASS INDEX DOCD: CPT | Performed by: STUDENT IN AN ORGANIZED HEALTH CARE EDUCATION/TRAINING PROGRAM

## 2024-03-28 PROCEDURE — 3079F DIAST BP 80-89 MM HG: CPT | Performed by: STUDENT IN AN ORGANIZED HEALTH CARE EDUCATION/TRAINING PROGRAM

## 2024-03-28 PROCEDURE — 86364 TISS TRNSGLTMNASE EA IG CLAS: CPT

## 2024-03-28 PROCEDURE — 81002 URINALYSIS NONAUTO W/O SCOPE: CPT | Performed by: STUDENT IN AN ORGANIZED HEALTH CARE EDUCATION/TRAINING PROGRAM

## 2024-03-28 PROCEDURE — 82784 ASSAY IGA/IGD/IGG/IGM EACH: CPT

## 2024-03-28 PROCEDURE — 3074F SYST BP LT 130 MM HG: CPT | Performed by: STUDENT IN AN ORGANIZED HEALTH CARE EDUCATION/TRAINING PROGRAM

## 2024-03-28 PROCEDURE — 36415 COLL VENOUS BLD VENIPUNCTURE: CPT

## 2024-03-28 RX ORDER — FLUCONAZOLE 150 MG/1
150 TABLET ORAL
Qty: 2 TABLET | Refills: 1 | Status: SHIPPED | OUTPATIENT
Start: 2024-03-28

## 2024-03-28 NOTE — TELEPHONE ENCOUNTER
Closed    Other   Case ID: [id number]      Payer: [employer]    [phone]    [phone]   Your PA request has been closed. Your ePA request has been aborted. Approval on file. Pharmacy should process request.   View History     Closed    Other   Case ID: [id number]      Payer: [employer]    [phone]    [phone]   Your PA request has been closed. [id number]   View History     For 1.7 mg and 0.25 mg

## 2024-03-28 NOTE — TELEPHONE ENCOUNTER
Prior authorization for wegovy all 3 strengths was done through [employer]. It can take 1-5 business days for a decision to come back

## 2024-03-28 NOTE — PROGRESS NOTES
OFFICE NOTE     Patient ID: Radha Rodrigez is a 58 year old female.  Today's Date: 03/28/24  Chief Complaint: Yeast Infection (Pt thinks might have yeast infection maybe from antibiotics, vaginal itching, burning, on skin and urinating 5x days) and Diarrhea (Since stent removal, over a week)        Pt is a a 56y/o female with PMHx HTN, Hypothyroidism, Class 2 obesity (CDV29-09), pre-diabetes, ALYSSIA, GERD,  leiomyoma of uterus and chronic sinusitis (followed by Dr. Lora), was recently re-admitted to [facility] on 312 for complicated left ureter stent with a 5mm left renal pelvis stone, and on 3/20 had left stent removed was calcium oxalate. Told to stay hydrate, limit salt and red meat, and normal calcium diet and avoid oxalate foods (leafy green vegetables). Presents with dysuria and feels like yeast infection (vaginal itching and skin urinating 5x daily and diarrhea since stent removal. Had diarrhea and pasty stools (non-bloody) over past 3 months multiple rounds of antibiotics.     Pt also noted zepbound was denied by insurance,   Qsymia, Saxenda, wegovy (alternatives). Pt with BMI 50 (278lbs, and Last A1c value was 6.2% done 3/14/2024.         Vitals:    03/28/24 0835   BP: 125/82   Pulse: 65   SpO2: 98%   Weight: 278 lb (126.1 kg)   Height: 5' 2\" (1.575 m)     body mass index is 50.85 kg/m².  BP Readings from Last 3 Encounters:   03/28/24 125/82   03/20/24 149/89   03/14/24 142/77     The 10-year ASCVD risk score (Tri RYAN, et al., 2019) is: 4.1%    Values used to calculate the score:      Age: 58 years      Sex: Female      Is Non- : No      Diabetic: Yes      Tobacco smoker: No      Systolic Blood Pressure: 125 mmHg      Is BP treated: No      HDL Cholesterol: 42 mg/dL      Total Cholesterol: 130 mg/dL      Medications reviewed:  Current Outpatient Medications   Medication Sig Dispense Refill    fluconazole (DIFLUCAN) 150 MG Oral Tab Take 1 tablet (150 mg total) by mouth every  third day as needed (IF SYMPTOMS ARE STILL PRESENT AFTER FIRST DOSE). 2 tablet 1    semaglutide-weight management 0.25 MG/0.5ML Subcutaneous Solution Auto-injector Inject 0.5 mL (0.25 mg total) into the skin once a week for 4 doses. 2 mL 0    [START ON 4/25/2024] semaglutide-weight management 1.7 MG/0.75ML Subcutaneous Solution Auto-injector Inject 0.75 mL (1.7 mg total) into the skin once a week for 28 days. 3 mL 0    [START ON 5/23/2024] semaglutide-weight management 2.4 MG/0.75ML Subcutaneous Solution Auto-injector Inject 0.75 mL (2.4 mg total) into the skin once a week for 4 doses. 3 mL 0    escitalopram 5 MG Oral Tab Take 1 tablet (5 mg total) by mouth daily. 90 tablet 1    fluticasone-salmeterol 115-21 MCG/ACT Inhalation Aerosol Inhale 2 puffs into the lungs 2 (two) times daily. FOR ASTHMA 1 each 9    Esomeprazole Magnesium 40 MG Oral Capsule Delayed Release Take 1 capsule (40 mg total) by mouth 2 (two) times daily. (Patient taking differently: Take 1 capsule (40 mg total) by mouth every morning before breakfast.) 180 capsule 0    fluticasone propionate 50 MCG/ACT Nasal Suspension 2 sprays by Nasal route daily. 16 g 0    albuterol 108 (90 Base) MCG/ACT Inhalation Aero Soln Inhale 2-4 puffs into the lungs every 4 to 6 hours as needed for Wheezing or Shortness of Breath (cough, asthma, chest tightness). FOR ASTHMA. Pharmacist, please switch to formulary alternative per insurance coverage, ok to replace with proair, ventolin, proventil, or any other albuterol inhaler. -drkp 3 each 9    levothyroxine 150 MCG Oral Tab Take 1 tablet (150 mcg total) by mouth daily. 90 tablet 3    cholecalciferol (VITAMIN D3) 125 MCG (5000 UT) Oral Cap Take 1 capsule (5,000 Units total) by mouth daily.      PHENTERMINE HCL 15 MG Oral Cap TAKE 1 CAPSULE BY MOUTH EVERY MORNING (Patient not taking: Reported on 3/28/2024) 30 capsule 0    Ketorolac Tromethamine 10 MG Oral Tab Take 1 tablet (10 mg total) by mouth every 8 (eight) hours as  needed for Pain (Use sparingly and with plenty of water). (Patient not taking: Reported on 3/28/2024) 60 tablet 0         Assessment & Plan    1. Yeast infection involving the vagina and surrounding area (Primary)  -     Fluconazole; Take 1 tablet (150 mg total) by mouth every third day as needed (IF SYMPTOMS ARE STILL PRESENT AFTER FIRST DOSE).  Dispense: 2 tablet; Refill: 1  Ua negative send to culture  If no imrpovement start diflucan 1x dose  2. Dysuria  -     POC Urinalysis, Manual Dip without microscopy [[id number]]  -     Fluconazole; Take 1 tablet (150 mg total) by mouth every third day as needed (IF SYMPTOMS ARE STILL PRESENT AFTER FIRST DOSE).  Dispense: 2 tablet; Refill: 1  -     Urine Culture, Routine; Future; Expected date: 03/28/2024  Plan  As above   3. Diarrhea, unspecified type  -     CELIAC DISEASE SCREEN Reflex; Future; Expected date: 03/28/2024  -     Calprotectin, Fecal; Future; Expected date: 03/28/2024  -     Giardia + Crypto Antigen, Stool; Future; Expected date: 03/28/2024  -     Stool Culture W/Shigatoxin; Future; Expected date: 03/28/2024  -     C. diff toxigenic PCR (OPT); Future; Expected date: 03/28/2024  -     Norovirus, RT PCR; Future; Expected date: 03/28/2024  Pt with diarrhea since urethral stent removal  ?abx use (c/diff) or infectious etiology?  Plan  -stool studies ordered, if negative or no improvement in next week follow up with established GI (last seen Oct 2022)  4. Diverticulosis  -     CELIAC DISEASE SCREEN Reflex; Future; Expected date: 03/28/2024  -     Calprotectin, Fecal; Future; Expected date: 03/28/2024  -     Giardia + Crypto Antigen, Stool; Future; Expected date: 03/28/2024  -     Stool Culture W/Shigatoxin; Future; Expected date: 03/28/2024  -     C. diff toxigenic PCR (OPT); Future; Expected date: 03/28/2024  -     Norovirus, RT PCR; Future; Expected date: 03/28/2024  Pt with diarrhea since urethral stent removal  ?abx use (c/diff) or infectious  etiology?  Plan  -stool studies ordered, if negative or no improvement in next week follow up with established GI (last seen Oct 2022)  5. Calcium oxalate calculus  -     POC Urinalysis, Manual Dip without microscopy [[id number]]  Pt with calcium oxalatate stone passage  Plan:  Given information of low oxalate foods.      6. Morbid obesity (HCC)  -     Semaglutide-Weight Management; Inject 0.5 mL (0.25 mg total) into the skin once a week for 4 doses.  Dispense: 2 mL; Refill: 0  -     Semaglutide-Weight Management; Inject 0.75 mL (1.7 mg total) into the skin once a week for 28 days.  Dispense: 3 mL; Refill: 0  -     Semaglutide-Weight Management; Inject 0.75 mL (2.4 mg total) into the skin once a week for 4 doses.  Dispense: 3 mL; Refill: 0  Pt also noted zepbound was denied by insurance,   Qsymia, Saxenda, wegovy (alternatives). Pt with BMI 50 (278lbs, and Last A1c value was 6.2% done 3/14/2024.   Plan:  Start wegovy titration next 3 months  Follow up in 3 months.   7. Pre-diabetes  -     Semaglutide-Weight Management; Inject 0.5 mL (0.25 mg total) into the skin once a week for 4 doses.  Dispense: 2 mL; Refill: 0  -     Semaglutide-Weight Management; Inject 0.75 mL (1.7 mg total) into the skin once a week for 28 days.  Dispense: 3 mL; Refill: 0  -     Semaglutide-Weight Management; Inject 0.75 mL (2.4 mg total) into the skin once a week for 4 doses.  Dispense: 3 mL; Refill: 0    Pt also noted zepbound was denied by insurance,   Qsymia, Saxenda, wegovy (alternatives). Pt with BMI 50 (278lbs, and Last A1c value was 6.2% done 3/14/2024.   Plan:  Start wegovy titration next 3 months  Follow up in 3 months.       Follow Up: As needed/if symptoms worsen or No follow-ups on file..     I spent 42 minutes obtaining pertitent medical history, reviewing pertinent imaging/labs and specialists notes, evaluating patient, discussing differential diagnosis' and various treatment options, reinforcing importance of compliance with  treatment plan, and completing documentation.      Objective/ Results:   Physical Exam  Constitutional:       Appearance: She is well-developed. She is obese.   Cardiovascular:      Rate and Rhythm: Normal rate and regular rhythm.      Heart sounds: Normal heart sounds.   Pulmonary:      Effort: Pulmonary effort is normal.      Breath sounds: Normal breath sounds.   Abdominal:      General: Bowel sounds are normal.      Palpations: Abdomen is soft.   Skin:     General: Skin is warm and dry.   Neurological:      Mental Status: She is oriented to person, place, and time.      Deep Tendon Reflexes: Reflexes are normal and symmetric.        Reviewed:    Patient Active Problem List    Diagnosis    Calcium oxalate calculus    Left flank pain    Pain due to ureteral stent, initial encounter ([facility])    Acute cystitis with hematuria    Ureterolithiasis    Pyelonephritis    Encounter for therapeutic drug monitoring    Type 2 diabetes mellitus without complication, without long-term current use of insulin ([facility])    Pre-diabetes    Stress    Essential hypertension    Morbid obesity with BMI of 50.0-59.9, adult ([facility])    Diverticulosis of colon    Esophageal reflux    Seasonal allergies    Hypothyroidism    Leiomyoma of uterus      Allergies   Allergen Reactions    Augmentin [Amoxicillin-Pot Clavulanate] NAUSEA AND VOMITING    Cortisone DIZZINESS and OTHER (SEE COMMENTS)     Blood drop, dizziness, flushed    Adhesive Tape RASH     Needs paper tape        Social History     Socioeconomic History    Marital status:    Tobacco Use    Smoking status: Never     Passive exposure: Never    Smokeless tobacco: Never   Vaping Use    Vaping Use: Never used   Substance and Sexual Activity    Alcohol use: Not Currently    Drug use: Never     Social Determinants of Health     Financial Resource Strain: Low Risk  (3/11/2024)    Financial Resource Strain     Difficulty of Paying Living Expenses: Not very hard     Med Affordability: No   Food  Insecurity: No Food Insecurity (3/8/2024)    Food Insecurity     Food Insecurity: Never true   Transportation Needs: No Transportation Needs (3/11/2024)    Transportation Needs     Lack of Transportation: No   Housing Stability: Low Risk  (3/8/2024)    Housing Stability     Housing Instability: No      Review of Systems   Constitutional: Negative.    Respiratory: Negative.     Cardiovascular: Negative.    Gastrointestinal: Negative.    Genitourinary:  Positive for dysuria. Negative for vaginal bleeding, vaginal discharge and vaginal pain.   Skin: Negative.    Neurological: Negative.      All other systems negative unless otherwise stated in ROS or HPI above.       Guille Miramontes MD  Internal Medicine       Call office with any questions or seek emergency care if necessary.   Patient understands and agrees to follow directions and advice.      ----------------------------------------- PATIENT INSTRUCTIONS-----------------------------------------     Patient Instructions   Low oxalate Diet foods information given today in clinic.

## 2024-03-28 NOTE — TELEPHONE ENCOUNTER
Prior authorization for wegovy 2.4 mg/0.75 ml has been approved.    Approved    Prior authorization approved   Case ID: [id number]      Payer: [facility]    [phone]    [phone]   Your PA request has been approved.  Additional information will be provided in the approval communication. (Message 1149)   Approval Details    Authorized from March 28, 2024 to October 28, 2024      Electronic appeal: Not supported   View History

## 2024-03-29 LAB — TTG IGA SER-ACNC: 0.5 U/ML (ref ?–7)

## 2024-03-30 NOTE — PROGRESS NOTES
Please relay to patient:  Regla Garcia, in regards to the diarrhea, does not seem to be celiac disease. Might be post infectious/antibiotics. But I am still waiting for stool studies for that.

## 2024-03-30 NOTE — PROGRESS NOTES
No action needed if read by pt:  Regla Garcia, your urine culture isn't growing any bacterial likely you are having a yeast infection. If still troublesome may take the diflucan if you haven't already.

## 2024-04-01 ENCOUNTER — TELEPHONE (OUTPATIENT)
Dept: INTERNAL MEDICINE CLINIC | Facility: CLINIC | Age: 59
End: 2024-04-01

## 2024-04-01 DIAGNOSIS — E66.01 MORBID OBESITY WITH BMI OF 50.0-59.9, ADULT (HCC): Primary | ICD-10-CM

## 2024-04-01 NOTE — TELEPHONE ENCOUNTER
Patient contacted and made aware of note below by Dr. Miramontes. Patient verbalized understanding. No further questions or concerns at this time.

## 2024-04-01 NOTE — TELEPHONE ENCOUNTER
[facility] pharmacist indicated that patient called the pharmacy that she did not read the instructions correctly and accidentally wasted one dose of the wegovy, so will be a pen short. Currently doing 0.25mg. pharmacist noticed that Dr Miramontes sent in 2 other wegovy scripts but next script is for 0.75mg, but patient is concerned that it might be too high of a dose. Wondered if doctor could prescribe 0.5mg dose or how to proceed? Pharmacist stated that the 0.5mg and 0.75mg doses of the wegovy are on back order currently, but they do have the 1mg wegovy dose in stock. Please advise.

## 2024-04-01 NOTE — TELEPHONE ENCOUNTER
Sent in script for 0.5mg dose to start and then go to .75mg dose afterwards. Hopefully they can have supply before her current dose is complete or she can call other local pharmacy to see who has stock and send there.

## 2024-04-14 DIAGNOSIS — R73.03 PRE-DIABETES: ICD-10-CM

## 2024-04-14 DIAGNOSIS — E66.01 MORBID OBESITY (HCC): ICD-10-CM

## 2024-04-15 RX ORDER — SEMAGLUTIDE 0.25 MG/.5ML
0.25 INJECTION, SOLUTION SUBCUTANEOUS
Qty: 2 ML | Refills: 0 | OUTPATIENT
Start: 2024-04-15

## 2024-04-18 ENCOUNTER — HOSPITAL ENCOUNTER (OUTPATIENT)
Dept: ULTRASOUND IMAGING | Age: 59
Discharge: HOME | End: 2024-04-18
Attending: UROLOGY
Payer: COMMERCIAL

## 2024-04-18 DIAGNOSIS — N20.0 NEPHROLITHIASIS: ICD-10-CM

## 2024-04-18 PROCEDURE — 76770 US EXAM ABDO BACK WALL COMP: CPT | Performed by: UROLOGY

## 2024-04-25 ENCOUNTER — OFFICE VISIT (OUTPATIENT)
Dept: INTERNAL MEDICINE CLINIC | Facility: CLINIC | Age: 59
End: 2024-04-25

## 2024-04-25 VITALS
BODY MASS INDEX: 50.42 KG/M2 | HEART RATE: 69 BPM | OXYGEN SATURATION: 98 % | SYSTOLIC BLOOD PRESSURE: 110 MMHG | HEIGHT: 62 IN | DIASTOLIC BLOOD PRESSURE: 72 MMHG | WEIGHT: 274 LBS | TEMPERATURE: 97 F

## 2024-04-25 DIAGNOSIS — E66.01 MORBID OBESITY WITH BMI OF 50.0-59.9, ADULT (HCC): ICD-10-CM

## 2024-04-25 DIAGNOSIS — R11.2 NAUSEA AND VOMITING, UNSPECIFIED VOMITING TYPE: Primary | ICD-10-CM

## 2024-04-25 DIAGNOSIS — K21.9 GASTROESOPHAGEAL REFLUX DISEASE WITHOUT ESOPHAGITIS: ICD-10-CM

## 2024-04-25 PROCEDURE — 3078F DIAST BP <80 MM HG: CPT | Performed by: STUDENT IN AN ORGANIZED HEALTH CARE EDUCATION/TRAINING PROGRAM

## 2024-04-25 PROCEDURE — 3074F SYST BP LT 130 MM HG: CPT | Performed by: STUDENT IN AN ORGANIZED HEALTH CARE EDUCATION/TRAINING PROGRAM

## 2024-04-25 PROCEDURE — 99215 OFFICE O/P EST HI 40 MIN: CPT | Performed by: STUDENT IN AN ORGANIZED HEALTH CARE EDUCATION/TRAINING PROGRAM

## 2024-04-25 PROCEDURE — 3008F BODY MASS INDEX DOCD: CPT | Performed by: STUDENT IN AN ORGANIZED HEALTH CARE EDUCATION/TRAINING PROGRAM

## 2024-04-25 RX ORDER — OMEPRAZOLE 20 MG/1
20 CAPSULE, DELAYED RELEASE ORAL
Qty: 90 CAPSULE | Refills: 3 | Status: SHIPPED | OUTPATIENT
Start: 2024-04-25

## 2024-04-25 RX ORDER — ONDANSETRON 4 MG/1
4 TABLET, FILM COATED ORAL EVERY 8 HOURS PRN
Qty: 20 TABLET | Refills: 5 | Status: SHIPPED | OUTPATIENT
Start: 2024-04-25

## 2024-04-25 NOTE — PROGRESS NOTES
OFFICE NOTE     Patient ID: Radha Rodrigez is a 58 year old female.  Today's Date: 04/25/24  Chief Complaint: Abdominal Pain (Headache, dizzy, Sunday night ate coconut oil on accident and is allergic and also took dose of wegovy)    Pt is a a 58y/o female with PMHx HTN, Hypothyroidism, Class 2 obesity (Body mass index is 50.12 kg/m². ), pre-diabetes, ALYSSIA, GERD (on chronic ppi since 2010),  leiomyoma of uterus and chronic sinusitis (followed by Dr. Lora) recent complicated nephrolithiasis whom presents to clinic with abdominal pain (headche and dizziness). Pt ate coconut oil on accident and allergic and took dose of wegovy Sunday evening. And just now increased to 1.7 and noticed severe nausea and upset stomach.         Vitals:    04/25/24 1417 04/25/24 1425   BP: 160/73 110/72   Pulse: 65 69   Temp: 97.4 °F (36.3 °C)    TempSrc: Oral    SpO2: 98%    Weight: 274 lb (124.3 kg)    Height: 5' 2\" (1.575 m)      body mass index is 50.12 kg/m².  BP Readings from Last 3 Encounters:   04/25/24 110/72   03/28/24 125/82   03/20/24 149/89     The 10-year ASCVD risk score (Tri RYAN, et al., 2019) is: 3.2%    Values used to calculate the score:      Age: 58 years      Sex: Female      Is Non- : No      Diabetic: Yes      Tobacco smoker: No      Systolic Blood Pressure: 110 mmHg      Is BP treated: No      HDL Cholesterol: 42 mg/dL      Total Cholesterol: 130 mg/dL      Medications reviewed:  Current Outpatient Medications   Medication Sig Dispense Refill    semaglutide-weight management 0.5 MG/0.5ML Subcutaneous Solution Auto-injector Inject 0.5 mL (0.5 mg total) into the skin once a week for 4 doses. 2 mL 0    [START ON 5/23/2024] semaglutide-weight management 1 MG/0.5ML Subcutaneous Solution Auto-injector Inject 0.5 mL (1 mg total) into the skin once a week for 4 doses. 2 mL 0    [START ON 6/12/2024] semaglutide-weight management 1.7 MG/0.75ML Subcutaneous Solution Auto-injector Inject  0.75 mL (1.7 mg total) into the skin once a week for 28 days. 3 mL 0    ondansetron (ZOFRAN) 4 mg tablet Take 1 tablet (4 mg total) by mouth every 8 (eight) hours as needed for Nausea. 20 tablet 5    omeprazole 20 MG Oral Capsule Delayed Release Take 1 capsule (20 mg total) by mouth every morning before breakfast. FOR ACID REFLUX. 90 capsule 3    fluticasone-salmeterol 115-21 MCG/ACT Inhalation Aerosol Inhale 2 puffs into the lungs 2 (two) times daily. FOR ASTHMA 1 each 9    fluticasone propionate 50 MCG/ACT Nasal Suspension 2 sprays by Nasal route daily. 16 g 0    albuterol 108 (90 Base) MCG/ACT Inhalation Aero Soln Inhale 2-4 puffs into the lungs every 4 to 6 hours as needed for Wheezing or Shortness of Breath (cough, asthma, chest tightness). FOR ASTHMA. Pharmacist, please switch to formulary alternative per insurance coverage, ok to replace with proair, ventolin, proventil, or any other albuterol inhaler. -kp 3 each 9    levothyroxine 150 MCG Oral Tab Take 1 tablet (150 mcg total) by mouth daily. 90 tablet 3    cholecalciferol (VITAMIN D3) 125 MCG (5000 UT) Oral Cap Take 1 capsule (5,000 Units total) by mouth daily.           Assessment & Plan    1. Nausea and vomiting, unspecified vomiting type (Primary)  -     Ondansetron HCl; Take 1 tablet (4 mg total) by mouth every 8 (eight) hours as needed for Nausea.  Dispense: 20 tablet; Refill: 5  Pt patient with nausea and vomiting after up titration of her recent semaglutide from 0.5 mg to 1.7 with unanticipated drastic nausea and vomiting side effect.  Her symptoms are seeming to improve at this time.  However likely is the too abrupt change of her body GLP-1 receptor sites being saturated with wegovy   Plan:  - We will readjust the patient to her last previously tolerated dose of 0.5mg starting next Friday May 3rd for 4 doses and discussed possible side effect of Nausea/vomiting with uptitration of doses and will cautiously advance to 1.0mg after tolerating well 4  doses of 0.5, and so on with 1.7mg after tolerating well of 4 doses of  10.0mg  -also encouraged patient to continue ongoing lifestyle changes I recommend to eat a more balanced mediterranean diet (eat more vegetables and fruits) more omega 3 fatty acids, lean protein (chicken, turkey, seafood), less process/fried fatty foods, less red met, and mixed aerobic exercise 180 minutes a week and intermittent strength training. And follow up in 3 months   2. Morbid obesity with BMI of 50.0-59.9, adult (HCC)  -     Semaglutide-Weight Management; Inject 0.5 mL (0.5 mg total) into the skin once a week for 4 doses.  Dispense: 2 mL; Refill: 0  -     Semaglutide-Weight Management; Inject 0.5 mL (1 mg total) into the skin once a week for 4 doses.  Dispense: 2 mL; Refill: 0  -     Semaglutide-Weight Management; Inject 0.75 mL (1.7 mg total) into the skin once a week for 28 days.  Dispense: 3 mL; Refill: 0  Pt patient with nausea and vomiting after up titration of her recent semaglutide from 0.5 mg to 1.7 with unanticipated drastic nausea and vomiting side effect.  Her symptoms are seeming to improve at this time.  However likely is the too abrupt change of her body GLP-1 receptor sites being saturated with wegovy   Plan:  - We will readjust the patient to her last previously tolerated dose of 0.5mg  3. Gastroesophageal reflux disease without esophagitis  -     Omeprazole; Take 1 capsule (20 mg total) by mouth every morning before breakfast. FOR ACID REFLUX.  Dispense: 90 capsule; Refill: 3  Pt with chronic history of GERD on PPI for +15 years.  Plan:  -continue omeparzole 20mg every am      Follow Up: As needed/if symptoms worsen or Return in about 9 weeks (around 6/27/2024) for Annual physical (labs/thyroid0 wegovy)..     I spent 42 minutes obtaining pertitent medical history, reviewing pertinent imaging/labs and specialists notes, evaluating patient, discussing differential diagnosis' and various treatment options, reinforcing  importance of compliance with treatment plan, and completing documentation.      Objective/ Results:   Physical Exam  Constitutional:       Appearance: She is well-developed.   Cardiovascular:      Rate and Rhythm: Normal rate and regular rhythm.      Heart sounds: Normal heart sounds.   Pulmonary:      Effort: Pulmonary effort is normal.      Breath sounds: Normal breath sounds.   Abdominal:      General: Bowel sounds are normal.      Palpations: Abdomen is soft.      Tenderness: There is abdominal tenderness.   Skin:     General: Skin is warm and dry.   Neurological:      Mental Status: She is alert and oriented to person, place, and time.      Deep Tendon Reflexes: Reflexes are normal and symmetric.        Reviewed:    Patient Active Problem List    Diagnosis    Calcium oxalate calculus    Left flank pain    Pain due to ureteral stent, initial encounter ([facility])    Acute cystitis with hematuria    Ureterolithiasis    Pyelonephritis    Encounter for therapeutic drug monitoring    Type 2 diabetes mellitus without complication, without long-term current use of insulin ([facility])    Pre-diabetes    Stress    Essential hypertension    Morbid obesity with BMI of 50.0-59.9, adult ([facility])    Diverticulosis of colon    Esophageal reflux    Seasonal allergies    Hypothyroidism    Leiomyoma of uterus      Allergies   Allergen Reactions    Augmentin [Amoxicillin-Pot Clavulanate] NAUSEA AND VOMITING    Coconut Oil NAUSEA AND VOMITING    Cortisone DIZZINESS and OTHER (SEE COMMENTS)     Blood drop, dizziness, flushed    Adhesive Tape RASH     Needs paper tape        Social History     Socioeconomic History    Marital status:    Tobacco Use    Smoking status: Never     Passive exposure: Never    Smokeless tobacco: Never   Vaping Use    Vaping status: Never Used   Substance and Sexual Activity    Alcohol use: Not Currently    Drug use: Never     Social Determinants of Health     Financial Resource Strain: Low Risk  (3/11/2024)     Financial Resource Strain     Difficulty of Paying Living Expenses: Not very hard     Med Affordability: No   Food Insecurity: No Food Insecurity (3/8/2024)    Food Insecurity     Food Insecurity: Never true   Transportation Needs: No Transportation Needs (3/11/2024)    Transportation Needs     Lack of Transportation: No   Housing Stability: Low Risk  (3/8/2024)    Housing Stability     Housing Instability: No      Review of Systems   Constitutional: Negative.    Respiratory: Negative.     Cardiovascular: Negative.    Gastrointestinal:  Positive for abdominal pain, nausea and vomiting.   Skin: Negative.    Neurological: Negative.      All other systems negative unless otherwise stated in ROS or HPI above.       Guille Miramontes MD  Internal Medicine       Call office with any questions or seek emergency care if necessary.   Patient understands and agrees to follow directions and advice.      ----------------------------------------- PATIENT INSTRUCTIONS-----------------------------------------     There are no Patient Instructions on file for this visit.

## 2024-04-26 ENCOUNTER — TELEPHONE (OUTPATIENT)
Dept: INTERNAL MEDICINE CLINIC | Facility: CLINIC | Age: 59
End: 2024-04-26

## 2024-04-26 NOTE — TELEPHONE ENCOUNTER
Response to cancel request received from pharmacy.  Received: Yesterday  [facility] Ref[facility]  The pharmacy received the electronic cancel request, but could not cancel the prescription. Additional follow up tasks may be necessary based on the pharmacy response noted below.    Pharmacy Note: Prescription not found, please contact pharmacy          Pharmacy    [id number] - [place], [place] - [address] [address] [phone], [phone]   [address] [address]   Phone: [phone] Fax: [phone]   Hours: Not open 24 hours     Outpatient Medication Detail     Disp Refills Start End    escitalopram 5 MG Oral Tab (Discontinued) 90 tablet 1 2/26/2024 4/25/2024    Sig - Route: Take 1 tablet (5 mg total) by mouth daily. - Oral    Sent to pharmacy as: Escitalopram Oxalate 5 MG Oral Tablet (Lexapro)    Reason for Discontinue: Stop This Medication     E-Prescribing Status: Receipt confirmed by pharmacy (2/26/2024  2:55 PM CST)    E-Cancel Status: Request denied by pharmacy (4/25/2024  2:49 PM CDT)       E-Cancel Status Note: Prescription not found, please contact pharmacy      Order Providers    Prescribing Provider Encounter Provider   Jairo Nelson MD Shamsi, Omar, MD       Associated Diagnoses    Stress        Encounter    View Encounter              This Order Has Been Discontinued    Order Status Reason By On   Discontinued Stop This Medication  Guille Miramontes MD 4/25/24 0959

## 2024-05-01 ENCOUNTER — OFFICE VISIT (OUTPATIENT)
Dept: SURGERY | Facility: CLINIC | Age: 59
End: 2024-05-01
Payer: COMMERCIAL

## 2024-05-01 DIAGNOSIS — N20.0 NEPHROLITHIASIS: Primary | ICD-10-CM

## 2024-05-01 PROCEDURE — 99213 OFFICE O/P EST LOW 20 MIN: CPT | Performed by: UROLOGY

## 2024-05-01 NOTE — PROGRESS NOTES
Priscilla Gonzalez MD  Department of Urology  [address] [place] [address] Rd., [address]  [place] [address]    T: [phone]  F: [phone]    To: Guille Miramontes MD   [address] [place] [address] [address]  [facility] [address]    Re: Radha Rodrigez   MRN: [id number]  : 1965    Dear Guille Miramontes MD,    Today I had the pleasure of seeing Radha Rodrigez in my clinic. As you know, Ms. Rodrigez is a pleasant 58 year old year old female who I am seeing for nephrolithiasis patient was last seen in this department on Visit date not found.    Briefly, patient underwent left ureteroscopy laser lithotripsy with me on 3/12/2024.  It was uncomplicated.  Her stent was removed in the clinic.    Her stone analysis returned calcium oxalate monohydrate and dihydrate.    CaOx Monohydrate  % 80   CaOx Dihydrate  % 20       Her follow-up ultrasound demonstrated mild residual left pelviectasis but no hydroureteronephrosis.     PAST MEDICAL HISTORY:  Past Medical History:    Back problem    Calculus of kidney    Disorder of thyroid    Diverticulosis of large intestine    Esophageal reflux    Hx of motion sickness    Morbid obesity with BMI of 50.0-59.9, adult (HCC)    Pneumonia due to organism    PONV (postoperative nausea and vomiting)    Pre-diabetes    Seasonal allergies    Thyroid disease    Vitamin D deficiency        PAST SURGICAL HISTORY:  Past Surgical History:   Procedure Laterality Date    Colonoscopy  2021    Hernia surgery      Hysterectomy      Other surgical history Left 2024    Cystoscopy left ureteroscopy, laser lithotripsy, retrograde pyelogram, stent placement    Removal gallbladder      Repair ing hernia,5+y/o,reducibl           ALLERGIES:  Allergies   Allergen Reactions    Augmentin [Amoxicillin-Pot Clavulanate] NAUSEA AND VOMITING    Coconut Oil NAUSEA AND VOMITING    Cortisone DIZZINESS and OTHER (SEE COMMENTS)     Blood drop, dizziness, flushed    Adhesive Tape RASH     Needs paper tape          MEDICATIONS:  Current Outpatient Medications   Medication Instructions    albuterol 108 (90 Base) MCG/ACT Inhalation Aero Soln 2-4 puffs, Inhalation, EVERY 4 TO 6 HOURS PRN, FOR ASTHMA. Pharmacist, please switch to formulary alternative per insurance coverage, ok to replace with proair, ventolin, proventil, or any other albuterol inhaler. -jovan    cholecalciferol (VITAMIN D3) 5,000 Units, Oral, Daily    fluticasone propionate 50 MCG/ACT Nasal Suspension 2 sprays, Nasal, Daily    fluticasone-salmeterol 115-21 MCG/ACT Inhalation Aerosol 2 puffs, Inhalation, 2 times daily, FOR ASTHMA    levothyroxine (SYNTHROID) 150 mcg, Oral, Daily    omeprazole (PRILOSEC) 20 mg, Oral, Every morning before breakfast, FOR ACID REFLUX.    ondansetron (ZOFRAN) 4 mg, Oral, Every 8 hours PRN    semaglutide-weight management (WEGOVY) 0.5 mg, Subcutaneous, Weekly    [START ON 5/23/2024] semaglutide-weight management (WEGOVY) 1 mg, Subcutaneous, Weekly    [START ON 6/12/2024] semaglutide-weight management (WEGOVY) 1.7 mg, Subcutaneous, Weekly        FAMILY HISTORY:  Family History   Problem Relation Age of Onset    Ovarian Cancer Mother     Breast Cancer Sister 27        SOCIAL HISTORY:  Social History     Socioeconomic History    Marital status:    Tobacco Use    Smoking status: Never     Passive exposure: Never    Smokeless tobacco: Never   Vaping Use    Vaping status: Never Used   Substance and Sexual Activity    Alcohol use: Not Currently    Drug use: Never     Social Determinants of Health     Financial Resource Strain: Low Risk  (3/11/2024)    Financial Resource Strain     Difficulty of Paying Living Expenses: Not very hard     Med Affordability: No   Food Insecurity: No Food Insecurity (3/8/2024)    Food Insecurity     Food Insecurity: Never true   Transportation Needs: No Transportation Needs (3/11/2024)    Transportation Needs     Lack of Transportation: No   Housing Stability: Low Risk  (3/8/2024)    Housing Stability      Housing Instability: No          PHYSICAL EXAMINATION:  There were no vitals filed for this visit.  CONSTITUTIONAL: No apparent distress, cooperative and communicative  NEUROLOGIC: Alert and oriented   HEAD: Normocephalic, atraumatic   EYES: Sclera non-icteric   ENT: Hearing intact, moist mucous membranes   NECK: No obvious goiter or masses   RESPIRATORY: Normal respiratory effort, Nonlabored breathing on room air  SKIN: No evident rashes   ABDOMEN: Soft, nontender, nondistended, no rebound tenderness, no guarding, no masses      REVIEW OF SYSTEMS:    A comprehensive 10-point review of systems was completed.  Pertinent positives and negatives are noted in the the HPI.       LABORATORY DATA:  URINE CULTURE No Growth at 18-24 hrs.           Resulting Agency: [facility] ([facility] hospital)     lucose Urine  Negative mg/dL Negative   Bilirubin Urine  Negative Negative   Ketones, UA  Negative - Trace mg/dL Negative   Spec Gravity  1.005 - 1.030 >=1.030   Blood Urine  Negative Negative   PH Urine  5.0 - 8.0 5.5   Protein Urine  Negative - Trace mg/dL Negative   Urobilinogen Urine  0.2 - 1.0 mg/dL 0.2   Nitrite Urine  Negative Negative   Leukocyte Esterase Urine  Negative Negative   APPEARANCE  Clear clear   Color Urine  Yellow yellow   Multistix Lot#  Numeric [id number]   Multistix Expiration Date  Date 113,024        Ref Range & Units 3/14/24  9:03 AM   HgbA1C  <5.7 % 6.2 High    Comment:  Normal HbA1C:     <5.7%   Pre-Diabetic:     5.7 - 6.4%   Diabetic:         >6.4%   Diabetic Control: <7.0%     Estimated Average Glucose  68 - 126 mg/dL 131 High      Final Diagnosis:      Left kidney stones:   Fragments of calculi, gross examination only.     Comment:  The entire specimen is placed in transport material and sent out for chemical analysis, and the result will be submitted in a linked report.        Electronically signed by Chanda Beavers MD on 3/12/2024 at  2:        IMAGING REVIEW:  PROCEDURE: US KIDNEY/BLADDER (CPT=76770)       COMPARISON: [facility], CT ABDOMEN+PELVIS (CPT=74176), 3/07/2024, 10:06 PM.      INDICATIONS: Nephrolithiasis      TECHNIQUE: Ultrasound examination was performed to visualize the kidneys and bladder.       FINDINGS:   RIGHT KIDNEY: Normal.  Right kidney length is 10.6 cm.  Normal echotexture.  No hydronephrosis, mass, calculus or perirenal fluid.    LEFT KIDNEY: Left stent has been removed.  Mild pelviectasis.  Left kidney length is 10.1 cm.  Normal echotexture.  No hydronephrosis, mass, calculus or perirenal fluid.  1.3 cm simple cyst within the superior pole.   BLADDER: Normal.  No visible wall thickening, mass, or calculus.       CONCLUSION:      Removal of left nephroureteral stent.      Mild residual left pelviectasis.      No hydronephrosis               DICTATED BY (CST): ULISES MARQUEZ MD ON 4/18/2024 AT 6:03 PM       FINALIZED BY (CST): ULISES MARQUEZ MD ON 4/18/2024 AT 6:05 PM      Narrative   PROCEDURE:   CT ABDOMEN+PELVIS(CPT=74176)     COMPARISON:   [facility], CT ABDOMEN PELVIS IV CONTRAST NO ORAL (ER), 9/18/2022, 6:38 PM.  [facility], CT ABDOMEN+PELVIS KIDNEYSTONE 2D RNDR(NO IV,NO ORAL)(CPT=74176), 3/03/2024, 0:09 AM.     INDICATIONS:   Post op complication-stent put in kidney geraldine night     TECHNIQUE: CT images of the abdomen and pelvis were obtained without intravenous contrast material.  Automated exposure control for dose reduction was used. Adjustment of the mA and/or kV was done based on the patient's size. Use of iterative  reconstruction technique for dose reduction was used.  Dose information is transmitted to the ACR (American College of Radiology) NRDR (National Radiology Data Registry) which includes the Dose Index Registry.     FINDINGS:  URINARY TRACT: Interval placement of a left-sided double-J stent.  Minimal residual left hydronephrosis and mild hydroureter with slight haziness to the periureteric fat.  Previously seen 5 mm left UPJ  calculus has been pushed back into the lower pole  renal collecting system.  No right-sided urinary tract calculus or obstruction.  No contour deforming renal mass.  Urinary bladder is collapsed and contains the distal aspect of the double-J stent.  ADRENALS: Normal unenhanced adrenals.    LIVER: Mild hepatic steatosis.  BILIARY: No evidence for biliary dilatation. Post cholecystectomy.  PANCREAS: Normal unenhanced pancreas.    SPLEEN: Normal unenhanced spleen.    AORTA/VASCULAR: No aneurysm.  LYMPHADENOPATHY: None.  GI/MESENTERY: Normal appendix.  Colonic diverticulosis.  No obstruction, bowel wall thickening or mesenteric mass.    ABDOMINAL WALL: Normal.  No mass or hernia.    ASCITES:   None  PELVIC ORGANS: Post hysterectomy.  BONES: Grade 1 spondylolisthesis of L4 on L5 related to bilateral L4 pars defects.  A partial transitional L5 vertebral body.  No suspect bone lesion.  LUNG BASES: Normal.    OTHER: No major discrepancy with preliminary [facility] report.               Impression   CONCLUSION:  1. Interval placement of left-sided double-J stent.  Minimal residual left hydroureteronephrosis.  Mild haziness to the periureteric fat is probably reactive to the stent.  Suggest correlation with urinalysis to exclude UTI.  5 mm calculus has been  pushed back into the left lower pole renal collecting system.           Dictated by (CST): Gasper Roy MD on 3/08/2024 at 5:45 AM      Finalized by (CST): Gasper Roy MD on 3/08/2024 at 5:55 AM      OTHER RELEVANT DATA:   none     IMPRESSION: Status post ureteroscopy with all stones treated on the left side with follow-up ultrasound demonstrating no hydroureteronephrosis-recommended behavioral management.  Offered 24 urine testing and renal bladder ultrasound in 1 year    Patient was counseled on stone prevention methods including hydration (until urine is clear), limiting salt and red meat/meat intake, eating a normal calcium diet, and drinking lemon with  water. We also talked about reasons to go to the emergency room - namely acute flank pain associated with fevers, chills, nausea or vomiting.    Offered 24 urine testing- she declined    Offered renal bladder ultrasound and follow-up in 1 year- she declined     PLAN:  Stone prevention  Return to clinic as needed    Thank you for referring this very pleasant patient to my clinic. If you have any questions or concerns, please do not hesitate to contact me.    Sincerely,  Priscilla Gonzalez MD    30 minutes were spent on this patient at this visit obtaining a history, reviewing medical records, developing a treatment plan, counseling and discussing treatment strategy with patient, coordination of care and documentation.     The 21st Century Cures Act makes medical notes available to patients in the interest of transparency.  However, please be advised that this is a medical document.  It is intended as a peer to peer communication.  It is written in medical language and may contain abbreviations or verbiage that are technical and unfamiliar.  It may appear blunt or direct.  Medical documents are intended to carry relevant information, facts as evident, and the clinical opinion of the practitioner.

## 2024-05-21 DIAGNOSIS — E66.01 MORBID OBESITY WITH BMI OF 50.0-59.9, ADULT (HCC): ICD-10-CM

## 2024-05-23 RX ORDER — SEMAGLUTIDE 0.5 MG/.5ML
INJECTION, SOLUTION SUBCUTANEOUS
Qty: 2 ML | Refills: 0 | OUTPATIENT
Start: 2024-05-23

## 2024-06-27 ENCOUNTER — LAB ENCOUNTER (OUTPATIENT)
Dept: LAB | Age: 59
End: 2024-06-27
Attending: STUDENT IN AN ORGANIZED HEALTH CARE EDUCATION/TRAINING PROGRAM

## 2024-06-27 ENCOUNTER — OFFICE VISIT (OUTPATIENT)
Dept: INTERNAL MEDICINE CLINIC | Facility: CLINIC | Age: 59
End: 2024-06-27

## 2024-06-27 VITALS
BODY MASS INDEX: 50.05 KG/M2 | WEIGHT: 272 LBS | SYSTOLIC BLOOD PRESSURE: 137 MMHG | RESPIRATION RATE: 18 BRPM | HEIGHT: 62 IN | DIASTOLIC BLOOD PRESSURE: 76 MMHG | HEART RATE: 72 BPM

## 2024-06-27 DIAGNOSIS — E66.01 MORBID OBESITY WITH BMI OF 50.0-59.9, ADULT (HCC): ICD-10-CM

## 2024-06-27 DIAGNOSIS — Z90.710 HISTORY OF TOTAL HYSTERECTOMY: ICD-10-CM

## 2024-06-27 DIAGNOSIS — Z90.49 S/P CHOLECYSTECTOMY: ICD-10-CM

## 2024-06-27 DIAGNOSIS — Z00.00 ANNUAL PHYSICAL EXAM: Primary | ICD-10-CM

## 2024-06-27 DIAGNOSIS — K90.9: ICD-10-CM

## 2024-06-27 DIAGNOSIS — Z12.31 SCREENING MAMMOGRAM FOR BREAST CANCER: ICD-10-CM

## 2024-06-27 DIAGNOSIS — Z91.018 FOOD ALLERGY: ICD-10-CM

## 2024-06-27 DIAGNOSIS — E55.9 VITAMIN D DEFICIENCY: ICD-10-CM

## 2024-06-27 DIAGNOSIS — K58.2 IRRITABLE BOWEL SYNDROME WITH BOTH CONSTIPATION AND DIARRHEA: ICD-10-CM

## 2024-06-27 DIAGNOSIS — Z23 NEED FOR TDAP VACCINATION: ICD-10-CM

## 2024-06-27 PROBLEM — E11.9 TYPE 2 DIABETES MELLITUS WITHOUT COMPLICATION, WITHOUT LONG-TERM CURRENT USE OF INSULIN (HCC): Status: RESOLVED | Noted: 2024-02-26 | Resolved: 2024-06-27

## 2024-06-27 PROBLEM — R10.31 RIGHT LOWER QUADRANT ABDOMINAL PAIN: Status: ACTIVE | Noted: 2024-06-27

## 2024-06-27 PROCEDURE — 90471 IMMUNIZATION ADMIN: CPT | Performed by: STUDENT IN AN ORGANIZED HEALTH CARE EDUCATION/TRAINING PROGRAM

## 2024-06-27 PROCEDURE — 99396 PREV VISIT EST AGE 40-64: CPT | Performed by: STUDENT IN AN ORGANIZED HEALTH CARE EDUCATION/TRAINING PROGRAM

## 2024-06-27 PROCEDURE — 3075F SYST BP GE 130 - 139MM HG: CPT | Performed by: STUDENT IN AN ORGANIZED HEALTH CARE EDUCATION/TRAINING PROGRAM

## 2024-06-27 PROCEDURE — 99213 OFFICE O/P EST LOW 20 MIN: CPT | Performed by: STUDENT IN AN ORGANIZED HEALTH CARE EDUCATION/TRAINING PROGRAM

## 2024-06-27 PROCEDURE — 90715 TDAP VACCINE 7 YRS/> IM: CPT | Performed by: STUDENT IN AN ORGANIZED HEALTH CARE EDUCATION/TRAINING PROGRAM

## 2024-06-27 PROCEDURE — 3008F BODY MASS INDEX DOCD: CPT | Performed by: STUDENT IN AN ORGANIZED HEALTH CARE EDUCATION/TRAINING PROGRAM

## 2024-06-27 PROCEDURE — 3078F DIAST BP <80 MM HG: CPT | Performed by: STUDENT IN AN ORGANIZED HEALTH CARE EDUCATION/TRAINING PROGRAM

## 2024-06-27 RX ORDER — CHOLESTYRAMINE LIGHT 4 G/5.7G
4 POWDER, FOR SUSPENSION ORAL 2 TIMES DAILY
Qty: 60 EACH | Refills: 2 | Status: SHIPPED | OUTPATIENT
Start: 2024-06-27 | End: 2024-09-25

## 2024-06-27 NOTE — PROGRESS NOTES
History of Present Illness   Patient ID: Radha Rodrigez is a 58 year old female.  Chief Complaint: Routine Physical      Radha Rodrigez is a pleasant 58 year old female with PMHx HTN, Hypothyroidism, Class 2 obesity (Body mass index is 50.12 kg/m². ), pre-diabetes, ALYSSIA, GERD (on chronic ppi since 2010),  leiomyoma of uterus (s/p total hysterectomy in 2012)  chronic abdominal pain/ (s/p cholecystectomy), chronic sinusitis (followed by Dr. Lora) recent complicated nephrolithiasis who presents for annual physical exam and other gastrointesting concerns Radha Rodrigez is doing well today.    Wegovy 1.7 (started this week).   Does have diarrhea, abdominal bloating ongoing for years prior to starting Wegovy.       Dr. Hernandez and Dr. Abner Arguello, (last seen 10/7/2024)   Cholecystectomy sept 2020, and RUQ chronic   Last had celiac was negative, and EGD from Dr Hernandez Jan 2019 was normal and negative H Pylori. Felt likely to be IBS-Diarrhea and Constipation     Health Maintenance  - All care gaps addressed with patient.   Health Maintenance Due   Topic Date Due    Diabetes Care Foot Exam  Never done    Diabetes Care Dilated Eye Exam  Never done    Colorectal Cancer Screening  Never done    Asthma Action Plan  Never done    Asthma Control Test  Never done    Diabetes Care: Microalb/Creat Ratio  Never done    Annual Physical  Never done    Pneumococcal Vaccine: Birth to 64yrs (1 of 2 - PCV) Never done    DTaP,Tdap,and Td Vaccines (1 - Tdap) Never done    Zoster Vaccines (1 of 2) Never done    COVID-19 Vaccine (4 - 2023-24 season) 09/01/2023    Mammogram  06/29/2024         Review of Systems  Review of Systems   Constitutional: Negative.    Respiratory: Negative.     Cardiovascular: Negative.    Gastrointestinal:  Positive for abdominal distention, abdominal pain, constipation and diarrhea. Negative for blood in stool, nausea and vomiting.   Skin: Negative.    Neurological: Negative.         Physical Exam  Vitals:    06/27/24 1227   BP: 137/76   Pulse: 72   Resp: 18   Weight: 272 lb (123.4 kg)   Height: 5' 2\" (1.575 m)     Body mass index is 49.75 kg/m².  BP Readings from Last 3 Encounters:   06/27/24 137/76   04/25/24 110/72   03/28/24 125/82     Physical Exam  Constitutional:       Appearance: She is well-developed.   Cardiovascular:      Rate and Rhythm: Normal rate and regular rhythm.      Heart sounds: Normal heart sounds.   Pulmonary:      Effort: Pulmonary effort is normal.      Breath sounds: Normal breath sounds.   Abdominal:      General: Bowel sounds are normal.      Palpations: Abdomen is soft.   Skin:     General: Skin is warm and dry.   Neurological:      Mental Status: She is alert and oriented to person, place, and time.      Deep Tendon Reflexes: Reflexes are normal and symmetric.           Labs & Imaging  Pertinent labs and imaging reviewed.   Lab Results   Component Value Date    GLU 96 03/09/2024    BUN 15 03/09/2024    BUNCREA 18.5 03/09/2024    CREATSERUM 0.81 03/09/2024    ANIONGAP 5 03/09/2024    GFRNAA 73 09/27/2021    GFRAA 84 09/27/2021    CA 8.8 03/09/2024    OSMOCALC 293 03/09/2024    ALKPHO 96 03/07/2024    AST 24 03/07/2024    ALT 29 03/07/2024    BILT 0.3 03/07/2024    TP 7.0 03/07/2024    ALB 4.4 03/07/2024    GLOBULIN 2.6 (L) 03/07/2024     03/09/2024    K 4.0 03/09/2024     03/09/2024    CO2 26.0 03/09/2024     Lab Results   Component Value Date     (H) 03/14/2024    A1C 6.2 (H) 03/14/2024     Lab Results   Component Value Date    WBC 5.4 03/09/2024    RBC 4.31 03/09/2024    HGB 12.3 03/09/2024    HCT 37.3 03/09/2024    MCV 86.5 03/09/2024    MCH 28.5 03/09/2024    MCHC 33.0 03/09/2024    RDW 12.6 03/09/2024    .0 03/09/2024    MPV 8.1 11/18/2017     Lab Results   Component Value Date    CHOLEST 130 04/29/2023    TRIG 103 04/29/2023    HDL 42 04/29/2023    LDL 69 04/29/2023    VLDL 16 04/29/2023    NONHDLC 88 04/29/2023     The 10-year  ASCVD risk score (Tri RYAN, et al., 2019) is: 4.9%    Values used to calculate the score:      Age: 58 years      Sex: Female      Is Non- : No      Diabetic: Yes      Tobacco smoker: No      Systolic Blood Pressure: 137 mmHg      Is BP treated: No      HDL Cholesterol: 42 mg/dL      Total Cholesterol: 130 mg/dL    Medical History    Reviewed allergies:  Allergies   Allergen Reactions    Augmentin [Amoxicillin-Pot Clavulanate] NAUSEA AND VOMITING    Coconut Oil NAUSEA AND VOMITING    Cortisone DIZZINESS and OTHER (SEE COMMENTS)     Blood drop, dizziness, flushed    Adhesive Tape RASH     Needs paper tape        Reviewed:  Patient Active Problem List    Diagnosis    Malabsorption syndrome (HCC)    S/P cholecystectomy    Right lower quadrant abdominal pain    Calcium oxalate calculus    Left flank pain    Pain due to ureteral stent, initial encounter ([facility])    Acute cystitis with hematuria    Ureterolithiasis    Pyelonephritis    Encounter for therapeutic drug monitoring    Pre-diabetes    Stress    Essential hypertension    Morbid obesity with BMI of 50.0-59.9, adult (HCC)    Diverticulosis of colon    Esophageal reflux    Seasonal allergies    Hypothyroidism    Leiomyoma of uterus      Reviewed:  Past Medical History:    Back problem    Calculus of kidney    Disorder of thyroid    Diverticulosis of large intestine    Esophageal reflux    Hx of motion sickness    Morbid obesity with BMI of 50.0-59.9, adult (HCC)    Pneumonia due to organism    PONV (postoperative nausea and vomiting)    Pre-diabetes    Seasonal allergies    Thyroid disease    Vitamin D deficiency      Reviewed:  Family History   Problem Relation Age of Onset    Ovarian Cancer Mother     Breast Cancer Sister 27       Reviewed:  Past Surgical History:   Procedure Laterality Date    Colonoscopy  11/30/2021    Hernia surgery      Hysterectomy  12/21/2012    Other surgical history Left 03/12/2024    Cystoscopy left ureteroscopy,  laser lithotripsy, retrograde pyelogram, stent placement    Removal gallbladder      Repair ing hernia,5+y/o,reducibl        Reviewed:  Social History     Socioeconomic History    Marital status:    Tobacco Use    Smoking status: Never     Passive exposure: Never    Smokeless tobacco: Never   Vaping Use    Vaping status: Never Used   Substance and Sexual Activity    Alcohol use: Not Currently    Drug use: Never     Social Determinants of Health     Financial Resource Strain: Low Risk  (3/11/2024)    Financial Resource Strain     Difficulty of Paying Living Expenses: Not very hard     Med Affordability: No   Food Insecurity: No Food Insecurity (3/8/2024)    Food Insecurity     Food Insecurity: Never true   Transportation Needs: No Transportation Needs (3/11/2024)    Transportation Needs     Lack of Transportation: No   Housing Stability: Low Risk  (3/8/2024)    Housing Stability     Housing Instability: No      Reviewed:  Current Outpatient Medications   Medication Sig Dispense Refill    semaglutide-weight management 1.7 MG/0.75ML Subcutaneous Solution Auto-injector Inject 0.75 mL (1.7 mg total) into the skin once a week. 3 mL 2    cholestyramine light 4 g Oral Powd Pack Take 1 packet (4 g total) by mouth 2 (two) times daily. 60 each 2    omeprazole 20 MG Oral Capsule Delayed Release Take 1 capsule (20 mg total) by mouth every morning before breakfast. FOR ACID REFLUX. 90 capsule 3    fluticasone-salmeterol 115-21 MCG/ACT Inhalation Aerosol Inhale 2 puffs into the lungs 2 (two) times daily. FOR ASTHMA 1 each 9    fluticasone propionate 50 MCG/ACT Nasal Suspension 2 sprays by Nasal route daily. 16 g 0    albuterol 108 (90 Base) MCG/ACT Inhalation Aero Soln Inhale 2-4 puffs into the lungs every 4 to 6 hours as needed for Wheezing or Shortness of Breath (cough, asthma, chest tightness). FOR ASTHMA. Pharmacist, please switch to formulary alternative per insurance coverage, ok to replace with proair, ventolin,  proventil, or any other albuterol inhaler. -jovan 3 each 9    levothyroxine 150 MCG Oral Tab Take 1 tablet (150 mcg total) by mouth daily. 90 tablet 3    cholecalciferol (VITAMIN D3) 125 MCG (5000 UT) Oral Cap Take 1 capsule (5,000 Units total) by mouth daily.            Assessment & Plan    1. Annual physical exam  - Vitamin D; Future  - TSH W Reflex To Free T4; Future  - Lipid Panel; Future  - CBC With Differential With Platelet; Future  - Hemoglobin A1C; Future  - Comp Metabolic Panel (14); Future  - CT CALCIUM SCORING; Future  Patient here for physical exam and due for following.  Plan:  -order the following Labs: CBC, CMP, Lipid Panel, A1C, TSH, Vitamin D  -Order screening mammgram and reflex to ultrasound if indicated  -Discussed benefit for Screening for Cardiac CT scan for evaluation of cardiac arthrosclerosis, ordered Calcium CT scan   2. S/P cholecystectomy  - Stool Culture W/Shigatoxin; Future  - Calprotectin, Fecal; Future  - cholestyramine light 4 g Oral Powd Pack; Take 1 packet (4 g total) by mouth 2 (two) times daily.  Dispense: 60 each; Refill: 2  - Vitamin B12 [E]; Future  Pt seen by GI 2021 concern for IBS /SIBO micorscopic colitis ,vs bile acid sequstration  Plan;  [facility], Calprotecting, Vitamin B12  -Given information on low FODMAP diet for IBS  -Start emprici cholestramine 4mg po BID with meals if bile acid etiology  -refer to allergy for food sensitivity testing    3. Malabsorption syndrome (HCC)  - Stool Culture W/Shigatoxin; Future  - Calprotectin, Fecal; Future  - cholestyramine light 4 g Oral Powd Pack; Take 1 packet (4 g total) by mouth 2 (two) times daily.  Dispense: 60 each; Refill: 2  - Immunoglobulin E, Total [E]; Future  - Allergy Referral - [place] Banegas)  - Vitamin B12 [E]; Future  Plan  As above   4. Morbid obesity with BMI of 50.0-59.9, adult ([facility])  - semaglutide-weight management 1.7 MG/0.75ML Subcutaneous Solution Auto-injector; Inject 0.75 mL (1.7 mg total)  into the skin once a week.  Dispense: 3 mL; Refill: 2  - Calprotectin, Fecal; Future  - Vitamin B12 [E]; Future  Modest weight loss BMI down to 49.75 (weight 272lbs),   Plan:   -maintain on wegovy 1.7mg weekly    5. Irritable bowel syndrome with both constipation and diarrhea  - Stool Culture W/Shigatoxin; Future  - Calprotectin, Fecal; Future  - Sed Rate, Westergren (Automated) [E]; Future  - C-Reactive Protein [E]; Future  - Vitamin B12 [E]; Future  Plan  As above   6. Food allergy  - Immunoglobulin E, Total [E]; Future  - Allergy Referral - [facility] ([place])  Refer to allergy for food allergy testing (check IgE)    7. Screening mammogram for breast cancer  - [facility] DONNELL 2D+3D SCREENING BILAT (CPT=77067/49746); Future  Screening mammogram ordered    8. Vitamin D deficiency  - Vitamin D; Future  Check vitamin d if low start supplementation.     9. History of total hysterectomy  Noted in history 2012    10. Need for Tdap vaccination  - TETANUS, DIPHTHERIA TOXOIDS AND ACELLULAR PERTUSIS VACCINE (TDAP), >7 YEARS, IM USE  Tdap given today        Follow Up:   Return in about 3 months (around 9/27/2024) for Weight loss and Gut/Malabsorption follow up.      Guille Miramontes MD  Internal Medicine          Patient asked to sign release of information for outside records if not already requested, make future office/imaging appointments at the  prior to leaving, and to sign up for [employer] if not already active.  Preventive measures and further education discussed with patient as per after visit summary. Potential medication side effects discussed. All questions answered to best of ability.   Call office with any questions. Seek emergency care if necessary.   Patient understands and agrees to follow directions and advice.      ----------------------------------------- PATIENT INSTRUCTIONS-----------------------------------------     There are no Patient Instructions on file for this visit.        The 21st Century Cures  Act makes medical notes available to patients in the interest of transparency.  However, please be advised that this is a medical document.  It is intended as a peer to peer communication.  It is written in medical language and may contain abbreviations or verbiage that are technical and unfamiliar.  It may appear blunt or direct.  Medical documents are intended to carry relevant information, facts as evident, and the clinical opinion of the practitioner.

## 2024-07-01 ENCOUNTER — LAB ENCOUNTER (OUTPATIENT)
Dept: LAB | Age: 59
End: 2024-07-01
Attending: STUDENT IN AN ORGANIZED HEALTH CARE EDUCATION/TRAINING PROGRAM
Payer: COMMERCIAL

## 2024-07-01 DIAGNOSIS — Z00.00 ANNUAL PHYSICAL EXAM: ICD-10-CM

## 2024-07-01 DIAGNOSIS — K57.90 DIVERTICULOSIS: ICD-10-CM

## 2024-07-01 DIAGNOSIS — K58.2 IRRITABLE BOWEL SYNDROME WITH BOTH CONSTIPATION AND DIARRHEA: ICD-10-CM

## 2024-07-01 DIAGNOSIS — E66.01 MORBID OBESITY WITH BMI OF 50.0-59.9, ADULT (HCC): ICD-10-CM

## 2024-07-01 DIAGNOSIS — R19.7 DIARRHEA, UNSPECIFIED TYPE: ICD-10-CM

## 2024-07-01 DIAGNOSIS — K90.9: ICD-10-CM

## 2024-07-01 DIAGNOSIS — E55.9 VITAMIN D DEFICIENCY: ICD-10-CM

## 2024-07-01 DIAGNOSIS — Z90.49 S/P CHOLECYSTECTOMY: ICD-10-CM

## 2024-07-01 DIAGNOSIS — Z91.018 FOOD ALLERGY: ICD-10-CM

## 2024-07-01 LAB
ALBUMIN SERPL-MCNC: 4.2 G/DL (ref 3.2–4.8)
ALBUMIN/GLOB SERPL: 1.7 {RATIO} (ref 1–2)
ALP LIVER SERPL-CCNC: 108 U/L
ALT SERPL-CCNC: 29 U/L
ANION GAP SERPL CALC-SCNC: 6 MMOL/L (ref 0–18)
AST SERPL-CCNC: 27 U/L (ref ?–34)
BASOPHILS # BLD AUTO: 0.05 X10(3) UL (ref 0–0.2)
BASOPHILS NFR BLD AUTO: 1 %
BILIRUB SERPL-MCNC: 0.5 MG/DL (ref 0.3–1.2)
BUN BLD-MCNC: 13 MG/DL (ref 9–23)
BUN/CREAT SERPL: 16 (ref 10–20)
CALCIUM BLD-MCNC: 9.2 MG/DL (ref 8.7–10.4)
CHLORIDE SERPL-SCNC: 108 MMOL/L (ref 98–112)
CHOLEST SERPL-MCNC: 144 MG/DL (ref ?–200)
CO2 SERPL-SCNC: 27 MMOL/L (ref 21–32)
CREAT BLD-MCNC: 0.81 MG/DL
CRP SERPL-MCNC: 0.7 MG/DL (ref ?–1)
DEPRECATED RDW RBC AUTO: 41.2 FL (ref 35.1–46.3)
EGFRCR SERPLBLD CKD-EPI 2021: 84 ML/MIN/1.73M2 (ref 60–?)
EOSINOPHIL # BLD AUTO: 0.19 X10(3) UL (ref 0–0.7)
EOSINOPHIL NFR BLD AUTO: 3.8 %
ERYTHROCYTE [DISTWIDTH] IN BLOOD BY AUTOMATED COUNT: 13.2 % (ref 11–15)
ERYTHROCYTE [SEDIMENTATION RATE] IN BLOOD: 12 MM/HR
EST. AVERAGE GLUCOSE BLD GHB EST-MCNC: 123 MG/DL (ref 68–126)
FASTING PATIENT LIPID ANSWER: YES
FASTING STATUS PATIENT QL REPORTED: YES
GLOBULIN PLAS-MCNC: 2.5 G/DL (ref 2–3.5)
GLUCOSE BLD-MCNC: 91 MG/DL (ref 70–99)
HBA1C MFR BLD: 5.9 % (ref ?–5.7)
HCT VFR BLD AUTO: 42 %
HDLC SERPL-MCNC: 38 MG/DL (ref 40–59)
HGB BLD-MCNC: 14.1 G/DL
IMM GRANULOCYTES # BLD AUTO: 0.01 X10(3) UL (ref 0–1)
IMM GRANULOCYTES NFR BLD: 0.2 %
LDLC SERPL CALC-MCNC: 87 MG/DL (ref ?–100)
LYMPHOCYTES # BLD AUTO: 1.98 X10(3) UL (ref 1–4)
LYMPHOCYTES NFR BLD AUTO: 39.5 %
MCH RBC QN AUTO: 29.1 PG (ref 26–34)
MCHC RBC AUTO-ENTMCNC: 33.6 G/DL (ref 31–37)
MCV RBC AUTO: 86.8 FL
MONOCYTES # BLD AUTO: 0.6 X10(3) UL (ref 0.1–1)
MONOCYTES NFR BLD AUTO: 12 %
NEUTROPHILS # BLD AUTO: 2.18 X10 (3) UL (ref 1.5–7.7)
NEUTROPHILS # BLD AUTO: 2.18 X10(3) UL (ref 1.5–7.7)
NEUTROPHILS NFR BLD AUTO: 43.5 %
NONHDLC SERPL-MCNC: 106 MG/DL (ref ?–130)
NOROVIRUS GI/GII PCR: POSITIVE
OSMOLALITY SERPL CALC.SUM OF ELEC: 292 MOSM/KG (ref 275–295)
PLATELET # BLD AUTO: 298 10(3)UL (ref 150–450)
POTASSIUM SERPL-SCNC: 4.3 MMOL/L (ref 3.5–5.1)
PROT SERPL-MCNC: 6.7 G/DL (ref 5.7–8.2)
RBC # BLD AUTO: 4.84 X10(6)UL
SODIUM SERPL-SCNC: 141 MMOL/L (ref 136–145)
TRIGL SERPL-MCNC: 100 MG/DL (ref 30–149)
TSI SER-ACNC: 0.62 MIU/ML (ref 0.55–4.78)
VIT B12 SERPL-MCNC: 332 PG/ML (ref 211–911)
VIT D+METAB SERPL-MCNC: 48 NG/ML (ref 30–100)
VLDLC SERPL CALC-MCNC: 16 MG/DL (ref 0–30)
WBC # BLD AUTO: 5 X10(3) UL (ref 4–11)

## 2024-07-01 PROCEDURE — 82785 ASSAY OF IGE: CPT

## 2024-07-01 PROCEDURE — 83993 ASSAY FOR CALPROTECTIN FECAL: CPT

## 2024-07-01 PROCEDURE — 87798 DETECT AGENT NOS DNA AMP: CPT

## 2024-07-01 PROCEDURE — 86140 C-REACTIVE PROTEIN: CPT

## 2024-07-01 PROCEDURE — 82306 VITAMIN D 25 HYDROXY: CPT

## 2024-07-01 PROCEDURE — 82607 VITAMIN B-12: CPT

## 2024-07-01 PROCEDURE — 87045 FECES CULTURE AEROBIC BACT: CPT

## 2024-07-01 PROCEDURE — 36415 COLL VENOUS BLD VENIPUNCTURE: CPT

## 2024-07-01 PROCEDURE — 80061 LIPID PANEL: CPT

## 2024-07-01 PROCEDURE — 87427 SHIGA-LIKE TOXIN AG IA: CPT

## 2024-07-01 PROCEDURE — 85652 RBC SED RATE AUTOMATED: CPT

## 2024-07-01 PROCEDURE — 84443 ASSAY THYROID STIM HORMONE: CPT

## 2024-07-01 PROCEDURE — 87015 SPECIMEN INFECT AGNT CONCNTJ: CPT

## 2024-07-01 PROCEDURE — 87046 STOOL CULTR AEROBIC BACT EA: CPT

## 2024-07-01 PROCEDURE — 85025 COMPLETE CBC W/AUTO DIFF WBC: CPT

## 2024-07-01 PROCEDURE — 80053 COMPREHEN METABOLIC PANEL: CPT

## 2024-07-01 PROCEDURE — 83036 HEMOGLOBIN GLYCOSYLATED A1C: CPT

## 2024-07-02 LAB — CALPROTECTIN STL-MCNT: 44.1 ΜG/G (ref ?–50)

## 2024-07-02 NOTE — PROGRESS NOTES
Please relay to patient:    Regla MUNROE    after review of your labs here are your recommendations:    #Diarrhea: Your recent diarrhea panel shows that your norovirus is positive.  Please continue conservative management with oral fluids is much as possible this is a short limited infection that should clear within the next few days.  If it is still bothersome by the end of the week please follow-up in clinic we can discuss further.    # Lipids/cholesterol: Your ASCVD, ie 10-year risk score which indicates the potential chance that you could have a heart attack, stroke or death related to cholesterol/heart disease, is GREATER than the 5% cut off. It would be recommended we start a cholesterol medication such as rosuvastatin 10mg to help lower your risk of a heart attack. Let me know if you are willing to start this medication. We should also check the blood vessels of the heart using a 50$ exam called a heart scan/calcium score. If a heart scan/calcium was ordered at our visit, please proceed with scheduling and we will follow up to review your results. This scan result does not change the recommendation to start a cholesterol medication, it simply confirms and provides guidance for next steps for evaluation and treatment of heart disease.     The 10-year ASCVD risk score (Tri RYAN, et al., 2019) is: 5.8%    Values used to calculate the score:      Age: 58 years      Sex: Female      Is Non- : No      Diabetic: Yes      Tobacco smoker: No      Systolic Blood Pressure: 137 mmHg      Is BP treated: No      HDL Cholesterol: 38 mg/dL      Total Cholesterol: 144 mg/dL      # CMP: Your comprehensive metabolic panel shows overall stable functioning kidneys (creatinine, GFR), liver (AST, ALT, Bilirubin), and electrolytes (sodium, potassium, calcium). Slight variations in other values such as BUN/Creat, Serum Osm, anion gap, chloride, etc are not of clinical value at this time.     # CBC: Your  complete blood count shows overall stable red blood cells, white blood cells, platelets (help you stop bleeding), and hematocrit (thickness of blood),  Slight variations in other values such as RDW/sw, MCH are not of clinical value at this time.     # TSH: Your thyroid (TSH) function is normal.     # Vitamins: Your vitamin D level is Normal Vitamin D (>30ng/mL) If you are already on Vitamin supplementation please continue current dose. Typically Maintenance level is 600 International units daily for patients under the age of 70, and if above 70 years old 800 international units daily    # Diabetes/A1C: Your A1C Last A1c value was 5.9% done 7/1/2024. which shows  prediabetes. This does not require medications unless your A1C reaches greater than 6.5, but if you would like to start medications to prevent the progression to diabetes please let me know. I would recommend increasing exercise and/or reducing your intake of carbohydrates, pastas, sweets, and sugary drinks/etc, drink more water, eat more vegetables instead of fruit, and try to lose 10% of your current bodyweight.  We will recheck your A1C in 3 months, please schedule a follow up office visit so we can recheck your A1C in the office using a finger stick test. You do not need to fast. If you prefer a video visit let me know so we can order a lab draw A1C/metabolic panel to be completed 3 days prior to our visit.       If you have any questions or concerns in regards to these labs please schedule a follow up and will gladly discuss.   -Dr. Miramontes

## 2024-07-03 LAB — IGE SERPL-ACNC: 14.4 KU/L (ref 2–214)

## 2024-07-15 RX ORDER — NAPROXEN 500 MG/1
500 TABLET ORAL 2 TIMES DAILY PRN
Qty: 60 TABLET | Refills: 0 | Status: SHIPPED | OUTPATIENT
Start: 2024-07-15

## 2024-07-15 NOTE — TELEPHONE ENCOUNTER
Refill passed per [facility] protocol.    Last Office Visit: 06/27/2024    Last written by Emergency Department Dr. Essence King-please advise if appropriate.     Requested Prescriptions   Pending Prescriptions Disp Refills    NAPROXEN 500 MG Oral Tab [Pharmacy Med Name: Naproxen 500 Mg Tab Auro] 60 tablet 0     Sig: TAKE ONE TABLET BY MOUTH TWICE DAILY AS NEEDED       Non-Narcotic Pain Medication Protocol Passed - 7/11/2024  4:16 PM        Passed - In person appointment or virtual visit in the past 6 mos or appointment in next 3 mos     Recent Outpatient Visits              2 weeks ago Annual physical exam    [facility] Guille Miramontes MD    Office Visit    2 months ago Nephrolithiasis    [facility] Priscilla Gonzalez MD    Office Visit    2 months ago Nausea and vomiting, unspecified vomiting type    [facility] Guille Lezama MD    Office Visit    3 months ago Yeast infection involving the vagina and surrounding area    [facility]    Office Visit    4 months ago Left nephrolithiasis    [facility] Guille Miramontes MD    Office Visit          Future Appointments         Provider Department Appt Notes    In 3 weeks ADO DEXA RM1; ADO ABILIO RM1 [facility]     In 1 month Jairo Nelson MD [facility]     In 2 months Guille Miramontes MD [facility] weight loss and gut/malabsorption fu    In 2 months Brian Mcrae MD [facility] food allergies  Advised to stop taking antihistamines 5 days before her appointment.                       Future Appointments         Provider Department Appt Notes    In 3 weeks ADO DEXA RM1; ADO ABILIO RM1 [facility]  Mammography - [facility]     In 1 month Jairo Nelson MD [facility]     In 2 months Guille Miramontes MD [facility] weight loss and gut/malabsorption fu    In 2 months Brian Mcrae MD [facility] food allergies  Advised to stop taking antihistamines 5 days before her appointment.          Recent Outpatient Visits              2 weeks ago Annual physical exam    [facility], Guille Lezama MD    Office Visit    2 months ago Nephrolithiasis    [facility] Priscilla Gonzalez MD    Office Visit    2 months ago Nausea and vomiting, unspecified vomiting type    [facility], Guille Lezama MD    Office Visit    3 months ago Yeast infection involving the vagina and surrounding area    [facility], Guille Lezama MD    Office Visit    4 months ago Left nephrolithiasis    [facility], Guille Lezama MD    Office Visit

## 2024-08-05 ENCOUNTER — HOSPITAL ENCOUNTER (OUTPATIENT)
Dept: MAMMOGRAPHY | Age: 59
Discharge: HOME | End: 2024-08-05
Attending: STUDENT IN AN ORGANIZED HEALTH CARE EDUCATION/TRAINING PROGRAM
Payer: COMMERCIAL

## 2024-08-05 DIAGNOSIS — Z12.31 SCREENING MAMMOGRAM FOR BREAST CANCER: ICD-10-CM

## 2024-08-05 PROCEDURE — 77063 BREAST TOMOSYNTHESIS BI: CPT | Performed by: STUDENT IN AN ORGANIZED HEALTH CARE EDUCATION/TRAINING PROGRAM

## 2024-08-05 PROCEDURE — 77067 SCR MAMMO BI INCL CAD: CPT | Performed by: STUDENT IN AN ORGANIZED HEALTH CARE EDUCATION/TRAINING PROGRAM

## 2024-08-06 NOTE — PROGRESS NOTES
No action if read by pt:      Regla Garcia,   Your mammogram results show no mammographic evidence of malignancy in either breast. As long as your clinical breast exams remain unchanged, annual screening mammogram is recommended.     Please continue to follow up with your gynecologist physician for yearly clinical breast exams as mammograms are not 100% accurate in detecting breast cancers. Please continue self breast exams and alert me right away with any abnormal findings.    If you do not have an ob/gyn physician please let me know and I will provide a referral for you if needed.     Hope you are staying healthy this summer and ill see you this fall   -Dr. Miramontes

## 2024-09-11 ENCOUNTER — NURSE TRIAGE (OUTPATIENT)
Dept: INTERNAL MEDICINE CLINIC | Facility: CLINIC | Age: 59
End: 2024-09-11

## 2024-09-11 NOTE — TELEPHONE ENCOUNTER
Action Requested: Summary for Provider     []  Critical Lab, Recommendations Needed  [] Need Additional Advice  []   FYI    []   Need Orders  [] Need Medications Sent to Pharmacy  []  Other     SUMMARY: Patient reports 'excruciating pain in right hip, started 1 week ago. Worsening.  Offered [facility] location appointment and [facility] location today. Patient declined.  Advised [facility]. Patient agreed and will proceed to [facility].        Reason for call: Hip Pain (Right hip pain)  Onset: Data Unavailable                     Reason for Disposition   SEVERE pain (e.g., excruciating, unable to do any normal activities)    Protocols used: Hip Pain-A-[place]

## 2024-09-17 ENCOUNTER — APPOINTMENT (OUTPATIENT)
Dept: GENERAL RADIOLOGY | Age: 59
End: 2024-09-17
Attending: NURSE PRACTITIONER
Payer: COMMERCIAL

## 2024-09-17 ENCOUNTER — HOSPITAL ENCOUNTER (OUTPATIENT)
Age: 59
Discharge: HOME | End: 2024-09-17
Payer: COMMERCIAL

## 2024-09-17 VITALS
OXYGEN SATURATION: 97 % | DIASTOLIC BLOOD PRESSURE: 87 MMHG | RESPIRATION RATE: 20 BRPM | HEART RATE: 96 BPM | SYSTOLIC BLOOD PRESSURE: 160 MMHG | TEMPERATURE: 97 F

## 2024-09-17 DIAGNOSIS — M25.551 PAIN OF RIGHT HIP: Primary | ICD-10-CM

## 2024-09-17 DIAGNOSIS — M54.41 ACUTE RIGHT-SIDED LOW BACK PAIN WITH RIGHT-SIDED SCIATICA: ICD-10-CM

## 2024-09-17 PROCEDURE — 73502 X-RAY EXAM HIP UNI 2-3 VIEWS: CPT | Performed by: NURSE PRACTITIONER

## 2024-09-17 PROCEDURE — 99213 OFFICE O/P EST LOW 20 MIN: CPT | Performed by: NURSE PRACTITIONER

## 2024-09-17 RX ORDER — PREDNISONE 20 MG/1
40 TABLET ORAL ONCE
Status: COMPLETED | OUTPATIENT
Start: 2024-09-17 | End: 2024-09-17

## 2024-09-17 RX ORDER — CYCLOBENZAPRINE HCL 10 MG
10 TABLET ORAL 2 TIMES DAILY PRN
Qty: 10 TABLET | Refills: 0 | Status: SHIPPED | OUTPATIENT
Start: 2024-09-17

## 2024-09-17 RX ORDER — PREDNISONE 20 MG/1
40 TABLET ORAL DAILY
Qty: 10 TABLET | Refills: 0 | Status: SHIPPED | OUTPATIENT
Start: 2024-09-17 | End: 2024-09-22

## 2024-09-17 NOTE — ED PROVIDER NOTES
Patient Seen in: [facility]      History   No chief complaint on file.    Stated Complaint: RT leg pain    Subjective:   HPI    This is a 58-year-old female with history of obesity, thyroid disease, prediabetes presenting with sciatica.  Patient states she has a history of chronic low back pain from an injury and for the past several weeks has been dealing with sciatica that is radiating into her butt into her hip and down the front of her leg.  Patient states a couple weeks ago she was having significant hip pain but now it seems more like sciatica but may be a combination of muscle pain.  Denies any injury or trauma or fall.  Denies bowel or bladder incontinence or saddle paresthesia denies difficulty ambulating.  Patient states she has been taking over-the-counter Advil Tylenol dual action and also took a naproxen.  Patient states she is just looking to get something else to help with relief of the sciatica and pain that she is experiencing she does have an appointment next week with her primary care provider.    Objective:   Past Medical History:    Back problem    Calculus of kidney    Disorder of thyroid    Diverticulosis of large intestine    Esophageal reflux    Hx of motion sickness    Morbid obesity with BMI of 50.0-59.9, adult (HCC)    Pneumonia due to organism    PONV (postoperative nausea and vomiting)    Pre-diabetes    Seasonal allergies    Thyroid disease    Vitamin D deficiency              Past Surgical History:   Procedure Laterality Date    Colonoscopy  11/30/2021    Hernia surgery      Hysterectomy  12/21/2012    Other surgical history Left 03/12/2024    Cystoscopy left ureteroscopy, laser lithotripsy, retrograde pyelogram, stent placement    Removal gallbladder      Repair ing hernia,5+y/o,reducibl                  No pertinent social history.            Review of Systems    Positive for stated Chief Complaint: No chief complaint on file.    Other systems are as noted in  HPI.  Constitutional and vital signs reviewed.      All other systems reviewed and negative except as noted above.    Physical Exam     ED Triage Vitals [09/17/24 1620]   /87   Pulse 96   Resp 20   Temp 97.2 °F (36.2 °C)   Temp src Temporal   SpO2 97 %   O2 Device None (Room air)       Current Vitals:   Vital Signs  BP: 160/87  Pulse: 96  Resp: 20  Temp: 97.2 °F (36.2 °C)  Temp src: Temporal    Oxygen Therapy  SpO2: 97 %  O2 Device: None (Room air)            Physical Exam  Vitals and nursing note reviewed.   Constitutional:       Appearance: Normal appearance.   HENT:      Right Ear: External ear normal.      Left Ear: External ear normal.      Nose: Nose normal.      Mouth/Throat:      Mouth: Mucous membranes are moist.      Pharynx: Oropharynx is clear.   Eyes:      Conjunctiva/sclera: Conjunctivae normal.   Abdominal:      Palpations: Abdomen is soft.      Tenderness: There is no abdominal tenderness. There is no right CVA tenderness or left CVA tenderness.   Musculoskeletal:      Cervical back: Normal range of motion.      Lumbar back: Tenderness present. Normal range of motion. Positive right straight leg raise test. Negative left straight leg raise test.        Back:       Comments: Cervical thoracic lumbar spine no midline TTP or step-offs   Skin:     General: Skin is warm.      Capillary Refill: Capillary refill takes less than 2 seconds.   Neurological:      General: No focal deficit present.      Mental Status: She is alert and oriented to person, place, and time.               ED Course   Labs Reviewed - No data to display  XR HIP W OR WO PELVIS 2 OR 3 VIEWS, RIGHT (CPT=73502)    Result Date: 9/17/2024  CONCLUSION:   No acute fracture or dislocation.  Minimal right hip osteoarthrosis with a small amount of mineralization in the superior aspect of the joint space.       Dictated by (CST): Philipp Reed MD on 9/17/2024 at 4:48 PM     Finalized by (CST): Philipp Reed MD on 9/17/2024 at 4:51 PM                 MDM            Medical Decision Making  58-year-old female nontoxic-appearing presenting with back pain that radiates into her buttock and the front of her leg.  DDx lumbar strain versus acute on chronic low back pain with sciatica versus arthralgia of the hip versus radicular leg pain.  X-ray will be ordered of the right hip and pelvis patient will receive a dose of prednisone she states she has taken this in the past discussed this may help with the sciatica and will prescribe cyclobenzaprine at home muscle relaxer per the patient she has taken this before discussed continuing over-the-counter Tylenol NSAIDs may try IcyHot or alternating heat or ice recommend following up with physiatry but also following up with primary care provider next week at scheduled appointment.  Patient is agreeable with this plan of care.    X-ray independently viewed by this provider no fracture noted    Discussed results with the patient reinforced medication that will be prescribed and over-the-counter medications discussed outpatient follow-up and ER precautions.  Patient acknowledged understanding discharge instructions.    Problems Addressed:  Acute right-sided low back pain with right-sided sciatica: acute illness or injury  Pain of right hip: acute illness or injury    Amount and/or Complexity of Data Reviewed  Radiology: ordered and independent interpretation performed. Decision-making details documented in ED Course.    Risk  OTC drugs.  Prescription drug management.        Disposition and Plan     Clinical Impression:  1. Pain of right hip    2. Acute right-sided low back pain with right-sided sciatica         Disposition:  Discharge  9/17/2024  4:55 pm    Follow-up:  Guille Miramontes MD  [address] [place] [address] [address]  [facility] [id number] [phone]      Follow-up at your scheduled appointment next week    Law Vallejo MD  [address] [place] [address] [address]  [facility] [address] [phone]    Call   [facility] for  physiatry recommend you call and set up an appointment          Medications Prescribed:  Current Discharge Medication List        START taking these medications    Details   predniSONE 20 MG Oral Tab Take 2 tablets (40 mg total) by mouth daily for 5 days.  Qty: 10 tablet, Refills: 0    Associated Diagnoses: Pain of right hip; Acute right-sided low back pain with right-sided sciatica      cyclobenzaprine 10 MG Oral Tab Take 1 tablet (10 mg total) by mouth 2 (two) times daily as needed for Muscle spasms (May cause drowsiness no driving or operating machinery while taking this medication).  Qty: 10 tablet, Refills: 0    Associated Diagnoses: Pain of right hip; Acute right-sided low back pain with right-sided sciatica

## 2024-09-17 NOTE — DISCHARGE INSTRUCTIONS
Follow-up with your primary care provider at your scheduled appointment next week take the next dose of prednisone tomorrow morning early in the morning as a side effect as it can keep you awake take the muscle relaxer when you are at home and do not have to go out as it may cause drowsiness recommend you take a dose tonight as you received your first dose of prednisone here in the [facility].  You may try over-the-counter topical IcyHot to areas of discomfort you may try stretching you may alternate heat or ice you may take over-the-counter ibuprofen and Tylenol with food on the stomach.  If you wake up cannot feel your lower extremities and ambulate unintentionally urinate or have a bowel movement on yourself or any new or worsening symptoms pain location you did not initially have go to the nearest emergency department.

## 2024-09-18 RX ORDER — NAPROXEN 500 MG/1
500 TABLET ORAL 2 TIMES DAILY PRN
Qty: 60 TABLET | Refills: 0 | Status: SHIPPED | OUTPATIENT
Start: 2024-09-18

## 2024-09-19 NOTE — TELEPHONE ENCOUNTER
Refill passed per [facility] protocol.    Requested Prescriptions   Pending Prescriptions Disp Refills    NAPROXEN 500 MG Oral Tab [Pharmacy Med Name: Naproxen 500 Mg Tab Hitesh] 60 tablet 0     Sig: TAKE 1 TABLET BY MOUTH 2 TIMES DAILY AS NEEDED.       Non-Narcotic Pain Medication Protocol Passed - 9/14/2024 12:36 PM        Passed - In person appointment or virtual visit in the past 6 mos or appointment in next 3 mos     Recent Outpatient Visits              2 months [facility]    [facility]    4 months ago Nephrolithiasis    [facility] Priscilla Rivers MD    Office Visit    4 months ago Nausea and vomiting, unspecified vomiting type    [facility]    [facility]    5 months ago Yeast infection involving the vagina and surrounding area    [facility]    [facility]    6 months ago [facility]rolithiasis    [facility]    [facility]          Future Appointments         Provider Department Appt Notes    In 1 week Guille Miramontes MD [facility] weight loss and gut/malabsorption fu    In 1 week Brian Mcrae MD [facility] food allergies  Advised to stop taking antihistamines 5 days before her appointment.                       Future Appointments         Provider Department Appt Notes    In 1 week Guille Miramontes MD [facility] weight loss and gut/malabsorption fu    In 1 week Brian Mcrae MD [facility] food allergies  Advised to stop taking antihistamines 5 days before her appointment.          Recent Outpatient Visits               2 months ago Annual physical exam    [facility] Guille Lezama MD    Office Visit    4 months ago [facility]    [facility]    4 months ago Nausea and vomiting, unspecified vomiting type    [facility], Guille Lezama MD    Office Visit    5 months ago Yeast infection involving the vagina and surrounding area    [facility], Guille Lezama MD    Office Visit    6 months ago Left nephrolithiasis    [facility], Guille Lezama MD    Office Visit

## 2024-09-23 DIAGNOSIS — E66.01 MORBID OBESITY WITH BMI OF 50.0-59.9, ADULT (HCC): ICD-10-CM

## 2024-09-24 ENCOUNTER — HOSPITAL ENCOUNTER (OUTPATIENT)
Dept: GENERAL RADIOLOGY | Age: 59
Discharge: HOME | DRG: 392 | End: 2024-09-24
Attending: STUDENT IN AN ORGANIZED HEALTH CARE EDUCATION/TRAINING PROGRAM
Payer: COMMERCIAL

## 2024-09-24 ENCOUNTER — LAB ENCOUNTER (OUTPATIENT)
Dept: LAB | Age: 59
DRG: 392 | End: 2024-09-24
Attending: STUDENT IN AN ORGANIZED HEALTH CARE EDUCATION/TRAINING PROGRAM
Payer: COMMERCIAL

## 2024-09-24 ENCOUNTER — LAB ENCOUNTER (OUTPATIENT)
Dept: LAB | Age: 59
End: 2024-09-24
Attending: STUDENT IN AN ORGANIZED HEALTH CARE EDUCATION/TRAINING PROGRAM
Payer: COMMERCIAL

## 2024-09-24 ENCOUNTER — OFFICE VISIT (OUTPATIENT)
Dept: INTERNAL MEDICINE CLINIC | Facility: CLINIC | Age: 59
End: 2024-09-24

## 2024-09-24 ENCOUNTER — NURSE TRIAGE (OUTPATIENT)
Dept: INTERNAL MEDICINE CLINIC | Facility: CLINIC | Age: 59
End: 2024-09-24

## 2024-09-24 ENCOUNTER — HOSPITAL ENCOUNTER (OUTPATIENT)
Dept: GENERAL RADIOLOGY | Age: 59
Discharge: HOME | End: 2024-09-24
Attending: STUDENT IN AN ORGANIZED HEALTH CARE EDUCATION/TRAINING PROGRAM
Payer: COMMERCIAL

## 2024-09-24 VITALS
SYSTOLIC BLOOD PRESSURE: 122 MMHG | DIASTOLIC BLOOD PRESSURE: 77 MMHG | OXYGEN SATURATION: 98 % | HEIGHT: 62 IN | HEART RATE: 85 BPM | BODY MASS INDEX: 48.76 KG/M2 | WEIGHT: 265 LBS

## 2024-09-24 DIAGNOSIS — R06.09 DYSPNEA ON EXERTION: ICD-10-CM

## 2024-09-24 DIAGNOSIS — E66.01 MORBID OBESITY WITH BMI OF 50.0-59.9, ADULT (HCC): ICD-10-CM

## 2024-09-24 DIAGNOSIS — R06.09 DYSPNEA ON EXERTION: Primary | ICD-10-CM

## 2024-09-24 DIAGNOSIS — M54.6 ACUTE RIGHT-SIDED THORACIC BACK PAIN: ICD-10-CM

## 2024-09-24 DIAGNOSIS — M54.9 UPPER BACK PAIN ON RIGHT SIDE: ICD-10-CM

## 2024-09-24 LAB — D DIMER PPP FEU-MCNC: 0.48 UG/ML FEU (ref ?–0.58)

## 2024-09-24 PROCEDURE — 85379 FIBRIN DEGRADATION QUANT: CPT

## 2024-09-24 PROCEDURE — 3008F BODY MASS INDEX DOCD: CPT | Performed by: STUDENT IN AN ORGANIZED HEALTH CARE EDUCATION/TRAINING PROGRAM

## 2024-09-24 PROCEDURE — 3074F SYST BP LT 130 MM HG: CPT | Performed by: STUDENT IN AN ORGANIZED HEALTH CARE EDUCATION/TRAINING PROGRAM

## 2024-09-24 PROCEDURE — 71046 X-RAY EXAM CHEST 2 VIEWS: CPT | Performed by: STUDENT IN AN ORGANIZED HEALTH CARE EDUCATION/TRAINING PROGRAM

## 2024-09-24 PROCEDURE — 71100 X-RAY EXAM RIBS UNI 2 VIEWS: CPT | Performed by: STUDENT IN AN ORGANIZED HEALTH CARE EDUCATION/TRAINING PROGRAM

## 2024-09-24 PROCEDURE — 3078F DIAST BP <80 MM HG: CPT | Performed by: STUDENT IN AN ORGANIZED HEALTH CARE EDUCATION/TRAINING PROGRAM

## 2024-09-24 PROCEDURE — 36415 COLL VENOUS BLD VENIPUNCTURE: CPT

## 2024-09-24 PROCEDURE — 99215 OFFICE O/P EST HI 40 MIN: CPT | Performed by: STUDENT IN AN ORGANIZED HEALTH CARE EDUCATION/TRAINING PROGRAM

## 2024-09-24 PROCEDURE — 3044F HG A1C LEVEL LT 7.0%: CPT | Performed by: STUDENT IN AN ORGANIZED HEALTH CARE EDUCATION/TRAINING PROGRAM

## 2024-09-24 RX ORDER — PREDNISONE 10 MG/1
TABLET ORAL
Qty: 18 TABLET | Refills: 0 | Status: SHIPPED | OUTPATIENT
Start: 2024-09-24 | End: 2024-10-02

## 2024-09-24 RX ORDER — SEMAGLUTIDE 2.4 MG/.75ML
2.4 INJECTION, SOLUTION SUBCUTANEOUS WEEKLY
Qty: 3 ML | Refills: 2 | Status: SHIPPED | OUTPATIENT
Start: 2024-09-24

## 2024-09-24 RX ORDER — BACLOFEN 10 MG/1
10 TABLET ORAL 3 TIMES DAILY PRN
Qty: 90 TABLET | Refills: 1 | Status: SHIPPED | OUTPATIENT
Start: 2024-09-24 | End: 2024-09-30 | Stop reason: ALTCHOICE

## 2024-09-24 NOTE — PATIENT INSTRUCTIONS
Nutritional Goals Reviewed and Discussed:   Limit Carbohydrates to 100gm per day, Eat 100-200 calories within 1 hour of awkaing up, Eat 3-4 cups of fresh fruits or vegetables daily    Behavior Modification Reviewed and Discussed:  Eat breakfast, Eat 3 meals per day, Plan meals in advance (if unable to cook, meal prep service such as [id number]) High protein, Low simple carbs. Read nutrition labels track calories and Macros with [employer] or [employer] colleen (thus daily food journal), No drinking 30mintutes before or after meals, utilize portion control strategies to reduce caloric intake, Identify triggers for eating and manage cues, and Eat Slowly and take 20-30 minutes to complete each meal.    Goal for Next Month:  Keep Food log: At first track current daily caloric intake and limit current caloric consumption by 500 to 1,000 calories daily OR start with caloric restriction of less than 1,800 calories daily.   Increase Cardiac/cardio exercise at least 180 minutes a week (at least 3 times a week) Goal is 1hr a day (5 days a week) would prefer if enrolls in fitness classes or  at least 1x a week. (If possible to work out in the morning is proven to increase natural Dopamine, Serotonin, Endorphin, levels (Feel good and energy hormones) and reduce cortisol  (stress hormone levels).   Drink 48-64 ounces of non-caloric beverages per day. No fruit juices or regular soda.  Increase activity- upper body exercises in addition to cardio and, aim to walk 10minutes per day after dinner  Increase fruit and vegetable servings to 5-6 per day.   6. Food recommendation for Breakfast: Steel cut oatmeal:  1cup Steel cut oatmeal and 2cups unsweetened almond milk. And cinnamon (let it ,overnight in a bowl), and portion out 4 servings (separate containers/jars), then daily add one triple zero okios greek yogurt, 1 medium banana and however many berries your heart desires  All berries are good, (blueberry, raspberry,  strawberries, blackberries).   -avoid high sugar fruits (pineapple, mangos, melons, banana)- high  7. Plant based protein: Ascent Plant Based Protein Powder - Non Dairy Vegan Protein, Zero Artificial Ingredients, Soy & Gluten Free, No Added Sugar, 4g BCAA, 2g Leucine - Chocolate, 18 Servings

## 2024-09-24 NOTE — PROGRESS NOTES
OFFICE NOTE     Patient ID: Radha Rodrigez is a 58 year old female.  Today's Date: 09/24/24  Chief Complaint: Urgent Care F/u (Still having R shoulder blade pain , when breathes pain shoots to the front of right rib cage . Pain 7/10)    Pt is a a 58y/o female with PMHx HTN, Hypothyroidism, Class 2 obesity ([id number] now Body mass index is 48.47 kg/m².  Improved on wegovy), pre-diabetes, ALYSSIA, GERD,  leiomyoma of uterus and chronic sinusitis (followed by Dr. Lora) left nephrolithiasis, presents for right lower back/sciatica pain and right mid/upper thoracic back pain and obesity follow up.     Was in urgent care and given prednisone and muscule relxanat that helped but still having SAAB and shortness of breath      Pt taking wegovy 1.7mg weekly and tolerating well, trying to limit calories. Weight today 265 (weight down from 272 during physical in the summer)        Vitals:    09/24/24 1317   BP: 122/77   Pulse: 85   SpO2: 98%   Weight: 265 lb (120.2 kg)   Height: 5' 2\" (1.575 m)     body mass index is 48.47 kg/m².  BP Readings from Last 3 Encounters:   09/24/24 122/77   09/17/24 160/87   06/27/24 137/76     The 10-year ASCVD risk score (Tri RYAN, et al., 2019) is: 4.7%    Values used to calculate the score:      Age: 58 years      Sex: Female      Is Non- : No      Diabetic: Yes      Tobacco smoker: No      Systolic Blood Pressure: 122 mmHg      Is BP treated: No      HDL Cholesterol: 38 mg/dL      Total Cholesterol: 144 mg/dL      Medications reviewed:  Current Outpatient Medications   Medication Sig Dispense Refill    baclofen 10 MG Oral Tab Take 1 tablet (10 mg total) by mouth 3 (three) times daily as needed. 90 tablet 1    predniSONE 10 MG Oral Tab Take 3 tablets (30 mg total) by mouth daily for 3 days, THEN 2 tablets (20 mg total) daily for 3 days, THEN 1 tablet (10 mg total) daily for 3 days. TAKE WITH FOOD.. 18 tablet 0    semaglutide-weight management (WEGOVY) 2.4  MG/0.75ML Subcutaneous Solution Auto-injector Inject 0.75 mL (2.4 mg total) into the skin once a week. 3 mL 2    naproxen 500 MG Oral Tab Take 1 tablet (500 mg total) by mouth 2 (two) times daily as needed. 60 tablet 0    cyclobenzaprine 10 MG Oral Tab Take 1 tablet (10 mg total) by mouth 2 (two) times daily as needed for Muscle spasms (May cause drowsiness no driving or operating machinery while taking this medication). 10 tablet 0    rosuvastatin 10 MG Oral Tab Take 1 tablet (10 mg total) by mouth nightly. FOR CHOLESTEROL. 90 tablet 9    semaglutide-weight management 1.7 MG/0.75ML Subcutaneous Solution Auto-injector Inject 0.75 mL (1.7 mg total) into the skin once a week. 3 mL 2    cholestyramine light 4 g Oral Powd Pack Take 1 packet (4 g total) by mouth 2 (two) times daily. 60 each 2    omeprazole 20 MG Oral Capsule Delayed Release Take 1 capsule (20 mg total) by mouth every morning before breakfast. FOR ACID REFLUX. 90 capsule 3    fluticasone-salmeterol 115-21 MCG/ACT Inhalation Aerosol Inhale 2 puffs into the lungs 2 (two) times daily. FOR ASTHMA 1 each 9    fluticasone propionate 50 MCG/ACT Nasal Suspension 2 sprays by Nasal route daily. 16 g 0    albuterol 108 (90 Base) MCG/ACT Inhalation Aero Soln Inhale 2-4 puffs into the lungs every 4 to 6 hours as needed for Wheezing or Shortness of Breath (cough, asthma, chest tightness). FOR ASTHMA. Pharmacist, please switch to formulary alternative per insurance coverage, ok to replace with proair, ventolin, proventil, or any other albuterol inhaler. -drkp 3 each 9    levothyroxine 150 MCG Oral Tab Take 1 tablet (150 mcg total) by mouth daily. 90 tablet 3    cholecalciferol (VITAMIN D3) 125 MCG (5000 UT) Oral Cap Take 1 capsule (5,000 Units total) by mouth daily.           Assessment & Plan    1. Dyspnea on exertion (Primary)  -     Baclofen; Take 1 tablet (10 mg total) by mouth 3 (three) times daily as needed.  Dispense: 90 tablet; Refill: 1  -     D-Dimer; Future;  Expected date: 09/24/2024  -     XR CHEST PA + LAT CHEST (CPT=71046); Future; Expected date: 09/24/2024  -     Cancel: XR RIBS WITH CHEST (3 VIEWS), RIGHT  (CPT=71101); Future; Expected date: 09/24/2024  -     predniSONE; Take 3 tablets (30 mg total) by mouth daily for 3 days, THEN 2 tablets (20 mg total) daily for 3 days, THEN 1 tablet (10 mg total) daily for 3 days. TAKE WITH FOOD..  Dispense: 18 tablet; Refill: 0  Pt with mid thoracic right sided back pain and reported SAAB and shortness of breath, recent improvement on steroids   Plan  -order DDIMer to r/o PE  -order xray ribs and rib series to rule out PNA and/or fracture  -prednisone taper followed by naproxen 500mg po BID  -start baclofen 10mg po TID PRN for spasms  -if no improvement followup in next few weeks, if symptoms improve follow up 3 months for obesity follow up.     2. Upper back pain on right side  -     Baclofen; Take 1 tablet (10 mg total) by mouth 3 (three) times daily as needed.  Dispense: 90 tablet; Refill: 1  -     D-Dimer; Future; Expected date: 09/24/2024  -     XR CHEST PA + LAT CHEST (CPT=71046); Future; Expected date: 09/24/2024  -     Cancel: XR RIBS WITH CHEST (3 VIEWS), RIGHT  (CPT=71101); Future; Expected date: 09/24/2024  -     predniSONE; Take 3 tablets (30 mg total) by mouth daily for 3 days, THEN 2 tablets (20 mg total) daily for 3 days, THEN 1 tablet (10 mg total) daily for 3 days. TAKE WITH FOOD..  Dispense: 18 tablet; Refill: 0  Pt with mid thoracic right sided back pain and reported SAAB and shortness of breath, recent improvement on steroids   Plan  -order DDIMer to r/o PE  -order xray ribs and rib series to rule out PNA and/or fracture  -prednisone taper followed by naproxen 500mg po BID  -start baclofen 10mg po TID PRN for spasms  -if no improvement followup in next few weeks, if symptoms improve follow up 3 months for obesity follow up.     3. Acute right-sided thoracic back pain  -     Baclofen; Take 1 tablet (10 mg  total) by mouth 3 (three) times daily as needed.  Dispense: 90 tablet; Refill: 1  -     D-Dimer; Future; Expected date: 09/24/2024  -     XR CHEST PA + LAT CHEST (CPT=71046); Future; Expected date: 09/24/2024  -     Cancel: XR RIBS WITH CHEST (3 VIEWS), RIGHT  (CPT=71101); Future; Expected date: 09/24/2024  -     predniSONE; Take 3 tablets (30 mg total) by mouth daily for 3 days, THEN 2 tablets (20 mg total) daily for 3 days, THEN 1 tablet (10 mg total) daily for 3 days. TAKE WITH FOOD..  Dispense: 18 tablet; Refill: 0  Pt with mid thoracic right sided back pain and reported SAAB and shortness of breath, recent improvement on steroids   Plan  -order DDIMer to r/o PE  -order xray ribs and rib series to rule out PNA and/or fracture  -prednisone taper followed by naproxen 500mg po BID  -start baclofen 10mg po TID PRN for spasms  -if no improvement followup in next few weeks, if symptoms improve follow up 3 months for obesity follow up.     4. Morbid obesity with BMI of 50.0-59.9, adult (HCC)  -     Wegovy; Inject 0.75 mL (2.4 mg total) into the skin once a week.  Dispense: 3 mL; Refill: 2  Pt with modest weight loss and no adverse effect on wegovy and lifestyle changes  Plan  -Increase pt on maximum dose of Wegovy 2.4mg weekly, watch for N/V and fatigue  -Nutritional Goals Reviewed and Discussed:   Limit Carbohydrates to 100gm per day, Eat 100-200 calories within 1 hour of awkaing up, Eat 3-4 cups of fresh fruits or vegetables daily    Behavior Modification Reviewed and Discussed:  Eat breakfast, Eat 3 meals per day, Plan meals in advance (if unable to cook, meal prep service such as [employer]) High protein, Low simple carbs. Read nutrition labels track calories and Macros with MyFitnessPal or [employer] colleen (thus daily food journal), No drinking 30mintutes before or after meals, utilize portion control strategies to reduce caloric intake, Identify triggers for eating and manage cues, and Eat Slowly and take 20-30 minutes to  complete each meal.    Goal for Next Month:  Keep Food log: At first track current daily caloric intake and limit current caloric consumption by 500 to 1,000 calories daily OR start with caloric restriction of less than 1,800 calories daily.   Increase Cardiac/cardio exercise at least 180 minutes a week (at least 3 times a week) Goal is 1hr a day (5 days a week) would prefer if enrolls in fitness classes or  at least 1x a week. (If possible to work out in the morning is proven to increase natural Dopamine, Serotonin, Endorphin, levels (Feel good and energy hormones) and reduce cortisol  (stress hormone levels).   Drink 48-64 ounces of non-caloric beverages per day. No fruit juices or regular soda.  Increase activity- upper body exercises in addition to cardio and, aim to walk 10minutes per day after dinner  Increase fruit and vegetable servings to 5-6 per day.   6. Food recommendation for Breakfast: Steel cut oatmeal:  1cup Steel cut oatmeal and 2cups unsweetened almond milk. And cinnamon (let it ,overnight in a bowl), and portion out 4 servings (separate containers/jars), then daily add one triple zero okios greek yogurt, 1 medium banana and however many berries your heart desires  All berries are good, (blueberry, raspberry, strawberries, blackberries).   -avoid high sugar fruits (pineapple, mangos, melons, banana)- high  7. Plant based protein: Ascent Plant Based Protein Powder - Non Dairy Vegan Protein, Zero Artificial Ingredients, Soy & Gluten Free, No Added Sugar, 4g BCAA, 2g Leucine - Chocolate, 18 Servings        Follow Up: As needed/if symptoms worsen or Return in about 3 months (around 12/26/2024) for Obesity follow up,..     Objective/ Results:   Physical Exam  Constitutional:       Appearance: She is well-developed.   Cardiovascular:      Rate and Rhythm: Normal rate and regular rhythm.      Heart sounds: Normal heart sounds.   Pulmonary:      Effort: Pulmonary effort is normal.       Breath sounds: Normal breath sounds.   Abdominal:      General: Bowel sounds are normal.      Palpations: Abdomen is soft.   Musculoskeletal:      Thoracic back: Spasms and tenderness (right mid thoracic) present.   Skin:     General: Skin is warm and dry.   Neurological:      Mental Status: She is alert and oriented to person, place, and time.      Deep Tendon Reflexes: Reflexes are normal and symmetric.        Reviewed:    Patient Active Problem List    Diagnosis    Malabsorption syndrome ([facility])    S/P cholecystectomy    Right lower quadrant abdominal pain    Calcium oxalate calculus    Left flank pain    Pain due to ureteral stent, initial encounter ([facility])    Acute cystitis with hematuria    Ureterolithiasis    Pyelonephritis    Encounter for therapeutic drug monitoring    Pre-diabetes    Stress    Essential hypertension    Morbid obesity with BMI of 50.0-59.9, adult ([facility])    Diverticulosis of colon    Esophageal reflux    Seasonal allergies    Hypothyroidism    Leiomyoma of uterus      Allergies   Allergen Reactions    Augmentin [Amoxicillin-Pot Clavulanate] NAUSEA AND VOMITING    Coconut Oil NAUSEA AND VOMITING    Cortisone DIZZINESS and OTHER (SEE COMMENTS)     Blood drop, dizziness, flushed    Adhesive Tape RASH     Needs paper tape        Social History     Socioeconomic History    Marital status:    Tobacco Use    Smoking status: Never     Passive exposure: Never    Smokeless tobacco: Never   Vaping Use    Vaping status: Never Used   Substance and Sexual Activity    Alcohol use: Not Currently    Drug use: Never     Social Determinants of Health     Financial Resource Strain: Low Risk  (3/11/2024)    Financial Resource Strain     Difficulty of Paying Living Expenses: Not very hard     Med Affordability: No   Food Insecurity: No Food Insecurity (3/8/2024)    Food Insecurity     Food Insecurity: Never true   Transportation Needs: No Transportation Needs (3/11/2024)    Transportation Needs     Lack of  Transportation: No   Housing Stability: Low Risk  (3/8/2024)    Housing Stability     Housing Instability: No      Review of Systems   Constitutional: Negative.    Respiratory:  Positive for shortness of breath. Negative for cough, chest tightness and wheezing.    Cardiovascular: Negative.    Gastrointestinal: Negative.    Musculoskeletal:  Positive for back pain.   Skin: Negative.    Neurological: Negative.      All other systems negative unless otherwise stated in ROS or HPI above.       Guille Miramontes MD  Internal Medicine       Call office with any questions or seek emergency care if necessary.   Patient understands and agrees to follow directions and advice.      ----------------------------------------- PATIENT INSTRUCTIONS-----------------------------------------     Patient Instructions   Nutritional Goals Reviewed and Discussed:   Limit Carbohydrates to 100gm per day, Eat 100-200 calories within 1 hour of awkaing up, Eat 3-4 cups of fresh fruits or vegetables daily    Behavior Modification Reviewed and Discussed:  Eat breakfast, Eat 3 meals per day, Plan meals in advance (if unable to cook, meal prep service such as [employer]) High protein, Low simple carbs. Read nutrition labels track calories and Macros with [employer] or [employer] colleen (thus daily food journal), No drinking 30mintutes before or after meals, utilize portion control strategies to reduce caloric intake, Identify triggers for eating and manage cues, and Eat Slowly and take 20-30 minutes to complete each meal.    Goal for Next Month:  Keep Food log: At first track current daily caloric intake and limit current caloric consumption by 500 to 1,000 calories daily OR start with caloric restriction of less than 1,800 calories daily.   Increase Cardiac/cardio exercise at least 180 minutes a week (at least 3 times a week) Goal is 1hr a day (5 days a week) would prefer if enrolls in fitness classes or  at least 1x a week. (If possible to  work out in the morning is proven to increase natural Dopamine, Serotonin, Endorphin, levels (Feel good and energy hormones) and reduce cortisol  (stress hormone levels).   Drink 48-64 ounces of non-caloric beverages per day. No fruit juices or regular soda.  Increase activity- upper body exercises in addition to cardio and, aim to walk 10minutes per day after dinner  Increase fruit and vegetable servings to 5-6 per day.   6. Food recommendation for Breakfast: Steel cut oatmeal:  1cup Steel cut oatmeal and 2cups unsweetened almond milk. And cinnamon (let it ,overnight in a bowl), and portion out 4 servings (separate containers/jars), then daily add one triple zero okios greek yogurt, 1 medium banana and however many berries your heart desires  All berries are good, (blueberry, raspberry, strawberries, blackberries).   -avoid high sugar fruits (pineapple, mangos, melons, banana)- high  7. Plant based protein: Ascent Plant Based Protein Powder - Non Dairy Vegan Protein, Zero Artificial Ingredients, Soy & Gluten Free, No Added Sugar, 4g BCAA, 2g Leucine - Chocolate, 18 Servings          The 21st Century Cures Act makes medical notes available to patients in the interest of transparency.  However, please be advised that this is a medical document.  It is intended as a peer to peer communication.  It is written in medical language and may contain abbreviations or verbiage that are technical and unfamiliar.  It may appear blunt or direct.  Medical documents are intended to carry relevant information, facts as evident, and the clinical opinion of the practitioner.

## 2024-09-24 NOTE — TELEPHONE ENCOUNTER
Action Requested: Summary for Provider     []  Critical Lab, Recommendations Needed  [] Need Additional Advice  []   FYI    []   Need Orders  [] Need Medications Sent to Pharmacy  []  Other     SUMMARY: Per protocol advised :  .dvoc  Future Appointments   Date Time Provider Department Center   9/24/2024  1:20 PM Guille Miramontes MD [facility]   9/26/2024  2:20 PM Guille Miramontes MD [facility]   10/1/2024  2:00 PM Brian Mcrae MD [facility]     Reason for call: Appt Request and Shoulder Pain  Onset: Data Unavailable    Patient calling ( name and date of birth of patient verified )  has been seen at   [facility]     She has pain to her back right shoulder blade, she is \" shaky \" and having some  intermittent pain  with regular  inspirations /breathing     Has applied heat/ice and used Naproxen with slight relief     Denies any chest pain       Reason for Disposition   Patient wants to be seen    Protocols used: Shoulder Pain-A-[place]    
no

## 2024-09-25 ENCOUNTER — APPOINTMENT (OUTPATIENT)
Dept: CT IMAGING | Facility: HOSPITAL | Age: 59
End: 2024-09-25
Attending: EMERGENCY MEDICINE
Payer: COMMERCIAL

## 2024-09-25 ENCOUNTER — HOSPITAL ENCOUNTER (INPATIENT)
Facility: HOSPITAL | Age: 59
LOS: 2 days | Discharge: HOME | End: 2024-09-27
Attending: EMERGENCY MEDICINE | Admitting: HOSPITALIST
Payer: COMMERCIAL

## 2024-09-25 ENCOUNTER — APPOINTMENT (OUTPATIENT)
Dept: MRI IMAGING | Facility: HOSPITAL | Age: 59
End: 2024-09-25
Attending: EMERGENCY MEDICINE
Payer: COMMERCIAL

## 2024-09-25 ENCOUNTER — APPOINTMENT (OUTPATIENT)
Dept: MRI IMAGING | Facility: HOSPITAL | Age: 59
DRG: 392 | End: 2024-09-25
Attending: EMERGENCY MEDICINE
Payer: COMMERCIAL

## 2024-09-25 ENCOUNTER — APPOINTMENT (OUTPATIENT)
Dept: CT IMAGING | Facility: HOSPITAL | Age: 59
DRG: 392 | End: 2024-09-25
Attending: EMERGENCY MEDICINE
Payer: COMMERCIAL

## 2024-09-25 ENCOUNTER — HOSPITAL ENCOUNTER (OUTPATIENT)
Facility: HOSPITAL | Age: 59
Setting detail: OBSERVATION
Discharge: HOME | DRG: 392 | End: 2024-09-27
Attending: EMERGENCY MEDICINE | Admitting: HOSPITALIST
Payer: COMMERCIAL

## 2024-09-25 DIAGNOSIS — K83.8 DILATION OF COMMON BILE DUCT: Primary | ICD-10-CM

## 2024-09-25 DIAGNOSIS — R10.31 RIGHT LOWER QUADRANT ABDOMINAL PAIN: ICD-10-CM

## 2024-09-25 PROBLEM — R10.9 ABDOMINAL PAIN: Status: ACTIVE | Noted: 2024-09-25

## 2024-09-25 LAB
ALBUMIN SERPL-MCNC: 4.2 G/DL (ref 3.2–4.8)
ALP LIVER SERPL-CCNC: 133 U/L
ALT SERPL-CCNC: 26 U/L
ANION GAP SERPL CALC-SCNC: 4 MMOL/L (ref 0–18)
AST SERPL-CCNC: 18 U/L (ref ?–34)
BASOPHILS # BLD AUTO: 0.03 X10(3) UL (ref 0–0.2)
BASOPHILS NFR BLD AUTO: 0.2 %
BILIRUB DIRECT SERPL-MCNC: 0.2 MG/DL (ref ?–0.3)
BILIRUB SERPL-MCNC: 0.5 MG/DL (ref 0.3–1.2)
BILIRUB UR QL: NEGATIVE
BUN BLD-MCNC: 18 MG/DL (ref 9–23)
BUN/CREAT SERPL: 21.4 (ref 10–20)
CALCIUM BLD-MCNC: 9.2 MG/DL (ref 8.7–10.4)
CHLORIDE SERPL-SCNC: 107 MMOL/L (ref 98–112)
CLARITY UR: CLEAR
CO2 SERPL-SCNC: 24 MMOL/L (ref 21–32)
COLOR UR: COLORLESS
CREAT BLD-MCNC: 0.84 MG/DL
DEPRECATED RDW RBC AUTO: 39.7 FL (ref 35.1–46.3)
EGFRCR SERPLBLD CKD-EPI 2021: 80 ML/MIN/1.73M2 (ref 60–?)
EOSINOPHIL # BLD AUTO: 0.01 X10(3) UL (ref 0–0.7)
EOSINOPHIL NFR BLD AUTO: 0.1 %
ERYTHROCYTE [DISTWIDTH] IN BLOOD BY AUTOMATED COUNT: 12.8 % (ref 11–15)
GLUCOSE BLD-MCNC: 142 MG/DL (ref 70–99)
GLUCOSE UR-MCNC: NORMAL MG/DL
HCT VFR BLD AUTO: 44.4 %
HGB BLD-MCNC: 14.9 G/DL
HGB UR QL STRIP.AUTO: NEGATIVE
IMM GRANULOCYTES # BLD AUTO: 0.09 X10(3) UL (ref 0–1)
IMM GRANULOCYTES NFR BLD: 0.7 %
KETONES UR-MCNC: NEGATIVE MG/DL
LEUKOCYTE ESTERASE UR QL STRIP.AUTO: NEGATIVE
LIPASE SERPL-CCNC: 33 U/L (ref 12–53)
LYMPHOCYTES # BLD AUTO: 1.25 X10(3) UL (ref 1–4)
LYMPHOCYTES NFR BLD AUTO: 9.2 %
MCH RBC QN AUTO: 28.8 PG (ref 26–34)
MCHC RBC AUTO-ENTMCNC: 33.6 G/DL (ref 31–37)
MCV RBC AUTO: 85.7 FL
MONOCYTES # BLD AUTO: 0.62 X10(3) UL (ref 0.1–1)
MONOCYTES NFR BLD AUTO: 4.6 %
NEUTROPHILS # BLD AUTO: 11.62 X10 (3) UL (ref 1.5–7.7)
NEUTROPHILS # BLD AUTO: 11.62 X10(3) UL (ref 1.5–7.7)
NEUTROPHILS NFR BLD AUTO: 85.2 %
NITRITE UR QL STRIP.AUTO: NEGATIVE
OSMOLALITY SERPL CALC.SUM OF ELEC: 284 MOSM/KG (ref 275–295)
PH UR: 7 [PH] (ref 5–8)
PLATELET # BLD AUTO: 359 10(3)UL (ref 150–450)
POTASSIUM SERPL-SCNC: 4.5 MMOL/L (ref 3.5–5.1)
PROT SERPL-MCNC: 6.9 G/DL (ref 5.7–8.2)
PROT UR-MCNC: NEGATIVE MG/DL
RBC # BLD AUTO: 5.18 X10(6)UL
SODIUM SERPL-SCNC: 135 MMOL/L (ref 136–145)
SP GR UR STRIP: 1.01 (ref 1–1.03)
UROBILINOGEN UR STRIP-ACNC: NORMAL
WBC # BLD AUTO: 13.6 X10(3) UL (ref 4–11)

## 2024-09-25 PROCEDURE — 74181 MRI ABDOMEN W/O CONTRAST: CPT | Performed by: EMERGENCY MEDICINE

## 2024-09-25 PROCEDURE — 99223 1ST HOSP IP/OBS HIGH 75: CPT | Performed by: HOSPITALIST

## 2024-09-25 PROCEDURE — 76376 3D RENDER W/INTRP POSTPROCES: CPT | Performed by: EMERGENCY MEDICINE

## 2024-09-25 PROCEDURE — 74177 CT ABD & PELVIS W/CONTRAST: CPT | Performed by: EMERGENCY MEDICINE

## 2024-09-25 PROCEDURE — 99255 IP/OBS CONSLTJ NEW/EST HI 80: CPT | Performed by: INTERNAL MEDICINE

## 2024-09-25 RX ORDER — CYCLOBENZAPRINE HCL 10 MG
10 TABLET ORAL 2 TIMES DAILY PRN
Status: DISCONTINUED | OUTPATIENT
Start: 2024-09-25 | End: 2024-09-27

## 2024-09-25 RX ORDER — SODIUM CHLORIDE 9 MG/ML
INJECTION, SOLUTION INTRAVENOUS CONTINUOUS
Status: DISCONTINUED | OUTPATIENT
Start: 2024-09-25 | End: 2024-09-26

## 2024-09-25 RX ORDER — BISACODYL 10 MG
10 SUPPOSITORY, RECTAL RECTAL
Status: DISCONTINUED | OUTPATIENT
Start: 2024-09-25 | End: 2024-09-27

## 2024-09-25 RX ORDER — PANTOPRAZOLE SODIUM 40 MG/1
40 TABLET, DELAYED RELEASE ORAL
Status: DISCONTINUED | OUTPATIENT
Start: 2024-09-26 | End: 2024-09-27

## 2024-09-25 RX ORDER — FLUTICASONE PROPIONATE AND SALMETEROL 250; 50 UG/1; UG/1
1 POWDER RESPIRATORY (INHALATION) 2 TIMES DAILY
Status: DISCONTINUED | OUTPATIENT
Start: 2024-09-25 | End: 2024-09-27

## 2024-09-25 RX ORDER — MORPHINE SULFATE 4 MG/ML
4 INJECTION, SOLUTION INTRAMUSCULAR; INTRAVENOUS EVERY 2 HOUR PRN
Status: DISCONTINUED | OUTPATIENT
Start: 2024-09-25 | End: 2024-09-27

## 2024-09-25 RX ORDER — KETOROLAC TROMETHAMINE 15 MG/ML
15 INJECTION, SOLUTION INTRAMUSCULAR; INTRAVENOUS EVERY 6 HOURS PRN
Status: DISPENSED | OUTPATIENT
Start: 2024-09-25 | End: 2024-09-27

## 2024-09-25 RX ORDER — POLYETHYLENE GLYCOL 3350 17 G/17G
17 POWDER, FOR SOLUTION ORAL DAILY PRN
Status: DISCONTINUED | OUTPATIENT
Start: 2024-09-25 | End: 2024-09-27

## 2024-09-25 RX ORDER — POLYETHYLENE GLYCOL 3350 17 G/17G
17 POWDER, FOR SOLUTION ORAL DAILY
Status: DISCONTINUED | OUTPATIENT
Start: 2024-09-25 | End: 2024-09-27

## 2024-09-25 RX ORDER — MORPHINE SULFATE 4 MG/ML
4 INJECTION, SOLUTION INTRAMUSCULAR; INTRAVENOUS ONCE
Status: COMPLETED | OUTPATIENT
Start: 2024-09-25 | End: 2024-09-25

## 2024-09-25 RX ORDER — LEVOTHYROXINE SODIUM 50 UG/1
150 TABLET ORAL
Status: DISCONTINUED | OUTPATIENT
Start: 2024-09-26 | End: 2024-09-27

## 2024-09-25 RX ORDER — ONDANSETRON 2 MG/ML
4 INJECTION INTRAMUSCULAR; INTRAVENOUS EVERY 6 HOURS PRN
Status: DISCONTINUED | OUTPATIENT
Start: 2024-09-25 | End: 2024-09-27

## 2024-09-25 RX ORDER — MORPHINE SULFATE 2 MG/ML
1 INJECTION, SOLUTION INTRAMUSCULAR; INTRAVENOUS EVERY 2 HOUR PRN
Status: DISCONTINUED | OUTPATIENT
Start: 2024-09-25 | End: 2024-09-27

## 2024-09-25 RX ORDER — ROSUVASTATIN CALCIUM 10 MG/1
10 TABLET, COATED ORAL NIGHTLY
Status: DISCONTINUED | OUTPATIENT
Start: 2024-09-25 | End: 2024-09-27

## 2024-09-25 RX ORDER — SENNOSIDES 8.6 MG
17.2 TABLET ORAL NIGHTLY PRN
Status: DISCONTINUED | OUTPATIENT
Start: 2024-09-25 | End: 2024-09-27

## 2024-09-25 RX ORDER — ACETAMINOPHEN 500 MG
1000 TABLET ORAL EVERY 6 HOURS PRN
Status: DISCONTINUED | OUTPATIENT
Start: 2024-09-25 | End: 2024-09-27

## 2024-09-25 RX ORDER — MORPHINE SULFATE 2 MG/ML
2 INJECTION, SOLUTION INTRAMUSCULAR; INTRAVENOUS EVERY 2 HOUR PRN
Status: DISCONTINUED | OUTPATIENT
Start: 2024-09-25 | End: 2024-09-27

## 2024-09-25 NOTE — PROGRESS NOTES
Regla Garcia,   Your Ddimer is negative/normal, unlikely to have a blood clot. Also your xray of the chest and ribs fortunately show no signs of pneumonia, no collapsed lung and no rib fracture. I suspect it is a severe muscle spasm. Please take anti-inflammatory and muscle relaxants as prescribed and if no improvement feel free to follow up  -Dr. Miramontes

## 2024-09-25 NOTE — ED QUICK NOTES
Orders for admission, patient is aware of plan and ready to go upstairs. Any questions, please call ED RN Brittani at extension [phone].     Patient Covid vaccination status: Fully vaccinated     COVID Test Ordered in ED: None    COVID Suspicion at Admission: N/A    Running Infusions:  None    Mental Status/LOC at time of transport: A&Ox4    Other pertinent information: NPO  CIWA score: N/A   NIH score:  N/A

## 2024-09-25 NOTE — H&P
[facility]  part of [facility]    History & Physical    Radha Rodrigez Patient Status:  Inpatient    1965 MRN [id number]   Location [facility] 3W/SW Attending Ender Ramos MD   Hosp Day # 0 PCP Guille Miramontes MD     Date:  2024  Date of Admission:  2024    History provided by:patient  Chief Complaint:     Chief Complaint   Patient presents with    Abdomen/Flank Pain       HPI:   Radha Rodrigez is a(n) 58 year old female with a history of asthma and kidney stones who presents with right abd pain.    Pt's pain started with right shoulder and shoulder blade pain about a week ago.    This pain has now migrated to RUQ of abdomen.    Pt says that with certain movements she feels a twisting sensation that feels like a muscle spasm.   Denies trauma.    Denies relation to food.  No f/c.  No n/v/c/d.   No CP or SOB.    Pt has hx of cholecystectomy but had some CBD dilation on CT however MRCP is negative.       History     Past Medical History:    Back problem    Calculus of kidney    Disorder of thyroid    Diverticulosis of large intestine    Esophageal reflux    Hx of motion sickness    Morbid obesity with BMI of 50.0-59.9, adult (HCC)    Pneumonia due to organism    PONV (postoperative nausea and vomiting)    Pre-diabetes    Seasonal allergies    Thyroid disease    Vitamin D deficiency     Past Surgical History:   Procedure Laterality Date    Colonoscopy  2021    Hernia surgery      Hysterectomy  2012    Other surgical history Left 2024    Cystoscopy left ureteroscopy, laser lithotripsy, retrograde pyelogram, stent placement    Removal gallbladder      Repair ing hernia,5+y/o,reducibl       Family History   Problem Relation Age of Onset    Ovarian Cancer Mother     Breast Cancer Sister 27     Social History:  Social History     Socioeconomic History    Marital status:    Tobacco Use    Smoking status: Never     Passive exposure: Never     Smokeless tobacco: Never   Vaping Use    Vaping status: Never Used   Substance and Sexual Activity    Alcohol use: Not Currently    Drug use: Never     Social Determinants of Health     Financial Resource Strain: Low Risk  (3/11/2024)    Financial Resource Strain     Difficulty of Paying Living Expenses: Not very hard     Med Affordability: No   Food Insecurity: No Food Insecurity (9/25/2024)    Food Insecurity     Food Insecurity: Never true   Transportation Needs: No Transportation Needs (9/25/2024)    Transportation Needs     Lack of Transportation: No   Housing Stability: Low Risk  (9/25/2024)    Housing Stability     Housing Instability: No     Allergies/Medications:   Allergies:   Allergies   Allergen Reactions    Augmentin [Amoxicillin-Pot Clavulanate] NAUSEA AND VOMITING    Coconut Oil NAUSEA AND VOMITING    Cortisone DIZZINESS and OTHER (SEE COMMENTS)     Blood drop, dizziness, flushed    Adhesive Tape RASH     Needs paper tape       Home medications  Medications Prior to Admission   Medication Sig    predniSONE 10 MG Oral Tab Take 3 tablets (30 mg total) by mouth daily for 3 days, THEN 2 tablets (20 mg total) daily for 3 days, THEN 1 tablet (10 mg total) daily for 3 days. TAKE WITH FOOD..    naproxen 500 MG Oral Tab Take 1 tablet (500 mg total) by mouth 2 (two) times daily as needed.    cyclobenzaprine 10 MG Oral Tab Take 1 tablet (10 mg total) by mouth 2 (two) times daily as needed for Muscle spasms (May cause drowsiness no driving or operating machinery while taking this medication).    rosuvastatin 10 MG Oral Tab Take 1 tablet (10 mg total) by mouth nightly. FOR CHOLESTEROL.    omeprazole 20 MG Oral Capsule Delayed Release Take 1 capsule (20 mg total) by mouth every morning before breakfast. FOR ACID REFLUX.    fluticasone-salmeterol 115-21 MCG/ACT Inhalation Aerosol Inhale 2 puffs into the lungs 2 (two) times daily. FOR ASTHMA    fluticasone propionate 50 MCG/ACT Nasal Suspension 2 sprays by Nasal  route daily.    levothyroxine 150 MCG Oral Tab Take 1 tablet (150 mcg total) by mouth daily.    cholecalciferol (VITAMIN D3) 125 MCG (5000 UT) Oral Cap Take 1 capsule (5,000 Units total) by mouth daily.    baclofen 10 MG Oral Tab Take 1 tablet (10 mg total) by mouth 3 (three) times daily as needed.    semaglutide-weight management (WEGOVY) 2.4 MG/0.75ML Subcutaneous Solution Auto-injector Inject 0.75 mL (2.4 mg total) into the skin once a week.    semaglutide-weight management 1.7 MG/0.75ML Subcutaneous Solution Auto-injector Inject 0.75 mL (1.7 mg total) into the skin once a week.    albuterol 108 (90 Base) MCG/ACT Inhalation Aero Soln Inhale 2-4 puffs into the lungs every 4 to 6 hours as needed for Wheezing or Shortness of Breath (cough, asthma, chest tightness). FOR ASTHMA. Pharmacist, please switch to formulary alternative per insurance coverage, ok to replace with proair, ventolin, proventil, or any other albuterol inhaler. -drkp       Review of Systems:     The remainder of the ROS is negative except as noted in the HPI.    Physical Exam:   Vital Signs:  Blood pressure 156/70, pulse 66, temperature 98 °F (36.7 °C), resp. rate 20, height 5' 2\" (1.575 m), weight 265 lb 1.6 oz (120.2 kg), SpO2 98%.     Gen:   NAD.   A and O x 3  Eyes:  PERRL  Moist mucus membranes.    CV:    RRR.  No m/g/r  Pulm:   CTA bilat  Abd:   +bs, soft, tender over RUQ to deep palpation, no rebound or guarding, ND  LE:   No c/c/e  Neuro:   nonfocal  Skin:  No rash    Results:     Lab Results   Component Value Date    WBC 13.6 (H) 09/25/2024    HGB 14.9 09/25/2024    HCT 44.4 09/25/2024    .0 09/25/2024    CREATSERUM 0.84 09/25/2024    BUN 18 09/25/2024     (L) 09/25/2024    K 4.5 09/25/2024     09/25/2024    CO2 24.0 09/25/2024     (H) 09/25/2024    CA 9.2 09/25/2024    ALB 4.2 09/25/2024    ALKPHO 133 (H) 09/25/2024    BILT 0.5 09/25/2024    TP 6.9 09/25/2024    AST 18 09/25/2024    ALT 26 09/25/2024    PTT 32.5  04/22/2017    INR 1.0 04/22/2017    PT 12.0 11/24/2012    T4F 1.3 04/29/2023    TSH 0.621 07/01/2024    LIP 33 09/25/2024    DDIMER 0.48 09/24/2024    ESRML 12 07/01/2024    CRP 0.70 07/01/2024    MG 1.7 03/04/2024    PHOS 3.2 03/04/2024    B12 332 07/01/2024       MRI ABDOMEN AND MRCP W/3D (CPT=74181/89089)    Result Date: 9/25/2024  CONCLUSION:   Cholecystectomy.  No significant biliary ductal dilatation or evidence of filling defect within the nondilated common bile duct.  Hepatic steatosis.  Lesser incidental findings as above.   Dictated by (CST): Horace Mcintyre MD on 9/25/2024 at 10:40 AM     Finalized by (CST): Horace Mcintyre MD on 9/25/2024 at 10:59 AM          CT ABDOMEN+PELVIS(CONTRAST ONLY)(CPT=74177)    Result Date: 9/25/2024  CONCLUSION:  1.  Post cholecystectomy.  No significant intrahepatic biliary ductal dilatation.  Common bile duct is borderline enlarged at 8 mm.  The duct is larger compared to 3/7/24 CT, raising the possibility of a biliary stones or biliary stricture.  2.  Non-specific fecalization of distal ileal and terminal ileum, suggesting slow bowel transit. 3.  Left renal cyst. 4.  Post hysterectomy.  No adnexal mass. 5.  Funneled appearance of the base the urinary bladder compatible with prolapse.    Dictated by (CST): Clifton Whitlock MD on 9/25/2024 at 6:34 AM     Finalized by (CST): Clifton Whitlock MD on 9/25/2024 at 6:40 AM          XR CHEST PA + LAT CHEST (CPT=71046)    Result Date: 9/24/2024  CONCLUSION:  1. No acute cardiopulmonary finding.     Dictated by (CST): Gasper Roy MD on 9/24/2024 at 9:00 PM     Finalized by (CST): Gasper Roy MD on 9/24/2024 at 9:01 PM          XR RIBS, UNILATERAL (2 VIEWS), RIGHT (CPT=71100)    Result Date: 9/24/2024  CONCLUSION:  1. Negative right rib series.    Dictated by (CST): Gasper Roy MD on 9/24/2024 at 8:58 PM     Finalized by (CST): Gasper Roy MD on 9/24/2024 at 9:00 PM               Assessment/Plan:       Right scapular pain that  has no radiated to RUQ abd pain.  Dilation of common bile duct on CT however MRCP is negative for choledocholithiasis.  This pain seems musculoskeletal.  - MRI thoracic spine  - GI to consult  - NPO unless ok with GI to eat  - gentle IVF  - toradol prn, morphine prn  - cont PO protonix    HL  - crestor    Hx ofasthma  - cont advair    Hx of hypothyroidism   - cont levothyroxine         dvt proph - SCDs    Code status - Full    Ender Barahona MD  9/25/2024

## 2024-09-25 NOTE — CONSULTS
Is this a shared or split note between Advanced Practice Provider and Physician? Yes      [facility]   Gastroenterology Consultation Note    Rahda Rodrigez  Patient Status:    Inpatient  Date of Admission:         9/25/2024, Hospital day #0  Attending:   Ansley Willams MD  PCP:     Guille Miramontes MD    Reason for Consultation:  ABD pain, Biliary ductal dilatation    History of Present Illness:  Radha Rodrigez is a 58 year old female w/ PMHx of BMI 48.49, nephrolithiasis s/p cystoscopy, left ureteroscopy, laser lithotripsy, stent placement, GERD, diverticulosis, pre-DM, CCY who presents to the ED with R shoulder blade pain that radiates to the RUQ.    Pt states that she started to have R shoulder pain that then migrated to her RUQ that came on following heavy lifting at work.  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  The pain is excruciating when she is moving.  She can pinpoint it/touch it under her R rib.  She denies N/V, dyspepsia, difficulty/painful swallowing, black/bloody stools, mild chronic constipation with BM 3-4 times per week, no diarrhea, no fever, chills, no chest pain, SOB.    Pertinent Family Hx:  - No known history of esophageal, gastric or colon cancers  - No known IBD  - No known liver pathologies    Endoscopy Hx:  - EGD 2019 with Dr. Hernandez negative for H. Pylori, without remarkable findings    -Colonoscopy 11/30/2021 for screening and epigastric abdominal pain. The examination was complete by the report to the cecum. The ileum was not visualized. Random biopsies were not performed. A 10-year screening examination was advised     Social Hx:  - No tobacco use  - No ETOH  - Denies cannabis/illicit drug use  - Denies NSAIDs  - Occupation: Lunch room      History:  Past Medical History:    Back problem    Calculus of kidney    Disorder of thyroid    Diverticulosis of large intestine    Esophageal reflux    Hx of motion sickness     Morbid obesity with BMI of 50.0-59.9, adult (HCC)    Pneumonia due to organism    PONV (postoperative nausea and vomiting)    Pre-diabetes    Seasonal allergies    Thyroid disease    Vitamin D deficiency     Past Surgical History:   Procedure Laterality Date    Colonoscopy  11/30/2021    Hernia surgery      Hysterectomy  12/21/2012    Other surgical history Left 03/12/2024    Cystoscopy left ureteroscopy, laser lithotripsy, retrograde pyelogram, stent placement    Removal gallbladder      Repair ing hernia,5+y/o,reducibl       Family History   Problem Relation Age of Onset    Ovarian Cancer Mother     Breast Cancer Sister 27      reports that she has never smoked. She has never been exposed to tobacco smoke. She has never used smokeless tobacco. She reports that she does not currently use alcohol. She reports that she does not use drugs.    Allergies:  Allergies   Allergen Reactions    Augmentin [Amoxicillin-Pot Clavulanate] NAUSEA AND VOMITING    Coconut Oil NAUSEA AND VOMITING    Cortisone DIZZINESS and OTHER (SEE COMMENTS)     Blood drop, dizziness, flushed    Adhesive Tape RASH     Needs paper tape       Medications:    Current Facility-Administered Medications:     morphINE PF 2 MG/ML injection 1 mg, 1 mg, Intravenous, Q2H PRN **OR** morphINE PF 2 MG/ML injection 2 mg, 2 mg, Intravenous, Q2H PRN **OR** morphINE PF 4 MG/ML injection 4 mg, 4 mg, Intravenous, Q2H PRN    Review of Systems:   CONSTITUTIONAL:  negative for fevers, chills, unintentional weight loss   EYES:  negative for diplopia or change in vision   RESPIRATORY:  negative for severe shortness of breath  CARDIOVASCULAR:  negative for crushing sub-sternal chest pain  GASTROINTESTINAL:  see HPI  GENITOURINARY:  negative for dysuria or gross hematuria  INTEGUMENT/BREAST:  SKIN:  negative for jaundice or new rash   ALLERGIC/IMMUNOLOGIC:  negative for hay fever   ENDOCRINE:  negative for cold intolerance and heat intolerance  MUSCULOSKELETAL:  negative for  joint effusion/severe erythema  NEURO: negative for new loss of consciousness or dizziness   BEHAVIOR/PSYCH:  negative for psychotic behavior    Physical Exam:    Blood pressure 156/70, pulse 66, temperature 98 °F (36.7 °C), resp. rate 20, height 5' 2\" (1.575 m), weight 265 lb 1.6 oz (120.2 kg), SpO2 98%. Body mass index is 48.49 kg/m².    Gen: awake, alert patient, NAD  HEENT: EOMI, the sclera appears anicteric, oropharynx clear, mucus membranes appear moist  CV: RRR  Lung: no conversational dyspnea   Abdomen: soft, TTP in RUQ, pinpoint with pain, Carnett's sign elicited, no rebound or gaurding, ND abdomen with NABS appreciated   Back: No CVA tenderness   Skin: dry, warm, no jaundice  Ext: no LE edema is evident  Neuro: Alert and interactive  Psych: calm, cooperative    Laboratory Data:  Lab Results   Component Value Date    WBC 13.6 09/25/2024    HGB 14.9 09/25/2024    HCT 44.4 09/25/2024    .0 09/25/2024    CREATSERUM 0.84 09/25/2024    BUN 18 09/25/2024     09/25/2024    K 4.5 09/25/2024     09/25/2024    CO2 24.0 09/25/2024     09/25/2024    CA 9.2 09/25/2024    ALB 4.2 09/25/2024    ALKPHO 133 09/25/2024    BILT 0.5 09/25/2024    TP 6.9 09/25/2024    AST 18 09/25/2024    ALT 26 09/25/2024    LIP 33 09/25/2024       Imaging:  CT ABDOMEN+PELVIS(CONTRAST ONLY)(CPT=74177)    Result Date: 9/25/2024  CONCLUSION:  1.  Post cholecystectomy.  No significant intrahepatic biliary ductal dilatation.  Common bile duct is borderline enlarged at 8 mm.  The duct is larger compared to 3/7/24 CT, raising the possibility of a biliary stones or biliary stricture.  2.  Non-specific fecalization of distal ileal and terminal ileum, suggesting slow bowel transit. 3.  Left renal cyst. 4.  Post hysterectomy.  No adnexal mass. 5.  Funneled appearance of the base the urinary bladder compatible with prolapse.    Dictated by (CST): Clifton Whitlock MD on 9/25/2024 at 6:34 AM     Finalized by (CST): Clifton Whitlock MD on  9/25/2024 at 6:40 AM          XR CHEST PA + LAT CHEST (CPT=71046)    Result Date: 9/24/2024  CONCLUSION:  1. No acute cardiopulmonary finding.     Dictated by (CST): Gasper Roy MD on 9/24/2024 at 9:00 PM     Finalized by (CST): Gasper Roy MD on 9/24/2024 at 9:01 PM          XR RIBS, UNILATERAL (2 VIEWS), RIGHT (CPT=71100)    Result Date: 9/24/2024  CONCLUSION:  1. Negative right rib series.    Dictated by (CST): Gasper Roy MD on 9/24/2024 at 8:58 PM     Finalized by (CST): Gasper Roy MD on 9/24/2024 at 9:00 PM           Assessment & Plan   Radha Rodrigez is a 58 year old female w/ PMHx of BMI 48.49, nephrolithiasis s/p cystoscopy, left ureteroscopy, laser lithotripsy, stent placement, GERD, diverticulosis, pre-DM, CCY, hx of multiple ventral herniorrhaphies, NAFLD who presents to the ED with R shoulder blade pain that radiates to the RUQ.    #ABD Pain  #Biliary ductal dilatation  -Labs on admission with slightly elevated ALP, otherwise LFTs WNL, leukocytosis, no anemia  -CT A/P revealed post CCY state with no intrahepatic ductal dilatation but CBD borderline enlarged at 8mm and enlarged compared to CT from 3/2024 concerning for biliary stone or stricture.    -MRI/MRCP without biliary ductal dilatation or evidence of filling defect within the nondilated CBD.  Hepatic steatosis.  Pancreas normal.  Possible cysts in thoracic spine.  -Carnett's elicited  -Etiology remains unclear.  Possible MSK injury following heavy lifting at work though XR of ribs unremarkable.  Primary ordered thoracic spine MRI.  Recommend conservative management with trending LFTs and await imaging results.    Recommend:  -MRI thoracic spine per primary  -Trend LFTs  -Encouraged bowel regimen while on narcotics given chronic constipation  -Ok for low fat diet as tolerated from GI stance  -Endoscopic evaluation pending clinical course    Thank you for the opportunity to participate in the care of this patient.    Case discussed  with Katerine Sal MD and Drea NANCE.    Margot De La Vega DNP, [facility] Gastroenterology  9/25/2024

## 2024-09-25 NOTE — PROGRESS NOTES
Please relay to pt if not read:(Multiple imaging/labs summarized in message below):   Regla Garcia,   Your Ddimer is negative/normal, unlikely to have a blood clot. Also your xray of the chest and ribs fortunately show no signs of pneumonia, no collapsed lung and no rib fracture. I suspect it is a severe muscle spasm. Please take anti-inflammatory and muscle relaxants as prescribed and if no improvement feel free to follow up  -Dr. Miramontes

## 2024-09-25 NOTE — PAYOR COMM NOTE
ADMISSION REVIEW     Payor: [facility]  Subscriber #:  [id number]  Authorization Number: [id number]    Admit date: 9/25/24  Admit time:  9:10 AM       REVIEW DOCUMENTATION:     ED Provider Notes        ED Provider Notes signed by Bisi Haro MD at 9/25/2024  7:00 AM       Author: Bisi Haro MD Service: -- Author Type: Physician    Filed: 9/25/2024  7:00 AM Date of Service: 9/25/2024  4:44 AM Status: Addendum    : Bisi Haro MD (Physician)    Related Notes: Original Note by Bisi Haro MD (Physician) filed at 9/25/2024  6:51 AM           Patient Seen in: [facility] Emergency Department    History     Chief Complaint   Patient presents with    Abdomen/Flank Pain       HPI    History is provided by patient/independent historian: patient, patient's   58 year old female with history of nephrolithiasis,  cholecystitis, here with complaints of right-sided shoulder blade pain yesterday, now with right upper quadrant abdominal pain today.  It comes and spasms.  No fever, nausea vomiting or changes in bowel movements.  She does report increased urinary frequency.   reports she has a history of kidney stones.    History reviewed.   Past Medical History:    Back problem    Calculus of kidney    Disorder of thyroid    Diverticulosis of large intestine    Esophageal reflux    Hx of motion sickness    Morbid obesity with BMI of 50.0-59.9, adult (HCC)    Pneumonia due to organism    PONV (postoperative nausea and vomiting)    Pre-diabetes    Seasonal allergies    Thyroid disease    Vitamin D deficiency         History reviewed.   Past Surgical History:   Procedure Laterality Date    Colonoscopy  11/30/2021    Hernia surgery      Hysterectomy  12/21/2012    Other surgical history Left 03/12/2024    Cystoscopy left ureteroscopy, laser lithotripsy, retrograde pyelogram, stent placement    Removal gallbladder      Repair ing hernia,5+y/o,reducibl           Home Medications reviewed :  (Not in  a hospital admission)        History reviewed.   Social History     Socioeconomic History    Marital status:    Tobacco Use    Smoking status: Never     Passive exposure: Never    Smokeless tobacco: Never   Vaping Use    Vaping status: Never Used   Substance and Sexual Activity    Alcohol use: Not Currently    Drug use: Never         ROS  Review of Systems   Respiratory:  Negative for shortness of breath.    Cardiovascular:  Negative for chest pain.   Gastrointestinal:  Positive for abdominal pain.   Genitourinary:  Positive for frequency.   All other systems reviewed and are negative.     All other pertinent organ systems are reviewed and are negative.      Physical Exam     ED Triage Vitals   BP 09/25/24 0330 (!) 131/97   Pulse 09/25/24 0330 100   Resp 09/25/24 0330 20   Temp 09/25/24 0330 98 °F (36.7 °C)   Temp src --    SpO2 09/25/24 0500 97 %   O2 Device 09/25/24 0500 None (Room air)     Vital signs reviewed.      Physical Exam  Vitals and nursing note reviewed.   Constitutional:       Comments: Patient appears uncomfortable   Cardiovascular:      Pulses: Normal pulses.   Pulmonary:      Effort: No respiratory distress.   Abdominal:      General: There is no distension.      Tenderness: There is abdominal tenderness in the right upper quadrant.   Neurological:      Mental Status: She is alert.         ED Course       Labs:     Labs Reviewed   CBC WITH DIFFERENTIAL WITH PLATELET - Abnormal; Notable for the following components:       Result Value    WBC 13.6 (*)     Neutrophil Absolute Prelim 11.62 (*)     Neutrophil Absolute 11.62 (*)     All other components within normal limits   BASIC METABOLIC PANEL (8) - Abnormal; Notable for the following components:    Glucose 142 (*)     Sodium 135 (*)     BUN/CREA Ratio 21.4 (*)     All other components within normal limits   HEPATIC FUNCTION PANEL (7) - Abnormal; Notable for the following components:    Alkaline Phosphatase 133 (*)     All other components  within normal limits   URINALYSIS WITH CULTURE REFLEX - Abnormal; Notable for the following components:    Urine Color Colorless (*)     All other components within normal limits   LIPASE - Normal         My EKG Interpretation:   As reviewed and Interpreted by me      Imaging Results Available and Reviewed while in ED:   CT ABDOMEN+PELVIS(CONTRAST ONLY)(CPT=74177)    Result Date: 9/25/2024  CONCLUSION:  1.  Post cholecystectomy.  No significant intrahepatic biliary ductal dilatation.  Common bile duct is borderline enlarged at 8 mm.  The duct is larger compared to 3/7/24 CT, raising the possibility of a biliary stones or biliary stricture.  2.  Non-specific fecalization of distal ileal and terminal ileum, suggesting slow bowel transit. 3.  Left renal cyst. 4.  Post hysterectomy.  No adnexal mass. 5.  Funneled appearance of the base the urinary bladder compatible with prolapse.    Dictated by (CST): Clifton Whitlock MD on 9/25/2024 at 6:34 AM     Finalized by (CST): Clifton Whitlock MD on 9/25/2024 at 6:40 AM          XR CHEST PA + LAT CHEST (CPT=71046)    Result Date: 9/24/2024  CONCLUSION:  1. No acute cardiopulmonary finding.     Dictated by (CST): Gasper Roy MD on 9/24/2024 at 9:00 PM     Finalized by (CST): Gasper Roy MD on 9/24/2024 at 9:01 PM          XR RIBS, UNILATERAL (2 VIEWS), RIGHT (CPT=71100)    Result Date: 9/24/2024  CONCLUSION:  1. Negative right rib series.    Dictated by (CST): aGsper Roy MD on 9/24/2024 at 8:58 PM     Finalized by (CST): Gasper Roy MD on 9/24/2024 at 9:00 PM         My review and independent interpretation of CT images: no free fluid. Radiology report corroborates this in addition to other details as reported by them.      Decision rules/scores evaluated: none      Diagnostic labs/tests considered but not ordered: none    ED Medications Administered:   Medications   morphINE PF 4 MG/ML injection 4 mg (4 mg Intravenous Given 9/25/24 1181)   sodium chloride 0.9 % IV bolus  1,000 mL (0 mL Intravenous Stopped 9/25/24 0614)   iopamidol 76% (ISOVUE-370) injection for power injector (70 mL Intravenous Given 9/25/24 0620)            - pt still with pain    MDM       Medical Decision Making      Differential Diagnosis: After obtaining the patient's history, performing the physical exam and reviewing the diagnostics, multiple initial diagnoses were considered based on the presenting problem including nephrolithiasis, UTI, choledocholithiasis    External document review: I personally reviewed available external medical records for any recent pertinent discharge summaries, testing, and procedures - the findings are as follows: yesterday visit with Dr. Miramontes for dyspnea on exertion    Complicating Factors: The patient already  has a past medical history of Back problem, Calculus of kidney, Disorder of thyroid, Diverticulosis of large intestine, Esophageal reflux, motion sickness, Morbid obesity with BMI of 50.0-59.9, adult (HCC), Pneumonia due to organism, PONV (postoperative nausea and vomiting), Pre-diabetes, Seasonal allergies, Thyroid disease, and Vitamin D deficiency. to contribute to the complexity of this ED evaluation.    Procedures performed: none    Discussed management with physician/appropriate source: Dr. Willams, Dr. Briscoe- requesting hold on antibiotics    Considered admission/deescalation of care for: abdominal pain    Social determinants of health affecting patient care: none    Prescription medications considered: discussed continuing current medication regimen    The patient requires continuous monitoring for: abdominal pain    Shared decision making: discussed possible admission        Disposition and Plan     Clinical Impression:  1. Dilation of common bile duct        Disposition:  Admit    Follow-up:  No follow-up provider specified.    Medications Prescribed:  Current Discharge Medication List          Hospital Problems       Present on Admission  Date Reviewed:  9/24/2024            ICD-10-CM Noted POA    * (Principal) Dilation of common bile duct K83.8 9/25/2024 Unknown                  Signed by Bisi Haro MD on 9/25/2024  7:00 AM         MEDICATIONS ADMINISTERED IN LAST 1 DAY:  cyclobenzaprine (Flexeril) tab 10 mg       Date Action Dose Route User    9/25/2024 1309 Given 10 mg Oral Drea Barroso RN          fluticasone-salmeterol (Advair Diskus) 250-50 MCG/ACT inhaler 1 puff       Date Action Dose Route User    9/25/2024 1309 Given 1 puff Inhalation Drea Barroso RN          iopamidol 76% (ISOVUE-370) injection for power injector       Date Action Dose Route User    9/25/2024 0620 Given 70 mL Intravenous Noé Cavanaugh          ketorolac (Toradol) 15 MG/ML injection 15 mg       Date Action Dose Route User    9/25/2024 1523 Given 15 mg Intravenous Drea Barroso RN          morphINE PF 2 MG/ML injection 2 mg       Date Action Dose Route User    9/25/2024 0934 Given 2 mg Intravenous Drea Barroso RN          morphINE PF 4 MG/ML injection 4 mg       Date Action Dose Route User    9/25/2024 0459 Given 4 mg Intravenous Juju Gutierrez RN          sodium chloride 0.9% infusion       Date Action Dose Route User    9/25/2024 1306 New Bag (none) Intravenous Drea Barroso RN          sodium chloride 0.9 % IV bolus 1,000 mL       Date Action Dose Route User    9/25/2024 0500 New Bag 1,000 mL Intravenous Juju Gutierrez RN            Vitals (last day)       Date/Time Temp Pulse Resp BP SpO2 Weight O2 Device O2 Flow Rate (L/min) [facility]    09/25/24 0925 -- -- -- -- -- 265 lb 1.6 oz (120.2 kg) -- -- LP    09/25/24 0913 -- 66 20 156/70 98 % -- -- -- LP    09/25/24 0645 -- 74 15 -- 98 % -- -- -- AC    09/25/24 0529 -- 68 -- -- -- -- -- --     09/25/24 0500 -- 74 14 -- 97 % -- None (Room air) -- CR    09/25/24 0330 98 °F (36.7 °C) 100 20 131/97 -- 265 lb (120.2 kg) -- -- [place]       9/25/2024 H&P    [facility]  part of [facility]     History &  Physical           Radha Rodrigez Patient Status:  Inpatient    1965 MRN [id number]   Location [facility] 3W/SW Attending Ender Ramos MD   Hosp Day # 0 PCP Guille Miramontes MD      Date:  2024  Date of Admission:  2024     History provided by:patient  Chief Complaint:          Chief Complaint   Patient presents with    Abdomen/Flank Pain         HPI:   Radha Rodrigez is a(n) 58 year old female with a history of asthma and kidney stones who presents with right abd pain.    Pt's pain started with right shoulder and shoulder blade pain about a week ago.    This pain has now migrated to RUQ of abdomen.    Pt says that with certain movements she feels a twisting sensation that feels like a muscle spasm.   Denies trauma.    Denies relation to food.  No f/c.  No n/v/c/d.   No CP or SOB.    Pt has hx of cholecystectomy but had some CBD dilation on CT however MRCP is negative.        History      Past Medical History       Past Medical History:    Back problem    Calculus of kidney    Disorder of thyroid    Diverticulosis of large intestine    Esophageal reflux    Hx of motion sickness    Morbid obesity with BMI of 50.0-59.9, adult (HCC)    Pneumonia due to organism    PONV (postoperative nausea and vomiting)    Pre-diabetes    Seasonal allergies    Thyroid disease    Vitamin D deficiency         Past Surgical History         Past Surgical History:   Procedure Laterality Date    Colonoscopy   2021    Hernia surgery        Hysterectomy   2012    Other surgical history Left 2024     Cystoscopy left ureteroscopy, laser lithotripsy, retrograde pyelogram, stent placement    Removal gallbladder        Repair ing hernia,5+y/o,reducibl             Family History         Family History   Problem Relation Age of Onset    Ovarian Cancer Mother      Breast Cancer Sister 27         Social History:  Short Social Hx on File   Social History            Socioeconomic History    Marital  status:    Tobacco Use    Smoking status: Never       Passive exposure: Never    Smokeless tobacco: Never   Vaping Use    Vaping status: Never Used   Substance and Sexual Activity    Alcohol use: Not Currently    Drug use: Never      Social Determinants of Health           Financial Resource Strain: Low Risk  (3/11/2024)     Financial Resource Strain      Difficulty of Paying Living Expenses: Not very hard      Med Affordability: No   Food Insecurity: No Food Insecurity (9/25/2024)     Food Insecurity      Food Insecurity: Never true   Transportation Needs: No Transportation Needs (9/25/2024)     Transportation Needs      Lack of Transportation: No   Housing Stability: Low Risk  (9/25/2024)     Housing Stability      Housing Instability: No         Allergies/Medications:   Allergies:   Allergies         Allergies   Allergen Reactions    Augmentin [Amoxicillin-Pot Clavulanate] NAUSEA AND VOMITING    Coconut Oil NAUSEA AND VOMITING    Cortisone DIZZINESS and OTHER (SEE COMMENTS)       Blood drop, dizziness, flushed    Adhesive Tape RASH       Needs paper tape            Home medications  Prescriptions Prior to Admission        Medications Prior to Admission   Medication Sig    predniSONE 10 MG Oral Tab Take 3 tablets (30 mg total) by mouth daily for 3 days, THEN 2 tablets (20 mg total) daily for 3 days, THEN 1 tablet (10 mg total) daily for 3 days. TAKE WITH FOOD..    naproxen 500 MG Oral Tab Take 1 tablet (500 mg total) by mouth 2 (two) times daily as needed.    cyclobenzaprine 10 MG Oral Tab Take 1 tablet (10 mg total) by mouth 2 (two) times daily as needed for Muscle spasms (May cause drowsiness no driving or operating machinery while taking this medication).    rosuvastatin 10 MG Oral Tab Take 1 tablet (10 mg total) by mouth nightly. FOR CHOLESTEROL.    omeprazole 20 MG Oral Capsule Delayed Release Take 1 capsule (20 mg total) by mouth every morning before breakfast. FOR ACID REFLUX.     fluticasone-salmeterol 115-21 MCG/ACT Inhalation Aerosol Inhale 2 puffs into the lungs 2 (two) times daily. FOR ASTHMA    fluticasone propionate 50 MCG/ACT Nasal Suspension 2 sprays by Nasal route daily.    levothyroxine 150 MCG Oral Tab Take 1 tablet (150 mcg total) by mouth daily.    cholecalciferol (VITAMIN D3) 125 MCG (5000 UT) Oral Cap Take 1 capsule (5,000 Units total) by mouth daily.    baclofen 10 MG Oral Tab Take 1 tablet (10 mg total) by mouth 3 (three) times daily as needed.    semaglutide-weight management (WEGOVY) 2.4 MG/0.75ML Subcutaneous Solution Auto-injector Inject 0.75 mL (2.4 mg total) into the skin once a week.    semaglutide-weight management 1.7 MG/0.75ML Subcutaneous Solution Auto-injector Inject 0.75 mL (1.7 mg total) into the skin once a week.    albuterol 108 (90 Base) MCG/ACT Inhalation Aero Soln Inhale 2-4 puffs into the lungs every 4 to 6 hours as needed for Wheezing or Shortness of Breath (cough, asthma, chest tightness). FOR ASTHMA. Pharmacist, please switch to formulary alternative per insurance coverage, ok to replace with proair, ventolin, proventil, or any other albuterol inhaler. -jovan            Review of Systems:      The remainder of the ROS is negative except as noted in the HPI.     Physical Exam:   Vital Signs:  Blood pressure 156/70, pulse 66, temperature 98 °F (36.7 °C), resp. rate 20, height 5' 2\" (1.575 m), weight 265 lb 1.6 oz (120.2 kg), SpO2 98%.     Gen:   NAD.   A and O x 3  Eyes:  PERRL  Moist mucus membranes.    CV:    RRR.  No m/g/r  Pulm:   CTA bilat  Abd:   +bs, soft, tender over RUQ to deep palpation, no rebound or guarding, ND  LE:   No c/c/e  Neuro:   nonfocal  Skin:  No rash     Results:            Lab Results   Component Value Date     WBC 13.6 (H) 09/25/2024     HGB 14.9 09/25/2024     HCT 44.4 09/25/2024     .0 09/25/2024     CREATSERUM 0.84 09/25/2024     BUN 18 09/25/2024      (L) 09/25/2024     K 4.5 09/25/2024      09/25/2024      CO2 24.0 09/25/2024      (H) 09/25/2024     CA 9.2 09/25/2024     ALB 4.2 09/25/2024     ALKPHO 133 (H) 09/25/2024     BILT 0.5 09/25/2024     TP 6.9 09/25/2024     AST 18 09/25/2024     ALT 26 09/25/2024     PTT 32.5 04/22/2017     INR 1.0 04/22/2017     PT 12.0 11/24/2012     T4F 1.3 04/29/2023     TSH 0.621 07/01/2024     LIP 33 09/25/2024     DDIMER 0.48 09/24/2024     ESRML 12 07/01/2024     CRP 0.70 07/01/2024     MG 1.7 03/04/2024     PHOS 3.2 03/04/2024     B12 332 07/01/2024         MRI ABDOMEN AND MRCP W/3D (CPT=74181/79837)     Result Date: 9/25/2024  CONCLUSION:          Cholecystectomy.  No significant biliary ductal dilatation or evidence of filling defect within the nondilated common bile duct.  Hepatic steatosis.  Lesser incidental findings as above.   Dictated by (CST): Horace Mcintyre MD on 9/25/2024 at 10:40 AM     Finalized by (CST): Horace Mcintyre MD on 9/25/2024 at 10:59 AM           CT ABDOMEN+PELVIS(CONTRAST ONLY)(CPT=74177)     Result Date: 9/25/2024  CONCLUSION:         1.  Post cholecystectomy.  No significant intrahepatic biliary ductal dilatation.  Common bile duct is borderline enlarged at 8 mm.  The duct is larger compared to 3/7/24 CT, raising the possibility of a biliary stones or biliary stricture.  2.  Non-specific fecalization of distal ileal and terminal ileum, suggesting slow bowel transit. 3.  Left renal cyst. 4.  Post hysterectomy.  No adnexal mass. 5.  Funneled appearance of the base the urinary bladder compatible with prolapse.    Dictated by (CST): Clifton Whitlock MD on 9/25/2024 at 6:34 AM     Finalized by (CST): Clifton Whitlock MD on 9/25/2024 at 6:40 AM           XR CHEST PA + LAT CHEST (CPT=71046)     Result Date: 9/24/2024  CONCLUSION:         1. No acute cardiopulmonary finding.     Dictated by (CST): Gasper Roy MD on 9/24/2024 at 9:00 PM     Finalized by (CST): Gasper Roy MD on 9/24/2024 at 9:01 PM           XR RIBS, UNILATERAL (2 VIEWS), RIGHT  (CPT=71100)     Result Date: 9/24/2024  CONCLUSION:         1. Negative right rib series.    Dictated by (CST): Gasper Roy MD on 9/24/2024 at 8:58 PM     Finalized by (CST): Gasper Roy MD on 9/24/2024 at 9:00 PM                Assessment/Plan:       Right scapular pain that has no radiated to RUQ abd pain.  Dilation of common bile duct on CT however MRCP is negative for choledocholithiasis.  This pain seems musculoskeletal.  - MRI thoracic spine  - GI to consult  - NPO unless ok with GI to eat  - gentle IVF  - toradol prn, morphine prn  - cont PO protonix     HL  - crestor     Hx ofasthma  - cont advair     Hx of hypothyroidism   - cont levothyroxine          dvt proph - SCDs     Code status - Full     Ender Barahona MD  9/25/2024     Medications 09/25/24   fluticasone-salmeterol (Advair Diskus) 250-50 MCG/ACT inhaler 1 puff  Dose: 1 puff  Freq: 2 times daily Route: IN  Start: 09/25/24 1245   Admin Instructions:   RN TO ADMINISTER  Rinse and spit after use. RT To Administer First Dose.    1309 LP-Given   2100                levothyroxine (Synthroid) tab 150 mcg  Dose: 150 mcg  Freq: Daily at 0700 Route: OR  Start: 09/26/24 0700   Admin Instructions:   Give on an empty stomach. Hold tube feedings 1 hour before AND after.       morphINE PF 4 MG/ML injection 4 mg  Dose: 4 mg  Freq: Once Route: IV  Start: 09/25/24 0445 End: 09/25/24 0459    0459 CR-Given                 pantoprazole (Protonix) DR tab 40 mg  Dose: 40 mg  Freq: Every morning before breakfast Route: OR  Start: 09/26/24 0700   Admin Instructions:   Do not crush       polyethylene glycol (PEG 3350) (Miralax) 17 g oral packet 17 g  Dose: 17 g  Freq: Daily Route: OR  Start: 09/25/24 1330   Admin Instructions:   1 packet=17gm=1 heaping tablespoon; Dissolve in 8 oz of water    (1330 LP)-Not Given                 rosuvastatin (Crestor) tab 10 mg  Dose: 10 mg  Freq: Nightly Route: OR  Start: 09/25/24 2100 2100                 sodium chloride 0.9 % IV  bolus 1,000 mL  Dose: 1,000 mL  Freq: Once Route: IV  Last Dose: Stopped (09/25/24 0614)  Start: 09/25/24 0445 End: 09/25/24 0614    0500 CR-New Bag   0614 AC-Stopped                      User Information   Initial Name Initial Name   AC Brittani Cisneros RN  [[id number]] Juju Rodarte RN  [[id number]]   Drea Wheeler RN  [[id number]]           Continuous Meds Sorted by Name  for Radha Rodrigez as of 9/25/24 through 9/25/24    1 Day 3 Days 7 Days 10 Days < Today >   Legend:       Medications 09/25/24   sodium chloride 0.9% infusion  Rate: 75 mL/hr  Freq: Continuous Route: IV  Start: 09/25/24 1245    1306 LP-New Bag                       User Information   Initial Name Initial Name   Drea Wheeler RN  [[id number]]           PRN Meds Sorted by Name  for Elia Tinofany ROQUE as of 9/25/24 through 9/25/24    1 Day 3 Days 7 Days 10 Days < Today >   Legend:       Medications 09/25/24   acetaminophen (Tylenol Extra Strength) tab 1,000 mg  Dose: 1,000 mg  Freq: Every 6 hours PRN Route: OR  PRN Reasons: Fever,Headaches,mild pain  PRN Comment: equal to or greater than 100.4  Start: 09/25/24 1235   Admin Instructions:   do not initiate oral therapy until 6-8 hours after the last IV acetaminophen dose if IV acetaminophen was used previously       bisacodyl (Dulcolax) 10 MG rectal suppository 10 mg  Dose: 10 mg  Freq: Daily as needed Route: RE  PRN Reason: constipation  Start: 09/25/24 1236   Admin Instructions:   Use after MiraLAX and Senokot.  Use prior to Fleet's Enema.       cyclobenzaprine (Flexeril) tab 10 mg  Dose: 10 mg  Freq: 2 times daily PRN Route: OR  PRN Reason: Muscle spasms  PRN Comment: May cause drowsiness no driving or operating machinery while taking this medication  Start: 09/25/24 1238    1309 LP-Given                 iopamidol 76% (ISOVUE-370) injection for power injector  Dose: 70 mL  Freq: IMG once as needed Route: IV  PRN Reason: contrast  Start: 09/25/24 0617 End: 09/25/24 0620 0620 AI-Given                  ketorolac (Toradol) 15 MG/ML injection 15 mg  Dose: 15 mg  Freq: Every 6 hours PRN Route: IV  PRN Reason: moderate pain  Start: 09/25/24 1252 End: 09/27/24 1251    1523 LP-Given                  morphINE PF 2 MG/ML injection 1 mg  Dose: 1 mg  Freq: Every 2 hour PRN Route: IV  PRN Reason: mild pain  Start: 09/25/24 0922   Admin Instructions:   Use PRN reason as a guide and follow range order policy. If oral pain meds are ordered and patient can tolerate oral intake, start with PRN oral pain medications first.                     Or   morphINE PF 2 MG/ML injection 2 mg  Dose: 2 mg  Freq: Every 2 hour PRN Route: IV  PRN Reason: moderate pain  Start: 09/25/24 0922   Admin Instructions:   Use PRN reason as a guide and follow range order policy. If oral pain meds are ordered and patient can tolerate oral intake, start with PRN oral pain medications first.      0934 LP-Given                      Retention Suture Text: Retention sutures were placed to support the closure and prevent dehiscence.

## 2024-09-25 NOTE — ED PROVIDER NOTES
Patient Seen in: [facility] Emergency Department    History     Chief Complaint   Patient presents with    Abdomen/Flank Pain       HPI    History is provided by patient/independent historian: patient, patient's   58 year old female with history of nephrolithiasis,  cholecystitis, here with complaints of right-sided shoulder blade pain yesterday, now with right upper quadrant abdominal pain today.  It comes and spasms.  No fever, nausea vomiting or changes in bowel movements.  She does report increased urinary frequency.   reports she has a history of kidney stones.    History reviewed.   Past Medical History:    Back problem    Calculus of kidney    Disorder of thyroid    Diverticulosis of large intestine    Esophageal reflux    Hx of motion sickness    Morbid obesity with BMI of 50.0-59.9, adult (HCC)    Pneumonia due to organism    PONV (postoperative nausea and vomiting)    Pre-diabetes    Seasonal allergies    Thyroid disease    Vitamin D deficiency         History reviewed.   Past Surgical History:   Procedure Laterality Date    Colonoscopy  11/30/2021    Hernia surgery      Hysterectomy  12/21/2012    Other surgical history Left 03/12/2024    Cystoscopy left ureteroscopy, laser lithotripsy, retrograde pyelogram, stent placement    Removal gallbladder      Repair ing hernia,5+y/o,reducibl           Home Medications reviewed :  (Not in a hospital admission)        History reviewed.   Social History     Socioeconomic History    Marital status:    Tobacco Use    Smoking status: Never     Passive exposure: Never    Smokeless tobacco: Never   Vaping Use    Vaping status: Never Used   Substance and Sexual Activity    Alcohol use: Not Currently    Drug use: Never         ROS  Review of Systems   Respiratory:  Negative for shortness of breath.    Cardiovascular:  Negative for chest pain.   Gastrointestinal:  Positive for abdominal pain.   Genitourinary:  Positive for frequency.   All  other systems reviewed and are negative.     All other pertinent organ systems are reviewed and are negative.      Physical Exam     ED Triage Vitals   BP 09/25/24 0330 (!) 131/97   Pulse 09/25/24 0330 100   Resp 09/25/24 0330 20   Temp 09/25/24 0330 98 °F (36.7 °C)   Temp src --    SpO2 09/25/24 0500 97 %   O2 Device 09/25/24 0500 None (Room air)     Vital signs reviewed.      Physical Exam  Vitals and nursing note reviewed.   Constitutional:       Comments: Patient appears uncomfortable   Cardiovascular:      Pulses: Normal pulses.   Pulmonary:      Effort: No respiratory distress.   Abdominal:      General: There is no distension.      Tenderness: There is abdominal tenderness in the right upper quadrant.   Neurological:      Mental Status: She is alert.         ED Course       Labs:     Labs Reviewed   CBC WITH DIFFERENTIAL WITH PLATELET - Abnormal; Notable for the following components:       Result Value    WBC 13.6 (*)     Neutrophil Absolute Prelim 11.62 (*)     Neutrophil Absolute 11.62 (*)     All other components within normal limits   BASIC METABOLIC PANEL (8) - Abnormal; Notable for the following components:    Glucose 142 (*)     Sodium 135 (*)     BUN/CREA Ratio 21.4 (*)     All other components within normal limits   HEPATIC FUNCTION PANEL (7) - Abnormal; Notable for the following components:    Alkaline Phosphatase 133 (*)     All other components within normal limits   URINALYSIS WITH CULTURE REFLEX - Abnormal; Notable for the following components:    Urine Color Colorless (*)     All other components within normal limits   LIPASE - Normal         My EKG Interpretation:   As reviewed and Interpreted by me      Imaging Results Available and Reviewed while in ED:   CT ABDOMEN+PELVIS(CONTRAST ONLY)(CPT=74177)    Result Date: 9/25/2024  CONCLUSION:  1.  Post cholecystectomy.  No significant intrahepatic biliary ductal dilatation.  Common bile duct is borderline enlarged at 8 mm.  The duct is larger  compared to 3/7/24 CT, raising the possibility of a biliary stones or biliary stricture.  2.  Non-specific fecalization of distal ileal and terminal ileum, suggesting slow bowel transit. 3.  Left renal cyst. 4.  Post hysterectomy.  No adnexal mass. 5.  Funneled appearance of the base the urinary bladder compatible with prolapse.    Dictated by (CST): Clifton Whitlock MD on 9/25/2024 at 6:34 AM     Finalized by (CST): Clifton Whitlock MD on 9/25/2024 at 6:40 AM          XR CHEST PA + LAT CHEST (CPT=71046)    Result Date: 9/24/2024  CONCLUSION:  1. No acute cardiopulmonary finding.     Dictated by (CST): Gasper Roy MD on 9/24/2024 at 9:00 PM     Finalized by (CST): Gasper Roy MD on 9/24/2024 at 9:01 PM          XR RIBS, UNILATERAL (2 VIEWS), RIGHT (CPT=71100)    Result Date: 9/24/2024  CONCLUSION:  1. Negative right rib series.    Dictated by (CST): Gasper Roy MD on 9/24/2024 at 8:58 PM     Finalized by (CST): Gasper Roy MD on 9/24/2024 at 9:00 PM         My review and independent interpretation of CT images: no free fluid. Radiology report corroborates this in addition to other details as reported by them.      Decision rules/scores evaluated: none      Diagnostic labs/tests considered but not ordered: none    ED Medications Administered:   Medications   morphINE PF 4 MG/ML injection 4 mg (4 mg Intravenous Given 9/25/24 3272)   sodium chloride 0.9 % IV bolus 1,000 mL (0 mL Intravenous Stopped 9/25/24 0614)   iopamidol 76% (ISOVUE-370) injection for power injector (70 mL Intravenous Given 9/25/24 0620)            - pt still with pain    MDM       Medical Decision Making      Differential Diagnosis: After obtaining the patient's history, performing the physical exam and reviewing the diagnostics, multiple initial diagnoses were considered based on the presenting problem including nephrolithiasis, UTI, choledocholithiasis    External document review: I personally reviewed available external medical records for  any recent pertinent discharge summaries, testing, and procedures - the findings are as follows: yesterday visit with Dr. Miramontes for dyspnea on exertion    Complicating Factors: The patient already  has a past medical history of Back problem, Calculus of kidney, Disorder of thyroid, Diverticulosis of large intestine, Esophageal reflux, motion sickness, Morbid obesity with BMI of 50.0-59.9, adult (HCC), Pneumonia due to organism, PONV (postoperative nausea and vomiting), Pre-diabetes, Seasonal allergies, Thyroid disease, and Vitamin D deficiency. to contribute to the complexity of this ED evaluation.    Procedures performed: none    Discussed management with physician/appropriate source: Dr. Willams, Dr. Briscoe- requesting hold on antibiotics    Considered admission/deescalation of care for: abdominal pain    Social determinants of health affecting patient care: none    Prescription medications considered: discussed continuing current medication regimen    The patient requires continuous monitoring for: abdominal pain    Shared decision making: discussed possible admission        Disposition and Plan     Clinical Impression:  1. Dilation of common bile duct        Disposition:  Admit    Follow-up:  No follow-up provider specified.    Medications Prescribed:  Current Discharge Medication List          Hospital Problems       Present on Admission  Date Reviewed: 9/24/2024            ICD-10-CM Noted POA    * (Principal) Dilation of common bile duct K83.8 9/25/2024 Unknown

## 2024-09-26 ENCOUNTER — APPOINTMENT (OUTPATIENT)
Dept: MRI IMAGING | Facility: HOSPITAL | Age: 59
End: 2024-09-26
Attending: HOSPITALIST
Payer: COMMERCIAL

## 2024-09-26 ENCOUNTER — APPOINTMENT (OUTPATIENT)
Dept: MRI IMAGING | Facility: HOSPITAL | Age: 59
DRG: 392 | End: 2024-09-26
Attending: HOSPITALIST
Payer: COMMERCIAL

## 2024-09-26 LAB
ALBUMIN SERPL-MCNC: 3.6 G/DL (ref 3.2–4.8)
ALBUMIN/GLOB SERPL: 1.6 {RATIO} (ref 1–2)
ALP LIVER SERPL-CCNC: 104 U/L
ALT SERPL-CCNC: 18 U/L
ANION GAP SERPL CALC-SCNC: 6 MMOL/L (ref 0–18)
AST SERPL-CCNC: 9 U/L (ref ?–34)
BASOPHILS # BLD AUTO: 0.04 X10(3) UL (ref 0–0.2)
BASOPHILS NFR BLD AUTO: 0.4 %
BILIRUB SERPL-MCNC: 0.5 MG/DL (ref 0.3–1.2)
BUN BLD-MCNC: 17 MG/DL (ref 9–23)
BUN/CREAT SERPL: 23.6 (ref 10–20)
CALCIUM BLD-MCNC: 8.7 MG/DL (ref 8.7–10.4)
CHLORIDE SERPL-SCNC: 109 MMOL/L (ref 98–112)
CO2 SERPL-SCNC: 25 MMOL/L (ref 21–32)
CREAT BLD-MCNC: 0.72 MG/DL
DEPRECATED RDW RBC AUTO: 41.2 FL (ref 35.1–46.3)
EGFRCR SERPLBLD CKD-EPI 2021: 97 ML/MIN/1.73M2 (ref 60–?)
EOSINOPHIL # BLD AUTO: 0.18 X10(3) UL (ref 0–0.7)
EOSINOPHIL NFR BLD AUTO: 2 %
ERYTHROCYTE [DISTWIDTH] IN BLOOD BY AUTOMATED COUNT: 13.1 % (ref 11–15)
GLOBULIN PLAS-MCNC: 2.2 G/DL (ref 2–3.5)
GLUCOSE BLD-MCNC: 88 MG/DL (ref 70–99)
HCT VFR BLD AUTO: 39.5 %
HGB BLD-MCNC: 13 G/DL
IMM GRANULOCYTES # BLD AUTO: 0.08 X10(3) UL (ref 0–1)
IMM GRANULOCYTES NFR BLD: 0.9 %
LYMPHOCYTES # BLD AUTO: 4.08 X10(3) UL (ref 1–4)
LYMPHOCYTES NFR BLD AUTO: 45.3 %
MCH RBC QN AUTO: 28.6 PG (ref 26–34)
MCHC RBC AUTO-ENTMCNC: 32.9 G/DL (ref 31–37)
MCV RBC AUTO: 86.8 FL
MONOCYTES # BLD AUTO: 0.83 X10(3) UL (ref 0.1–1)
MONOCYTES NFR BLD AUTO: 9.2 %
NEUTROPHILS # BLD AUTO: 3.8 X10 (3) UL (ref 1.5–7.7)
NEUTROPHILS # BLD AUTO: 3.8 X10(3) UL (ref 1.5–7.7)
NEUTROPHILS NFR BLD AUTO: 42.2 %
OSMOLALITY SERPL CALC.SUM OF ELEC: 291 MOSM/KG (ref 275–295)
PLATELET # BLD AUTO: 287 10(3)UL (ref 150–450)
POTASSIUM SERPL-SCNC: 3.9 MMOL/L (ref 3.5–5.1)
PROT SERPL-MCNC: 5.8 G/DL (ref 5.7–8.2)
RBC # BLD AUTO: 4.55 X10(6)UL
SODIUM SERPL-SCNC: 140 MMOL/L (ref 136–145)
WBC # BLD AUTO: 9 X10(3) UL (ref 4–11)

## 2024-09-26 PROCEDURE — 99233 SBSQ HOSP IP/OBS HIGH 50: CPT | Performed by: INTERNAL MEDICINE

## 2024-09-26 PROCEDURE — 72146 MRI CHEST SPINE W/O DYE: CPT | Performed by: HOSPITALIST

## 2024-09-26 PROCEDURE — 99233 SBSQ HOSP IP/OBS HIGH 50: CPT | Performed by: HOSPITALIST

## 2024-09-26 RX ORDER — DICYCLOMINE HYDROCHLORIDE 10 MG/1
10 CAPSULE ORAL 3 TIMES DAILY PRN
Status: DISCONTINUED | OUTPATIENT
Start: 2024-09-26 | End: 2024-09-26

## 2024-09-26 NOTE — PLAN OF CARE
Plan is for MRI spine. PRN medications given for pain, pt reports some relief while at rest. IVF infusing.   Problem: PAIN - ADULT  Goal: Verbalizes/displays adequate comfort level or patient's stated pain goal  Description: INTERVENTIONS:  - Encourage pt to monitor pain and request assistance  - Assess pain using appropriate pain scale  - Administer analgesics based on type and severity of pain and evaluate response  - Implement non-pharmacological measures as appropriate and evaluate response  - Consider cultural and social influences on pain and pain management  - Manage/alleviate anxiety  - Utilize distraction and/or relaxation techniques  - Monitor for opioid side effects  - Notify MD/LIP if interventions unsuccessful or patient reports new pain  - Anticipate increased pain with activity and pre-medicate as appropriate  Outcome: Progressing     Problem: SAFETY ADULT - FALL  Goal: Free from fall injury  Description: INTERVENTIONS:  - Assess pt frequently for physical needs  - Identify cognitive and physical deficits and behaviors that affect risk of falls.  - [place] fall precautions as indicated by assessment.  - Educate pt/family on patient safety including physical limitations  - Instruct pt to call for assistance with activity based on assessment  - Modify environment to reduce risk of injury  - Provide assistive devices as appropriate  - Consider OT/PT consult to assist with strengthening/mobility  - Encourage toileting schedule  Outcome: Progressing

## 2024-09-26 NOTE — PROGRESS NOTES
[facility]     Gastroenterology Progress Note    Radha Rodrigez Patient Status:  Inpatient    1965 MRN [id number]   Location [facility] 3W/SW Attending Ender Ramos MD   Hosp Day # 1 PCP Guille Miramontes MD       Subjective:   Ruq/pain under right breast unchanged. She states it was the worst when she was trying to get on and off the gurney for mri spine overnight. No nausea or emesis. No fever. She ate meals without worsening of symptoms.     Objective:   Blood pressure 155/68, pulse 92, temperature 97.8 °F (36.6 °C), temperature source Oral, resp. rate 18, height 5' 2\" (1.575 m), weight 265 lb 1.6 oz (120.2 kg), SpO2 100%. Body mass index is 48.49 kg/m².    General: awake, alert and oriented, no acute distress  HEENT: moist mucus membranes  PULM: no conversational dyspnea  CARDIOVASCULAR: regular rate and rhythm, the extremities are warm and well perfused  GI: soft, mild ruq tenderness,, non-distended, + BS, no rebound/guarding   EXTREMITIES: no edema, moving all extremities  SKIN: no visible rash  NEURO: appropriate and interactive    Assessment and Plan:   58F BMI 48 presenting with R shoulder blade pain radiating to RUQ. Initially started with sciatic nerve pain in leg that improved with short burst of steroids. Remote history of cholecystectomy. Clinical presentation not consistent with luminal or biliary etiology. Would consider MSK or neuropathic origin. LFTs normal. MRCP without biliary dilation. MRI thoracic spine with thoracic foraminal stenosis which may be contributory. Would consider empiric steroid vs gabapentin for suspected neuropathic pain.     Katerine Sal MD  [facility]      Results:     Lab Results   Component Value Date    WBC 9.0 2024    HGB 13.0 2024    HCT 39.5 2024    .0 2024    CREATSERUM 0.72 2024    BUN 17 2024     2024    K 3.9 2024     2024    CO2 25.0  09/26/2024    GLU 88 09/26/2024    CA 8.7 09/26/2024    ALB 3.6 09/26/2024    ALKPHO 104 09/26/2024    BILT 0.5 09/26/2024    TP 5.8 09/26/2024    AST 9 09/26/2024    ALT 18 09/26/2024    PTT 32.5 04/22/2017    INR 1.0 04/22/2017    PT 12.0 11/24/2012    T4F 1.3 04/29/2023    TSH 0.621 07/01/2024    LIP 33 09/25/2024    DDIMER 0.48 09/24/2024    ESRML 12 07/01/2024    CRP 0.70 07/01/2024    MG 1.7 03/04/2024    PHOS 3.2 03/04/2024    B12 332 07/01/2024       MRI SPINE THORACIC (CPT=72146)    Result Date: 9/26/2024  CONCLUSION:  1. No acute thoracic spine fracture. Normal caliber and signal characteristics of the thoracic spinal cord. 2. Mild multilevel thoracic spine degenerative changes as detailed.  Findings result in mild right neural foraminal stenosis at T3-T4 and T4-T5.  There is also mild left neural foraminal stenosis at T5-T6.  No other high-grade spinal canal or neural foraminal stenosis throughout the thoracic spine.  Partially imaged lower cervical spine degenerative changes with a disc bulge at C6-C7. 3. Mild dextroscoliosis of the thoracic spine. 4. Scattered well-circumscribed T2 bright/cystic foci are noted in paraspinal region/posterior mediastinum bilaterally.  These were present on prior MRCP from November, 2020 and are favored to represent either incidental perineural and/or duplication cysts. 5. Lesser incidental findings as above.   elm-remote  Dictated by (CST): Drew Mireles MD on 9/26/2024 at 10:01 AM     Finalized by (CST): Drew Mireles MD on 9/26/2024 at 10:10 AM          MRI ABDOMEN AND MRCP W/3D (CPT=74181/14394)    Result Date: 9/25/2024  CONCLUSION:   Cholecystectomy.  No significant biliary ductal dilatation or evidence of filling defect within the nondilated common bile duct.  Hepatic steatosis.  Lesser incidental findings as above.   Dictated by (CST): Horace Mcintyre MD on 9/25/2024 at 10:40 AM     Finalized by (CST): Horace Mcintyre MD on 9/25/2024 at 10:59 AM          CT  ABDOMEN+PELVIS(CONTRAST ONLY)(CPT=74177)    Result Date: 9/25/2024  CONCLUSION:  1.  Post cholecystectomy.  No significant intrahepatic biliary ductal dilatation.  Common bile duct is borderline enlarged at 8 mm.  The duct is larger compared to 3/7/24 CT, raising the possibility of a biliary stones or biliary stricture.  2.  Non-specific fecalization of distal ileal and terminal ileum, suggesting slow bowel transit. 3.  Left renal cyst. 4.  Post hysterectomy.  No adnexal mass. 5.  Funneled appearance of the base the urinary bladder compatible with prolapse.    Dictated by (CST): Clifton Whitlock MD on 9/25/2024 at 6:34 AM     Finalized by (CST): Clifton Whitlock MD on 9/25/2024 at 6:40 AM

## 2024-09-26 NOTE — PROGRESS NOTES
[facility]  part of [facility]    Progress Note    Radha Rodrigez Patient Status:  Inpatient    1965 MRN [id number]   Location [facility] 3W/SW Attending Ender Ramos MD   Hosp Day # 1 PCP Guille Miramontes MD       Subjective:     No change in pt's pain.   The pain is in RUQ of abdomen and feels like an intermittent squeezing.  Does not feel like pt's prior kidney stone pain.    Objective:   Blood pressure 131/63, pulse 70, temperature 97.5 °F (36.4 °C), temperature source Oral, resp. rate 20, height 5' 2\" (1.575 m), weight 265 lb 1.6 oz (120.2 kg), SpO2 92%.    Gen:   NAD.   A and O x 3  Eyes:  PERRL  Moist mucus membranes.    CV:    RRR.  No m/g/r  Pulm:   CTA bilat  Abd:   +bs, soft, tender over RUQ to deep palpation, no rebound or guarding, ND  LE:   No c/c/e  Neuro:   nonfocal  Skin:  No rash    Results:     Lab Results   Component Value Date    WBC 9.0 2024    HGB 13.0 2024    HCT 39.5 2024    .0 2024    CREATSERUM 0.72 2024    BUN 17 2024     2024    K 3.9 2024     2024    CO2 25.0 2024    GLU 88 2024    CA 8.7 2024    ALB 3.6 2024    ALKPHO 104 2024    BILT 0.5 2024    TP 5.8 2024    AST 9 2024    ALT 18 2024    PTT 32.5 2017    INR 1.0 2017    PT 12.0 2012    T4F 1.3 2023    TSH 0.621 2024    LIP 33 2024    DDIMER 0.48 2024    ESRML 12 2024    CRP 0.70 2024    MG 1.7 2024    PHOS 3.2 2024    B12 332 2024       MRI SPINE THORACIC (CPT=72146)    Result Date: 2024  CONCLUSION:  1. No acute thoracic spine fracture. Normal caliber and signal characteristics of the thoracic spinal cord. 2. Mild multilevel thoracic spine degenerative changes as detailed.  Findings result in mild right neural foraminal stenosis at T3-T4 and T4-T5.  There is also mild left neural foraminal  stenosis at T5-T6.  No other high-grade spinal canal or neural foraminal stenosis throughout the thoracic spine.  Partially imaged lower cervical spine degenerative changes with a disc bulge at C6-C7. 3. Mild dextroscoliosis of the thoracic spine. 4. Scattered well-circumscribed T2 bright/cystic foci are noted in paraspinal region/posterior mediastinum bilaterally.  These were present on prior MRCP from November, 2020 and are favored to represent either incidental perineural and/or duplication cysts. 5. Lesser incidental findings as above.   elm-remote  Dictated by (CST): Drew Mireles MD on 9/26/2024 at 10:01 AM     Finalized by (CST): Drew Mireles MD on 9/26/2024 at 10:10 AM          MRI ABDOMEN AND MRCP W/3D (CPT=74181/03622)    Result Date: 9/25/2024  CONCLUSION:   Cholecystectomy.  No significant biliary ductal dilatation or evidence of filling defect within the nondilated common bile duct.  Hepatic steatosis.  Lesser incidental findings as above.   Dictated by (CST): Horace Mcintyre MD on 9/25/2024 at 10:40 AM     Finalized by (CST): Horace Mcintyre MD on 9/25/2024 at 10:59 AM          CT ABDOMEN+PELVIS(CONTRAST ONLY)(CPT=74177)    Result Date: 9/25/2024  CONCLUSION:  1.  Post cholecystectomy.  No significant intrahepatic biliary ductal dilatation.  Common bile duct is borderline enlarged at 8 mm.  The duct is larger compared to 3/7/24 CT, raising the possibility of a biliary stones or biliary stricture.  2.  Non-specific fecalization of distal ileal and terminal ileum, suggesting slow bowel transit. 3.  Left renal cyst. 4.  Post hysterectomy.  No adnexal mass. 5.  Funneled appearance of the base the urinary bladder compatible with prolapse.    Dictated by (CST): Clifton Whitlock MD on 9/25/2024 at 6:34 AM     Finalized by (CST): Clifton Whitlock MD on 9/25/2024 at 6:40 AM          XR CHEST PA + LAT CHEST (CPT=71046)    Result Date: 9/24/2024  CONCLUSION:  1. No acute cardiopulmonary finding.     Dictated by  (CST): Gasper Roy MD on 9/24/2024 at 9:00 PM     Finalized by (CST): Gasper Roy MD on 9/24/2024 at 9:01 PM          XR RIBS, UNILATERAL (2 VIEWS), RIGHT (CPT=71100)    Result Date: 9/24/2024  CONCLUSION:  1. Negative right rib series.    Dictated by (CST): Gasper Roy MD on 9/24/2024 at 8:58 PM     Finalized by (CST): Gasper Roy MD on 9/24/2024 at 9:00 PM               Assessment and Plan:     Right scapular pain that has no radiated to RUQ abd pain.  Dilation of common bile duct on CT however MRCP is negative for choledocholithiasis.  This pain seems musculoskeletal.  No obvious cause for pain seen on MRI thoracic spine.  - GI on consult  - bentyl prn  - pt now on general diet  - stop IVF  - toradol prn, morphine prn  - cont PO protonix  - ambulate, mobilize     HL  - crestor     Hx ofasthma  - cont advair     Hx of hypothyroidism   - cont levothyroxine          dvt proph - SCDs     Code status - Full    MDM:    High Ender Barahona MD  9/26/2024

## 2024-09-26 NOTE — PLAN OF CARE
Per GI possible neuropathic pain, steroid vs gabapentin, awaiting orders from primary, possible discharge in am.    Problem: Patient Centered Care  Goal: Patient preferences are identified and integrated in the patient's plan of care  Description: Interventions:  - What would you like us to know as we care for you? Elizabeth been having severe pain  - Provide timely, complete, and accurate information to patient/family  - Incorporate patient and family knowledge, values, beliefs, and cultural backgrounds into the planning and delivery of care  - Encourage patient/family to participate in care and decision-making at the level they choose  - Honor patient and family perspectives and choices  Outcome: Progressing     Problem: Patient/Family Goals  Goal: Patient/Family Long Term Goal  Description: Patient's Long Term Goal:     Interventions:  -   - See additional Care Plan goals for specific interventions  Outcome: Progressing  Goal: Patient/Family Short Term Goal  Description: Patient's Short Term Goal:     Interventions:   -   - See additional Care Plan goals for specific interventions  Outcome: Progressing     Problem: PAIN - ADULT  Goal: Verbalizes/displays adequate comfort level or patient's stated pain goal  Description: INTERVENTIONS:  - Encourage pt to monitor pain and request assistance  - Assess pain using appropriate pain scale  - Administer analgesics based on type and severity of pain and evaluate response  - Implement non-pharmacological measures as appropriate and evaluate response  - Consider cultural and social influences on pain and pain management  - Manage/alleviate anxiety  - Utilize distraction and/or relaxation techniques  - Monitor for opioid side effects  - Notify MD/LIP if interventions unsuccessful or patient reports new pain  - Anticipate increased pain with activity and pre-medicate as appropriate  Outcome: Progressing     Problem: SAFETY ADULT - FALL  Goal: Free from fall injury  Description:  INTERVENTIONS:  - Assess pt frequently for physical needs  - Identify cognitive and physical deficits and behaviors that affect risk of falls.  - [place] fall precautions as indicated by assessment.  - Educate pt/family on patient safety including physical limitations  - Instruct pt to call for assistance with activity based on assessment  - Modify environment to reduce risk of injury  - Provide assistive devices as appropriate  - Consider OT/PT consult to assist with strengthening/mobility  - Encourage toileting schedule  Outcome: Progressing

## 2024-09-26 NOTE — PLAN OF CARE
Problem: Patient Centered Care  Goal: Patient preferences are identified and integrated in the patient's plan of care  Description: Interventions:  - What would you like us to know as we care for you? Elizabeth been having severe pain  - Provide timely, complete, and accurate information to patient/family  - Incorporate patient and family knowledge, values, beliefs, and cultural backgrounds into the planning and delivery of care  - Encourage patient/family to participate in care and decision-making at the level they choose  - Honor patient and family perspectives and choices  Outcome: Progressing     Problem: PAIN - ADULT  Goal: Verbalizes/displays adequate comfort level or patient's stated pain goal  Description: INTERVENTIONS:  - Encourage pt to monitor pain and request assistance  - Assess pain using appropriate pain scale  - Administer analgesics based on type and severity of pain and evaluate response  - Implement non-pharmacological measures as appropriate and evaluate response  - Consider cultural and social influences on pain and pain management  - Manage/alleviate anxiety  - Utilize distraction and/or relaxation techniques  - Monitor for opioid side effects  - Notify MD/LIP if interventions unsuccessful or patient reports new pain  - Anticipate increased pain with activity and pre-medicate as appropriate  Outcome: Progressing     Problem: SAFETY ADULT - FALL  Goal: Free from fall injury  Description: INTERVENTIONS:  - Assess pt frequently for physical needs  - Identify cognitive and physical deficits and behaviors that affect risk of falls.  - [place] fall precautions as indicated by assessment.  - Educate pt/family on patient safety including physical limitations  - Instruct pt to call for assistance with activity based on assessment  - Modify environment to reduce risk of injury  - Provide assistive devices as appropriate  - Consider OT/PT consult to assist with strengthening/mobility  - Encourage toileting  schedule  Outcome: Progressing     Pt comfortable when she is lying still - sever pain with movement  Flexeril, morphine and Toradol given as ordered  Plan for MRI spine

## 2024-09-27 VITALS
SYSTOLIC BLOOD PRESSURE: 142 MMHG | HEIGHT: 62 IN | BODY MASS INDEX: 48.79 KG/M2 | DIASTOLIC BLOOD PRESSURE: 94 MMHG | RESPIRATION RATE: 17 BRPM | OXYGEN SATURATION: 98 % | WEIGHT: 265.13 LBS | TEMPERATURE: 98 F | HEART RATE: 75 BPM

## 2024-09-27 PROCEDURE — 99239 HOSP IP/OBS DSCHRG MGMT >30: CPT | Performed by: HOSPITALIST

## 2024-09-27 RX ORDER — SEMAGLUTIDE 1.7 MG/.75ML
0.75 INJECTION, SOLUTION SUBCUTANEOUS WEEKLY
Qty: 3 ML | Refills: 0 | OUTPATIENT
Start: 2024-09-27

## 2024-09-27 RX ORDER — HYDROCODONE BITARTRATE AND ACETAMINOPHEN 5; 325 MG/1; MG/1
1-2 TABLET ORAL EVERY 6 HOURS PRN
Qty: 10 TABLET | Refills: 0 | Status: SHIPPED | OUTPATIENT
Start: 2024-09-27 | End: 2024-10-01

## 2024-09-27 RX ORDER — METHYLPREDNISOLONE SODIUM SUCCINATE 125 MG/2ML
60 INJECTION INTRAMUSCULAR; INTRAVENOUS ONCE
Status: COMPLETED | OUTPATIENT
Start: 2024-09-27 | End: 2024-09-27

## 2024-09-27 RX ORDER — GABAPENTIN 100 MG/1
100 CAPSULE ORAL 3 TIMES DAILY
Status: DISCONTINUED | OUTPATIENT
Start: 2024-09-27 | End: 2024-09-27

## 2024-09-27 RX ORDER — GABAPENTIN 100 MG/1
100 CAPSULE ORAL 3 TIMES DAILY
Qty: 90 CAPSULE | Refills: 1 | Status: SHIPPED | OUTPATIENT
Start: 2024-09-27 | End: 2024-10-01

## 2024-09-27 RX ORDER — LEVOTHYROXINE SODIUM 150 UG/1
150 TABLET ORAL DAILY
Qty: 90 TABLET | Refills: 0 | Status: SHIPPED | OUTPATIENT
Start: 2024-09-27

## 2024-09-27 NOTE — PAYOR COMM NOTE
CONTINUED STAY REVIEW    Payor: [facility] PPO  Subscriber #:  [id number]  Authorization Number: [id number]    Admit date: 24  Admit time:  9:10 AM    REVIEW DOCUMENTATION:     Ender Ramos MD   Physician  Hospitalist     Progress Notes     Signed     Date of Service: 2024 10:24 AM     Signed         [facility]  part of [facility]     Progress Note           Radha Rodrigez Patient Status:  Inpatient    1965 MRN [id number]   Location [facility] 3W/SW Attending Ender Ramos MD   Hosp Day # 1 PCP Guille Miramontes MD         Subjective:      No change in pt's pain.   The pain is in RUQ of abdomen and feels like an intermittent squeezing.  Does not feel like pt's prior kidney stone pain.     Objective:   Blood pressure 131/63, pulse 70, temperature 97.5 °F (36.4 °C), temperature source Oral, resp. rate 20, height 5' 2\" (1.575 m), weight 265 lb 1.6 oz (120.2 kg), SpO2 92%.     Gen:   NAD.   A and O x 3  Eyes:  PERRL  Moist mucus membranes.    CV:    RRR.  No m/g/r  Pulm:   CTA bilat  Abd:   +bs, soft, tender over RUQ to deep palpation, no rebound or guarding, ND  LE:   No c/c/e  Neuro:   nonfocal  Skin:  No rash     Results:            Lab Results   Component Value Date     WBC 9.0 2024     HGB 13.0 2024     HCT 39.5 2024     .0 2024     CREATSERUM 0.72 2024     BUN 17 2024      2024     K 3.9 2024      2024     CO2 25.0 2024     GLU 88 2024     CA 8.7 2024     ALB 3.6 2024     ALKPHO 104 2024     BILT 0.5 2024     TP 5.8 2024     AST 9 2024     ALT 18 2024     PTT 32.5 2017     INR 1.0 2017     PT 12.0 2012     T4F 1.3 2023     TSH 0.621 2024     LIP 33 2024     DDIMER 0.48 2024     ESRML 12 2024     CRP 0.70 2024     MG 1.7 2024     PHOS 3.2 2024     B12 332 2024          MRI SPINE THORACIC (CPT=72146)     Result Date: 9/26/2024  CONCLUSION:         1. No acute thoracic spine fracture. Normal caliber and signal characteristics of the thoracic spinal cord. 2. Mild multilevel thoracic spine degenerative changes as detailed.  Findings result in mild right neural foraminal stenosis at T3-T4 and T4-T5.  There is also mild left neural foraminal stenosis at T5-T6.  No other high-grade spinal canal or neural foraminal stenosis throughout the thoracic spine.  Partially imaged lower cervical spine degenerative changes with a disc bulge at C6-C7. 3. Mild dextroscoliosis of the thoracic spine. 4. Scattered well-circumscribed T2 bright/cystic foci are noted in paraspinal region/posterior mediastinum bilaterally.  These were present on prior MRCP from November, 2020 and are favored to represent either incidental perineural and/or duplication cysts. 5. Lesser incidental findings as above.   elm-remote  Dictated by (CST): Drew Mireles MD on 9/26/2024 at 10:01 AM     Finalized by (CST): Drew Mireles MD on 9/26/2024 at 10:10 AM           MRI ABDOMEN AND MRCP W/3D (CPT=74181/80611)     Result Date: 9/25/2024  CONCLUSION:          Cholecystectomy.  No significant biliary ductal dilatation or evidence of filling defect within the nondilated common bile duct.  Hepatic steatosis.  Lesser incidental findings as above.   Dictated by (CST): Horace Mcintyre MD on 9/25/2024 at 10:40 AM     Finalized by (CST): Horace Mcintyre MD on 9/25/2024 at 10:59 AM           CT ABDOMEN+PELVIS(CONTRAST ONLY)(CPT=74177)     Result Date: 9/25/2024  CONCLUSION:         1.  Post cholecystectomy.  No significant intrahepatic biliary ductal dilatation.  Common bile duct is borderline enlarged at 8 mm.  The duct is larger compared to 3/7/24 CT, raising the possibility of a biliary stones or biliary stricture.  2.  Non-specific fecalization of distal ileal and terminal ileum, suggesting slow bowel transit. 3.  Left renal  cyst. 4.  Post hysterectomy.  No adnexal mass. 5.  Funneled appearance of the base the urinary bladder compatible with prolapse.    Dictated by (CST): Clifton Whitlock MD on 2024 at 6:34 AM     Finalized by (CST): Clifton Whitlock MD on 2024 at 6:40 AM           XR CHEST PA + LAT CHEST (CPT=71046)     Result Date: 2024  CONCLUSION:         1. No acute cardiopulmonary finding.     Dictated by (CST): Gasper Roy MD on 2024 at 9:00 PM     Finalized by (CST): Gasper Roy MD on 2024 at 9:01 PM           XR RIBS, UNILATERAL (2 VIEWS), RIGHT (CPT=71100)     Result Date: 2024  CONCLUSION:         1. Negative right rib series.    Dictated by (CST): Gasper Roy MD on 2024 at 8:58 PM     Finalized by (CST): Gasper Roy MD on 2024 at 9:00 PM                Assessment and Plan:      Right scapular pain that has no radiated to RUQ abd pain.  Dilation of common bile duct on CT however MRCP is negative for choledocholithiasis.  This pain seems musculoskeletal.  No obvious cause for pain seen on MRI thoracic spine.  - GI on consult  - bentyl prn  - pt now on general diet  - stop IVF  - toradol prn, morphine prn  - cont PO protonix  - ambulate, mobilize     HL  - crestor     Hx ofasthma  - cont advair     Hx of hypothyroidism   - cont levothyroxine          dvt proph - SCDs     Code status - Full     MDM:    High Ender Barahona MD  2024                  Katerine Sal MD   Physician  Gastroenterology     Progress Notes     Signed     Date of Service: 2024  3:15 PM     Signed               [facility]     Gastroenterology Progress Note           Tinofany Rodrigez Patient Status:  Inpatient    1965 MRN [id number]   Location [facility] 3W/SW Attending Ender Ramos MD   Hosp Day # 1 PCP Guille Miramontes MD         Subjective:   Ruq/pain under right breast unchanged. She states it was the worst when she was trying to get on and off the gurney for mri  spine overnight. No nausea or emesis. No fever. She ate meals without worsening of symptoms.      Objective:   Blood pressure 155/68, pulse 92, temperature 97.8 °F (36.6 °C), temperature source Oral, resp. rate 18, height 5' 2\" (1.575 m), weight 265 lb 1.6 oz (120.2 kg), SpO2 100%. Body mass index is 48.49 kg/m².     General: awake, alert and oriented, no acute distress  HEENT: moist mucus membranes  PULM: no conversational dyspnea  CARDIOVASCULAR: regular rate and rhythm, the extremities are warm and well perfused  GI: soft, mild ruq tenderness,, non-distended, + BS, no rebound/guarding   EXTREMITIES: no edema, moving all extremities  SKIN: no visible rash  NEURO: appropriate and interactive     Assessment and Plan:   58F BMI 48 presenting with R shoulder blade pain radiating to RUQ. Initially started with sciatic nerve pain in leg that improved with short burst of steroids. Remote history of cholecystectomy. Clinical presentation not consistent with luminal or biliary etiology. Would consider MSK or neuropathic origin. LFTs normal. MRCP without biliary dilation. MRI thoracic spine with thoracic foraminal stenosis which may be contributory. Would consider empiric steroid vs gabapentin for suspected neuropathic pain.      Katerine Sal MD  [facility] Gastroenterology        Results:            Lab Results   Component Value Date     WBC 9.0 09/26/2024     HGB 13.0 09/26/2024     HCT 39.5 09/26/2024     .0 09/26/2024     CREATSERUM 0.72 09/26/2024     BUN 17 09/26/2024      09/26/2024     K 3.9 09/26/2024      09/26/2024     CO2 25.0 09/26/2024     GLU 88 09/26/2024     CA 8.7 09/26/2024     ALB 3.6 09/26/2024     ALKPHO 104 09/26/2024     BILT 0.5 09/26/2024     TP 5.8 09/26/2024     AST 9 09/26/2024     ALT 18 09/26/2024     PTT 32.5 04/22/2017     INR 1.0 04/22/2017     PT 12.0 11/24/2012     T4F 1.3 04/29/2023     TSH 0.621 07/01/2024     LIP 33 09/25/2024     DDIMER 0.48 09/24/2024     ESRML  12 07/01/2024     CRP 0.70 07/01/2024     MG 1.7 03/04/2024     PHOS 3.2 03/04/2024     B12 332 07/01/2024         MRI SPINE THORACIC (CPT=72146)     Result Date: 9/26/2024  CONCLUSION:  1. No acute thoracic spine fracture. Normal caliber and signal characteristics of the thoracic spinal cord. 2. Mild multilevel thoracic spine degenerative changes as detailed.  Findings result in mild right neural foraminal stenosis at T3-T4 and T4-T5.  There is also mild left neural foraminal stenosis at T5-T6.  No other high-grade spinal canal or neural foraminal stenosis throughout the thoracic spine.  Partially imaged lower cervical spine degenerative changes with a disc bulge at C6-C7. 3. Mild dextroscoliosis of the thoracic spine. 4. Scattered well-circumscribed T2 bright/cystic foci are noted in paraspinal region/posterior mediastinum bilaterally.  These were present on prior MRCP from November, 2020 and are favored to represent either incidental perineural and/or duplication cysts. 5. Lesser incidental findings as above.   elm-remote  Dictated by (CST): Drew Mireles MD on 9/26/2024 at 10:01 AM     Finalized by (CST): Drew Mireles MD on 9/26/2024 at 10:10 AM           MRI ABDOMEN AND MRCP W/3D (CPT=74181/35012)     Result Date: 9/25/2024  CONCLUSION:   Cholecystectomy.  No significant biliary ductal dilatation or evidence of filling defect within the nondilated common bile duct.  Hepatic steatosis.  Lesser incidental findings as above.   Dictated by (CST): Horace Mcintyre MD on 9/25/2024 at 10:40 AM     Finalized by (CST): Horace Mcintyre MD on 9/25/2024 at 10:59 AM           CT ABDOMEN+PELVIS(CONTRAST ONLY)(CPT=74177)     Result Date: 9/25/2024  CONCLUSION:  1.  Post cholecystectomy.  No significant intrahepatic biliary ductal dilatation.  Common bile duct is borderline enlarged at 8 mm.  The duct is larger compared to 3/7/24 CT, raising the possibility of a biliary stones or biliary stricture.  2.  Non-specific  fecalization of distal ileal and terminal ileum, suggesting slow bowel transit. 3.  Left renal cyst. 4.  Post hysterectomy.  No adnexal mass. 5.  Funneled appearance of the base the urinary bladder compatible with prolapse.    Dictated by (CST): Clifton Whitlock MD on 9/25/2024 at 6:34 AM     Finalized by (CST): Clifton Whitlock MD on 9/25/2024 at 6:40 AM                                        MEDICATIONS ADMINISTERED IN LAST 1 DAY:  cyclobenzaprine (Flexeril) tab 10 mg       Date Action Dose Route User    9/26/2024 1525 Given 10 mg Oral Fawad Peterson RN          fluticasone-salmeterol (Advair Diskus) 250-50 MCG/ACT inhaler 1 puff       Date Action Dose Route User    9/27/2024 0828 Given 1 puff Inhalation Georgia Urena RN    9/26/2024 2117 Given 1 puff Inhalation Jamilah Flores RN          gabapentin (Neurontin) cap 100 mg       Date Action Dose Route User    9/27/2024 1240 Given 100 mg Oral Georgia Urena RN          levothyroxine (Synthroid) tab 150 mcg       Date Action Dose Route User    9/27/2024 0650 Given 150 mcg Oral Jamilah Flores RN          methylPREDNISolone sodium succinate (Solu-MEDROL) injection 60 mg       Date Action Dose Route User    9/27/2024 1240 Given 60 mg Intravenous Georgia Urena RN          pantoprazole (Protonix) DR tab 40 mg       Date Action Dose Route User    9/27/2024 0650 Given 40 mg Oral Jamilah Flores RN          rosuvastatin (Crestor) tab 10 mg       Date Action Dose Route User    9/26/2024 2117 Given 10 mg Oral Jamilah Flores RN            Vitals (last day)       Date/Time Temp Pulse Resp BP SpO2 Weight O2 Device O2 Flow Rate (L/min) Who    09/27/24 0825 97.7 °F (36.5 °C) 75 17 142/94 98 % -- None (Room air) -- CS    09/27/24 0416 97.3 °F (36.3 °C) 71 16 144/64 98 % -- None (Room air) -- KG    09/26/24 2114 98 °F (36.7 °C) 72 20 129/73 94 % -- None (Room air) -- KG    09/26/24 1417 97.8 °F (36.6 °C) 92 18 155/68 100 % -- None (Room air) -- RC    09/26/24  0916 97.5 °F (36.4 °C) -- -- -- -- -- -- -- MO    09/26/24 0857 -- 70 20 131/63 92 % -- None (Room air) -- RC    09/26/24 0221 97.5 °F (36.4 °C) 68 18 106/60 96 % -- None (Room air) -- KG          CIWA Scores (since admission)       None

## 2024-09-27 NOTE — PLAN OF CARE
Problem: PAIN - ADULT  Goal: Verbalizes/displays adequate comfort level or patient's stated pain goal  Description: INTERVENTIONS:  - Encourage pt to monitor pain and request assistance  - Assess pain using appropriate pain scale  - Administer analgesics based on type and severity of pain and evaluate response  - Implement non-pharmacological measures as appropriate and evaluate response  - Consider cultural and social influences on pain and pain management  - Manage/alleviate anxiety  - Utilize distraction and/or relaxation techniques  - Monitor for opioid side effects  - Notify MD/LIP if interventions unsuccessful or patient reports new pain  - Anticipate increased pain with activity and pre-medicate as appropriate  Outcome: Progressing     Problem: SAFETY ADULT - FALL  Goal: Free from fall injury  Description: INTERVENTIONS:  - Assess pt frequently for physical needs  - Identify cognitive and physical deficits and behaviors that affect risk of falls.  - [place] fall precautions as indicated by assessment.  - Educate pt/family on patient safety including physical limitations  - Instruct pt to call for assistance with activity based on assessment  - Modify environment to reduce risk of injury  - Provide assistive devices as appropriate  - Consider OT/PT consult to assist with strengthening/mobility  - Encourage toileting schedule  Outcome: Progressing   No acute changes overnight. Pain persists with movement. Pt declining PRN medication at this time as she reports it is manageable at rest.

## 2024-09-27 NOTE — TELEPHONE ENCOUNTER
Disp Refills Start End     semaglutide-weight management (WEGOVY) 2.4 MG/0.75ML Subcutaneous Solution Auto-injector 3 mL 2 9/24/2024 --    Sig - Route: Inject 0.75 mL (2.4 mg total) into the skin once a week. - Subcutaneous    Sent to pharmacy as: Wegovy 2.4 MG/0.75ML Subcutaneous Solution Auto-injector (semaglutide-weight management)    E-Prescribing Status: Receipt confirmed by pharmacy (9/24/2024  1:45 PM CDT)      Associated Diagnoses    Morbid obesity with BMI of 50.0-59.9, adult ([facility])        Pharmacy    [id number] - [place], [place] - [address] [address] [phone], [phone]

## 2024-09-27 NOTE — DISCHARGE SUMMARY
[facility]  part of [facility]    Discharge Summary    Radha Rodrigez Patient Status:  Inpatient    1965 MRN [id number]   Location [facility] 3W/SW Attending Ender Ramos MD   Hosp Day # 2 PCP Guille Miramontes MD     Date of Admission: 2024 Disposition:   Home or Self Care     Date of Discharge:  2024     Admitting Diagnosis:   Dilation of common bile duct [K83.8]    Hospital Discharge Diagnoses:    Right scapular pain   RUQ abd pain.  Dilation of common bile duct on CT    Neural foraminal stenosis  Hx of HL  Hx of asthma  Hx of hypothyroidism         Problem List:     Patient Active Problem List   Diagnosis    Diverticulosis of colon    Esophageal reflux    Essential hypertension    Hypothyroidism    Leiomyoma of uterus    Morbid obesity with BMI of 50.0-59.9, adult ([facility])    Seasonal allergies    Pre-diabetes    Stress    Encounter for therapeutic drug monitoring    Ureterolithiasis    Pyelonephritis    Acute cystitis with hematuria    Left flank pain    Pain due to ureteral stent, initial encounter ([facility])    Calcium oxalate calculus    Malabsorption syndrome ([facility])    S/P cholecystectomy    Right lower quadrant abdominal pain    Dilation of common bile duct    Abdominal pain       Physical Exam:      BP (!) 142/94 (BP Location: Left arm)   Pulse 75   Temp 97.7 °F (36.5 °C) (Oral)   Resp 17   Ht 5' 2\" (1.575 m)   Wt 265 lb 1.6 oz (120.2 kg)   SpO2 98%   BMI 48.49 kg/m²     Gen:  NAD.  A and O x  3  CV:   RRR.  No m/g/r  Pulm:   CTA bilat  Abd:   +bs, soft, NT, ND  LE:  No c/c/e  Neuro:  nonfocal      Reason for Admission:   Abd pain    History of Present Illness:   Radha Rodrigez is a(n) 58 year old female with a history of asthma and kidney stones who presents with right abd pain.    Pt's pain started with right shoulder and shoulder blade pain about a week ago.    This pain has now migrated to RUQ of abdomen.    Pt says that with certain movements she  feels a twisting sensation that feels like a muscle spasm.   Denies trauma.    Denies relation to food.  No f/c.  No n/v/c/d.   No CP or SOB.    Pt has hx of cholecystectomy but had some CBD dilation on CT however MRCP is negative.     Hospital Course:     Right scapular pain that has no radiated to RUQ abd pain.  Dilation of common bile duct on CT however MRCP is negative for choledocholithiasis.  This pain seems musculoskeletal.  Areas of neural foraminal stenosis seen on MRI thoracic spine.  - GI was on consult.   Pain not believed to be GI.  - bentyl prn  - pt now on general diet  - was given toradol prn, morphine prn  - give dose of solu medrol here  Home on pred taper that pt already has  Rx for neurontin and norco  - cont PO protonix  - ambulate, mobilize  PCP f/u     HL  - crestor     Hx ofasthma  - cont advair     Hx of hypothyroidism   - cont levothyroxine          dvt proph - SCDs     Code status - Full    Consultations:   GI    Discharge Condition:  Good    Discharge Medications:      Discharge Medications        START taking these medications        Instructions Prescription details   gabapentin 100 MG Caps  Commonly known as: Neurontin      Take 1 capsule (100 mg total) by mouth 3 (three) times daily.   Quantity: 90 capsule  Refills: 1     HYDROcodone-acetaminophen 5-325 MG Tabs  Commonly known as: Norco      Take 1-2 tablets by mouth every 6 (six) hours as needed for Pain.   Quantity: 10 tablet  Refills: 0            CHANGE how you take these medications        Instructions Prescription details   Wegovy 2.4 MG/0.75ML Soaj  Generic drug: semaglutide-weight management  What changed: Another medication with the same name was removed. Continue taking this medication, and follow the directions you see here.      Inject 0.75 mL (2.4 mg total) into the skin once a week.   Quantity: 3 mL  Refills: 2            CONTINUE taking these medications        Instructions Prescription details   albuterol 108 (90 Base)  MCG/ACT Aers  Commonly known as: Ventolin HFA      Inhale 2-4 puffs into the lungs every 4 to 6 hours as needed for Wheezing or Shortness of Breath (cough, asthma, chest tightness). FOR ASTHMA. Pharmacist, please switch to formulary alternative per insurance coverage, ok to replace with proair, ventolin, proventil, or any other albuterol inhaler. -drkp   Quantity: 3 each  Refills: 9     baclofen 10 MG Tabs  Commonly known as: Lioresal      Take 1 tablet (10 mg total) by mouth 3 (three) times daily as needed.   Stop taking on: November 23, 2024  Quantity: 90 tablet  Refills: 1     cholecalciferol 125 MCG (5000 UT) Caps  Commonly known as: Vitamin D3      Take 1 capsule (5,000 Units total) by mouth daily.   Refills: 0     cyclobenzaprine 10 MG Tabs  Commonly known as: Flexeril      Take 1 tablet (10 mg total) by mouth 2 (two) times daily as needed for Muscle spasms (May cause drowsiness no driving or operating machinery while taking this medication).   Quantity: 10 tablet  Refills: 0     fluticasone propionate 50 MCG/ACT Susp  Commonly known as: Flonase      2 sprays by Nasal route daily.   Quantity: 16 g  Refills: 0     fluticasone-salmeterol 115-21 MCG/ACT Aero  Commonly known as: ADVAIR HFA      Inhale 2 puffs into the lungs 2 (two) times daily. FOR ASTHMA   Quantity: 1 each  Refills: 9     levothyroxine 150 MCG Tabs  Commonly known as: Synthroid      Take 1 tablet (150 mcg total) by mouth daily.   Quantity: 90 tablet  Refills: 3     naproxen 500 MG Tabs  Commonly known as: Naprosyn      Take 1 tablet (500 mg total) by mouth 2 (two) times daily as needed.   Quantity: 60 tablet  Refills: 0     omeprazole 20 MG Cpdr  Commonly known as: PriLOSEC      Take 1 capsule (20 mg total) by mouth every morning before breakfast. FOR ACID REFLUX.   Quantity: 90 capsule  Refills: 3     predniSONE 10 MG Tabs  Commonly known as: Deltasone  Start taking on: September 24, 2024      Take 3 tablets (30 mg total) by mouth daily for 3  days, THEN 2 tablets (20 mg total) daily for 3 days, THEN 1 tablet (10 mg total) daily for 3 days. TAKE WITH FOOD..   Stop taking on: October 2, 2024  Quantity: 18 tablet  Refills: 0     rosuvastatin 10 MG Tabs  Commonly known as: Crestor      Take 1 tablet (10 mg total) by mouth nightly. FOR CHOLESTEROL.   Quantity: 90 tablet  Refills: 9               Where to Get Your Medications        These medications were sent to [id number] - [place], [place] - [address] [address] [facility] [address] [facility] [phone], [phone]  [address] [facility] [address]      Phone: [phone]   gabapentin 100 MG Caps  HYDROcodone-acetaminophen 5-325 MG Tabs         Follow up Visits:     Follow-up Information       Guille Miramontes MD. Schedule an appointment as soon as possible for a visit in 1 week(s).    Specialty: Internal Medicine  Contact information:  [address] [place] [address] [id number] [phone]                             Hospital Discharge Diagnoses:  MSK pain    Lace+ Score: 51  59-90 High Risk  29-58 Medium Risk  0-28   Low Risk.    TCM Follow-Up Recommendation:  LACE 29-58: Moderate Risk of readmission after discharge from the hospital.    >35 minutes spent preparing this discharge.    Ender Barahona MD  9/27/2024  11:30 AM

## 2024-09-27 NOTE — PLAN OF CARE
Patient alert and oriented. Ambulating independently in room. Dose of IV steroids given prior to discharge. Cleared for discharge home. IV removed. Discharge instructions reviewed with patient, questions answered, verbalized understanding. Patient reports family will transport home.     Problem: Patient Centered Care  Goal: Patient preferences are identified and integrated in the patient's plan of care  Description: Interventions:  - What would you like us to know as we care for you? Elizabeth been having severe pain  - Provide timely, complete, and accurate information to patient/family  - Incorporate patient and family knowledge, values, beliefs, and cultural backgrounds into the planning and delivery of care  - Encourage patient/family to participate in care and decision-making at the level they choose  - Honor patient and family perspectives and choices  Outcome: Completed     Problem: PAIN - ADULT  Goal: Verbalizes/displays adequate comfort level or patient's stated pain goal  Description: INTERVENTIONS:  - Encourage pt to monitor pain and request assistance  - Assess pain using appropriate pain scale  - Administer analgesics based on type and severity of pain and evaluate response  - Implement non-pharmacological measures as appropriate and evaluate response  - Consider cultural and social influences on pain and pain management  - Manage/alleviate anxiety  - Utilize distraction and/or relaxation techniques  - Monitor for opioid side effects  - Notify MD/LIP if interventions unsuccessful or patient reports new pain  - Anticipate increased pain with activity and pre-medicate as appropriate  Outcome: Completed     Problem: SAFETY ADULT - FALL  Goal: Free from fall injury  Description: INTERVENTIONS:  - Assess pt frequently for physical needs  - Identify cognitive and physical deficits and behaviors that affect risk of falls.  - [place] fall precautions as indicated by assessment.  - Educate pt/family on patient  safety including physical limitations  - Instruct pt to call for assistance with activity based on assessment  - Modify environment to reduce risk of injury  - Provide assistive devices as appropriate  - Consider OT/PT consult to assist with strengthening/mobility  - Encourage toileting schedule  Outcome: Completed

## 2024-09-27 NOTE — PROGRESS NOTES
To whom it may concern:    Radha Rodrigez was admitted to [facility] from 9/25/2024 to 9/27/2024.  She may return to work on Monday 9/30/2024.      Thank you.            Ender Barahona MD  9/27/2024

## 2024-09-30 ENCOUNTER — TELEPHONE (OUTPATIENT)
Dept: INTERNAL MEDICINE CLINIC | Facility: CLINIC | Age: 59
End: 2024-09-30

## 2024-09-30 ENCOUNTER — PATIENT OUTREACH (OUTPATIENT)
Dept: CASE MANAGEMENT | Age: 59
End: 2024-09-30

## 2024-09-30 DIAGNOSIS — K83.8 DILATION OF COMMON BILE DUCT: ICD-10-CM

## 2024-09-30 DIAGNOSIS — Z02.9 ENCOUNTERS FOR ADMINISTRATIVE PURPOSE: Primary | ICD-10-CM

## 2024-09-30 PROCEDURE — 1111F DSCHRG MED/CURRENT MED MERGE: CPT

## 2024-09-30 NOTE — TELEPHONE ENCOUNTER
Spoke to patient for TCM today.     Patient reports since being home from the hospital the RUQ pain has improved. The patient states however she continues to feel shaky from the Prednisone and felt this in the hospital as well. She states today she has been experiencing \"cold sweats\" and weakness and feels she returned to work too soon. The patient denies fever but has not taken her temperature. She states she feels all of this is from the Prednisone but is asking for further advice. She is to finish the Prednisone on October 2nd. The patient denies chest pain, shortness of breath, nausea, vomiting, diarrhea. She does admit to a poor appetite. Eating smaller amounts.     [facility] scheduled the patient for a TCM/HFU with Dr. Miramontes tomorrow 10/01/2024. The patient is asking for further recommendations.     Clinical staff: Please update Dr. Miramontes on the following. Please call patient if further recommendations prior to tomorrow's appointment. Thank you!

## 2024-09-30 NOTE — PROGRESS NOTES
Transitional Care Management   Discharge Date: 24  Contact Date: 2024    Assessment:  TCM Initial Assessment    General:  Assessment completed with: Patient  Patient Subjective: Spoke with patient. Patient states since being home from the hospital she no longer has the RUQ pain. She states it feels like \"residual pain\". Patient states she is having continued back pain today and is taking Gabapentin. She rates this pain a 5/10. Feels it is controlled at this time. Patient states right now her concern is that she is feeling shaky from the Prednisone and felt this in the hospital as well. She states today she has been experiencing \"cold sweats\" and weakness and feels she returned to work too soon. The patient denies fever but has not taken her temperature. She states she feels all of this is from the Prednisone but is asking for further advice. She is to finish the Prednisone on . The patient denies chest pain, shortness of breath, nausea, vomiting, diarrhea. The patient does admit to a poor appetite. Ate some yogurt this morning. (See RN intervention below)  Chief Complaint: Right scapular pain   RUQ abd pain.  Dilation of common bile duct on CT    Neural foraminal stenosis  Hx of HL  Hx of asthma  Hx of hypothyroidism  Verify patient name and  with patient/ caregiver: Yes    Hospital Stay/Discharge:  Tell me what you understand of why you were in the hospital or emergency department: RUQ pain became worse  Prior to leaving the hospital were your Discharge Instructions reviewed with you?: Yes  Did you receive a copy of your written Discharge Instructions?: Yes  What questions do you have about your Discharge Instructions?: None at this time  Do you feel better or worse since you left the hospital or emergency department?: Same    Follow - Up Appointment:  Do you have a follow-up appointment?: Yes  Date: 10/01/24  Physician: Dr. Miramontes  Are there any barriers to getting to your follow-up  appointment?: No    Home Health/DME:  Prior to leaving the hospital was Home Health (HH) arranged for you?: N/A  Are HH needs identified by staff during the assessment?: No     Prior to leaving the hospital or emergency department was Durable Medical Equipment (DME), medical supplies, or infusions arranged for you?: N/A  Are DME/medical supply/infusions needs identified by staff during this assessment?: No     Medications/Diet:  Did any of your medications change, during or after your hospital stay or ED visit?: Yes  Do you have your new or updated medications?: Yes  Do you understand what your medications are for and possible side effects?: Yes  Are there any reasons that keep you from taking your medication as prescribed?: No  Any concerns about medication refills?: No    Were you given a different diet per your Discharge Instructions?: No     Questions/Concerns:  Do you have any questions or concerns that have not been discussed?: Yes         Nursing Interventions:    [facility] scheduled the patient for a TCM/HFU with Dr. Miramontes for tomorrow 10/01/2024 and advised the patient to return to the ER with any worsening symptoms and the patient verbalized agreement with this. A condition update was also sent to the PCP office for further recommendations and the patient is aware she will be contacted directly.     All d/c instructions reviewed with pt.  Reviewed when to call MD vs when to go to ER/call 911.  Educated pt on the importance of taking all meds as prescribed as well as close f/u with PCP/specialists.  Pt verbalized understanding and will contact office with any further questions or concerns.     Medication Review:   Current Outpatient Medications   Medication Sig Dispense Refill    LEVOTHYROXINE 150 MCG Oral Tab Take 1 tablet (150 mcg total) by mouth daily. 90 tablet 0    gabapentin 100 MG Oral Cap Take 1 capsule (100 mg total) by mouth 3 (three) times daily. 90 capsule 1    HYDROcodone-acetaminophen 5-325 MG Oral  Tab Take 1-2 tablets by mouth every 6 (six) hours as needed for Pain. 10 tablet 0    baclofen 10 MG Oral Tab Take 1 tablet (10 mg total) by mouth 3 (three) times daily as needed. 90 tablet 1    predniSONE 10 MG Oral Tab Take 3 tablets (30 mg total) by mouth daily for 3 days, THEN 2 tablets (20 mg total) daily for 3 days, THEN 1 tablet (10 mg total) daily for 3 days. TAKE WITH FOOD.. 18 tablet 0    semaglutide-weight management (WEGOVY) 2.4 MG/0.75ML Subcutaneous Solution Auto-injector Inject 0.75 mL (2.4 mg total) into the skin once a week. 3 mL 2    naproxen 500 MG Oral Tab Take 1 tablet (500 mg total) by mouth 2 (two) times daily as needed. 60 tablet 0    cyclobenzaprine 10 MG Oral Tab Take 1 tablet (10 mg total) by mouth 2 (two) times daily as needed for Muscle spasms (May cause drowsiness no driving or operating machinery while taking this medication). 10 tablet 0    rosuvastatin 10 MG Oral Tab Take 1 tablet (10 mg total) by mouth nightly. FOR CHOLESTEROL. 90 tablet 9    omeprazole 20 MG Oral Capsule Delayed Release Take 1 capsule (20 mg total) by mouth every morning before breakfast. FOR ACID REFLUX. 90 capsule 3    fluticasone-salmeterol 115-21 MCG/ACT Inhalation Aerosol Inhale 2 puffs into the lungs 2 (two) times daily. FOR ASTHMA 1 each 9    fluticasone propionate 50 MCG/ACT Nasal Suspension 2 sprays by Nasal route daily. 16 g 0    albuterol 108 (90 Base) MCG/ACT Inhalation Aero Soln Inhale 2-4 puffs into the lungs every 4 to 6 hours as needed for Wheezing or Shortness of Breath (cough, asthma, chest tightness). FOR ASTHMA. Pharmacist, please switch to formulary alternative per insurance coverage, ok to replace with proair, ventolin, proventil, or any other albuterol inhaler. -drkp 3 each 9    cholecalciferol (VITAMIN D3) 125 MCG (5000 UT) Oral Cap Take 1 capsule (5,000 Units total) by mouth daily.       Did patient review medications using current pill bottles and not just a medication list?  Yes  START  taking:  gabapentin (Neurontin)  HYDROcodone-acetaminophen (Norco)  CHANGE how you take:  Wegovy (semaglutide-weight management)  Discharge medications reviewed/discussed/and reconciled against outpatient medications with patient.  Any changes or updates to medications sent to primary care provider.  Patient Acknowledged    SDOH: NCM did not address at this encounter     Follow-up Appointments:  Your appointments       Date & Time Appointment Department ([place])    Oct 01, 2024 2:00 PM CDT Consult with Brian Mcrae MD [facility] ([facility])        Oct 03, 2024 2:30 PM CDT Hospital Follow Up with Guille Miramontes MD [facility] ([facility])        Dec 27, 2024 12:45 PM CST Follow Up Visit with Guille Miramontes MD [facility] ([facility])              [facility]  [address]  [facility] [id number] [phone] [place] [address] [address] [id number] [phone]            Transitional Care Clinic  Was TCC Ordered: No      Primary Care Provider (If no TCC appointment)  Does patient already have a PCP appointment scheduled? No  Nurse Care Manager Scheduled PCP office TCM appointment with patient      Specialist  Does the patient have any other follow-up appointment(s) that need to be scheduled? No   -If yes: Nurse Care Manager reviewed upcoming specialist appointments with patient: No   -Does the patient need assistance scheduling appointment(s): No    CCM referral placed:  Not Applicable

## 2024-09-30 NOTE — TELEPHONE ENCOUNTER
Advised patient of Dr. Miramontes's note. Patient verbalized understanding and will see Dr Miramontes tomorrow.

## 2024-10-01 ENCOUNTER — OFFICE VISIT (OUTPATIENT)
Dept: INTERNAL MEDICINE CLINIC | Facility: CLINIC | Age: 59
End: 2024-10-01

## 2024-10-01 VITALS
SYSTOLIC BLOOD PRESSURE: 110 MMHG | HEART RATE: 89 BPM | DIASTOLIC BLOOD PRESSURE: 75 MMHG | WEIGHT: 261 LBS | HEIGHT: 62 IN | BODY MASS INDEX: 48.03 KG/M2

## 2024-10-01 DIAGNOSIS — M48.04 NEURAL FORAMINAL STENOSIS OF THORACIC SPINE: Primary | ICD-10-CM

## 2024-10-01 DIAGNOSIS — M48.061 NEURAL FORAMINAL STENOSIS OF LUMBAR SPINE: ICD-10-CM

## 2024-10-01 DIAGNOSIS — M94.0 ACUTE COSTOCHONDRITIS: ICD-10-CM

## 2024-10-01 PROCEDURE — 3074F SYST BP LT 130 MM HG: CPT | Performed by: STUDENT IN AN ORGANIZED HEALTH CARE EDUCATION/TRAINING PROGRAM

## 2024-10-01 PROCEDURE — 99496 TRANSJ CARE MGMT HIGH F2F 7D: CPT | Performed by: STUDENT IN AN ORGANIZED HEALTH CARE EDUCATION/TRAINING PROGRAM

## 2024-10-01 PROCEDURE — 3008F BODY MASS INDEX DOCD: CPT | Performed by: STUDENT IN AN ORGANIZED HEALTH CARE EDUCATION/TRAINING PROGRAM

## 2024-10-01 PROCEDURE — 3078F DIAST BP <80 MM HG: CPT | Performed by: STUDENT IN AN ORGANIZED HEALTH CARE EDUCATION/TRAINING PROGRAM

## 2024-10-01 RX ORDER — MELOXICAM 15 MG/1
15 TABLET ORAL DAILY
Qty: 90 TABLET | Refills: 0 | Status: SHIPPED | OUTPATIENT
Start: 2024-10-01 | End: 2024-12-30

## 2024-10-01 RX ORDER — GABAPENTIN 300 MG/1
300 CAPSULE ORAL 3 TIMES DAILY PRN
Qty: 270 CAPSULE | Refills: 0 | Status: SHIPPED | OUTPATIENT
Start: 2024-10-01 | End: 2024-12-30

## 2024-10-01 RX ORDER — BACLOFEN 10 MG/1
10 TABLET ORAL 3 TIMES DAILY PRN
Qty: 90 TABLET | Refills: 2 | Status: SHIPPED | OUTPATIENT
Start: 2024-10-01 | End: 2024-12-30

## 2024-10-01 NOTE — PROGRESS NOTES
Subjective:   Radha Rodrigez is a 58 year old female who presents for hospital follow up.   She was discharged from [facility] to Home or Self Care  Admission Date: 24   Discharge Date: 24  Hospital Discharge Diagnosis: Dilation of Common bile duct    Interactive contact within 2 business days post discharge first initiated on Date: 2024    During the visit, the following was completed:  Obtained and reviewed discharge summary, continuity of care documents, Hospitalist notes, and gastroenterology  Reviewed Labs (CBC, CMP), Labs (Microbiology), and CT radiology results    HPI: Pt is a a 58y/o female with PMHx HTN, Hypothyroidism, Class 2 obesity ([id number]), pre-diabetes, ALYSSIA, GERD,  leiomyoma of uterus and chronic sinusitis (followed by Dr. Lora) left nephrolithiasis, whom was recently admitted for RUQ MSK pain (dilated biliary duct but history of cholecystectomy)    Discharge Summary:  Ender Wheatley MD (Physician)  Hospitalist     [facility]  part of [facility]     Discharge Summary           Radha Rodrigez Patient Status:  Inpatient    1965 MRN [id number]   Location [facility] 3W/SW Attending Ender Ramos MD   Hosp Day # 2 PCP Guille Miramontes MD      Date of Admission: 2024   Disposition:   Home or Self Care                 Date of Discharge:  2024      Admitting Diagnosis:   Dilation of common bile duct [K83.8]     Hospital Discharge Diagnoses:     Right scapular pain   RUQ abd pain.  Dilation of common bile duct on CT    Neural foraminal stenosis  Hx of HL  Hx of asthma  Hx of hypothyroidism           Problem List:          Patient Active Problem List   Diagnosis    Diverticulosis of colon    Esophageal reflux    Essential hypertension    Hypothyroidism    Leiomyoma of uterus    Morbid obesity with BMI of 50.0-59.9, adult (HCC)    Seasonal allergies    Pre-diabetes    Stress    Encounter for therapeutic drug monitoring     Ureterolithiasis    Pyelonephritis    Acute cystitis with hematuria    Left flank pain    Pain due to ureteral stent, initial encounter ([facility])    Calcium oxalate calculus    Malabsorption syndrome (HCC)    S/P cholecystectomy    Right lower quadrant abdominal pain    Dilation of common bile duct    Abdominal pain         Physical Exam:       BP (!) 142/94 (BP Location: Left arm)   Pulse 75   Temp 97.7 °F (36.5 °C) (Oral)   Resp 17   Ht 5' 2\" (1.575 m)   Wt 265 lb 1.6 oz (120.2 kg)   SpO2 98%   BMI 48.49 kg/m²      Gen:  NAD.  A and O x  3  CV:   RRR.  No m/g/r  Pulm:   CTA bilat  Abd:   +bs, soft, NT, ND  LE:  No c/c/e  Neuro:  nonfocal        Reason for Admission:   Abd pain     History of Present Illness:   Radha Rodrigez is a(n) 58 year old female with a history of asthma and kidney stones who presents with right abd pain.    Pt's pain started with right shoulder and shoulder blade pain about a week ago.    This pain has now migrated to RUQ of abdomen.    Pt says that with certain movements she feels a twisting sensation that feels like a muscle spasm.   Denies trauma.    Denies relation to food.  No f/c.  No n/v/c/d.   No CP or SOB.    Pt has hx of cholecystectomy but had some CBD dilation on CT however MRCP is negative.      Hospital Course:      Right scapular pain that has no radiated to RUQ abd pain.  Dilation of common bile duct on CT however MRCP is negative for choledocholithiasis.  This pain seems musculoskeletal.  Areas of neural foraminal stenosis seen on MRI thoracic spine.  - GI was on consult.   Pain not believed to be GI.  - bentyl prn  - pt now on general diet  - was given toradol prn, morphine prn  - give dose of solu medrol here  Home on pred taper that pt already has  Rx for neurontin and norco  - cont PO protonix  - ambulate, mobilize  PCP f/u     HL  - crestor     Hx ofasthma  - cont advair     Hx of hypothyroidism   - cont levothyroxine          dvt proph - SCDs     Code status -  Full     Consultations:   GI     Discharge Condition:  Good     Discharge Medications:       Discharge Medications          START taking these medications         Instructions Prescription details   gabapentin 100 MG Caps  Commonly known as: Neurontin       Take 1 capsule (100 mg total) by mouth 3 (three) times daily.    Quantity: 90 capsule  Refills: 1      HYDROcodone-acetaminophen 5-325 MG Tabs  Commonly known as: Norco       Take 1-2 tablets by mouth every 6 (six) hours as needed for Pain.    Quantity: 10 tablet  Refills: 0                CHANGE how you take these medications         Instructions Prescription details   Wegovy 2.4 MG/0.75ML Soaj  Generic drug: semaglutide-weight management  What changed: Another medication with the same name was removed. Continue taking this medication, and follow the directions you see here.       Inject 0.75 mL (2.4 mg total) into the skin once a week.    Quantity: 3 mL  Refills: 2                CONTINUE taking these medications         Instructions Prescription details   albuterol 108 (90 Base) MCG/ACT Aers  Commonly known as: Ventolin HFA       Inhale 2-4 puffs into the lungs every 4 to 6 hours as needed for Wheezing or Shortness of Breath (cough, asthma, chest tightness). FOR ASTHMA. Pharmacist, please switch to formulary alternative per insurance coverage, ok to replace with proair, ventolin, proventil, or any other albuterol inhaler. -drkp    Quantity: 3 each  Refills: 9      baclofen 10 MG Tabs  Commonly known as: Lioresal       Take 1 tablet (10 mg total) by mouth 3 (three) times daily as needed.    Stop taking on: November 23, 2024  Quantity: 90 tablet  Refills: 1      cholecalciferol 125 MCG (5000 UT) Caps  Commonly known as: Vitamin D3       Take 1 capsule (5,000 Units total) by mouth daily.    Refills: 0      cyclobenzaprine 10 MG Tabs  Commonly known as: Flexeril       Take 1 tablet (10 mg total) by mouth 2 (two) times daily as needed for Muscle spasms (May cause  drowsiness no driving or operating machinery while taking this medication).    Quantity: 10 tablet  Refills: 0      fluticasone propionate 50 MCG/ACT Susp  Commonly known as: Flonase       2 sprays by Nasal route daily.    Quantity: 16 g  Refills: 0      fluticasone-salmeterol 115-21 MCG/ACT Aero  Commonly known as: ADVAIR HFA       Inhale 2 puffs into the lungs 2 (two) times daily. FOR ASTHMA    Quantity: 1 each  Refills: 9      levothyroxine 150 MCG Tabs  Commonly known as: Synthroid       Take 1 tablet (150 mcg total) by mouth daily.    Quantity: 90 tablet  Refills: 3      naproxen 500 MG Tabs  Commonly known as: Naprosyn       Take 1 tablet (500 mg total) by mouth 2 (two) times daily as needed.    Quantity: 60 tablet  Refills: 0      omeprazole 20 MG Cpdr  Commonly known as: PriLOSEC       Take 1 capsule (20 mg total) by mouth every morning before breakfast. FOR ACID REFLUX.    Quantity: 90 capsule  Refills: 3      predniSONE 10 MG Tabs  Commonly known as: Deltasone  Start taking on: September 24, 2024       Take 3 tablets (30 mg total) by mouth daily for 3 days, THEN 2 tablets (20 mg total) daily for 3 days, THEN 1 tablet (10 mg total) daily for 3 days. TAKE WITH FOOD..    Stop taking on: October 2, 2024  Quantity: 18 tablet  Refills: 0      rosuvastatin 10 MG Tabs  Commonly known as: Crestor       Take 1 tablet (10 mg total) by mouth nightly. FOR CHOLESTEROL.    Quantity: 90 tablet  Refills: 9                    Where to Get Your Medications          These medications were sent to [id number] - [place], [place] - [address] [address] [phone], [phone]  [address] [address] [address]        Phone: [phone]   gabapentin 100 MG Caps  HYDROcodone-acetaminophen 5-325 MG Tabs            Follow up Visits:       Follow-up Information         Guille Miramontes MD. Schedule an appointment as soon as possible for a visit in 1 week(s).    Specialty: Internal Medicine  Contact information:  [address]  [address]  [facility] [place] [address]  [phone]              While admitted seen by GI;   Recommended Bowel regiment while on narcotcs for chronic constipation,   -low fat diet from GI stance, and       MRI of Thoracic spine while admitted:   No acture thoracic spine fracture, Multilevel degenerative mild right neural foraminal stensois of T3-T4, and T4-T5. And mild left neural foraminal stenosis of T5-T6. 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      History/Other:   Current Medications:  Medication Reconciliation:  I am aware of an inpatient discharge within the last 30 days.  The discharge medication list has been reconciled with the patient's current medication list and reviewed by me.  See medication list for additions of new medication, and changes to current doses of medications and discontinued medications.  Outpatient Medications Marked as Taking for the 10/1/24 encounter (Office Visit) with Guille Miramontes MD   Medication Sig    Meloxicam 15 MG Oral Tab Take 1 tablet (15 mg total) by mouth daily.    gabapentin 300 MG Oral Cap Take 1 capsule (300 mg total) by mouth 3 (three) times daily as needed.    baclofen 10 MG Oral Tab Take 1 tablet (10 mg total) by mouth 3 (three) times daily as needed.    LEVOTHYROXINE 150 MCG Oral Tab Take 1 tablet (150 mcg total) by mouth daily.    semaglutide-weight management (WEGOVY) 2.4 MG/0.75ML Subcutaneous Solution Auto-injector Inject 0.75 mL (2.4 mg total) into the skin once a week.    rosuvastatin 10 MG Oral Tab Take 1 tablet (10 mg total) by mouth nightly. FOR CHOLESTEROL.    omeprazole 20 MG Oral Capsule Delayed Release Take 1 capsule (20 mg total) by mouth every morning before breakfast. FOR ACID REFLUX.    fluticasone-salmeterol 115-21 MCG/ACT Inhalation Aerosol Inhale 2 puffs into the  lungs 2 (two) times daily. FOR ASTHMA    fluticasone propionate 50 MCG/ACT Nasal Suspension 2 sprays by Nasal route daily.    albuterol 108 (90 Base) MCG/ACT Inhalation Aero Soln Inhale 2-4 puffs into the lungs every 4 to 6 hours as needed for Wheezing or Shortness of Breath (cough, asthma, chest tightness). FOR ASTHMA. Pharmacist, please switch to formulary alternative per insurance coverage, ok to replace with proair, ventolin, proventil, or any other albuterol inhaler. -kp    cholecalciferol (VITAMIN D3) 125 MCG (5000 UT) Oral Cap Take 1 capsule (5,000 Units total) by mouth daily.       Review of Systems   Constitutional: Negative.    HENT: Negative.     Eyes: Negative.    Respiratory: Negative.     Cardiovascular:  Positive for chest pain (right inferior right costocondritis).   Gastrointestinal: Negative.    Genitourinary: Negative.    Musculoskeletal:  Positive for back pain.   Skin: Negative.    Neurological: Negative.    Psychiatric/Behavioral: Negative.            Objective:   Physical Exam  Constitutional:       Appearance: She is well-developed.   Cardiovascular:      Rate and Rhythm: Normal rate and regular rhythm.      Heart sounds: Normal heart sounds.   Pulmonary:      Effort: Pulmonary effort is normal.      Breath sounds: Normal breath sounds.   Abdominal:      General: Bowel sounds are normal.      Palpations: Abdomen is soft.   Skin:     General: Skin is warm and dry.   Neurological:      Mental Status: She is alert and oriented to person, place, and time.      Deep Tendon Reflexes: Reflexes are normal and symmetric.           /75 (BP Location: Right arm, Patient Position: Sitting, Cuff Size: large)   Pulse 89   Ht 5' 2\" (1.575 m)   Wt 261 lb (118.4 kg)   BMI 47.74 kg/m²  Estimated body mass index is 47.74 kg/m² as calculated from the following:    Height as of this encounter: 5' 2\" (1.575 m).    Weight as of this encounter: 261 lb (118.4 kg).    Assessment & Plan:   1. Neural  foraminal stenosis of thoracic spine (Primary)  -     Meloxicam; Take 1 tablet (15 mg total) by mouth daily.  Dispense: 90 tablet; Refill: 0  -     Gabapentin; Take 1 capsule (300 mg total) by mouth 3 (three) times daily as needed.  Dispense: 270 capsule; Refill: 0  -     Physiatry Referral - In Network  2. Acute costochondritis  -     Meloxicam; Take 1 tablet (15 mg total) by mouth daily.  Dispense: 90 tablet; Refill: 0  -     Gabapentin; Take 1 capsule (300 mg total) by mouth 3 (three) times daily as needed.  Dispense: 270 capsule; Refill: 0  -     Physiatry Referral - In Network  3. Neural foraminal stenosis of lumbar spine  -     Meloxicam; Take 1 tablet (15 mg total) by mouth daily.  Dispense: 90 tablet; Refill: 0  -     Gabapentin; Take 1 capsule (300 mg total) by mouth 3 (three) times daily as needed.  Dispense: 270 capsule; Refill: 0  -     Physiatry Referral - In Network  Other orders  -     Baclofen; Take 1 tablet (10 mg total) by mouth 3 (three) times daily as needed.  Dispense: 90 tablet; Refill: 2  Pt with acute costochondritis and bilateral thoracic and lumbar spine pain  Plan  -start meloxicam 15mg po dialy and increase PPI to 40mg daily to prevent PUD  -start baclofen 10mg po TID PRN for spasms  -start gabapentin 300mg po TID PRN for neuropathic pain  -discussed with physiatrist Dr. Behar to evaluate and steroid injections if warranted  -pt in agreement with plan      No follow-ups on file.

## 2024-10-03 ENCOUNTER — NURSE TRIAGE (OUTPATIENT)
Dept: INTERNAL MEDICINE CLINIC | Facility: CLINIC | Age: 59
End: 2024-10-03

## 2024-10-03 NOTE — TELEPHONE ENCOUNTER
Verified name and date of birth. Informed with understanding.   She is on her way to get the Baclofen-her son is driving.   She asked what else can she do:  I suggested alternating heat/and ice but not directly on the skin with understanding.   Instructed to call back if needed.      October 3, 2024  Guille Miramontes MD   to Annika Larsno, RN  Em Rn Triage       10/3/24 12:05 PM  Relay to pay out of pocket ~$20 to prevent phone tag with insurance and pharmacy  Guille Miramontes MD   to Annika Larson, [facility]       10/3/24 12:04 PM  Yes would recommend to pay out of pocket for baclofen

## 2024-10-03 NOTE — PAYOR COMM NOTE
--------------  DISCHARGE REVIEW    Payor: [facility]  Subscriber #:  [id number]  Authorization Number: [id number]    Admit date: 24  Admit time:   9:10 AM  Discharge Date: 2024  2:42 PM     Admitting Physician: Ender Ramos MD  Attending Physician:  No att. providers found  Primary Care Physician: Guille Miramontes MD          Discharge Summary Notes        Discharge Summary signed by Ender Ramos MD at 2024 11:34 AM       Author: Ender Ramos MD Specialty: HOSPITALIST, HOSPITALIST Author Type: Physician    Filed: 2024 11:34 AM Date of Service: 2024 11:30 AM Status: Signed    : Ender Ramos MD (Physician)           [facility]  part of [facility]    Discharge Summary    Radha Rodrigez Patient Status:  Inpatient    1965 MRN [id number]   Location [facility] 3W/SW Attending Ender Ramos MD   Hosp Day # 2 PCP Guille Miramontes MD     Date of Admission: 2024 Disposition:   Home or Self Care     Date of Discharge:  2024     Admitting Diagnosis:   Dilation of common bile duct [K83.8]    Hospital Discharge Diagnoses:    Right scapular pain   RUQ abd pain.  Dilation of common bile duct on CT    Neural foraminal stenosis  Hx of HL  Hx of asthma  Hx of hypothyroidism         Problem List:     Patient Active Problem List   Diagnosis    Diverticulosis of colon    Esophageal reflux    Essential hypertension    Hypothyroidism    Leiomyoma of uterus    Morbid obesity with BMI of 50.0-59.9, adult (HCC)    Seasonal allergies    Pre-diabetes    Stress    Encounter for therapeutic drug monitoring    Ureterolithiasis    Pyelonephritis    Acute cystitis with hematuria    Left flank pain    Pain due to ureteral stent, initial encounter ([facility])    Calcium oxalate calculus    Malabsorption syndrome ([facility])    S/P cholecystectomy    Right lower quadrant abdominal pain    Dilation of common bile duct    Abdominal pain       Physical Exam:      BP (!) 142/94  (BP Location: Left arm)   Pulse 75   Temp 97.7 °F (36.5 °C) (Oral)   Resp 17   Ht 5' 2\" (1.575 m)   Wt 265 lb 1.6 oz (120.2 kg)   SpO2 98%   BMI 48.49 kg/m²     Gen:  NAD.  A and O x  3  CV:   RRR.  No m/g/r  Pulm:   CTA bilat  Abd:   +bs, soft, NT, ND  LE:  No c/c/e  Neuro:  nonfocal      Reason for Admission:   Abd pain    History of Present Illness:   Radha Rodrigez is a(n) 58 year old female with a history of asthma and kidney stones who presents with right abd pain.    Pt's pain started with right shoulder and shoulder blade pain about a week ago.    This pain has now migrated to RUQ of abdomen.    Pt says that with certain movements she feels a twisting sensation that feels like a muscle spasm.   Denies trauma.    Denies relation to food.  No f/c.  No n/v/c/d.   No CP or SOB.    Pt has hx of cholecystectomy but had some CBD dilation on CT however MRCP is negative.     Hospital Course:     Right scapular pain that has no radiated to RUQ abd pain.  Dilation of common bile duct on CT however MRCP is negative for choledocholithiasis.  This pain seems musculoskeletal.  Areas of neural foraminal stenosis seen on MRI thoracic spine.  - GI was on consult.   Pain not believed to be GI.  - bentyl prn  - pt now on general diet  - was given toradol prn, morphine prn  - give dose of solu medrol here  Home on pred taper that pt already has  Rx for neurontin and norco  - cont PO protonix  - ambulate, mobilize  PCP f/u     HL  - crestor     Hx ofasthma  - cont advair     Hx of hypothyroidism   - cont levothyroxine          dvt proph - SCDs     Code status - Full    Consultations:   GI    Discharge Condition:  Good    Discharge Medications:      Discharge Medications        START taking these medications        Instructions Prescription details   gabapentin 100 MG Caps  Commonly known as: Neurontin      Take 1 capsule (100 mg total) by mouth 3 (three) times daily.   Quantity: 90 capsule  Refills: 1      HYDROcodone-acetaminophen 5-325 MG Tabs  Commonly known as: Norco      Take 1-2 tablets by mouth every 6 (six) hours as needed for Pain.   Quantity: 10 tablet  Refills: 0            CHANGE how you take these medications        Instructions Prescription details   Wegovy 2.4 MG/0.75ML Soaj  Generic drug: semaglutide-weight management  What changed: Another medication with the same name was removed. Continue taking this medication, and follow the directions you see here.      Inject 0.75 mL (2.4 mg total) into the skin once a week.   Quantity: 3 mL  Refills: 2            CONTINUE taking these medications        Instructions Prescription details   albuterol 108 (90 Base) MCG/ACT Aers  Commonly known as: Ventolin HFA      Inhale 2-4 puffs into the lungs every 4 to 6 hours as needed for Wheezing or Shortness of Breath (cough, asthma, chest tightness). FOR ASTHMA. Pharmacist, please switch to formulary alternative per insurance coverage, ok to replace with proair, ventolin, proventil, or any other albuterol inhaler. -jovan   Quantity: 3 each  Refills: 9     baclofen 10 MG Tabs  Commonly known as: Lioresal      Take 1 tablet (10 mg total) by mouth 3 (three) times daily as needed.   Stop taking on: November 23, 2024  Quantity: 90 tablet  Refills: 1     cholecalciferol 125 MCG (5000 UT) Caps  Commonly known as: Vitamin D3      Take 1 capsule (5,000 Units total) by mouth daily.   Refills: 0     cyclobenzaprine 10 MG Tabs  Commonly known as: Flexeril      Take 1 tablet (10 mg total) by mouth 2 (two) times daily as needed for Muscle spasms (May cause drowsiness no driving or operating machinery while taking this medication).   Quantity: 10 tablet  Refills: 0     fluticasone propionate 50 MCG/ACT Susp  Commonly known as: Flonase      2 sprays by Nasal route daily.   Quantity: 16 g  Refills: 0     fluticasone-salmeterol 115-21 MCG/ACT Aero  Commonly known as: ADVAIR HFA      Inhale 2 puffs into the lungs 2 (two) times daily. FOR  ASTHMA   Quantity: 1 each  Refills: 9     levothyroxine 150 MCG Tabs  Commonly known as: Synthroid      Take 1 tablet (150 mcg total) by mouth daily.   Quantity: 90 tablet  Refills: 3     naproxen 500 MG Tabs  Commonly known as: Naprosyn      Take 1 tablet (500 mg total) by mouth 2 (two) times daily as needed.   Quantity: 60 tablet  Refills: 0     omeprazole 20 MG Cpdr  Commonly known as: PriLOSEC      Take 1 capsule (20 mg total) by mouth every morning before breakfast. FOR ACID REFLUX.   Quantity: 90 capsule  Refills: 3     predniSONE 10 MG Tabs  Commonly known as: Deltasone  Start taking on: September 24, 2024      Take 3 tablets (30 mg total) by mouth daily for 3 days, THEN 2 tablets (20 mg total) daily for 3 days, THEN 1 tablet (10 mg total) daily for 3 days. TAKE WITH FOOD..   Stop taking on: October 2, 2024  Quantity: 18 tablet  Refills: 0     rosuvastatin 10 MG Tabs  Commonly known as: Crestor      Take 1 tablet (10 mg total) by mouth nightly. FOR CHOLESTEROL.   Quantity: 90 tablet  Refills: 9               Where to Get Your Medications        These medications were sent to [id number] - [place], [place] - [address] [facility] [phone], [phone]  [address] [place] [id number]      Phone: [phone]   gabapentin 100 MG Caps  HYDROcodone-acetaminophen 5-325 MG Tabs         Follow up Visits:     Follow-up Information       Guille Miramontes MD. Schedule an appointment as soon as possible for a visit in 1 week(s).    Specialty: Internal Medicine  Contact information:  [address] [place] [address] [id number] [phone]                             Hospital Discharge Diagnoses:  MSK pain    Lace+ Score: 51  59-90 High Risk  29-58 Medium Risk  0-28   Low Risk.    TCM Follow-Up Recommendation:  LACE 29-58: Moderate Risk of readmission after discharge from the hospital.    >35 minutes spent preparing this discharge.    Ender Barahona MD  9/27/2024  11:30 AM     Electronically  signed by Ender Ramos MD on 9/27/2024 11:34 AM         REVIEWER COMMENTS

## 2024-10-03 NOTE — TELEPHONE ENCOUNTER
Patient stated that she was in the hospital from 9/25/2024 to 9/27/2024 for the right upper abdominal pain radiating to the right side of her back. Patient saw Dr Miramontes on 10/1/2024. Pain has gotten worse but not as bad as it was when she went to the hospital last week. Pain in on the right side of the upper abdomen by the ribs radiating to the right side of her back. Patient does get short of breath. Can not move the right arm because when she does gets the shooting pain. Patient tried the increased dose of the gabapentin that Dr Miramontes prescribed but states it is too strong. Patient is still in bed and just feels spacey. States had to call off of work because she can not drive. Declined going back to the emergency room.  Patient stated that she picked up her baclofen on 9/24/2024 but was only given 9 tablets and now she is out of medication. States she was not give 90 tablets. States that the muscle relaxer does help. No other symptoms. Put patient on hold and called [place] pharmacist Max- indicated that patient was dispensed 90 tablets of baclofen on 9/24/2024, so insurance will not cover since too early. Can pay out of pocket-would be around $20. Patient advised.     Any recommendations?

## 2024-10-09 ENCOUNTER — OFFICE VISIT (OUTPATIENT)
Dept: PHYSICAL MEDICINE AND REHAB | Facility: CLINIC | Age: 59
End: 2024-10-09
Payer: COMMERCIAL

## 2024-10-09 VITALS — WEIGHT: 261 LBS | HEIGHT: 62 IN | BODY MASS INDEX: 48.03 KG/M2

## 2024-10-09 DIAGNOSIS — M54.59 MECHANICAL LOW BACK PAIN: ICD-10-CM

## 2024-10-09 DIAGNOSIS — E88.82 CLASS 3 OBESITY DUE TO DISRUPTION OF MC4R PATHWAY WITH SERIOUS COMORBIDITY AND BODY MASS INDEX (BMI) OF 45.0 TO 49.9 IN ADULT (HCC): Primary | ICD-10-CM

## 2024-10-09 DIAGNOSIS — M47.816 LUMBAR SPONDYLOSIS: ICD-10-CM

## 2024-10-09 DIAGNOSIS — M51.369 BULGE OF LUMBAR DISC WITHOUT MYELOPATHY: ICD-10-CM

## 2024-10-09 DIAGNOSIS — S76.019A STRAIN OF GLUTEUS MEDIUS, UNSPECIFIED LATERALITY, INITIAL ENCOUNTER: ICD-10-CM

## 2024-10-09 DIAGNOSIS — G89.29 CHRONIC MIDLINE THORACIC BACK PAIN: ICD-10-CM

## 2024-10-09 DIAGNOSIS — M79.10 MYALGIA: ICD-10-CM

## 2024-10-09 DIAGNOSIS — M51.34 BULGE OF THORACIC DISC WITHOUT MYELOPATHY: ICD-10-CM

## 2024-10-09 DIAGNOSIS — M47.816 FACET SYNDROME, LUMBAR: ICD-10-CM

## 2024-10-09 DIAGNOSIS — M51.372 DEGENERATION OF INTERVERTEBRAL DISC OF LUMBOSACRAL REGION WITH DISCOGENIC BACK PAIN AND LOWER EXTREMITY PAIN: ICD-10-CM

## 2024-10-09 DIAGNOSIS — M54.6 CHRONIC MIDLINE THORACIC BACK PAIN: ICD-10-CM

## 2024-10-09 DIAGNOSIS — M48.061 LUMBAR FORAMINAL STENOSIS: ICD-10-CM

## 2024-10-09 DIAGNOSIS — Z15.2 CLASS 3 OBESITY DUE TO DISRUPTION OF MC4R PATHWAY WITH SERIOUS COMORBIDITY AND BODY MASS INDEX (BMI) OF 45.0 TO 49.9 IN ADULT (HCC): Primary | ICD-10-CM

## 2024-10-09 DIAGNOSIS — M54.16 LUMBAR RADICULOPATHY: ICD-10-CM

## 2024-10-09 DIAGNOSIS — E66.813 CLASS 3 OBESITY DUE TO DISRUPTION OF MC4R PATHWAY WITH SERIOUS COMORBIDITY AND BODY MASS INDEX (BMI) OF 45.0 TO 49.9 IN ADULT (HCC): Primary | ICD-10-CM

## 2024-10-09 RX ORDER — BACLOFEN 10 MG/1
10 TABLET ORAL NIGHTLY PRN
COMMUNITY
Start: 2024-10-09

## 2024-10-09 RX ORDER — GABAPENTIN 100 MG/1
100 CAPSULE ORAL 3 TIMES DAILY
COMMUNITY
Start: 2024-10-09

## 2024-10-09 NOTE — H&P
[facility]  Clinic H&P    Requesting Physician: Guille Miramontes MD    Chief Complaint (Reason for Visit):    Chief Complaint   Patient presents with    New Patient     New right hand dominant patient presents for upper and low back pain radiating into right leg. Pain has been present for 3-4 weeks. Denies N/T. Denies weakness. Denies injury, gradual onset. No tx done. MRI 9/26/24. Pain 7/10.       History of Present Illness:  The patient is a 58 year old RIGHT handed female with significant past medical history of thyroid disease, GERD, morbid obesity who presents with THORACIC BACK PAIN WITH RADIATION to the RIGHT CHEST WALL, LOW BACK PAIN AND RADICULAR SYMPTOMS DOWN TO THE FRONT OF THE KNEE AND THE BACK OF THE LOWER LEG. She rates her pain 7/10, sharp and radiates as described above. She has been taking gabapentin,baclofen, and meloxicam for pain. She has also completed a course of oral steroids. She has been seen in the emergency room for this on September 17, 2024. The pain around the right ribs and chest wall began about 2-3 weeks ago. The back pain and right lower radic ular symptoms is chronic. She last had spine injections about 6-7 years ago by Dr. Vallejo. She had an MRI of the thoracic spin.  Her last lumbar spine MRI is from over 5 years ago. She denies paresthesias or weakness. She works in food services and feels the right low back pain is aggravated by serving and the right chest wall pain is aggravated by lifting with the right arm     PAST MEDICAL HISTORY:   Past Medical History:    Back problem    Calculus of kidney    Disorder of thyroid    Diverticulosis of large intestine    Esophageal reflux    Hx of motion sickness    Morbid obesity with BMI of 50.0-59.9, adult (HCC)    Pneumonia due to organism    PONV (postoperative nausea and vomiting)    Pre-diabetes    Seasonal allergies    Thyroid disease    Vitamin D deficiency       PAST SURGICAL HISTORY:    Past Surgical History:   Procedure Laterality Date    Colonoscopy  11/30/2021    Hernia surgery      Hysterectomy  12/21/2012    Other surgical history Left 03/12/2024    Cystoscopy left ureteroscopy, laser lithotripsy, retrograde pyelogram, stent placement    Removal gallbladder      Repair ing hernia,5+y/o,reducibl          FAMILY HISTORY:   Family History   Problem Relation Age of Onset    Ovarian Cancer Mother     Breast Cancer Sister 27       SOCIAL HISTORY:   Social History     Occupational History    Not on file   Tobacco Use    Smoking status: Never     Passive exposure: Never    Smokeless tobacco: Never   Vaping Use    Vaping status: Never Used   Substance and Sexual Activity    Alcohol use: Not Currently    Drug use: Never    Sexual activity: Not on file       CURRENT MEDICATIONS:   Current Outpatient Medications   Medication Sig Dispense Refill    baclofen 10 MG Oral Tab Take 1 tablet (10 mg total) by mouth nightly as needed.      gabapentin 100 MG Oral Cap Take 1 capsule (100 mg total) by mouth 3 (three) times daily.      Meloxicam 15 MG Oral Tab Take 1 tablet (15 mg total) by mouth daily. 90 tablet 0    LEVOTHYROXINE 150 MCG Oral Tab Take 1 tablet (150 mcg total) by mouth daily. 90 tablet 0    semaglutide-weight management (WEGOVY) 2.4 MG/0.75ML Subcutaneous Solution Auto-injector Inject 0.75 mL (2.4 mg total) into the skin once a week. 3 mL 2    rosuvastatin 10 MG Oral Tab Take 1 tablet (10 mg total) by mouth nightly. FOR CHOLESTEROL. 90 tablet 9    omeprazole 20 MG Oral Capsule Delayed Release Take 1 capsule (20 mg total) by mouth every morning before breakfast. FOR ACID REFLUX. 90 capsule 3    fluticasone-salmeterol 115-21 MCG/ACT Inhalation Aerosol Inhale 2 puffs into the lungs 2 (two) times daily. FOR ASTHMA 1 each 9    fluticasone propionate 50 MCG/ACT Nasal Suspension 2 sprays by Nasal route daily. 16 g 0    albuterol 108 (90 Base) MCG/ACT Inhalation Aero Soln Inhale 2-4 puffs into the lungs  every 4 to 6 hours as needed for Wheezing or Shortness of Breath (cough, asthma, chest tightness). FOR ASTHMA. Pharmacist, please switch to formulary alternative per insurance coverage, ok to replace with proair, ventolin, proventil, or any other albuterol inhaler. -drkp 3 each 9    cholecalciferol (VITAMIN D3) 125 MCG (5000 UT) Oral Cap Take 1 capsule (5,000 Units total) by mouth daily.          ALLERGIES:   Allergies[1]      REVIEW OF SYSTEMS:   Review of Systems   Constitutional: Negative.    HENT: Negative.    Eyes: Negative.    Respiratory: Negative.    Cardiovascular: Negative.    Gastrointestinal: Negative.    Genitourinary: Negative.    Musculoskeletal: As per HPI   Skin: Negative.    Neurological: As per HPI  Endo/Heme/Allergies: Negative.    Psychiatric/Behavioral: Negative.      All other systems reviewed and are negative. Pertinent positives and negatives noted in the HPI.        PHYSICAL EXAM:   Ht 62\"   Wt 261 lb (118.4 kg)   BMI 47.74 kg/m²     Body mass index is 47.74 kg/m².      General: No immediate distress  Head: Normocephalic/ Atraumatic  Eyes: Extra-occular movements intact.   Ears: No auricular hematoma or deformities  Mouth: No lesions or ulcerations  Heart: peripheral pulses intact. Normal capillary refill.   Lungs: Non-labored respirations  Abdomen: No abdominal guarding  Extremities: No lower extremity edema bilaterally   Skin: No lesions noted.   Cognition: alert & oriented x 3, attentive, able to follow 2 step commands, comprehention intact, spontaneous speech intact  Motor:    Musculoskeletal:    LUMBAR SPINE:  Inspection: no erythema, swelling, or obvious deformity.  Their iliac crest and shoulder heights are symmetrical.  Postural exam reveals no scoliosis or kyphosis.  Palpation: Tender to palpation midline lumbar spine as well as right lower lumbar facets and paraspinals, right glued and piriformis.  ROM: Full active range of motion of the lumbar spine with pain during flexion and  extension  Motor: 5/5 in all myotomes of the BILATERAL lower extremities  Sensation: Intact to light touch in all dermatomes of the lower extremities   Reflexes: 2/4 at L4 and S1  Facet Loading: Positive bilaterally  EDD: Negative  Straight leg raise: negative for radicular pain symptoms  Slump test: negative for pain symptoms or radicular pain symptoms      Gait:  Antalgic    Data  Admission on 09/25/2024, Discharged on 09/27/2024   Component Date Value Ref Range Status    WBC 09/25/2024 13.6 (H)  4.0 - 11.0 x10(3) uL Final    RBC 09/25/2024 5.18  3.80 - 5.30 x10(6)uL Final    HGB 09/25/2024 14.9  12.0 - 16.0 g/dL Final    HCT 09/25/2024 44.4  35.0 - 48.0 % Final    MCV 09/25/2024 85.7  80.0 - 100.0 fL Final    MCH 09/25/2024 28.8  26.0 - 34.0 pg Final    MCHC 09/25/2024 33.6  31.0 - 37.0 g/dL Final    RDW-SD 09/25/2024 39.7  35.1 - 46.3 fL Final    RDW 09/25/2024 12.8  11.0 - 15.0 % Final    PLT 09/25/2024 359.0  150.0 - 450.0 10(3)uL Final    Neutrophil Absolute Prelim 09/25/2024 11.62 (H)  1.50 - 7.70 x10 (3) uL Final    Neutrophil Absolute 09/25/2024 11.62 (H)  1.50 - 7.70 x10(3) uL Final    Lymphocyte Absolute 09/25/2024 1.25  1.00 - 4.00 x10(3) uL Final    Monocyte Absolute 09/25/2024 0.62  0.10 - 1.00 x10(3) uL Final    Eosinophil Absolute 09/25/2024 0.01  0.00 - 0.70 x10(3) uL Final    Basophil Absolute 09/25/2024 0.03  0.00 - 0.20 x10(3) uL Final    Immature Granulocyte Absolute 09/25/2024 0.09  0.00 - 1.00 x10(3) uL Final    Neutrophil % 09/25/2024 85.2  % Final    Lymphocyte % 09/25/2024 9.2  % Final    Monocyte % 09/25/2024 4.6  % Final    Eosinophil % 09/25/2024 0.1  % Final    Basophil % 09/25/2024 0.2  % Final    Immature Granulocyte % 09/25/2024 0.7  % Final    Glucose 09/25/2024 142 (H)  70 - 99 mg/dL Final    Sodium 09/25/2024 135 (L)  136 - 145 mmol/L Final    Potassium 09/25/2024 4.5  3.5 - 5.1 mmol/L Final    Chloride 09/25/2024 107  98 - 112 mmol/L Final    CO2 09/25/2024 24.0  21.0 - 32.0  mmol/L Final    Anion Gap 09/25/2024 4  0 - 18 mmol/L Final    BUN 09/25/2024 18  9 - 23 mg/dL Final    Creatinine 09/25/2024 0.84  0.55 - 1.02 mg/dL Final    BUN/CREA Ratio 09/25/2024 21.4 (H)  10.0 - 20.0 Final    Calcium, Total 09/25/2024 9.2  8.7 - 10.4 mg/dL Final    Calculated Osmolality 09/25/2024 284  275 - 295 mOsm/kg Final    eGFR-Cr 09/25/2024 80  >=60 mL/min/1.73m2 Final    AST 09/25/2024 18  <34 U/L Final    ALT 09/25/2024 26  10 - 49 U/L Final    Alkaline Phosphatase 09/25/2024 133 (H)  46 - 118 U/L Final    Bilirubin, Total 09/25/2024 0.5  0.3 - 1.2 mg/dL Final    Bilirubin, Direct 09/25/2024 0.2  <=0.3 mg/dL Final    Total Protein 09/25/2024 6.9  5.7 - 8.2 g/dL Final    Albumin 09/25/2024 4.2  3.2 - 4.8 g/dL Final    Lipase 09/25/2024 33  12 - 53 U/L Final    Urine Color 09/25/2024 Colorless (A)  Yellow Final    Clarity Urine 09/25/2024 Clear  Clear Final    Spec Gravity 09/25/2024 1.008  1.005 - 1.030 Final    Glucose Urine 09/25/2024 Normal  Normal mg/dL Final    Bilirubin Urine 09/25/2024 Negative  Negative Final    Ketones Urine 09/25/2024 Negative  Negative mg/dL Final    Blood Urine 09/25/2024 Negative  Negative Final    pH Urine 09/25/2024 7.0  5.0 - 8.0 Final    Protein Urine 09/25/2024 Negative  Negative mg/dL Final    Urobilinogen Urine 09/25/2024 Normal  Normal Final    Nitrite Urine 09/25/2024 Negative  Negative Final    Leukocyte Esterase Urine 09/25/2024 Negative  Negative Final    Microscopic 09/25/2024 Microscopic not indicated   Final    WBC 09/26/2024 9.0  4.0 - 11.0 x10(3) uL Final    RBC 09/26/2024 4.55  3.80 - 5.30 x10(6)uL Final    HGB 09/26/2024 13.0  12.0 - 16.0 g/dL Final    HCT 09/26/2024 39.5  35.0 - 48.0 % Final    MCV 09/26/2024 86.8  80.0 - 100.0 fL Final    MCH 09/26/2024 28.6  26.0 - 34.0 pg Final    MCHC 09/26/2024 32.9  31.0 - 37.0 g/dL Final    RDW-SD 09/26/2024 41.2  35.1 - 46.3 fL Final    RDW 09/26/2024 13.1  11.0 - 15.0 % Final    PLT 09/26/2024 287.0  150.0 -  450.0 10(3)uL Final    Neutrophil Absolute Prelim 09/26/2024 3.80  1.50 - 7.70 x10 (3) uL Final    Neutrophil Absolute 09/26/2024 3.80  1.50 - 7.70 x10(3) uL Final    Lymphocyte Absolute 09/26/2024 4.08 (H)  1.00 - 4.00 x10(3) uL Final    Monocyte Absolute 09/26/2024 0.83  0.10 - 1.00 x10(3) uL Final    Eosinophil Absolute 09/26/2024 0.18  0.00 - 0.70 x10(3) uL Final    Basophil Absolute 09/26/2024 0.04  0.00 - 0.20 x10(3) uL Final    Immature Granulocyte Absolute 09/26/2024 0.08  0.00 - 1.00 x10(3) uL Final    Neutrophil % 09/26/2024 42.2  % Final    Lymphocyte % 09/26/2024 45.3  % Final    Monocyte % 09/26/2024 9.2  % Final    Eosinophil % 09/26/2024 2.0  % Final    Basophil % 09/26/2024 0.4  % Final    Immature Granulocyte % 09/26/2024 0.9  % Final    Glucose 09/26/2024 88  70 - 99 mg/dL Final    Sodium 09/26/2024 140  136 - 145 mmol/L Final    Potassium 09/26/2024 3.9  3.5 - 5.1 mmol/L Final    Chloride 09/26/2024 109  98 - 112 mmol/L Final    CO2 09/26/2024 25.0  21.0 - 32.0 mmol/L Final    Anion Gap 09/26/2024 6  0 - 18 mmol/L Final    BUN 09/26/2024 17  9 - 23 mg/dL Final    Creatinine 09/26/2024 0.72  0.55 - 1.02 mg/dL Final    BUN/CREA Ratio 09/26/2024 23.6 (H)  10.0 - 20.0 Final    Calcium, Total 09/26/2024 8.7  8.7 - 10.4 mg/dL Final    Calculated Osmolality 09/26/2024 291  275 - 295 mOsm/kg Final    eGFR-Cr 09/26/2024 97  >=60 mL/min/1.73m2 Final    ALT 09/26/2024 18  10 - 49 U/L Final    AST 09/26/2024 9  <34 U/L Final    Alkaline Phosphatase 09/26/2024 104  46 - 118 U/L Final    Bilirubin, Total 09/26/2024 0.5  0.3 - 1.2 mg/dL Final    Total Protein 09/26/2024 5.8  5.7 - 8.2 g/dL Final    Albumin 09/26/2024 3.6  3.2 - 4.8 g/dL Final    Globulin  09/26/2024 2.2  2.0 - 3.5 g/dL Final    A/G Ratio 09/26/2024 1.6  1.0 - 2.0 Final   [facility] Lab Encounter on 09/24/2024   Component Date Value Ref Range Status    D-Dimer 09/24/2024 0.48  <0.58 ug/mL FEU Final   [facility] Lab Encounter on 07/01/2024   Component Date  Value Ref Range Status    Calprotectin, Fecal 07/01/2024 44.1  <50 µg/g Final    Norovirus Gi/Gii by PCR 07/01/2024 Positive (A)  Negative Final    Immunoglobulin E 07/01/2024 14.40  2.00 - 214.00 kU/L Final    Sed Rate 07/01/2024 12  0 - 30 mm/Hr Final    C-Reactive Protein 07/01/2024 0.70  <1.00 mg/dL Final    TSH 07/01/2024 0.621  0.550 - 4.780 mIU/mL Final    Cholesterol, Total 07/01/2024 144  <200 mg/dL Final    HDL Cholesterol 07/01/2024 38 (L)  40 - 59 mg/dL Final    Triglycerides 07/01/2024 100  30 - 149 mg/dL Final    LDL Cholesterol 07/01/2024 87  <100 mg/dL Final    VLDL 07/01/2024 16  0 - 30 mg/dL Final    Non HDL Chol 07/01/2024 106  <130 mg/dL Final    Patient Fasting for Lipid? 07/01/2024 Yes   Final    HgbA1C 07/01/2024 5.9 (H)  <5.7 % Final    Estimated Average Glucose 07/01/2024 123  68 - 126 mg/dL Final    Glucose 07/01/2024 91  70 - 99 mg/dL Final    Sodium 07/01/2024 141  136 - 145 mmol/L Final    Potassium 07/01/2024 4.3  3.5 - 5.1 mmol/L Final    Chloride 07/01/2024 108  98 - 112 mmol/L Final    CO2 07/01/2024 27.0  21.0 - 32.0 mmol/L Final    Anion Gap 07/01/2024 6  0 - 18 mmol/L Final    BUN 07/01/2024 13  9 - 23 mg/dL Final    Creatinine 07/01/2024 0.81  0.55 - 1.02 mg/dL Final    BUN/CREA Ratio 07/01/2024 16.0  10.0 - 20.0 Final    Calcium, Total 07/01/2024 9.2  8.7 - 10.4 mg/dL Final    Calculated Osmolality 07/01/2024 292  275 - 295 mOsm/kg Final    eGFR-Cr 07/01/2024 84  >=60 mL/min/1.73m2 Final    ALT 07/01/2024 29  10 - 49 U/L Final    AST 07/01/2024 27  <=34 U/L Final    Alkaline Phosphatase 07/01/2024 108  46 - 118 U/L Final    Bilirubin, Total 07/01/2024 0.5  0.3 - 1.2 mg/dL Final    Total Protein 07/01/2024 6.7  5.7 - 8.2 g/dL Final    Albumin 07/01/2024 4.2  3.2 - 4.8 g/dL Final    Globulin  07/01/2024 2.5  2.0 - 3.5 g/dL Final    A/G Ratio 07/01/2024 1.7  1.0 - 2.0 Final    Patient Fasting for CMP? 07/01/2024 Yes   Final    Vitamin B12 07/01/2024 332  211 - 911 pg/mL Final     Vitamin D, 25OH, Total 07/01/2024 48.0  30.0 - 100.0 ng/mL Final    WBC 07/01/2024 5.0  4.0 - 11.0 x10(3) uL Final    RBC 07/01/2024 4.84  3.80 - 5.30 x10(6)uL Final    HGB 07/01/2024 14.1  12.0 - 16.0 g/dL Final    HCT 07/01/2024 42.0  35.0 - 48.0 % Final    MCV 07/01/2024 86.8  80.0 - 100.0 fL Final    MCH 07/01/2024 29.1  26.0 - 34.0 pg Final    MCHC 07/01/2024 33.6  31.0 - 37.0 g/dL Final    RDW-SD 07/01/2024 41.2  35.1 - 46.3 fL Final    RDW 07/01/2024 13.2  11.0 - 15.0 % Final    PLT 07/01/2024 298.0  150.0 - 450.0 10(3)uL Final    Neutrophil Absolute Prelim 07/01/2024 2.18  1.50 - 7.70 x10 (3) uL Final    Neutrophil Absolute 07/01/2024 2.18  1.50 - 7.70 x10(3) uL Final    Lymphocyte Absolute 07/01/2024 1.98  1.00 - 4.00 x10(3) uL Final    Monocyte Absolute 07/01/2024 0.60  0.10 - 1.00 x10(3) uL Final    Eosinophil Absolute 07/01/2024 0.19  0.00 - 0.70 x10(3) uL Final    Basophil Absolute 07/01/2024 0.05  0.00 - 0.20 x10(3) uL Final    Immature Granulocyte Absolute 07/01/2024 0.01  0.00 - 1.00 x10(3) uL Final    Neutrophil % 07/01/2024 43.5  % Final    Lymphocyte % 07/01/2024 39.5  % Final    Monocyte % 07/01/2024 12.0  % Final    Eosinophil % 07/01/2024 3.8  % Final    Basophil % 07/01/2024 1.0  % Final    Immature Granulocyte % 07/01/2024 0.2  % Final    Stool Culture 07/01/2024 No Salmonella, Shigella, Campylobacter, Yersinia, Edwardsiella, Aeromonas or Plesiomonas isolated   Final    E Coli Shigatoxin 07/01/2024 Negative for Shigatoxins  Negative Final   ]      Radiology Imaging:  I reviewed with the patient her X-ray and MRI of the thoracic spine and lumbar spine   XR LUMBAR SPINE AP LAT FLEX EXT (CPT=72110)  PROCEDURE: XR LUMBAR SPINE AP LAT FLEX EXT EM (CPT=72120)     COMPARISON: None.     INDICATIONS: Lower back pain radiating to right leg x 1 month. No injury     TECHNIQUE: Lumbar spine radiographs, 4 views (AP, lateral, flexion, and extension views)       Impression:     1.5 cm anterolisthesis of L4  relative to L5 due to pars defects.  This increases to 1.6 centimeter during flexion and decreases to 1.5 centimeter during extension     The rest of the spine is in normal alignment and shows no motion during flexion and extension     Mild-to-moderate disc space narrowing at L4-5 with mild endplate DJD     No compression fracture     Surgical clips in the right upper quadrant  Finalized by (CST): Reid Handy MD on 10/14/2024 at 4:06 PM                  PROCEDURE: MRI SPINE THORACIC (CPT=72146)     COMPARISON: [facility], CT ABDOMEN + PELVIS (CONTRAST ONLY) (CPT=74177), 9/25/2024, 6:07 AM.     INDICATIONS: right shoulder and abd pain     TECHNIQUE: A variety of imaging planes and parameters were utilized for visualization of suspected pathology.  Images were performed without contrast.       FINDINGS:  ALIGNMENT: The expected thoracic kyphosis is maintained.  Mild dextroscoliosis.  BONES: No fracture or suspicious osseous lesion is evident.    CORD: Normal caliber, contour, and signal intensity.    DISCS: Scattered thoracic spine degenerative changes.  Mild right neural foraminal stenosis at the T3-T4 and T4-T5 levels, which is predominantly related to facet arthropathy.  At T5-T6, there is a left paracentral disc protrusion in addition to minor  bilateral facet arthropathy, which does not result in significant spinal canal or neural foraminal stenosis.  At T6-T7, there is a left paracentral disc protrusion, which does not result in significant spinal canal or neural foraminal stenosis.  Left  paracentral disc protrusion also noted at T7-T8 with mild bilateral facet arthropathy, but no resultant high-grade spinal canal or neural foraminal stenosis.  At T10-T11, there is right greater than left ligamentum flavum redundancy, but with no  resultant high-grade spinal canal or neural foraminal stenosis.  PARASPINAL AREA: Small scattered well-circumscribed T2 bright paraspinal foci are seen within the  mid and lower thoracic spine bilaterally and could relate to incidental perineural and/or duplication cysts.  These include a reference 1.6 cm cyst on left  at T8-T9.  There is also a reference 1.5 cm cyst on the right at T6-T7.  Note that these cystic foci were present on MRCP from 2022.  OTHER: Grade I anterolisthesis of L4 relative to L5, which is related to bilateral L4-L5 spondylolysis.  Small simple-appearing left upper pole renal cyst.               Impression   CONCLUSION:  1. No acute thoracic spine fracture. Normal caliber and signal characteristics of the thoracic spinal cord.  2. Mild multilevel thoracic spine degenerative changes as detailed.  Findings result in mild right neural foraminal stenosis at T3-T4 and T4-T5.  There is also mild left neural foraminal stenosis at T5-T6.  No other high-grade spinal canal or neural  foraminal stenosis throughout the thoracic spine.  Partially imaged lower cervical spine degenerative changes with a disc bulge at C6-C7.  3. Mild dextroscoliosis of the thoracic spine.  4. Scattered well-circumscribed T2 bright/cystic foci are noted in paraspinal region/posterior mediastinum bilaterally.  These were present on prior MRCP from November, 2020 and are favored to represent either incidental perineural and/or duplication  cysts.  5. Lesser incidental findings as above.        [id number]     Dictated by (CST): Drew Mireles MD on 9/26/2024 at 10:01 AM      Finalized by (CST): Drew Mireles MD on 9/26/2024 at 10:10 AM      ASSESSMENT AND PLAN:  The patient is a pleasant 58-year-old female presents for complaints of low back pain as well as right chest wall and flank pain.  First for low back pain, I believe this is due to spondylosis with mechanical low back pain and a spondylolisthesis and along with the lumbar radiculopathy.  As a pertains to the right chest wall pain, I believe this is due to an intercostal strain although potentially intra-abdominal pathology.  She  has been cleared by her PCP for this and was stated she has biliary duct stones that will occasionally get lodged.  She currently does not have any stones.  Currently I am recommending she switch baclofen to nighttime only if needed and continue her meloxicam.  She should also start gabapentin, physical therapy, and have x-rays of the lumbar spine performed.  She should continue her weight loss program as per her PCP.  I would like for her to follow-up with me in 2 months.  If her symptoms persist, then I would recommend an MRI of the lumbar spine.       RTC in 2 months    Discharge Instructions were provided as documented in AVS summary.  The patient was in agreement with the assessment and plan.  All questions were answered.  There were no barriers to learning.         1. 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 Facet syndrome, lumbar    3. Mechanical low back pain    4. Lumbar spondylosis    5. Degeneration of intervertebral disc of lumbosacral region with discogenic back pain and lower extremity pain    6. Lumbar foraminal stenosis    7. Bulge of lumbar disc without myelopathy    8. Lumbar radiculopathy    9. Myalgia    10. Strain of gluteus medius, unspecified laterality, initial encounter    11. Chronic midline thoracic back pain    12. Bulge of thoracic disc without myelopathy        Alex B. Behar MD, [facility]  Physical Medicine and Rehabilitation/Sports Medicine  [facility] [place]             [1]   Allergies  Allergen Reactions    Augmentin [Amoxicillin-Pot Clavulanate] NAUSEA AND VOMITING    Coconut Oil NAUSEA AND VOMITING    Cortisone DIZZINESS and OTHER (SEE COMMENTS)     Blood drop, dizziness, flushed    Adhesive Tape RASH     Needs paper tape

## 2024-10-09 NOTE — PATIENT INSTRUCTIONS
1) switch baclofen to night time only if needed  2) Continue the meloxicam 15 mg daily  3) Start gabapentin 100 mg three times per day. If limited improvement, then would increase to 200 mg three times per day.  4) Please begin physical therapy as soon as possible.   5) Please get X-rays of the lumbar spine today on your way out.   6) Follow up with me in about 2 months. If symptoms persist, then would recommend MRI lumbar spine  7) Continue weight loss program as per PCP

## 2024-10-14 ENCOUNTER — HOSPITAL ENCOUNTER (OUTPATIENT)
Dept: GENERAL RADIOLOGY | Age: 59
Discharge: HOME | End: 2024-10-14
Attending: PHYSICAL MEDICINE & REHABILITATION
Payer: COMMERCIAL

## 2024-10-14 DIAGNOSIS — E88.82 CLASS 3 OBESITY DUE TO DISRUPTION OF MC4R PATHWAY WITH SERIOUS COMORBIDITY AND BODY MASS INDEX (BMI) OF 45.0 TO 49.9 IN ADULT (HCC): ICD-10-CM

## 2024-10-14 DIAGNOSIS — M51.34 BULGE OF THORACIC DISC WITHOUT MYELOPATHY: ICD-10-CM

## 2024-10-14 DIAGNOSIS — M54.16 LUMBAR RADICULOPATHY: ICD-10-CM

## 2024-10-14 DIAGNOSIS — E66.813 CLASS 3 OBESITY DUE TO DISRUPTION OF MC4R PATHWAY WITH SERIOUS COMORBIDITY AND BODY MASS INDEX (BMI) OF 45.0 TO 49.9 IN ADULT (HCC): ICD-10-CM

## 2024-10-14 DIAGNOSIS — Z15.2 CLASS 3 OBESITY DUE TO DISRUPTION OF MC4R PATHWAY WITH SERIOUS COMORBIDITY AND BODY MASS INDEX (BMI) OF 45.0 TO 49.9 IN ADULT (HCC): ICD-10-CM

## 2024-10-14 DIAGNOSIS — M51.372 DEGENERATION OF INTERVERTEBRAL DISC OF LUMBOSACRAL REGION WITH DISCOGENIC BACK PAIN AND LOWER EXTREMITY PAIN: ICD-10-CM

## 2024-10-14 DIAGNOSIS — G89.29 CHRONIC MIDLINE THORACIC BACK PAIN: ICD-10-CM

## 2024-10-14 DIAGNOSIS — M51.369 BULGE OF LUMBAR DISC WITHOUT MYELOPATHY: ICD-10-CM

## 2024-10-14 DIAGNOSIS — M54.6 CHRONIC MIDLINE THORACIC BACK PAIN: ICD-10-CM

## 2024-10-14 DIAGNOSIS — M48.061 LUMBAR FORAMINAL STENOSIS: ICD-10-CM

## 2024-10-14 DIAGNOSIS — S76.019A STRAIN OF GLUTEUS MEDIUS, UNSPECIFIED LATERALITY, INITIAL ENCOUNTER: ICD-10-CM

## 2024-10-14 DIAGNOSIS — M54.59 MECHANICAL LOW BACK PAIN: ICD-10-CM

## 2024-10-14 DIAGNOSIS — M47.816 LUMBAR SPONDYLOSIS: ICD-10-CM

## 2024-10-14 DIAGNOSIS — M47.816 FACET SYNDROME, LUMBAR: ICD-10-CM

## 2024-10-14 DIAGNOSIS — M79.10 MYALGIA: ICD-10-CM

## 2024-10-14 PROCEDURE — 72110 X-RAY EXAM L-2 SPINE 4/>VWS: CPT | Performed by: PHYSICAL MEDICINE & REHABILITATION

## 2024-10-15 ENCOUNTER — TELEPHONE (OUTPATIENT)
Dept: PHYSICAL MEDICINE AND REHAB | Facility: CLINIC | Age: 59
End: 2024-10-15

## 2024-10-15 NOTE — TELEPHONE ENCOUNTER
Patient called to advise she had her x-ray yesterday and was concerned about a note that stated she had surgical clips in her RUQ. Patient confirmed she has had cholecystectomy-this RN advised the radiologist is just noting what is seen, and typically in any abdominal surgery the clips would be seen.    Patient asked if her body could be rejecting the clips as she still has pain- this RN advised this would be a question to ask the surgeon who performed the surgery, or discuss with her PCP.       Patient states was told has trapped nerve -but then was told it must be a stone. Patient states she has continued pain and it just trying to get to bottom of it.     XR was of lumbar spine- patient looking for Dr. Behar's results notes- states has follow up for after PT.    Routed to Dr. Behar.

## 2024-10-23 RX ORDER — NAPROXEN 500 MG/1
500 TABLET ORAL 2 TIMES DAILY PRN
Qty: 60 TABLET | Refills: 0 | OUTPATIENT
Start: 2024-10-23

## 2024-10-29 ENCOUNTER — MED REC SCAN ONLY (OUTPATIENT)
Dept: PHYSICAL MEDICINE AND REHAB | Facility: CLINIC | Age: 59
End: 2024-10-29

## 2024-10-29 RX ORDER — NAPROXEN 500 MG/1
500 TABLET ORAL 2 TIMES DAILY PRN
Qty: 60 TABLET | Refills: 0 | OUTPATIENT
Start: 2024-10-29

## 2024-10-31 NOTE — TELEPHONE ENCOUNTER
This RN went over Dr. Behar's results notes with patient.  This RN confirmed patient's next appointment with Dr. Behar.  Patient advised she had the instructions from her office visit and will continue with the PT.

## 2024-11-04 ENCOUNTER — MED REC SCAN ONLY (OUTPATIENT)
Dept: PHYSICAL MEDICINE AND REHAB | Facility: CLINIC | Age: 59
End: 2024-11-04

## 2024-11-13 ENCOUNTER — TELEPHONE (OUTPATIENT)
Dept: INTERNAL MEDICINE CLINIC | Facility: CLINIC | Age: 59
End: 2024-11-13

## 2024-11-13 DIAGNOSIS — Z12.11 COLON CANCER SCREENING: Primary | ICD-10-CM

## 2024-11-14 NOTE — TELEPHONE ENCOUNTER
Hello Please inform patient they are due for colorectal cancer screening. Given patient is above the age of +45 with average risk, and desire for non-invasive testing, order for Cologuard which detects stool sample for cancer was placed and will be shipped to your home within the next 48 hours and has a return [employer] pre-paid postage to send back in the mail. Please complete this within the next week to ensure Dr. Miramontes is delivering High quality/value care to his patients. If you have any questions, please schedule follow up with him and he would gladly discuss.

## 2024-11-27 ENCOUNTER — HOSPITAL ENCOUNTER (OUTPATIENT)
Age: 59
Discharge: HOME | End: 2024-11-27
Payer: COMMERCIAL

## 2024-11-27 VITALS
SYSTOLIC BLOOD PRESSURE: 132 MMHG | OXYGEN SATURATION: 100 % | TEMPERATURE: 98 F | HEART RATE: 95 BPM | RESPIRATION RATE: 16 BRPM | DIASTOLIC BLOOD PRESSURE: 86 MMHG

## 2024-11-27 DIAGNOSIS — J01.90 ACUTE VIRAL SINUSITIS: Primary | ICD-10-CM

## 2024-11-27 DIAGNOSIS — B97.89 ACUTE VIRAL SINUSITIS: Primary | ICD-10-CM

## 2024-11-27 LAB
S PYO AG THROAT QL: NEGATIVE
SARS-COV-2 RNA RESP QL NAA+PROBE: NOT DETECTED

## 2024-11-27 PROCEDURE — 87880 STREP A ASSAY W/OPTIC: CPT | Performed by: PHYSICIAN ASSISTANT

## 2024-11-27 PROCEDURE — U0002 COVID-19 LAB TEST NON-CDC: HCPCS | Performed by: PHYSICIAN ASSISTANT

## 2024-11-27 PROCEDURE — 99213 OFFICE O/P EST LOW 20 MIN: CPT | Performed by: PHYSICIAN ASSISTANT

## 2024-11-27 PROCEDURE — 87081 CULTURE SCREEN ONLY: CPT | Performed by: PHYSICIAN ASSISTANT

## 2024-11-27 NOTE — ED PROVIDER NOTES
Chief Complaint   Patient presents with    Headache       History obtained from: patient   services not used     HPI:     Radha Rodrigez is a 58 year old female who presents with general illness symptoms x 2 days.  Patient endorses nasal congestion, runny nose, post-nasal drip, headache, sore throat, and dry cough. Patient took NyQuil for symptoms. Patient continues to eat and drink normally. Patient is presenting with son who is sick with similar symptoms. Denies fever, chills, chest pain, shortness of breath, wheezing, productive cough, abdominal pain, vomiting, dizziness, neck stiffness, rash.     PMH  Past Medical History:    Back problem    Calculus of kidney    Disorder of thyroid    Diverticulosis of large intestine    Esophageal reflux    Hx of motion sickness    Morbid obesity with BMI of 50.0-59.9, adult (HCC)    Pneumonia due to organism    PONV (postoperative nausea and vomiting)    Pre-diabetes    Seasonal allergies    Thyroid disease    Vitamin D deficiency       PFSH    PFSH asessment screens reviewed and agree.  Nurses notes reviewed I agree with documentation.    Family History   Problem Relation Age of Onset    Ovarian Cancer Mother     Breast Cancer Sister 27     Family history reviewed with patient/caregiver and is not pertinent to presenting problem.  Social History     Socioeconomic History    Marital status:      Spouse name: Not on file    Number of children: Not on file    Years of education: Not on file    Highest education level: Not on file   Occupational History    Not on file   Tobacco Use    Smoking status: Never     Passive exposure: Never    Smokeless tobacco: Never   Vaping Use    Vaping status: Never Used   Substance and Sexual Activity    Alcohol use: Not Currently    Drug use: Never    Sexual activity: Not on file   Other Topics Concern    Not on file   Social History Narrative    Not on file     Social Drivers of Health     Financial Resource Strain: Low Risk   (3/11/2024)    Financial Resource Strain     Difficulty of Paying Living Expenses: Not very hard     Med Affordability: No   Food Insecurity: No Food Insecurity (9/25/2024)    Food Insecurity     Food Insecurity: Never true   Transportation Needs: No Transportation Needs (9/25/2024)    Transportation Needs     Lack of Transportation: No     Car Seat: Not on file   Physical Activity: Not on file   Stress: Not on file   Social Connections: Not on file   Housing Stability: Low Risk  (9/25/2024)    Housing Stability     Housing Instability: No     Housing Instability Emergency: Not on file     Crib or Bassinette: Not on file         ROS:   Positive for stated complaint: nasal congestion, runny nose, post-nasal drip, headache, sore throat, dry cough   All other systems reviewed and negative except as noted above.  Constitutional and Vital Signs Reviewed.    Physical Exam:     Findings:    /86   Pulse 95   Temp 97.9 °F (36.6 °C) (Oral)   Resp 16   SpO2 100%   GENERAL: well developed, no acute distress, non-toxic appearing   SKIN: good skin turgor, no obvious rashes  HEAD: normocephalic, atraumatic  EYES: sclera non-icteric bilaterally, conjunctiva clear bilaterally  EARS: canals clear bilaterally, TMs clear bilaterally  NOSE: nasal congestion   OROPHARYNX: MMM, pharynx mildly erythematous, tonsils surgically absent, uvula midline, no tongue elevation, maintaining airway and secretions  NECK: supple, no lymphadenopathy, no nuchal rigidity, no trismus, no edema, phonation normal    CARDIO: RRR, normal heart sounds   LUNGS: clear to auscultation bilaterally, no increased WOB, no rales, rhonchi, or wheezes  EXTREMITIES: no cyanosis or edema, CLEMENTE without difficulty  NEURO: no focal deficits  PSYCH: alert and oriented x3, answering questions appropriately, mood appropriate    MDM/Assessment/Plan:   Orders for this encounter:    Orders Placed This Encounter    POCT Rapid Strep    Rapid SARS-CoV-2 by PCR     Order  Specific Question:   Release to patient     Answer:   Immediate    Grp A Strep Cult, Throat    POCT Rapid Strep       Labs performed this visit:  Recent Results (from the past 10 hours)   Rapid SARS-CoV-2 by PCR    Collection Time: 11/27/24  8:55 AM    Specimen: Nares; Other   Result Value Ref Range    Rapid SARS-CoV-2 by PCR Not Detected Not Detected   POCT Rapid Strep    Collection Time: 11/27/24  9:16 AM   Result Value Ref Range    POCT Rapid Strep Negative Negative       Imaging performed this visit:  No orders to display       Medical Decision Making  DDx includes viral sinusitis versus covid versus strep pharyngitis versus allergic rhinitis versus other. Patient is overall very well-appearing with stable vitals and tolerating oral intake. No hypoxia or signs of respiratory distress.  Covid and strep tests negative. Given constellation of symptoms x 2 days and patient presenting with son who is sick with similar symptoms, suspect viral etiology. Discussed supportive care for suspected viral illness including rest, increased fluid intake, OTC decongestant, and OTC Tylenol/Motrin as needed for pain or fevers.  Discussed infection control.  Instructed patient to go directly to nearest ER with any worsening or concerning symptoms.  Follow-up with PCP.    Amount and/or Complexity of Data Reviewed  Labs: ordered.    Risk  OTC drugs.          Diagnosis:    ICD-10-CM    1. Acute viral sinusitis  J01.90     B97.89           All results reviewed and discussed with patient/patient's family. Patient/patient's family verbalize excellent understanding of instructions and feels comfortable with plan. All of patient's/patient's family's questions were addressed.   See AVS for detailed discharge instructions for your condition today.    Follow Up with:  Guille Miramontes MD  [address] [place] [address]  [phone]    Schedule an appointment as soon as possible for a visit         Note: This document was  dictated using Dragon medical dictation software.  Proofreading was performed to the best of my ability, but errors may be present.    Magdalena Stephenson PA-C

## 2024-11-27 NOTE — DISCHARGE INSTRUCTIONS
Drink plenty of water and get plenty of rest   You may benefit from taking a decongestant (e.g. Sudafed) and nasal spray (e.g. Flonase)  You may benefit from taking a daily allergy medication (e.g. Zyrtec)  You may benefit from using a humidifier    Alternate Tylenol and Motrin every 3 hours for pain or fever > 100.4 degrees    Sleep with head elevated and avoid laying flat  Avoid having air blow on your face    Wash hands often  Disinfect your environment  Do not share utensils or drinks    Symptoms may take a few weeks to resolve  Follow up with your primary care provider

## 2024-12-05 ENCOUNTER — OFFICE VISIT (OUTPATIENT)
Dept: PHYSICAL MEDICINE AND REHAB | Facility: CLINIC | Age: 59
End: 2024-12-05
Payer: COMMERCIAL

## 2024-12-05 VITALS — BODY MASS INDEX: 48 KG/M2 | WEIGHT: 261 LBS

## 2024-12-05 DIAGNOSIS — G89.29 CHRONIC MIDLINE THORACIC BACK PAIN: ICD-10-CM

## 2024-12-05 DIAGNOSIS — M51.369 BULGE OF LUMBAR DISC WITHOUT MYELOPATHY: ICD-10-CM

## 2024-12-05 DIAGNOSIS — Z15.2 CLASS 3 OBESITY DUE TO DISRUPTION OF MC4R PATHWAY WITH SERIOUS COMORBIDITY AND BODY MASS INDEX (BMI) OF 45.0 TO 49.9 IN ADULT (HCC): Primary | ICD-10-CM

## 2024-12-05 DIAGNOSIS — M51.34 BULGE OF THORACIC DISC WITHOUT MYELOPATHY: ICD-10-CM

## 2024-12-05 DIAGNOSIS — M54.6 CHRONIC MIDLINE THORACIC BACK PAIN: ICD-10-CM

## 2024-12-05 DIAGNOSIS — M47.816 FACET SYNDROME, LUMBAR: ICD-10-CM

## 2024-12-05 DIAGNOSIS — M79.10 MYALGIA: ICD-10-CM

## 2024-12-05 DIAGNOSIS — M48.061 LUMBAR FORAMINAL STENOSIS: ICD-10-CM

## 2024-12-05 DIAGNOSIS — M54.16 LUMBAR RADICULOPATHY: ICD-10-CM

## 2024-12-05 DIAGNOSIS — E88.82 CLASS 3 OBESITY DUE TO DISRUPTION OF MC4R PATHWAY WITH SERIOUS COMORBIDITY AND BODY MASS INDEX (BMI) OF 45.0 TO 49.9 IN ADULT (HCC): Primary | ICD-10-CM

## 2024-12-05 DIAGNOSIS — M54.59 MECHANICAL LOW BACK PAIN: ICD-10-CM

## 2024-12-05 DIAGNOSIS — S76.019A STRAIN OF GLUTEUS MEDIUS, UNSPECIFIED LATERALITY, INITIAL ENCOUNTER: ICD-10-CM

## 2024-12-05 DIAGNOSIS — M51.372 DEGENERATION OF INTERVERTEBRAL DISC OF LUMBOSACRAL REGION WITH DISCOGENIC BACK PAIN AND LOWER EXTREMITY PAIN: ICD-10-CM

## 2024-12-05 DIAGNOSIS — E66.813 CLASS 3 OBESITY DUE TO DISRUPTION OF MC4R PATHWAY WITH SERIOUS COMORBIDITY AND BODY MASS INDEX (BMI) OF 45.0 TO 49.9 IN ADULT (HCC): Primary | ICD-10-CM

## 2024-12-05 DIAGNOSIS — M47.816 LUMBAR SPONDYLOSIS: ICD-10-CM

## 2024-12-05 PROCEDURE — 99214 OFFICE O/P EST MOD 30 MIN: CPT | Performed by: PHYSICAL MEDICINE & REHABILITATION

## 2024-12-05 RX ORDER — METHYLPREDNISOLONE 4 MG/1
TABLET ORAL
Qty: 2 EACH | Refills: 0 | Status: SHIPPED | OUTPATIENT
Start: 2024-12-05

## 2024-12-05 RX ORDER — NAPROXEN 500 MG/1
500 TABLET ORAL 2 TIMES DAILY WITH MEALS
Qty: 60 TABLET | Refills: 3 | Status: SHIPPED | OUTPATIENT
Start: 2024-12-05

## 2024-12-05 NOTE — PATIENT INSTRUCTIONS
1) Increase gabapentin to 200 mg at night  2) Please call and schedule your MRI of the Lumbar spine at [phone].  Once you have your MRI scheduled, then call my office again to schedule a follow-up visit soon after your MRI so we may review the images together.  3) Continue home exercise program   4) I spoke with the patient and instructed them to take the double dose of the medrol dose pack as follows: Take all of the day 1 tablets for both packages together in the morning, Take all of the day 2 tablets for both packages together in the morning on day 2, Take all of the day 3 tablets for both packages together in the morning of day 3, Take all of the day 4 tablets for both packages together in the morning of day 4, Take all of the day 5 tablets for both packages together in the morning of day 5, Take all of the day 6 tablets for both packages together in the morning of day 6.  5) Follow up with me to review the MRI and discuss next steps which may include epidural steroid injection  6) continue weight loss program  7) Take Naprosyn 500 mg 1 tablet twice per day with food for the next two weeks and then as needed but no more than 2 tablets per day. Do not take with any other NSAIDS (Ibuprofen, Advil, Aleve, etc). OK to take Tylenol 500 mg every 6 hours as needed for pain. If you develop any side effects including stomach aches, nausea, vomiting, or other gastrointestinal symptoms, stop the medication and call my office.

## 2024-12-05 NOTE — PROGRESS NOTES
[facility]  Progress Note    CHIEF COMPLAINT:    Chief Complaint   Patient presents with    Follow - Up     Lov 10/9/24 right leg pain .Pain 8/10. Admits T/N right knee down the leg .Completed PT 0% improvement .Patient states she feels the pain has worsen she is having back of the knee pain and numbness. Gabapentin Naproxen with some relief.       History of Present Illness:  The patient is a 58 year old RIGHT handed female with significant past medical history of thyroid disease, GERD, morbid obesity who presents with thoracic back pain radiating to the chest wall as well as low back pain with radiation down the right leg to the back of the knee and hamstring region as well as into the right posterior lateral gastroc.  She is mostly complaining of the radicular symptoms into the right leg and rates as an 8 out of 10.  She associates this with numbness and tingling.  She has completed a full course of physical therapy with 0% improvement.  She has also had x-rays of the lumbar spine.  She denies any recent injections.  She has tried gabapentin and naproxen with mild relief.    PAST MEDICAL HISTORY:  Past Medical History:    Back problem    Calculus of kidney    Disorder of thyroid    Diverticulosis of large intestine    Esophageal reflux    Hx of motion sickness    Morbid obesity with BMI of 50.0-59.9, adult (HCC)    Pneumonia due to organism    PONV (postoperative nausea and vomiting)    Pre-diabetes    Seasonal allergies    Thyroid disease    Vitamin D deficiency       SURGICAL HISTORY:  Past Surgical History:   Procedure Laterality Date    Colonoscopy  11/30/2021    Hernia surgery      Hysterectomy  12/21/2012    Other surgical history Left 03/12/2024    Cystoscopy left ureteroscopy, laser lithotripsy, retrograde pyelogram, stent placement    Removal gallbladder      Repair ing hernia,5+y/o,reducibl         SOCIAL HISTORY:   Social History     Occupational History     Not on file   Tobacco Use    Smoking status: Never     Passive exposure: Never    Smokeless tobacco: Never   Vaping Use    Vaping status: Never Used   Substance and Sexual Activity    Alcohol use: Not Currently    Drug use: Never    Sexual activity: Not on file       FAMILY HISTORY:   Family History   Problem Relation Age of Onset    Ovarian Cancer Mother     Breast Cancer Sister 27       CURRENT MEDICATIONS:   Current Outpatient Medications   Medication Sig Dispense Refill    methylPREDNISolone (MEDROL) 4 MG Oral Tablet Therapy Pack As directed 2 each 0    naproxen (NAPROSYN) 500 MG Oral Tab Take 1 tablet (500 mg total) by mouth 2 (two) times daily with meals. Take for 2 weeks as directed and then as needed. 60 tablet 3    gabapentin 100 MG Oral Cap Take 1 capsule (100 mg total) by mouth 3 (three) times daily.      LEVOTHYROXINE 150 MCG Oral Tab Take 1 tablet (150 mcg total) by mouth daily. 90 tablet 0    semaglutide-weight management (WEGOVY) 2.4 MG/0.75ML Subcutaneous Solution Auto-injector Inject 0.75 mL (2.4 mg total) into the skin once a week. 3 mL 2    rosuvastatin 10 MG Oral Tab Take 1 tablet (10 mg total) by mouth nightly. FOR CHOLESTEROL. 90 tablet 9    omeprazole 20 MG Oral Capsule Delayed Release Take 1 capsule (20 mg total) by mouth every morning before breakfast. FOR ACID REFLUX. 90 capsule 3    fluticasone-salmeterol 115-21 MCG/ACT Inhalation Aerosol Inhale 2 puffs into the lungs 2 (two) times daily. FOR ASTHMA 1 each 9    fluticasone propionate 50 MCG/ACT Nasal Suspension 2 sprays by Nasal route daily. 16 g 0    albuterol 108 (90 Base) MCG/ACT Inhalation Aero Soln Inhale 2-4 puffs into the lungs every 4 to 6 hours as needed for Wheezing or Shortness of Breath (cough, asthma, chest tightness). FOR ASTHMA. Pharmacist, please switch to formulary alternative per insurance coverage, ok to replace with proair, ventolin, proventil, or any other albuterol inhaler. -jovan 3 each 9    cholecalciferol  (VITAMIN D3) 125 MCG (5000 UT) Oral Cap Take 1 capsule (5,000 Units total) by mouth daily.      baclofen 10 MG Oral Tab Take 1 tablet (10 mg total) by mouth nightly as needed. (Patient not taking: Reported on 12/5/2024)         ALLERGIES:   Allergies[1]    REVIEW OF SYSTEMS:   Review of Systems   Constitutional: Negative.    HENT: Negative.    Eyes: Negative.    Respiratory: Negative.    Cardiovascular: Negative.    Gastrointestinal: Negative.    Genitourinary: Negative.    Musculoskeletal: As per HPI  Skin: Negative.    Neurological: As per HPI  Endo/Heme/Allergies: Negative.    Psychiatric/Behavioral: Negative.      All other systems reviewed and are negative. Pertinent positives and negatives noted in the HPI.        PHYSICAL EXAM:   Wt 261 lb (118.4 kg)   BMI 47.74 kg/m²     Body mass index is 47.74 kg/m².      General: No immediate distress  Head: Normocephalic/ Atraumatic  Eyes: Extra-occular movements intact.   Ears: No auricular hematoma or deformities  Mouth: No lesions or ulcerations  Heart: peripheral pulses intact. Normal capillary refill.   Lungs: Non-labored respirations  Abdomen: No abdominal guarding  Extremities: No lower extremity edema bilaterally   Skin: No lesions noted.   Cognition: alert & oriented x 3, attentive, able to follow 2 step commands, comprehention intact, spontaneous speech intact  Motor:    Musculoskeletal:    LUMBAR SPINE:  Inspection: no erythema, swelling, or obvious deformity.  Their iliac crest and shoulder heights are symmetrical.  Postural exam reveals no scoliosis or kyphosis.  Palpation: Tender to palpation midline lumbar spine as well as right lower lumbar facets and paraspinals, right glued and piriformis.  ROM: Full active range of motion of the lumbar spine with pain during flexion and extension  Motor: 5/5 in all myotomes of the BILATERAL lower extremities  Sensation: Intact to light touch in all dermatomes of the lower extremities   Reflexes: 2/4 at L4 and  S1  Facet Loading: Positive bilaterally  EDD: Negative  Straight leg raise: negative for radicular pain symptoms  Slump test: negative for pain symptoms or radicular pain symptoms        Gait:  Antalgic    Data  Admission on 11/27/2024, Discharged on 11/27/2024   Component Date Value Ref Range Status    Rapid SARS-CoV-2 by PCR 11/27/2024 Not Detected  Not Detected Final    POCT Rapid Strep 11/27/2024 Negative  Negative Final    Strep Culture 11/27/2024 No Beta Hemolytic Strep Isolated   Final   Admission on 09/25/2024, Discharged on 09/27/2024   Component Date Value Ref Range Status    WBC 09/25/2024 13.6 (H)  4.0 - 11.0 x10(3) uL Final    RBC 09/25/2024 5.18  3.80 - 5.30 x10(6)uL Final    HGB 09/25/2024 14.9  12.0 - 16.0 g/dL Final    HCT 09/25/2024 44.4  35.0 - 48.0 % Final    MCV 09/25/2024 85.7  80.0 - 100.0 fL Final    MCH 09/25/2024 28.8  26.0 - 34.0 pg Final    MCHC 09/25/2024 33.6  31.0 - 37.0 g/dL Final    RDW-SD 09/25/2024 39.7  35.1 - 46.3 fL Final    RDW 09/25/2024 12.8  11.0 - 15.0 % Final    PLT 09/25/2024 359.0  150.0 - 450.0 10(3)uL Final    Neutrophil Absolute Prelim 09/25/2024 11.62 (H)  1.50 - 7.70 x10 (3) uL Final    Neutrophil Absolute 09/25/2024 11.62 (H)  1.50 - 7.70 x10(3) uL Final    Lymphocyte Absolute 09/25/2024 1.25  1.00 - 4.00 x10(3) uL Final    Monocyte Absolute 09/25/2024 0.62  0.10 - 1.00 x10(3) uL Final    Eosinophil Absolute 09/25/2024 0.01  0.00 - 0.70 x10(3) uL Final    Basophil Absolute 09/25/2024 0.03  0.00 - 0.20 x10(3) uL Final    Immature Granulocyte Absolute 09/25/2024 0.09  0.00 - 1.00 x10(3) uL Final    Neutrophil % 09/25/2024 85.2  % Final    Lymphocyte % 09/25/2024 9.2  % Final    Monocyte % 09/25/2024 4.6  % Final    Eosinophil % 09/25/2024 0.1  % Final    Basophil % 09/25/2024 0.2  % Final    Immature Granulocyte % 09/25/2024 0.7  % Final    Glucose 09/25/2024 142 (H)  70 - 99 mg/dL Final    Sodium 09/25/2024 135 (L)  136 - 145 mmol/L Final    Potassium 09/25/2024  4.5  3.5 - 5.1 mmol/L Final    Chloride 09/25/2024 107  98 - 112 mmol/L Final    CO2 09/25/2024 24.0  21.0 - 32.0 mmol/L Final    Anion Gap 09/25/2024 4  0 - 18 mmol/L Final    BUN 09/25/2024 18  9 - 23 mg/dL Final    Creatinine 09/25/2024 0.84  0.55 - 1.02 mg/dL Final    BUN/CREA Ratio 09/25/2024 21.4 (H)  10.0 - 20.0 Final    Calcium, Total 09/25/2024 9.2  8.7 - 10.4 mg/dL Final    Calculated Osmolality 09/25/2024 284  275 - 295 mOsm/kg Final    eGFR-Cr 09/25/2024 80  >=60 mL/min/1.73m2 Final    AST 09/25/2024 18  <34 U/L Final    ALT 09/25/2024 26  10 - 49 U/L Final    Alkaline Phosphatase 09/25/2024 133 (H)  46 - 118 U/L Final    Bilirubin, Total 09/25/2024 0.5  0.3 - 1.2 mg/dL Final    Bilirubin, Direct 09/25/2024 0.2  <=0.3 mg/dL Final    Total Protein 09/25/2024 6.9  5.7 - 8.2 g/dL Final    Albumin 09/25/2024 4.2  3.2 - 4.8 g/dL Final    Lipase 09/25/2024 33  12 - 53 U/L Final    Urine Color 09/25/2024 Colorless (A)  Yellow Final    Clarity Urine 09/25/2024 Clear  Clear Final    Spec Gravity 09/25/2024 1.008  1.005 - 1.030 Final    Glucose Urine 09/25/2024 Normal  Normal mg/dL Final    Bilirubin Urine 09/25/2024 Negative  Negative Final    Ketones Urine 09/25/2024 Negative  Negative mg/dL Final    Blood Urine 09/25/2024 Negative  Negative Final    pH Urine 09/25/2024 7.0  5.0 - 8.0 Final    Protein Urine 09/25/2024 Negative  Negative mg/dL Final    Urobilinogen Urine 09/25/2024 Normal  Normal Final    Nitrite Urine 09/25/2024 Negative  Negative Final    Leukocyte Esterase Urine 09/25/2024 Negative  Negative Final    Microscopic 09/25/2024 Microscopic not indicated   Final    WBC 09/26/2024 9.0  4.0 - 11.0 x10(3) uL Final    RBC 09/26/2024 4.55  3.80 - 5.30 x10(6)uL Final    HGB 09/26/2024 13.0  12.0 - 16.0 g/dL Final    HCT 09/26/2024 39.5  35.0 - 48.0 % Final    MCV 09/26/2024 86.8  80.0 - 100.0 fL Final    MCH 09/26/2024 28.6  26.0 - 34.0 pg Final    MCHC 09/26/2024 32.9  31.0 - 37.0 g/dL Final    RDW-SD  09/26/2024 41.2  35.1 - 46.3 fL Final    RDW 09/26/2024 13.1  11.0 - 15.0 % Final    PLT 09/26/2024 287.0  150.0 - 450.0 10(3)uL Final    Neutrophil Absolute Prelim 09/26/2024 3.80  1.50 - 7.70 x10 (3) uL Final    Neutrophil Absolute 09/26/2024 3.80  1.50 - 7.70 x10(3) uL Final    Lymphocyte Absolute 09/26/2024 4.08 (H)  1.00 - 4.00 x10(3) uL Final    Monocyte Absolute 09/26/2024 0.83  0.10 - 1.00 x10(3) uL Final    Eosinophil Absolute 09/26/2024 0.18  0.00 - 0.70 x10(3) uL Final    Basophil Absolute 09/26/2024 0.04  0.00 - 0.20 x10(3) uL Final    Immature Granulocyte Absolute 09/26/2024 0.08  0.00 - 1.00 x10(3) uL Final    Neutrophil % 09/26/2024 42.2  % Final    Lymphocyte % 09/26/2024 45.3  % Final    Monocyte % 09/26/2024 9.2  % Final    Eosinophil % 09/26/2024 2.0  % Final    Basophil % 09/26/2024 0.4  % Final    Immature Granulocyte % 09/26/2024 0.9  % Final    Glucose 09/26/2024 88  70 - 99 mg/dL Final    Sodium 09/26/2024 140  136 - 145 mmol/L Final    Potassium 09/26/2024 3.9  3.5 - 5.1 mmol/L Final    Chloride 09/26/2024 109  98 - 112 mmol/L Final    CO2 09/26/2024 25.0  21.0 - 32.0 mmol/L Final    Anion Gap 09/26/2024 6  0 - 18 mmol/L Final    BUN 09/26/2024 17  9 - 23 mg/dL Final    Creatinine 09/26/2024 0.72  0.55 - 1.02 mg/dL Final    BUN/CREA Ratio 09/26/2024 23.6 (H)  10.0 - 20.0 Final    Calcium, Total 09/26/2024 8.7  8.7 - 10.4 mg/dL Final    Calculated Osmolality 09/26/2024 291  275 - 295 mOsm/kg Final    eGFR-Cr 09/26/2024 97  >=60 mL/min/1.73m2 Final    ALT 09/26/2024 18  10 - 49 U/L Final    AST 09/26/2024 9  <34 U/L Final    Alkaline Phosphatase 09/26/2024 104  46 - 118 U/L Final    Bilirubin, Total 09/26/2024 0.5  0.3 - 1.2 mg/dL Final    Total Protein 09/26/2024 5.8  5.7 - 8.2 g/dL Final    Albumin 09/26/2024 3.6  3.2 - 4.8 g/dL Final    Globulin  09/26/2024 2.2  2.0 - 3.5 g/dL Final    A/G Ratio 09/26/2024 1.6  1.0 - 2.0 Final   [facility] Lab Encounter on 09/24/2024   Component Date Value Ref  Range Status    D-Dimer 09/24/2024 0.48  <0.58 ug/mL FEU Final   [facility] Lab Encounter on 07/01/2024   Component Date Value Ref Range Status    Calprotectin, Fecal 07/01/2024 44.1  <50 µg/g Final    Norovirus Gi/Gii by PCR 07/01/2024 Positive (A)  Negative Final    Immunoglobulin E 07/01/2024 14.40  2.00 - 214.00 kU/L Final    Sed Rate 07/01/2024 12  0 - 30 mm/Hr Final    C-Reactive Protein 07/01/2024 0.70  <1.00 mg/dL Final    TSH 07/01/2024 0.621  0.550 - 4.780 mIU/mL Final    Cholesterol, Total 07/01/2024 144  <200 mg/dL Final    HDL Cholesterol 07/01/2024 38 (L)  40 - 59 mg/dL Final    Triglycerides 07/01/2024 100  30 - 149 mg/dL Final    LDL Cholesterol 07/01/2024 87  <100 mg/dL Final    VLDL 07/01/2024 16  0 - 30 mg/dL Final    Non HDL Chol 07/01/2024 106  <130 mg/dL Final    Patient Fasting for Lipid? 07/01/2024 Yes   Final    HgbA1C 07/01/2024 5.9 (H)  <5.7 % Final    Estimated Average Glucose 07/01/2024 123  68 - 126 mg/dL Final    Glucose 07/01/2024 91  70 - 99 mg/dL Final    Sodium 07/01/2024 141  136 - 145 mmol/L Final    Potassium 07/01/2024 4.3  3.5 - 5.1 mmol/L Final    Chloride 07/01/2024 108  98 - 112 mmol/L Final    CO2 07/01/2024 27.0  21.0 - 32.0 mmol/L Final    Anion Gap 07/01/2024 6  0 - 18 mmol/L Final    BUN 07/01/2024 13  9 - 23 mg/dL Final    Creatinine 07/01/2024 0.81  0.55 - 1.02 mg/dL Final    BUN/CREA Ratio 07/01/2024 16.0  10.0 - 20.0 Final    Calcium, Total 07/01/2024 9.2  8.7 - 10.4 mg/dL Final    Calculated Osmolality 07/01/2024 292  275 - 295 mOsm/kg Final    eGFR-Cr 07/01/2024 84  >=60 mL/min/1.73m2 Final    ALT 07/01/2024 29  10 - 49 U/L Final    AST 07/01/2024 27  <=34 U/L Final    Alkaline Phosphatase 07/01/2024 108  46 - 118 U/L Final    Bilirubin, Total 07/01/2024 0.5  0.3 - 1.2 mg/dL Final    Total Protein 07/01/2024 6.7  5.7 - 8.2 g/dL Final    Albumin 07/01/2024 4.2  3.2 - 4.8 g/dL Final    Globulin  07/01/2024 2.5  2.0 - 3.5 g/dL Final    A/G Ratio 07/01/2024 1.7  1.0 - 2.0  Final    Patient Fasting for CMP? 07/01/2024 Yes   Final    Vitamin B12 07/01/2024 332  211 - 911 pg/mL Final    Vitamin D, 25OH, Total 07/01/2024 48.0  30.0 - 100.0 ng/mL Final    WBC 07/01/2024 5.0  4.0 - 11.0 x10(3) uL Final    RBC 07/01/2024 4.84  3.80 - 5.30 x10(6)uL Final    HGB 07/01/2024 14.1  12.0 - 16.0 g/dL Final    HCT 07/01/2024 42.0  35.0 - 48.0 % Final    MCV 07/01/2024 86.8  80.0 - 100.0 fL Final    MCH 07/01/2024 29.1  26.0 - 34.0 pg Final    MCHC 07/01/2024 33.6  31.0 - 37.0 g/dL Final    RDW-SD 07/01/2024 41.2  35.1 - 46.3 fL Final    RDW 07/01/2024 13.2  11.0 - 15.0 % Final    PLT 07/01/2024 298.0  150.0 - 450.0 10(3)uL Final    Neutrophil Absolute Prelim 07/01/2024 2.18  1.50 - 7.70 x10 (3) uL Final    Neutrophil Absolute 07/01/2024 2.18  1.50 - 7.70 x10(3) uL Final    Lymphocyte Absolute 07/01/2024 1.98  1.00 - 4.00 x10(3) uL Final    Monocyte Absolute 07/01/2024 0.60  0.10 - 1.00 x10(3) uL Final    Eosinophil Absolute 07/01/2024 0.19  0.00 - 0.70 x10(3) uL Final    Basophil Absolute 07/01/2024 0.05  0.00 - 0.20 x10(3) uL Final    Immature Granulocyte Absolute 07/01/2024 0.01  0.00 - 1.00 x10(3) uL Final    Neutrophil % 07/01/2024 43.5  % Final    Lymphocyte % 07/01/2024 39.5  % Final    Monocyte % 07/01/2024 12.0  % Final    Eosinophil % 07/01/2024 3.8  % Final    Basophil % 07/01/2024 1.0  % Final    Immature Granulocyte % 07/01/2024 0.2  % Final    Stool Culture 07/01/2024 No Salmonella, Shigella, Campylobacter, Yersinia, Edwardsiella, Aeromonas or Plesiomonas isolated   Final    E Coli Shigatoxin 07/01/2024 Negative for Shigatoxins  Negative Final   ]      Radiology Imaging:  I reviewed with the patient her X-ray of the lumbar spine from 10/14/2024  XR LUMBAR SPINE AP LAT FLEX EXT (CPT=72110)  PROCEDURE: XR LUMBAR SPINE AP LAT FLEX EXT EM (CPT=72120)     COMPARISON: None.     INDICATIONS: Lower back pain radiating to right leg x 1 month. No injury     TECHNIQUE: Lumbar spine radiographs, 4  views (AP, lateral, flexion, and extension views)       Impression:     1.5 cm anterolisthesis of L4 relative to L5 due to pars defects.  This increases to 1.6 centimeter during flexion and decreases to 1.5 centimeter during extension     The rest of the spine is in normal alignment and shows no motion during flexion and extension     Mild-to-moderate disc space narrowing at L4-5 with mild endplate DJD     No compression fracture     Surgical clips in the right upper quadrant  Finalized by (CST): Reid Handy MD on 10/14/2024 at 4:06 PM                    ASSESSMENT AND PLAN:  The patient is a pleasant 58-year-old female presents with right-sided low back pain with radiation into the right buttock down the leg to the posterior lateral gastroc.  I believe her symptoms are due to a lumbar radiculopathy.  I am recommending an MRI of the lumbar spine to further investigate her symptoms.  Her x-rays of the lumbar spine demonstrate a spondylolisthesis of L4 and L5 which is unstable.  She will follow-up with me to review the MRI and discuss the next steps.  In the meantime, she should increase her gabapentin to 200 mg at night.  She should continue her home exercise program.  I have given her a double Medrol Dosepak.  She can also use Naprosyn and follow-up with me to review the MRI images and discuss next steps.  She should also continue her weight loss program.       RTC in 4 to 6 weeks for MRI review  Discharge Instructions were provided as documented in AVS summary.  The patient was in agreement with the assessment and plan.  All questions were answered.  There were no barriers to learning.         1. 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 Facet syndrome, lumbar    3. Mechanical low back pain    4. Lumbar spondylosis    5. Degeneration of intervertebral disc of lumbosacral region with discogenic back pain and lower  extremity pain    6. Lumbar foraminal stenosis    7. Lumbar radiculopathy    8. Bulge of lumbar disc without myelopathy    9. Myalgia    10. Strain of gluteus medius, unspecified laterality, initial encounter    11. Chronic midline thoracic back pain    12. Bulge of thoracic disc without myelopathy        Alex B. Behar MD  Physical Medicine and Rehabilitation/Sports Medicine  [facility]         [1]   Allergies  Allergen Reactions    Augmentin [Amoxicillin-Pot Clavulanate] NAUSEA AND VOMITING    Coconut Oil NAUSEA AND VOMITING    Cortisone DIZZINESS and OTHER (SEE COMMENTS)     Blood drop, dizziness, flushed    Adhesive Tape RASH     Needs paper tape

## 2024-12-12 DIAGNOSIS — E66.01 MORBID OBESITY WITH BMI OF 50.0-59.9, ADULT (HCC): ICD-10-CM

## 2024-12-12 RX ORDER — SEMAGLUTIDE 2.4 MG/.75ML
2.4 INJECTION, SOLUTION SUBCUTANEOUS WEEKLY
Qty: 3 ML | Refills: 2 | Status: SHIPPED | OUTPATIENT
Start: 2024-12-12

## 2024-12-12 NOTE — TELEPHONE ENCOUNTER
Please review; protocol failed/ has no protocol      Holley Greenwood, RN2 minutes ago (9:16 AM)     HB  patient requesting refill on her Wegovy 2.4MG/0.75ML. states she requested refill on Sunday but not seeing record of that, patient has been out for 5 days now. prescription pended for approval.         Requested Prescriptions   Pending Prescriptions Disp Refills    semaglutide-weight management (WEGOVY) 2.4 MG/0.75ML Subcutaneous Solution Auto-injector 3 mL 2     Sig: Inject 0.75 mL (2.4 mg total) into the skin once a week.       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 Behar, Alex, MD    Office Visit    2 months ago Neural foraminal stenosis of thoracic spine    [facility] Guille Lezama MD    Office Visit    2 months ago Dyspnea on exertion    [facility] Guille Lezama MD    Office Visit    5 months ago Annual physical exam    [facility] Guille Lezama MD    Office Visit          Future Appointments         Provider Department Appt Notes    In 1 week [facility] MRI RM1 (1.5T WIDE) [facility] - [place]     In 2 weeks Guille Miramontes MD [facility] weight loss and gut/malabsorption fu    In 2 months Behar, Alex, MD [facility] MRI F/U

## 2024-12-12 NOTE — TELEPHONE ENCOUNTER
patient requesting refill on her Wegovy 2.4MG/0.75ML. states she requested refill on Sunday but not seeing record of that, patient has been out for 5 days now. prescription pended for approval.

## 2024-12-13 ENCOUNTER — TELEPHONE (OUTPATIENT)
Dept: INTERNAL MEDICINE CLINIC | Facility: CLINIC | Age: 59
End: 2024-12-13

## 2024-12-13 NOTE — TELEPHONE ENCOUNTER
Prior Authorization     Current Outpatient Medications   Medication Sig Dispense Refill    semaglutide-weight management (WEGOVY) 2.4 MG/0.75ML Subcutaneous Solution Auto-injector Inject 0.75 mL (2.4 mg total) into the skin once a week. 3 mL 2     KEY: KEY:  [id number]  Patient last name:  HELEN  :  1965

## 2024-12-13 NOTE — TELEPHONE ENCOUNTER
Prior Authorization             Current Outpatient Medications   Medication Sig Dispense Refill    semaglutide-weight management (WEGOVY) 2.4 MG/0.75ML Subcutaneous Solution Auto-injector Inject 0.75 mL (2.4 mg total) into the skin once a week. 3 mL 2      KEY: KEY:  [id number]  Patient last name:  HELEN  :  1965

## 2024-12-16 NOTE — TELEPHONE ENCOUNTER
Patient called and said Dr would have to appeal for Rx Wegovy or send a new script with prior authorization for Rx Ozempic. Patient said her insurance will cover Rx ozmepic. Patient said her last injection for Rx Wegovy was 12/8/24. Please adivse    Patient requesting call back after 2pm.

## 2024-12-16 NOTE — TELEPHONE ENCOUNTER
Patient called back on this.  Advised her to contact her plan for covered alternatives.  She will do so.

## 2024-12-16 NOTE — TELEPHONE ENCOUNTER
Denied - Wegovy    Note from payer: Your PA request has been denied.  Additional information will be provided in the denial communication. (Message 1147)  Payer: [employer] Christina Case ID: [id number]    [phone]

## 2024-12-18 ENCOUNTER — TELEPHONE (OUTPATIENT)
Dept: INTERNAL MEDICINE CLINIC | Facility: CLINIC | Age: 59
End: 2024-12-18

## 2024-12-18 NOTE — TELEPHONE ENCOUNTER
Please relay to pt I tried ozempic but was not covered since she does not have diabetes. If her insurance does not cover zepbound or wegovy please let me know we can consider compounded from [place] but out of pocket of semaglutide is 150-200$ a month. We can discuss on upcoming appointment 12/27

## 2024-12-20 ENCOUNTER — HOSPITAL ENCOUNTER (OUTPATIENT)
Dept: MRI IMAGING | Age: 59
Discharge: HOME | End: 2024-12-20
Attending: PHYSICAL MEDICINE & REHABILITATION
Payer: COMMERCIAL

## 2024-12-20 DIAGNOSIS — M47.816 LUMBAR SPONDYLOSIS: ICD-10-CM

## 2024-12-20 DIAGNOSIS — M54.59 MECHANICAL LOW BACK PAIN: ICD-10-CM

## 2024-12-20 DIAGNOSIS — M51.372 DEGENERATION OF INTERVERTEBRAL DISC OF LUMBOSACRAL REGION WITH DISCOGENIC BACK PAIN AND LOWER EXTREMITY PAIN: ICD-10-CM

## 2024-12-20 DIAGNOSIS — Z15.2 CLASS 3 OBESITY DUE TO DISRUPTION OF MC4R PATHWAY WITH SERIOUS COMORBIDITY AND BODY MASS INDEX (BMI) OF 45.0 TO 49.9 IN ADULT (HCC): ICD-10-CM

## 2024-12-20 DIAGNOSIS — E66.813 CLASS 3 OBESITY DUE TO DISRUPTION OF MC4R PATHWAY WITH SERIOUS COMORBIDITY AND BODY MASS INDEX (BMI) OF 45.0 TO 49.9 IN ADULT (HCC): ICD-10-CM

## 2024-12-20 DIAGNOSIS — E88.82 CLASS 3 OBESITY DUE TO DISRUPTION OF MC4R PATHWAY WITH SERIOUS COMORBIDITY AND BODY MASS INDEX (BMI) OF 45.0 TO 49.9 IN ADULT (HCC): ICD-10-CM

## 2024-12-20 DIAGNOSIS — M51.34 BULGE OF THORACIC DISC WITHOUT MYELOPATHY: ICD-10-CM

## 2024-12-20 DIAGNOSIS — S76.019A STRAIN OF GLUTEUS MEDIUS, UNSPECIFIED LATERALITY, INITIAL ENCOUNTER: ICD-10-CM

## 2024-12-20 DIAGNOSIS — M47.816 FACET SYNDROME, LUMBAR: ICD-10-CM

## 2024-12-20 DIAGNOSIS — M48.061 LUMBAR FORAMINAL STENOSIS: ICD-10-CM

## 2024-12-20 DIAGNOSIS — M54.6 CHRONIC MIDLINE THORACIC BACK PAIN: ICD-10-CM

## 2024-12-20 DIAGNOSIS — M54.16 LUMBAR RADICULOPATHY: ICD-10-CM

## 2024-12-20 DIAGNOSIS — M51.369 BULGE OF LUMBAR DISC WITHOUT MYELOPATHY: ICD-10-CM

## 2024-12-20 DIAGNOSIS — M79.10 MYALGIA: ICD-10-CM

## 2024-12-20 DIAGNOSIS — G89.29 CHRONIC MIDLINE THORACIC BACK PAIN: ICD-10-CM

## 2024-12-20 PROCEDURE — 72148 MRI LUMBAR SPINE W/O DYE: CPT | Performed by: PHYSICAL MEDICINE & REHABILITATION

## 2024-12-23 RX ORDER — LEVOTHYROXINE SODIUM 150 UG/1
150 TABLET ORAL DAILY
Qty: 90 TABLET | Refills: 3 | Status: SHIPPED | OUTPATIENT
Start: 2024-12-23

## 2024-12-23 NOTE — TELEPHONE ENCOUNTER
Refill Passed Per Protocol    Requested Prescriptions   Pending Prescriptions Disp Refills    LEVOTHYROXINE 150 MCG Oral Tab [Pharmacy Med Name: Levothyroxine Sodium 150 Mcg Tab Lupi] 90 tablet 0     Sig: TAKE 1 TABLET BY MOUTH ONCE DAILY       Thyroid Medication Protocol Passed - 12/23/2024  2:50 PM        Passed - TSH in past 12 months        Passed - Last TSH value is normal     Lab Results   Component Value Date    TSH 0.621 07/01/2024                 Passed - In person appointment or virtual visit in the past 12 mos or appointment in next 3 mos     Recent Outpatient Visits              2 weeks ago 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 and body mass index (BMI) of 45.0 to 49.9 in adult ([facility])    [facility] [place] Behar, Alex, MD    Office Visit    2 months ago 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 and body mass index (BMI) of 45.0 to 49.9 in adult ([facility])    [facility] [place] Behar, Alex, MD    Office Visit    2 months ago Neural foraminal stenosis of thoracic spine    [facility] Guille Lezama MD    Office Visit    3 months ago Dyspnea on exertion    [facility] Guille Lezama MD    Office Visit    5 months ago Annual physical exam    [facility] Guille Lezama MD    Office Visit          Future Appointments         Provider Department Appt Notes    In 4 days [facility]    In 1 month [facility]                         Future Appointments         Provider Department Appt Notes    In 4 days Guille Miramontes MD [facility] weight loss and gut/malabsorption fu    In 1  month Behar, Alex, MD [facility] MRI F/U          Recent Outpatient Visits              2 weeks ago 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 and body mass index (BMI) of 45.0 to 49.9 in adult ([facility])    [facility]    Office Visit    2 months ago 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

## 2024-12-27 ENCOUNTER — OFFICE VISIT (OUTPATIENT)
Dept: INTERNAL MEDICINE CLINIC | Facility: CLINIC | Age: 59
End: 2024-12-27
Payer: COMMERCIAL

## 2024-12-27 ENCOUNTER — LAB ENCOUNTER (OUTPATIENT)
Dept: LAB | Age: 59
End: 2024-12-27
Attending: STUDENT IN AN ORGANIZED HEALTH CARE EDUCATION/TRAINING PROGRAM
Payer: COMMERCIAL

## 2024-12-27 VITALS
SYSTOLIC BLOOD PRESSURE: 114 MMHG | BODY MASS INDEX: 49.32 KG/M2 | DIASTOLIC BLOOD PRESSURE: 79 MMHG | HEART RATE: 80 BPM | HEIGHT: 62 IN | WEIGHT: 268 LBS

## 2024-12-27 DIAGNOSIS — E66.813 CLASS 3 OBESITY: ICD-10-CM

## 2024-12-27 DIAGNOSIS — E03.9 HYPOTHYROIDISM, UNSPECIFIED TYPE: ICD-10-CM

## 2024-12-27 DIAGNOSIS — R73.03 PRE-DIABETES: Primary | ICD-10-CM

## 2024-12-27 DIAGNOSIS — R73.03 PRE-DIABETES: ICD-10-CM

## 2024-12-27 LAB
EST. AVERAGE GLUCOSE BLD GHB EST-MCNC: 131 MG/DL (ref 68–126)
HBA1C MFR BLD: 6.2 % (ref ?–5.7)
T3FREE SERPL-MCNC: 3.71 PG/ML (ref 2.4–4.2)
T4 FREE SERPL-MCNC: 1.4 NG/DL (ref 0.8–1.7)
TSI SER-ACNC: 0.44 UIU/ML (ref 0.55–4.78)

## 2024-12-27 PROCEDURE — 3078F DIAST BP <80 MM HG: CPT | Performed by: STUDENT IN AN ORGANIZED HEALTH CARE EDUCATION/TRAINING PROGRAM

## 2024-12-27 PROCEDURE — 36415 COLL VENOUS BLD VENIPUNCTURE: CPT

## 2024-12-27 PROCEDURE — 84439 ASSAY OF FREE THYROXINE: CPT

## 2024-12-27 PROCEDURE — 84481 FREE ASSAY (FT-3): CPT

## 2024-12-27 PROCEDURE — 84443 ASSAY THYROID STIM HORMONE: CPT

## 2024-12-27 PROCEDURE — 3008F BODY MASS INDEX DOCD: CPT | Performed by: STUDENT IN AN ORGANIZED HEALTH CARE EDUCATION/TRAINING PROGRAM

## 2024-12-27 PROCEDURE — 3074F SYST BP LT 130 MM HG: CPT | Performed by: STUDENT IN AN ORGANIZED HEALTH CARE EDUCATION/TRAINING PROGRAM

## 2024-12-27 PROCEDURE — 83036 HEMOGLOBIN GLYCOSYLATED A1C: CPT

## 2024-12-27 PROCEDURE — 99214 OFFICE O/P EST MOD 30 MIN: CPT | Performed by: STUDENT IN AN ORGANIZED HEALTH CARE EDUCATION/TRAINING PROGRAM

## 2024-12-27 RX ORDER — LEVOTHYROXINE SODIUM 150 UG/1
150 TABLET ORAL DAILY
Qty: 90 TABLET | Refills: 3 | Status: SHIPPED | OUTPATIENT
Start: 2024-12-27

## 2024-12-27 NOTE — PROGRESS NOTES
OFFICE NOTE     Patient ID: Radha Rodrigez is a 59 year old female.  Today's Date: 12/27/24  Chief Complaint: Weight Loss and Test Results (Mri results)  Pt is a a 56y/o female with PMHx HTN, Hypothyroidism, Class 2 obesity ([id number]), pre-diabetes, ALYSSIA, GERD,  leiomyoma of uterus and chronic sinusitis (followed by Dr. Lora) left nephrolithiasis,  With Lumbar discogenic radiculopathy (followed by Dr. Behar) with ongoing recurrent medical complications this past year resulting in unanticipated weight gain        Vitals:    12/27/24 1232   BP: 114/79   Pulse: 80   Weight: 268 lb (121.6 kg)   Height: 5' 2\" (1.575 m)     body mass index is 49.02 kg/m².  BP Readings from Last 3 Encounters:   12/27/24 114/79   11/27/24 132/86   10/01/24 110/75     The 10-year ASCVD risk score (Tri RYAN, et al., 2019) is: 4.5%    Values used to calculate the score:      Age: 59 years      Sex: Female      Is Non- : No      Diabetic: Yes      Tobacco smoker: No      Systolic Blood Pressure: 114 mmHg      Is BP treated: No      HDL Cholesterol: 38 mg/dL      Total Cholesterol: 144 mg/dL      Medications reviewed:  Current Outpatient Medications   Medication Sig Dispense Refill    levothyroxine 150 MCG Oral Tab Take 1 tablet (150 mcg total) by mouth daily. 90 tablet 3    naproxen (NAPROSYN) 500 MG Oral Tab Take 1 tablet (500 mg total) by mouth 2 (two) times daily with meals. Take for 2 weeks as directed and then as needed. 60 tablet 3    baclofen 10 MG Oral Tab Take 1 tablet (10 mg total) by mouth nightly as needed.      gabapentin 100 MG Oral Cap Take 1 capsule (100 mg total) by mouth 3 (three) times daily.      rosuvastatin 10 MG Oral Tab Take 1 tablet (10 mg total) by mouth nightly. FOR CHOLESTEROL. 90 tablet 9    omeprazole 20 MG Oral Capsule Delayed Release Take 1 capsule (20 mg total) by mouth every morning before breakfast. FOR ACID REFLUX. 90 capsule 3    fluticasone-salmeterol 115-21 MCG/ACT  Inhalation Aerosol Inhale 2 puffs into the lungs 2 (two) times daily. FOR ASTHMA 1 each 9    fluticasone propionate 50 MCG/ACT Nasal Suspension 2 sprays by Nasal route daily. (Patient taking differently: 2 sprays by Nasal route as needed.) 16 g 0    albuterol 108 (90 Base) MCG/ACT Inhalation Aero Soln Inhale 2-4 puffs into the lungs every 4 to 6 hours as needed for Wheezing or Shortness of Breath (cough, asthma, chest tightness). FOR ASTHMA. Pharmacist, please switch to formulary alternative per insurance coverage, ok to replace with proair, ventolin, proventil, or any other albuterol inhaler. -drkp 3 each 9    cholecalciferol (VITAMIN D3) 125 MCG (5000 UT) Oral Cap Take 1 capsule (5,000 Units total) by mouth daily.      semaglutide 8 MG/3ML Subcutaneous Solution Pen-injector Inject 2 mg into the skin once a week. (Patient not taking: Reported on 12/27/2024) 3 mL 12    methylPREDNISolone (MEDROL) 4 MG Oral Tablet Therapy Pack As directed (Patient not taking: Reported on 12/27/2024) 2 each 0         Assessment & Plan    1. Pre-diabetes (Primary)  -     Hemoglobin A1C; Future; Expected date: 12/27/2024  Check A1C again  2. Class 3 obesity  -     Referral to Bariatrics    Lifestyle Changes Recommendations:   Nutritional Goals Reviewed and Discussed:   Limit Carbohydrates to 100gm per day, Eat 100-200 calories within 1 hour of awkaing up, Eat 3-4 cups of fresh fruits or vegetables daily    Behavior Modification Reviewed and Discussed:  Eat breakfast, Eat 3 meals per day, Plan meals in advance (if unable to cook, meal prep service such as [employer]) High protein, Low simple carbs. Read nutrition labels track calories and Macros with Cell[employer] or LoseIt colleen (thus daily food journal), No drinking 30mintutes before or after meals, utilize portion control strategies to reduce caloric intake, Identify triggers for eating and manage cues, and Eat Slowly and take 20-30 minutes to complete each meal.    Goal for Next  Month:  Keep Food log: At first track current daily caloric intake and limit current caloric consumption by 500 to 1,000 calories daily OR start with caloric restriction of less than 1,800 calories daily.   Increase Cardiac/cardio exercise at least 30minutes a day/ 180 minutes a week, ideal Goal is 1hr a day (5 days a week) would prefer if enrolls in fitness classes or  at least 1x a week. (If possible to work out in the morning is proven to increase natural Dopamine, Serotonin, Endorphin, levels (Feel good and energy hormones) and reduce cortisol  (stress hormone levels).   Drink 48-64 ounces of non-caloric beverages per day. No fruit juices or regular soda.  Increase activity- upper body exercises in addition to cardio and, aim to walk 10minutes per day after dinner  Increase fruit and vegetable servings to 5-6 per day.   6. Food recommendation for Breakfast: Steel cut oatmeal:  1cup Steel cut oatmeal and 2cups unsweetened almond milk. And cinnamon (let it ,overnight in a bowl), and portion out 4 servings (separate containers/jars), then daily add one triple zero okios greek yogurt, 1 medium banana and however many berries your heart desires  All berries are good, (blueberry, raspberry, strawberries, blackberries).   -avoid high sugar fruits (pineapple, mangos, melons, banana)- high  7. Plant based protein: Ascent Plant Based Protein Powder - Non Dairy Vegan Protein, Zero Artificial Ingredients, Soy & Gluten Free, No Added Sugar, 4g BCAA, 2g Leucine - Chocolate, 18 Servings    3. Hypothyroidism, unspecified type  -     TSH W Reflex To Free T4; Future; Expected date: 12/27/2024  Pt with hypothyroidism on synthroid 150mcg  Plan  -recheck TSH and if stable continue dose    Follow Up: As needed/if symptoms worsen or No follow-ups on file..         Objective/ Results:   Physical Exam  Constitutional:       Appearance: She is well-developed.   HENT:      Head: Normocephalic and atraumatic.      Right  Ear: External ear normal.      Left Ear: External ear normal.      Nose: Nose normal.   Eyes:      Conjunctiva/sclera: Conjunctivae normal.      Pupils: Pupils are equal, round, and reactive to light.   Cardiovascular:      Rate and Rhythm: Normal rate and regular rhythm.      Heart sounds: Normal heart sounds.   Pulmonary:      Effort: Pulmonary effort is normal.      Breath sounds: Normal breath sounds.   Abdominal:      General: Bowel sounds are normal.      Palpations: Abdomen is soft.   Genitourinary:     Vagina: Normal.   Musculoskeletal:      Cervical back: Normal range of motion and neck supple.      Lumbar back: Tenderness present. Decreased range of motion. Positive right straight leg raise test.   Skin:     General: Skin is warm and dry.   Neurological:      Mental Status: She is alert and oriented to person, place, and time.      Deep Tendon Reflexes: Reflexes are normal and symmetric.   Psychiatric:         Behavior: Behavior normal.         Thought Content: Thought content normal.         Judgment: Judgment normal.        Reviewed:    Patient Active Problem List    Diagnosis    Neural foraminal stenosis of thoracic spine    Dilation of common bile duct    Abdominal pain    Malabsorption syndrome (HCC)    S/P cholecystectomy    Right lower quadrant abdominal pain    Calcium oxalate calculus    Left flank pain    Pain due to ureteral stent, initial encounter ([facility])    Acute cystitis with hematuria    Ureterolithiasis    Pyelonephritis    Encounter for therapeutic drug monitoring    Pre-diabetes    Stress    Essential hypertension    Morbid obesity with BMI of 50.0-59.9, adult ([facility])    Diverticulosis of colon    Esophageal reflux    Seasonal allergies    Hypothyroidism    Leiomyoma of uterus      Allergies[1]     Social History     Socioeconomic History    Marital status:    Tobacco Use    Smoking status: Never     Passive exposure: Never    Smokeless tobacco: Never   Vaping Use    Vaping status:  Never Used   Substance and Sexual Activity    Alcohol use: Not Currently    Drug use: Never     Social Drivers of Health     Financial Resource Strain: Low Risk  (3/11/2024)    Financial Resource Strain     Difficulty of Paying Living Expenses: Not very hard     Med Affordability: No   Food Insecurity: No Food Insecurity (9/25/2024)    Food Insecurity     Food Insecurity: Never true   Transportation Needs: No Transportation Needs (9/25/2024)    Transportation Needs     Lack of Transportation: No   Housing Stability: Low Risk  (9/25/2024)    Housing Stability     Housing Instability: No      Review of Systems   Constitutional: Negative.    Respiratory: Negative.     Cardiovascular: Negative.    Gastrointestinal: Negative.    Musculoskeletal:  Positive for back pain and gait problem.   Skin: Negative.      All other systems negative unless otherwise stated in ROS or HPI above.       Guille Miramontes MD  Internal Medicine       Call office with any questions or seek emergency care if necessary.   Patient understands and agrees to follow directions and advice.      ----------------------------------------- PATIENT INSTRUCTIONS-----------------------------------------     There are no Patient Instructions on file for this visit.        The 21st Century Cures Act makes medical notes available to patients in the interest of transparency.  However, please be advised that this is a medical document.  It is intended as a peer to peer communication.  It is written in medical language and may contain abbreviations or verbiage that are technical and unfamiliar.  It may appear blunt or direct.  Medical documents are intended to carry relevant information, facts as evident, and the clinical opinion of the practitioner.          [1]   Allergies  Allergen Reactions    Augmentin [Amoxicillin-Pot Clavulanate] NAUSEA AND VOMITING    Coconut Oil NAUSEA AND VOMITING    Cortisone DIZZINESS and OTHER (SEE COMMENTS)     Blood drop, dizziness,  flushed    Adhesive Tape RASH     Needs paper tape

## 2024-12-29 DIAGNOSIS — E03.9 HYPOTHYROIDISM, UNSPECIFIED TYPE: Primary | ICD-10-CM

## 2024-12-29 NOTE — PROGRESS NOTES
Please relay to pt if not read:    Regla Garcia,   Your A1C is still in Pre-diabetic range 6.2 not enough to have Ozempic or Mounjaro (they require 6.5 and on metformin).     Your TSH is within upper limit of normal with normal thyroid (T3/T4) hormone, I would continue current dose of levothyroxine 150mcg daily, if you feel more chest pain or palpitations in the coming months please let  me know and can always recheck and see if we need to go lower on the thyroid dose  -Dr. Miramontes

## 2024-12-30 ENCOUNTER — PATIENT OUTREACH (OUTPATIENT)
Dept: CASE MANAGEMENT | Age: 59
End: 2024-12-30

## 2024-12-30 NOTE — PROCEDURES
The office order to remove patient from the diabetes registry is Approved and finalized on December 30, 2024.

## 2025-01-09 ENCOUNTER — MED REC SCAN ONLY (OUTPATIENT)
Dept: INTERNAL MEDICINE CLINIC | Facility: CLINIC | Age: 60
End: 2025-01-09

## 2025-02-03 ENCOUNTER — OFFICE VISIT (OUTPATIENT)
Dept: SURGERY | Facility: CLINIC | Age: 60
End: 2025-02-03
Payer: COMMERCIAL

## 2025-02-03 VITALS
SYSTOLIC BLOOD PRESSURE: 103 MMHG | DIASTOLIC BLOOD PRESSURE: 72 MMHG | HEIGHT: 62 IN | HEART RATE: 70 BPM | OXYGEN SATURATION: 99 % | BODY MASS INDEX: 49.75 KG/M2 | WEIGHT: 270.38 LBS

## 2025-02-03 DIAGNOSIS — Z51.81 ENCOUNTER FOR THERAPEUTIC DRUG MONITORING: ICD-10-CM

## 2025-02-03 DIAGNOSIS — F43.9 STRESS: ICD-10-CM

## 2025-02-03 DIAGNOSIS — E66.01 MORBID OBESITY WITH BMI OF 45.0-49.9, ADULT (HCC): ICD-10-CM

## 2025-02-03 DIAGNOSIS — E11.9 TYPE 2 DIABETES MELLITUS WITHOUT COMPLICATION, WITHOUT LONG-TERM CURRENT USE OF INSULIN (HCC): Primary | ICD-10-CM

## 2025-02-03 RX ORDER — DIETHYLPROPION HYDROCHLORIDE 25 MG/1
1 TABLET ORAL DAILY
Qty: 30 TABLET | Refills: 5 | Status: SHIPPED | OUTPATIENT
Start: 2025-02-03 | End: 2025-03-05

## 2025-02-10 ENCOUNTER — NURSE TRIAGE (OUTPATIENT)
Dept: INTERNAL MEDICINE CLINIC | Facility: CLINIC | Age: 60
End: 2025-02-10

## 2025-02-10 ENCOUNTER — OFFICE VISIT (OUTPATIENT)
Dept: INTERNAL MEDICINE CLINIC | Facility: CLINIC | Age: 60
End: 2025-02-10
Payer: COMMERCIAL

## 2025-02-10 VITALS
TEMPERATURE: 98 F | OXYGEN SATURATION: 99 % | DIASTOLIC BLOOD PRESSURE: 83 MMHG | BODY MASS INDEX: 49 KG/M2 | SYSTOLIC BLOOD PRESSURE: 128 MMHG | HEART RATE: 80 BPM | WEIGHT: 268 LBS

## 2025-02-10 DIAGNOSIS — J10.1 INFLUENZA A: ICD-10-CM

## 2025-02-10 DIAGNOSIS — J32.9 RECURRENT SINUSITIS: Primary | ICD-10-CM

## 2025-02-10 PROCEDURE — 3074F SYST BP LT 130 MM HG: CPT | Performed by: STUDENT IN AN ORGANIZED HEALTH CARE EDUCATION/TRAINING PROGRAM

## 2025-02-10 PROCEDURE — 3079F DIAST BP 80-89 MM HG: CPT | Performed by: STUDENT IN AN ORGANIZED HEALTH CARE EDUCATION/TRAINING PROGRAM

## 2025-02-10 PROCEDURE — 99214 OFFICE O/P EST MOD 30 MIN: CPT | Performed by: STUDENT IN AN ORGANIZED HEALTH CARE EDUCATION/TRAINING PROGRAM

## 2025-02-10 RX ORDER — OSELTAMIVIR PHOSPHATE 75 MG/1
75 CAPSULE ORAL 2 TIMES DAILY
Qty: 10 CAPSULE | Refills: 0 | Status: SHIPPED | OUTPATIENT
Start: 2025-02-10 | End: 2025-02-15

## 2025-02-10 RX ORDER — DOXYCYCLINE 100 MG/1
100 CAPSULE ORAL 2 TIMES DAILY
Qty: 20 CAPSULE | Refills: 0 | Status: SHIPPED | OUTPATIENT
Start: 2025-02-10 | End: 2025-02-20

## 2025-02-10 RX ORDER — FLUTICASONE PROPIONATE 50 MCG
2 SPRAY, SUSPENSION (ML) NASAL DAILY
Qty: 1 EACH | Refills: 11 | Status: SHIPPED | OUTPATIENT
Start: 2025-02-10

## 2025-02-10 RX ORDER — AZELASTINE HYDROCHLORIDE 137 UG/1
1-2 SPRAY, METERED NASAL 2 TIMES DAILY
Qty: 30 ML | Refills: 11 | Status: SHIPPED | OUTPATIENT
Start: 2025-02-10

## 2025-02-10 RX ORDER — METHYLPREDNISOLONE 4 MG/1
TABLET ORAL
Qty: 1 EACH | Refills: 0 | Status: SHIPPED | OUTPATIENT
Start: 2025-02-10

## 2025-02-10 NOTE — PROGRESS NOTES
OFFICE NOTE     Patient ID: Radha Rodrigez is a 59 year old female.  Today's Date: 02/10/25  Chief Complaint: Sinus Problem (Sore throat, sinus, headache, b/l itchy ears, body chills x3 days)    Pt is a a 58y/o female with PMHx HTN, Hypothyroidism, Class 2 obesity ([id number]), pre-diabetes, ALYSSIA, GERD,  leiomyoma of uterus and chronic sinusitis (followed by Dr. Lora) left nephrolithiasis, recent  Has left ulnar fracture (workmans comp) march 5th with workmans comp whom presents to clinic with acute on chronic sinusitis.         Vitals:    02/10/25 1505   BP: 128/83   Pulse: 80   Temp: 98.2 °F (36.8 °C)   TempSrc: Oral   SpO2: 99%   Weight: 268 lb (121.6 kg)     body mass index is 49.02 kg/m².  BP Readings from Last 3 Encounters:   02/10/25 128/83   02/03/25 103/72   12/27/24 114/79     The 10-year ASCVD risk score (Tri RYAN, et al., 2019) is: 5.7%    Values used to calculate the score:      Age: 59 years      Sex: Female      Is Non- : No      Diabetic: Yes      Tobacco smoker: No      Systolic Blood Pressure: 128 mmHg      Is BP treated: No      HDL Cholesterol: 38 mg/dL      Total Cholesterol: 144 mg/dL      Medications reviewed:  Current Outpatient Medications   Medication Sig Dispense Refill    azelastine 137 MCG/SPRAY Nasal Solution 1-2 sprays by Nasal route in the morning and 1-2 sprays before bedtime. FOR SINUS SYMPTOMS/NASAL CONGESTION.. 30 mL 11    fluticasone propionate 50 MCG/ACT Nasal Suspension 2 sprays by Each Nare route daily. FOR NASAL CONGESTION/SINUS SYMPTOMS. 1 each 11    Benzocaine-Menthol 6-10 MG Mouth/Throat Lozenge Take 1 lozenge by mouth every 2 (two) hours as needed for Pain. 20 lozenge 0    doxycycline 100 MG Oral Cap Take 1 capsule (100 mg total) by mouth 2 (two) times daily for 10 days. 20 capsule 0    methylPREDNISolone 4 MG Oral Tablet Therapy Pack Take as directed with food. 1 each 0    oseltamivir 75 MG Oral Cap Take 1 capsule (75 mg total) by  mouth 2 (two) times daily for 5 days. 10 capsule 0    Diethylpropion HCl 25 MG Oral Tab Take 1 tablet by mouth daily. 30 tablet 5    levothyroxine 150 MCG Oral Tab Take 1 tablet (150 mcg total) by mouth daily. 90 tablet 3    naproxen (NAPROSYN) 500 MG Oral Tab Take 1 tablet (500 mg total) by mouth 2 (two) times daily with meals. Take for 2 weeks as directed and then as needed. 60 tablet 3    baclofen 10 MG Oral Tab Take 1 tablet (10 mg total) by mouth nightly as needed.      gabapentin 100 MG Oral Cap Take 1 capsule (100 mg total) by mouth 3 (three) times daily.      rosuvastatin 10 MG Oral Tab Take 1 tablet (10 mg total) by mouth nightly. FOR CHOLESTEROL. 90 tablet 9    omeprazole 20 MG Oral Capsule Delayed Release Take 1 capsule (20 mg total) by mouth every morning before breakfast. FOR ACID REFLUX. 90 capsule 3    fluticasone-salmeterol 115-21 MCG/ACT Inhalation Aerosol Inhale 2 puffs into the lungs 2 (two) times daily. FOR ASTHMA 1 each 9    albuterol 108 (90 Base) MCG/ACT Inhalation Aero Soln Inhale 2-4 puffs into the lungs every 4 to 6 hours as needed for Wheezing or Shortness of Breath (cough, asthma, chest tightness). FOR ASTHMA. Pharmacist, please switch to formulary alternative per insurance coverage, ok to replace with proair, ventolin, proventil, or any other albuterol inhaler. -drkp 3 each 9    cholecalciferol (VITAMIN D3) 125 MCG (5000 UT) Oral Cap Take 1 capsule (5,000 Units total) by mouth daily.      semaglutide 8 MG/3ML Subcutaneous Solution Pen-injector Inject 2 mg into the skin once a week. (Patient not taking: Reported on 12/27/2024) 3 mL 12    methylPREDNISolone (MEDROL) 4 MG Oral Tablet Therapy Pack As directed (Patient not taking: Reported on 12/27/2024) 2 each 0         Assessment & Plan    1. Recurrent sinusitis (Primary)  -     Azelastine HCl; 1-2 sprays by Nasal route in the morning and 1-2 sprays before bedtime. FOR SINUS SYMPTOMS/NASAL CONGESTION..  Dispense: 30 mL; Refill: 11  -      Fluticasone Propionate; 2 sprays by Each Nare route daily. FOR NASAL CONGESTION/SINUS SYMPTOMS.  Dispense: 1 each; Refill: 11  -     Benzocaine-Menthol; Take 1 lozenge by mouth every 2 (two) hours as needed for Pain.  Dispense: 20 lozenge; Refill: 0  -     Doxycycline Hyclate; Take 1 capsule (100 mg total) by mouth 2 (two) times daily for 10 days.  Dispense: 20 capsule; Refill: 0  -     methylPREDNISolone; Take as directed with food.  Dispense: 1 each; Refill: 0  -     Oseltamivir Phosphate; Take 1 capsule (75 mg total) by mouth 2 (two) times daily for 5 days.  Dispense: 10 capsule; Refill: 0  2. Influenza A  -     methylPREDNISolone; Take as directed with food.  Dispense: 1 each; Refill: 0  -     Oseltamivir Phosphate; Take 1 capsule (75 mg total) by mouth 2 (two) times daily for 5 days.  Dispense: 10 capsule; Refill: 0  Patient presenting URI and now sinus congestion tenderness and erythematous nasal turbinates consistent with acute secondary acute bacterial rhinosinusitis.  Plan:  -Positive influenza exposure past 72hrs, start tamiflu BID for 5 days  -take hot showers, drink tea and increase fluid intake   -Start Doxycycline 100mg twice daily for 10 days (patients with PCN allergy)  - pt educated while taking antibiotics should also take OTC priobiotics daily and/or natural probiotics such as greek yogurt/Kefir to help prevent development of C.Diff.  Flonase(Fluticazone generic acceptable) 1-2 puff each nostril twice daily for next month or till symptom resolves, Azelastine 1-2 puff each nostril twice daily for next month or till symptom resolves, Medrol dose pack for severe inflammation  -If no improvement, referral given to ENT for further evaluation and evaluation of possible nasal polyp or if CT sinus warranted  -If no improvement also able to follow up with myself follow up in 10-14 days if needs antibiotic duration and agent if warranted     Follow Up: As needed/if symptoms worsen or No follow-ups on file..      Objective/ Results:   Physical Exam  Constitutional:       Appearance: Normal appearance.   HENT:      Nose: Congestion and rhinorrhea present.      Right Turbinates: Enlarged and swollen.      Left Turbinates: Enlarged and swollen.   Cardiovascular:      Rate and Rhythm: Normal rate and regular rhythm.   Pulmonary:      Effort: Pulmonary effort is normal.      Breath sounds: Normal breath sounds.   Neurological:      Mental Status: She is alert.        Reviewed:    Patient Active Problem List    Diagnosis    Neural foraminal stenosis of thoracic spine    Dilation of common bile duct    Abdominal pain    Malabsorption syndrome (HCC)    S/P cholecystectomy    Right lower quadrant abdominal pain    Calcium oxalate calculus    Left flank pain    Pain due to ureteral stent, initial encounter    Acute cystitis with hematuria    Ureterolithiasis    Pyelonephritis    Encounter for therapeutic drug monitoring    Type 2 diabetes mellitus without complication, without long-term current use of insulin ([facility])    Pre-diabetes    Stress    Essential hypertension    Morbid obesity with BMI of 45.0-49.9, adult ([facility])    Diverticulosis of colon    Esophageal reflux    Seasonal allergies    Hypothyroidism    Leiomyoma of uterus      Allergies[1]     Social History     Socioeconomic History    Marital status:    Tobacco Use    Smoking status: Never     Passive exposure: Never    Smokeless tobacco: Never   Vaping Use    Vaping status: Never Used   Substance and Sexual Activity    Alcohol use: Not Currently    Drug use: Never     Social Drivers of Health     Food Insecurity: No Food Insecurity (9/25/2024)    Food Insecurity     Food Insecurity: Never true   Transportation Needs: No Transportation Needs (9/25/2024)    Transportation Needs     Lack of Transportation: No   Housing Stability: Low Risk  (9/25/2024)    Housing Stability     Housing Instability: No      Review of Systems   Constitutional:  Positive for fatigue and  fever. Negative for unexpected weight change.   HENT:  Positive for congestion, postnasal drip, sinus pressure, sinus pain, sneezing, sore throat and voice change.    Respiratory:  Positive for chest tightness and shortness of breath. Negative for wheezing.    Cardiovascular: Negative.    Gastrointestinal: Negative.    Skin: Negative.    Neurological: Negative.      All other systems negative unless otherwise stated in ROS or HPI above.       Guille Miramontes MD  Internal Medicine       Call office with any questions or seek emergency care if necessary.   Patient understands and agrees to follow directions and advice.      ----------------------------------------- PATIENT INSTRUCTIONS-----------------------------------------     There are no Patient Instructions on file for this visit.        The 21st Century Cures Act makes medical notes available to patients in the interest of transparency.  However, please be advised that this is a medical document.  It is intended as a peer to peer communication.  It is written in medical language and may contain abbreviations or verbiage that are technical and unfamiliar.  It may appear blunt or direct.  Medical documents are intended to carry relevant information, facts as evident, and the clinical opinion of the practitioner.          [1]   Allergies  Allergen Reactions    Augmentin [Amoxicillin-Pot Clavulanate] NAUSEA AND VOMITING    Coconut Oil NAUSEA AND VOMITING    Cortisone DIZZINESS and OTHER (SEE COMMENTS)     Blood drop, dizziness, flushed    Adhesive Tape RASH     Needs paper tape

## 2025-02-10 NOTE — TELEPHONE ENCOUNTER
Action Requested: Summary for Provider     []  Critical Lab, Recommendations Needed  [] Need Additional Advice  []   FYI    []   Need Orders  [] Need Medications Sent to Pharmacy  []  Other     SUMMARY: Sinus pressure/ Headaches, bilateral ear itchy,sore throat, chills, no fever for 3 days now,  watery diarrhea started today 2 episodes, no bleeding . Patient wants to be seen after 2 pm today .       Future Appointments   Date Time Provider Department Center   2/10/2025  3:15 PM [facility]             Reason for call: Sinusitis  Onset: 3 days         Reason for Disposition   SEVERE sinus pain   SEVERE sore throat pain   Patient wants to be seen    Protocols used: Sinus Pain and Congestion-A-OH, Sore Throat-A-OH, Diarrhea-A-OH

## 2025-02-11 ENCOUNTER — OFFICE VISIT (OUTPATIENT)
Dept: PHYSICAL MEDICINE AND REHAB | Facility: CLINIC | Age: 60
End: 2025-02-11
Payer: COMMERCIAL

## 2025-02-11 ENCOUNTER — TELEPHONE (OUTPATIENT)
Dept: PHYSICAL MEDICINE AND REHAB | Facility: CLINIC | Age: 60
End: 2025-02-11

## 2025-02-11 VITALS — BODY MASS INDEX: 49.32 KG/M2 | HEIGHT: 62 IN | WEIGHT: 268 LBS

## 2025-02-11 DIAGNOSIS — M43.16 SPONDYLOLISTHESIS OF LUMBAR REGION: ICD-10-CM

## 2025-02-11 DIAGNOSIS — M48.061 LUMBAR FORAMINAL STENOSIS: ICD-10-CM

## 2025-02-11 DIAGNOSIS — M54.16 LUMBAR RADICULOPATHY: Primary | ICD-10-CM

## 2025-02-11 DIAGNOSIS — S76.019A STRAIN OF GLUTEUS MEDIUS, UNSPECIFIED LATERALITY, INITIAL ENCOUNTER: ICD-10-CM

## 2025-02-11 DIAGNOSIS — Z15.2 CLASS 3 OBESITY DUE TO DISRUPTION OF MC4R PATHWAY WITH SERIOUS COMORBIDITY AND BODY MASS INDEX (BMI) OF 45.0 TO 49.9 IN ADULT (HCC): Primary | ICD-10-CM

## 2025-02-11 DIAGNOSIS — M54.16 LUMBAR RADICULOPATHY: ICD-10-CM

## 2025-02-11 DIAGNOSIS — M79.10 MYALGIA: ICD-10-CM

## 2025-02-11 DIAGNOSIS — M54.59 MECHANICAL LOW BACK PAIN: ICD-10-CM

## 2025-02-11 DIAGNOSIS — E66.813 CLASS 3 OBESITY DUE TO DISRUPTION OF MC4R PATHWAY WITH SERIOUS COMORBIDITY AND BODY MASS INDEX (BMI) OF 45.0 TO 49.9 IN ADULT (HCC): Primary | ICD-10-CM

## 2025-02-11 DIAGNOSIS — M47.816 FACET SYNDROME, LUMBAR: ICD-10-CM

## 2025-02-11 DIAGNOSIS — M51.369 BULGE OF LUMBAR DISC WITHOUT MYELOPATHY: ICD-10-CM

## 2025-02-11 DIAGNOSIS — E88.82 CLASS 3 OBESITY DUE TO DISRUPTION OF MC4R PATHWAY WITH SERIOUS COMORBIDITY AND BODY MASS INDEX (BMI) OF 45.0 TO 49.9 IN ADULT (HCC): Primary | ICD-10-CM

## 2025-02-11 DIAGNOSIS — M51.372 DEGENERATION OF INTERVERTEBRAL DISC OF LUMBOSACRAL REGION WITH DISCOGENIC BACK PAIN AND LOWER EXTREMITY PAIN: ICD-10-CM

## 2025-02-11 DIAGNOSIS — M47.816 LUMBAR SPONDYLOSIS: ICD-10-CM

## 2025-02-11 PROCEDURE — 99214 OFFICE O/P EST MOD 30 MIN: CPT | Performed by: PHYSICAL MEDICINE & REHABILITATION

## 2025-02-11 PROCEDURE — 3008F BODY MASS INDEX DOCD: CPT | Performed by: PHYSICAL MEDICINE & REHABILITATION

## 2025-02-11 NOTE — PROGRESS NOTES
[facility]  Progress Note    CHIEF COMPLAINT:    Chief Complaint   Patient presents with    Follow - Up     LOV: 12/5/2024 pt comes in to f/u on MRI of L-spine. Presents with stabbing pain down R thigh and R calf. Admits T/N in legs and feet. Denies weakness. Rates pain 5/10. Takes naproxen PRN, Gabapentin 200mg qNightly.        History of Present Illness:  The patient is a 59 year old RIGHT handed female with significant past medical history of thyroid disease, GERD, morbid obesity who presents with thoracic back pain radiating to the chest wall as well as low back pain with radiation down the right leg to the back of the knee and hamstring region as well as into the right posterior lateral gastroc.  She had an MRI of the lumbar spine performed which independently reviewed.  She rates her pain a 5 out of 10.  She is been taking naproxen as needed for pain as well as gabapentin 200 mg at nighttime.  She does have numbness and tingling in her legs but denies focal weakness.    PAST MEDICAL HISTORY:  Past Medical History:    Back problem    Calculus of kidney    Disorder of thyroid    Diverticulosis of large intestine    Esophageal reflux    Hx of motion sickness    Morbid obesity with BMI of 50.0-59.9, adult (HCC)    Pneumonia due to organism    PONV (postoperative nausea and vomiting)    Pre-diabetes    Seasonal allergies    Thyroid disease    Vitamin D deficiency       SURGICAL HISTORY:  Past Surgical History:   Procedure Laterality Date    Colonoscopy  11/30/2021    Hernia surgery      Hysterectomy  12/21/2012    Other surgical history Left 03/12/2024    Cystoscopy left ureteroscopy, laser lithotripsy, retrograde pyelogram, stent placement    Removal gallbladder      Repair ing hernia,5+y/o,reducibl         SOCIAL HISTORY:   Social History     Occupational History    Not on file   Tobacco Use    Smoking status: Never     Passive exposure: Never    Smokeless tobacco:  Never   Vaping Use    Vaping status: Never Used   Substance and Sexual Activity    Alcohol use: Not Currently    Drug use: Never    Sexual activity: Not on file       FAMILY HISTORY:   Family History   Problem Relation Age of Onset    Ovarian Cancer Mother     Breast Cancer Sister 27       CURRENT MEDICATIONS:   Current Outpatient Medications   Medication Sig Dispense Refill    azelastine 137 MCG/SPRAY Nasal Solution 1-2 sprays by Nasal route in the morning and 1-2 sprays before bedtime. FOR SINUS SYMPTOMS/NASAL CONGESTION.. 30 mL 11    fluticasone propionate 50 MCG/ACT Nasal Suspension 2 sprays by Each Nare route daily. FOR NASAL CONGESTION/SINUS SYMPTOMS. 1 each 11    Benzocaine-Menthol 6-10 MG Mouth/Throat Lozenge Take 1 lozenge by mouth every 2 (two) hours as needed for Pain. 20 lozenge 0    doxycycline 100 MG Oral Cap Take 1 capsule (100 mg total) by mouth 2 (two) times daily for 10 days. 20 capsule 0    methylPREDNISolone 4 MG Oral Tablet Therapy Pack Take as directed with food. 1 each 0    oseltamivir 75 MG Oral Cap Take 1 capsule (75 mg total) by mouth 2 (two) times daily for 5 days. 10 capsule 0    Diethylpropion HCl 25 MG Oral Tab Take 1 tablet by mouth daily. 30 tablet 5    levothyroxine 150 MCG Oral Tab Take 1 tablet (150 mcg total) by mouth daily. 90 tablet 3    semaglutide 8 MG/3ML Subcutaneous Solution Pen-injector Inject 2 mg into the skin once a week. (Patient not taking: Reported on 12/27/2024) 3 mL 12    naproxen (NAPROSYN) 500 MG Oral Tab Take 1 tablet (500 mg total) by mouth 2 (two) times daily with meals. Take for 2 weeks as directed and then as needed. 60 tablet 3    baclofen 10 MG Oral Tab Take 1 tablet (10 mg total) by mouth nightly as needed.      gabapentin 100 MG Oral Cap Take 1 capsule (100 mg total) by mouth 3 (three) times daily.      rosuvastatin 10 MG Oral Tab Take 1 tablet (10 mg total) by mouth nightly. FOR CHOLESTEROL. 90 tablet 9    omeprazole 20 MG Oral Capsule Delayed Release  Take 1 capsule (20 mg total) by mouth every morning before breakfast. FOR ACID REFLUX. 90 capsule 3    fluticasone-salmeterol 115-21 MCG/ACT Inhalation Aerosol Inhale 2 puffs into the lungs 2 (two) times daily. FOR ASTHMA 1 each 9    albuterol 108 (90 Base) MCG/ACT Inhalation Aero Soln Inhale 2-4 puffs into the lungs every 4 to 6 hours as needed for Wheezing or Shortness of Breath (cough, asthma, chest tightness). FOR ASTHMA. Pharmacist, please switch to formulary alternative per insurance coverage, ok to replace with proair, ventolin, proventil, or any other albuterol inhaler. -drkp 3 each 9    cholecalciferol (VITAMIN D3) 125 MCG (5000 UT) Oral Cap Take 1 capsule (5,000 Units total) by mouth daily.         ALLERGIES:   Allergies[1]    REVIEW OF SYSTEMS:   Review of Systems   Constitutional: Negative.    HENT: Negative.    Eyes: Negative.    Respiratory: Negative.    Cardiovascular: Negative.    Gastrointestinal: Negative.    Genitourinary: Negative.    Musculoskeletal: As per HPI  Skin: Negative.    Neurological: As per HPI  Endo/Heme/Allergies: Negative.    Psychiatric/Behavioral: Negative.      All other systems reviewed and are negative. Pertinent positives and negatives noted in the HPI.        PHYSICAL EXAM:   Ht 62\"   Wt 268 lb (121.6 kg)   BMI 49.02 kg/m²     Body mass index is 49.02 kg/m².      General: No immediate distress  Head: Normocephalic/ Atraumatic  Eyes: Extra-occular movements intact.   Ears: No auricular hematoma or deformities  Mouth: No lesions or ulcerations  Heart: peripheral pulses intact. Normal capillary refill.   Lungs: Non-labored respirations  Abdomen: No abdominal guarding  Extremities: No lower extremity edema bilaterally   Skin: No lesions noted.   Cognition: alert & oriented x 3, attentive, able to follow 2 step commands, comprehension intact, spontaneous speech intact  Motor:    Musculoskeletal:        Data  [facility] Lab Encounter on 12/27/2024   Component Date Value Ref Range  Status    HgbA1C 12/27/2024 6.2 (H)  <5.7 % Final    Estimated Average Glucose 12/27/2024 131 (H)  68 - 126 mg/dL Final    TSH 12/27/2024 0.436 (L)  0.550 - 4.780 uIU/mL Final    Free T4 12/27/2024 1.4  0.8 - 1.7 ng/dL Final    T3 Free 12/27/2024 3.71  2.40 - 4.20 pg/mL Final   Admission on 11/27/2024, Discharged on 11/27/2024   Component Date Value Ref Range Status    Rapid SARS-CoV-2 by PCR 11/27/2024 Not Detected  Not Detected Final    POCT Rapid Strep 11/27/2024 Negative  Negative Final    Strep Culture 11/27/2024 No Beta Hemolytic Strep Isolated   Final   Admission on 09/25/2024, Discharged on 09/27/2024   Component Date Value Ref Range Status    WBC 09/25/2024 13.6 (H)  4.0 - 11.0 x10(3) uL Final    RBC 09/25/2024 5.18  3.80 - 5.30 x10(6)uL Final    HGB 09/25/2024 14.9  12.0 - 16.0 g/dL Final    HCT 09/25/2024 44.4  35.0 - 48.0 % Final    MCV 09/25/2024 85.7  80.0 - 100.0 fL Final    MCH 09/25/2024 28.8  26.0 - 34.0 pg Final    MCHC 09/25/2024 33.6  31.0 - 37.0 g/dL Final    RDW-SD 09/25/2024 39.7  35.1 - 46.3 fL Final    RDW 09/25/2024 12.8  11.0 - 15.0 % Final    PLT 09/25/2024 359.0  150.0 - 450.0 10(3)uL Final    Neutrophil Absolute Prelim 09/25/2024 11.62 (H)  1.50 - 7.70 x10 (3) uL Final    Neutrophil Absolute 09/25/2024 11.62 (H)  1.50 - 7.70 x10(3) uL Final    Lymphocyte Absolute 09/25/2024 1.25  1.00 - 4.00 x10(3) uL Final    Monocyte Absolute 09/25/2024 0.62  0.10 - 1.00 x10(3) uL Final    Eosinophil Absolute 09/25/2024 0.01  0.00 - 0.70 x10(3) uL Final    Basophil Absolute 09/25/2024 0.03  0.00 - 0.20 x10(3) uL Final    Immature Granulocyte Absolute 09/25/2024 0.09  0.00 - 1.00 x10(3) uL Final    Neutrophil % 09/25/2024 85.2  % Final    Lymphocyte % 09/25/2024 9.2  % Final    Monocyte % 09/25/2024 4.6  % Final    Eosinophil % 09/25/2024 0.1  % Final    Basophil % 09/25/2024 0.2  % Final    Immature Granulocyte % 09/25/2024 0.7  % Final    Glucose 09/25/2024 142 (H)  70 - 99 mg/dL Final    Sodium  09/25/2024 135 (L)  136 - 145 mmol/L Final    Potassium 09/25/2024 4.5  3.5 - 5.1 mmol/L Final    Chloride 09/25/2024 107  98 - 112 mmol/L Final    CO2 09/25/2024 24.0  21.0 - 32.0 mmol/L Final    Anion Gap 09/25/2024 4  0 - 18 mmol/L Final    BUN 09/25/2024 18  9 - 23 mg/dL Final    Creatinine 09/25/2024 0.84  0.55 - 1.02 mg/dL Final    BUN/CREA Ratio 09/25/2024 21.4 (H)  10.0 - 20.0 Final    Calcium, Total 09/25/2024 9.2  8.7 - 10.4 mg/dL Final    Calculated Osmolality 09/25/2024 284  275 - 295 mOsm/kg Final    eGFR-Cr 09/25/2024 80  >=60 mL/min/1.73m2 Final    AST 09/25/2024 18  <34 U/L Final    ALT 09/25/2024 26  10 - 49 U/L Final    Alkaline Phosphatase 09/25/2024 133 (H)  46 - 118 U/L Final    Bilirubin, Total 09/25/2024 0.5  0.3 - 1.2 mg/dL Final    Bilirubin, Direct 09/25/2024 0.2  <=0.3 mg/dL Final    Total Protein 09/25/2024 6.9  5.7 - 8.2 g/dL Final    Albumin 09/25/2024 4.2  3.2 - 4.8 g/dL Final    Lipase 09/25/2024 33  12 - 53 U/L Final    Urine Color 09/25/2024 Colorless (A)  Yellow Final    Clarity Urine 09/25/2024 Clear  Clear Final    Spec Gravity 09/25/2024 1.008  1.005 - 1.030 Final    Glucose Urine 09/25/2024 Normal  Normal mg/dL Final    Bilirubin Urine 09/25/2024 Negative  Negative Final    Ketones Urine 09/25/2024 Negative  Negative mg/dL Final    Blood Urine 09/25/2024 Negative  Negative Final    pH Urine 09/25/2024 7.0  5.0 - 8.0 Final    Protein Urine 09/25/2024 Negative  Negative mg/dL Final    Urobilinogen Urine 09/25/2024 Normal  Normal Final    Nitrite Urine 09/25/2024 Negative  Negative Final    Leukocyte Esterase Urine 09/25/2024 Negative  Negative Final    Microscopic 09/25/2024 Microscopic not indicated   Final    WBC 09/26/2024 9.0  4.0 - 11.0 x10(3) uL Final    RBC 09/26/2024 4.55  3.80 - 5.30 x10(6)uL Final    HGB 09/26/2024 13.0  12.0 - 16.0 g/dL Final    HCT 09/26/2024 39.5  35.0 - 48.0 % Final    MCV 09/26/2024 86.8  80.0 - 100.0 fL Final    MCH 09/26/2024 28.6  26.0 - 34.0 pg  Final    MCHC 09/26/2024 32.9  31.0 - 37.0 g/dL Final    RDW-SD 09/26/2024 41.2  35.1 - 46.3 fL Final    RDW 09/26/2024 13.1  11.0 - 15.0 % Final    PLT 09/26/2024 287.0  150.0 - 450.0 10(3)uL Final    Neutrophil Absolute Prelim 09/26/2024 3.80  1.50 - 7.70 x10 (3) uL Final    Neutrophil Absolute 09/26/2024 3.80  1.50 - 7.70 x10(3) uL Final    Lymphocyte Absolute 09/26/2024 4.08 (H)  1.00 - 4.00 x10(3) uL Final    Monocyte Absolute 09/26/2024 0.83  0.10 - 1.00 x10(3) uL Final    Eosinophil Absolute 09/26/2024 0.18  0.00 - 0.70 x10(3) uL Final    Basophil Absolute 09/26/2024 0.04  0.00 - 0.20 x10(3) uL Final    Immature Granulocyte Absolute 09/26/2024 0.08  0.00 - 1.00 x10(3) uL Final    Neutrophil % 09/26/2024 42.2  % Final    Lymphocyte % 09/26/2024 45.3  % Final    Monocyte % 09/26/2024 9.2  % Final    Eosinophil % 09/26/2024 2.0  % Final    Basophil % 09/26/2024 0.4  % Final    Immature Granulocyte % 09/26/2024 0.9  % Final    Glucose 09/26/2024 88  70 - 99 mg/dL Final    Sodium 09/26/2024 140  136 - 145 mmol/L Final    Potassium 09/26/2024 3.9  3.5 - 5.1 mmol/L Final    Chloride 09/26/2024 109  98 - 112 mmol/L Final    CO2 09/26/2024 25.0  21.0 - 32.0 mmol/L Final    Anion Gap 09/26/2024 6  0 - 18 mmol/L Final    BUN 09/26/2024 17  9 - 23 mg/dL Final    Creatinine 09/26/2024 0.72  0.55 - 1.02 mg/dL Final    BUN/CREA Ratio 09/26/2024 23.6 (H)  10.0 - 20.0 Final    Calcium, Total 09/26/2024 8.7  8.7 - 10.4 mg/dL Final    Calculated Osmolality 09/26/2024 291  275 - 295 mOsm/kg Final    eGFR-Cr 09/26/2024 97  >=60 mL/min/1.73m2 Final    ALT 09/26/2024 18  10 - 49 U/L Final    AST 09/26/2024 9  <34 U/L Final    Alkaline Phosphatase 09/26/2024 104  46 - 118 U/L Final    Bilirubin, Total 09/26/2024 0.5  0.3 - 1.2 mg/dL Final    Total Protein 09/26/2024 5.8  5.7 - 8.2 g/dL Final    Albumin 09/26/2024 3.6  3.2 - 4.8 g/dL Final    Globulin  09/26/2024 2.2  2.0 - 3.5 g/dL Final    A/G Ratio 09/26/2024 1.6  1.0 - 2.0 Final    [facility] Lab Encounter on 09/24/2024   Component Date Value Ref Range Status    D-Dimer 09/24/2024 0.48  <0.58 ug/mL FEU Final   ]      Radiology Imaging:  I reviewed with the patient her MRI of the lumbar spine from 12/21/2024  MRI SPINE LUMBAR (CPT=72148)  Narrative: PROCEDURE: MRI SPINE LUMBAR (CPT=72148)     COMPARISON: [facility], CT ABDOMEN+PELVIS KIDNEYSTONE 2D RNDR(NO IV,NO ORAL)(CPT=74176), 3/03/2024, 0:09 AM.  [facility], MRI SPINE LUMBAR (CPT=72148), 2/11/2017, 1:06 PM.     INDICATIONS: M51.34 Bulge of thoracic disc without myelopathy G89.29 Chronic midline thoracic back pain M54.6 Chronic midline thoracic back pain S76.019A Strain of gluteus *     TECHNIQUE: A variety of imaging planes and parameters were utilized for visualization of suspected pathology.     FINDINGS:   PARASPINAL AREA: Normal with no visible mass.    BONES: A partial transitional L5 vertebral body with the right side sacralized.  There are bilateral L4 pars defects.  Vertebral bodies are intact.  No suspicious bone lesion or acute fracture.  CORD/CAUDA EQUINA: Normal caliber, contour, and signal intensity.    OTHER: Incidental simple left upper pole renal cyst.     LUMBAR DISC LEVELS:  L1-L2: No significant disc/facet abnormality, spinal stenosis, or foraminal stenosis.    L2-L3: No significant disc/facet abnormality, spinal stenosis, or foraminal stenosis.    L3-L4: Minimal spondylotic disc bulge accentuated laterally.  Mild facet arthrosis and ligamentum flavum hypertrophy.  No central narrowing.  Minimal caudal foraminal narrowing..  L4-L5: Decrease in disc height with a spondylotic disc bulge.  A small central superior subligamentous disc extrusion which is exaggerated by a grade 1 spondylolisthesis.  Bilateral L4 pars defects.  Moderate right and mild left foraminal narrowing.  L5-S1: Partial transitional L5 vertebral body with sacralized right-side is associated with a narrowed right extraforaminal  zone related to the sacralization on the right.  No other stenosis.              Impression: CONCLUSION:   1. L4-5:  Moderate to advanced disc degeneration/spondylosis with a small superior subligamentous disc extrusion exaggerated by a grade 1 spondylolisthesis.  Bilateral L4 pars defects.  Moderate right and mild left foraminal narrowing.           Dictated by (CST): Gasper Roy MD on 12/21/2024 at 0:57 AM       Finalized by (CST): Gasper Roy MD on 12/21/2024 at 1:27 AM              ASSESSMENT AND PLAN:  The patient is a pleasant 59-year-old female who presents for follow-up of her right-sided low back and radiation down the right leg.  I believe she has a lumbar radiculopathy.  I reviewed her MRI of the lumbar spine if she does have a spondylolisthesis at L4-L5 with a disc bulge at that same level leading to compression of the L5 and S1 nerve roots.  At L5-S1, she does have transitional lumbosacral anatomy with sacralization of the L5 transverse process/level.  I am recommending a right L5 and right S1 transforaminal epidural steroid injection given her symptom pattern.  She will follow-up with me 2 weeks after the procedure.  She should continue Naprosyn as needed for pain and gabapentin 200 mg at nighttime.       RTC in 2 weeks after procedure  Discharge Instructions were provided as documented in AVS summary.  The patient was in agreement with the assessment and plan.  All questions were answered.  There were no barriers to learning.         1. 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 Facet syndrome, lumbar    3. Mechanical low back pain    4. Lumbar spondylosis    5. Degeneration of intervertebral disc of lumbosacral region with discogenic back pain and lower extremity pain    6. Lumbar foraminal stenosis    7. Lumbar radiculopathy    8. Bulge of lumbar disc without myelopathy    9. Myalgia    10. Strain of  gluteus medius, unspecified laterality, initial encounter    11. Spondylolisthesis of lumbar region        Alex B. Behar MD  Physical Medicine and Rehabilitation/Sports Medicine  [address] Cures Act Notice to Patient: Medical documents like this are made available to patients in the interest of transparency. However, be advised this is a medical document and it is intended as [id number] communication between your medical providers. This medical document may contain abbreviations, assessments, medical data, and results or other terms that are unfamiliar. Medical documents are intended to carry relevant information, facts as evident, and the clinical opinion of the practitioner. As such, this medical document may be written in language that appears blunt or direct. You are encouraged to contact your medical provider and/or [facility] Patient Experience if you have any questions about this medical document.        [1]   Allergies  Allergen Reactions    Augmentin [Amoxicillin-Pot Clavulanate] NAUSEA AND VOMITING    Coconut Oil NAUSEA AND VOMITING    Cortisone DIZZINESS and OTHER (SEE COMMENTS)     Blood drop, dizziness, flushed    Adhesive Tape RASH     Needs paper tape

## 2025-02-11 NOTE — TELEPHONE ENCOUNTER
Initiated authorization for RIGHT L5 and RIGHT S1 TFESI. CPT/HCPCS 95902, 30057 dx:M43.16 to be done at [facility] with Availity portal.    Status: Approved - no action required  Reference/Authorization # [id number]  Valid: 2/11/25-5/11/25  Authorization is not required based on medical necessity, however, is not a guarantee of payment and may be subject to review once claim is submitted.

## 2025-02-11 NOTE — PATIENT INSTRUCTIONS
1) My office will call you to schedule the RiGHT L5 and RiGHT S1 TFESI under local anesthesia once the procedure is approved by your insurance carrier.    2) Follow up with me 2 weeks after the procedure. This can be in the office or virtually.   3) Continue gabapentin 200 mg at night   4) Use Naprosyn as needed

## 2025-02-12 NOTE — TELEPHONE ENCOUNTER
Patient has been scheduled for Right L5 and S1 transforaminal epidural steroid injection on 2/26/25 at the [facility] with Dr. Behar.   Anesthesia type:  Local  Please note: The [facility] will call the business day prior to discuss the exact time/arrival and additional instructions for your appointment.  Patient was advised that if he/she does receive the covid vaccine it needs to be at least 2 weeks before or after the injection.  Medications and allergies reviewed.  Educated to hold NSAIDS (Aleve, Ibuprofen, Motrin, Advil) and anti-inflammatories (Meloxicam, Naproxen, Diclofenac, Celebrex) and for cervical injections must hold Multi-Vitamins, Vitamin E, Fish Oil/Omega-3.  Patient informed of [facility]'s  policy:  The patient will require transportation arrangements to and from the procedure, with the  present on site for the entire visit.  Without a , the appointment is subject to cancellation.    [facility] is located in the [facility] 1st floor [address] [place] [address].   may park in the yellow/purple parking lot.  Patient verbalized understanding and agrees with plan.  Scheduled in Epic: Yes  Scheduled in Surgical Case: Yes  Follow up appointment made: NOV: 3/11/2025 Behar, Alex, MD

## 2025-02-26 PROBLEM — M51.369 BULGE OF LUMBAR DISC WITHOUT MYELOPATHY: Status: ACTIVE | Noted: 2025-02-26

## 2025-02-26 PROBLEM — M54.16 LUMBAR RADICULOPATHY: Status: ACTIVE | Noted: 2025-02-26

## 2025-02-26 PROBLEM — M48.061 LUMBAR FORAMINAL STENOSIS: Status: ACTIVE | Noted: 2025-02-26

## 2025-03-11 ENCOUNTER — TELEMEDICINE (OUTPATIENT)
Dept: PHYSICAL MEDICINE AND REHAB | Facility: CLINIC | Age: 60
End: 2025-03-11
Payer: COMMERCIAL

## 2025-03-11 ENCOUNTER — TELEPHONE (OUTPATIENT)
Dept: PHYSICAL MEDICINE AND REHAB | Facility: CLINIC | Age: 60
End: 2025-03-11

## 2025-03-11 DIAGNOSIS — M47.816 LUMBAR SPONDYLOSIS: Primary | ICD-10-CM

## 2025-03-11 DIAGNOSIS — M48.061 LUMBAR FORAMINAL STENOSIS: ICD-10-CM

## 2025-03-11 DIAGNOSIS — M47.816 LUMBAR SPONDYLOSIS: ICD-10-CM

## 2025-03-11 DIAGNOSIS — M54.16 LUMBAR RADICULOPATHY: Primary | ICD-10-CM

## 2025-03-11 DIAGNOSIS — E88.82 CLASS 3 OBESITY DUE TO DISRUPTION OF MC4R PATHWAY WITH SERIOUS COMORBIDITY AND BODY MASS INDEX (BMI) OF 45.0 TO 49.9 IN ADULT (HCC): ICD-10-CM

## 2025-03-11 DIAGNOSIS — M43.16 SPONDYLOLISTHESIS OF LUMBAR REGION: ICD-10-CM

## 2025-03-11 DIAGNOSIS — M54.59 MECHANICAL LOW BACK PAIN: ICD-10-CM

## 2025-03-11 DIAGNOSIS — Z15.2 CLASS 3 OBESITY DUE TO DISRUPTION OF MC4R PATHWAY WITH SERIOUS COMORBIDITY AND BODY MASS INDEX (BMI) OF 45.0 TO 49.9 IN ADULT (HCC): ICD-10-CM

## 2025-03-11 DIAGNOSIS — M54.16 LUMBAR RADICULOPATHY: ICD-10-CM

## 2025-03-11 DIAGNOSIS — M51.369 BULGE OF LUMBAR DISC WITHOUT MYELOPATHY: ICD-10-CM

## 2025-03-11 DIAGNOSIS — S76.019A STRAIN OF GLUTEUS MEDIUS, UNSPECIFIED LATERALITY, INITIAL ENCOUNTER: ICD-10-CM

## 2025-03-11 DIAGNOSIS — M51.372 DEGENERATION OF INTERVERTEBRAL DISC OF LUMBOSACRAL REGION WITH DISCOGENIC BACK PAIN AND LOWER EXTREMITY PAIN: ICD-10-CM

## 2025-03-11 DIAGNOSIS — E66.813 CLASS 3 OBESITY DUE TO DISRUPTION OF MC4R PATHWAY WITH SERIOUS COMORBIDITY AND BODY MASS INDEX (BMI) OF 45.0 TO 49.9 IN ADULT (HCC): ICD-10-CM

## 2025-03-11 DIAGNOSIS — M47.816 FACET SYNDROME, LUMBAR: ICD-10-CM

## 2025-03-11 DIAGNOSIS — M79.10 MYALGIA: ICD-10-CM

## 2025-03-11 PROCEDURE — 98006 SYNCH AUDIO-VIDEO EST MOD 30: CPT | Performed by: PHYSICAL MEDICINE & REHABILITATION

## 2025-03-11 NOTE — TELEPHONE ENCOUNTER
Initiated authorization for BILATERAL L4-L5 and L5-S1 MBB. CPT/HCPCS 64297-77, 80171 x's 2 dx:M54.16/M43.16 to be done at [facility] with Availity portal.    Status: No action required  Reference/Authorization # [id number]  Valid: 3/11/25-6/11/25  Authorization is not required based on medical necessity, however, is not a guarantee of payment and may be subject to review once claim is submitted.            Yes

## 2025-03-11 NOTE — TELEPHONE ENCOUNTER
Per Spinal Intervention Reference \"patient does not have to hold ASA or blood thinners when performing lumbar facet or medial branch blocks\"   Patient has been scheduled for Bilateral L4-L5 and L5-S1 Medial branch blocks on 4/2/2025 at the [facility] with Dr. Behar.   Anesthesia type:  Local  Please note: The [facility] will call the business day prior to discuss the exact time/arrival and additional instructions for your appointment.  Patient was advised that if he/she does receive the covid vaccine it needs to be at least 2 weeks before or after the injection.  Medications and allergies reviewed.  Patient informed of [facility]'s  policy:  The patient will require transportation arrangements to and from the procedure, with the  present on site for the entire visit.  Without a , the appointment is subject to cancellation.    [facility] is located in the [facility] 1st floor [address] [place] [address].   may park in the yellow/purple parking lot.  Patient verbalized understanding and agrees with plan.  Scheduled in Epic: Yes  Scheduled in Surgical Case: Yes  Follow up appointment made: NOV: Patient was notified that they will call/msg 24hrs post procedure for a condition update via [employer] or phone call.

## 2025-03-11 NOTE — PATIENT INSTRUCTIONS
1) My office will call you to schedule the BILATERAL L4-L5 and L5-S1 MBB under local anesthesia once the procedure is approved by your insurance carrier.    2) Tylenol 500-1000 mg every 6-8 hours as needed for pain.  No more than 3000 mg daily.  3) Depending on the how you do with the injection will be if we decide to repeat the MBB vs repeat epidural steroid injection      To ensure the accuracy of condition updates after your procedure, Dr. Behar would like for you to keep a written record of your pain for up to 12 hours after your injection. When you provide office staff with your condition update post injection - please refer back to this document for ease of communication.     Pain level prior to procedure: 0  1   2   3   4   5   6   7   8   9   10  (No Pain) 0  to  10 (Worst Pain); Please [place] corresponding number above.       Pain level immediately following procedure: 0  1   2   3   4   5   6   7   8   9   10  (No Pain) 0  to  10 (Worst Pain); Please [place] corresponding number above.           Please keep a close record of your pain for the next 12 hours to refer back to when speaking with office staff.      Percentage (%) of Relief:   0% - 100%  (0% = No Relief; 100% = Total Relief)   2 Hours After Procedure:     4 Hours After Procedure:     6 Hours After Procedure:     8 Hours After Procedure:     12 Hours After Procedure:

## 2025-03-11 NOTE — PROGRESS NOTES
[facility]  Video Visit Progress Note  CHIEF COMPLAINT:    Chief Complaint   Patient presents with    Low Back Pain    Injection    Imaging       History of Present Illness:  The patient is a 59 year old RIGHT handed female with significant past medical history of thyroid disease, GERD, morbid obesity who presents with thoracic back pain radiating to the chest wall as well as low back pain with radiation down the right leg to the back of the knee and hamstring region as well as into the right posterior lateral gastroc.  She is status post right L5 and right S1 transforaminal epidural steroid injection on 2/26/2025 with 90% improved in the leg and 50% in the low back. She is having more low back pain. She is now able to walk more but she is having significant difficulty sitting.     PAST MEDICAL HISTORY:  Past Medical History:    Back problem    Calculus of kidney    Disorder of thyroid    Diverticulosis of large intestine    Esophageal reflux    Hx of motion sickness    Morbid obesity with BMI of 50.0-59.9, adult (HCC)    Pneumonia due to organism    PONV (postoperative nausea and vomiting)    Pre-diabetes    Seasonal allergies    Thyroid disease    Vitamin D deficiency       SURGICAL HISTORY:  Past Surgical History:   Procedure Laterality Date    Colonoscopy  11/30/2021    Hernia surgery      Hysterectomy  12/21/2012    Other surgical history Left 03/12/2024    Cystoscopy left ureteroscopy, laser lithotripsy, retrograde pyelogram, stent placement    Removal gallbladder      Repair ing hernia,5+y/o,reducibl         SOCIAL HISTORY:   Social History     Occupational History    Not on file   Tobacco Use    Smoking status: Never     Passive exposure: Never    Smokeless tobacco: Never   Vaping Use    Vaping status: Never Used   Substance and Sexual Activity    Alcohol use: Not Currently    Drug use: Never    Sexual activity: Not on file       FAMILY HISTORY:   Family  History   Problem Relation Age of Onset    Ovarian Cancer Mother     Breast Cancer Sister 27       CURRENT MEDICATIONS:   Current Outpatient Medications   Medication Sig Dispense Refill    azelastine 137 MCG/SPRAY Nasal Solution 1-2 sprays by Nasal route in the morning and 1-2 sprays before bedtime. FOR SINUS SYMPTOMS/NASAL CONGESTION.. 30 mL 11    fluticasone propionate 50 MCG/ACT Nasal Suspension 2 sprays by Each Nare route daily. FOR NASAL CONGESTION/SINUS SYMPTOMS. 1 each 11    Benzocaine-Menthol 6-10 MG Mouth/Throat Lozenge Take 1 lozenge by mouth every 2 (two) hours as needed for Pain. (Patient not taking: Reported on 2/26/2025) 20 lozenge 0    methylPREDNISolone 4 MG Oral Tablet Therapy Pack Take as directed with food. 1 each 0    levothyroxine 150 MCG Oral Tab Take 1 tablet (150 mcg total) by mouth daily. 90 tablet 3    semaglutide 8 MG/3ML Subcutaneous Solution Pen-injector Inject 2 mg into the skin once a week. (Patient not taking: Reported on 12/27/2024) 3 mL 12    naproxen (NAPROSYN) 500 MG Oral Tab Take 1 tablet (500 mg total) by mouth 2 (two) times daily with meals. Take for 2 weeks as directed and then as needed. 60 tablet 3    baclofen 10 MG Oral Tab Take 1 tablet (10 mg total) by mouth nightly as needed.      gabapentin 100 MG Oral Cap Take 1 capsule (100 mg total) by mouth 3 (three) times daily.      rosuvastatin 10 MG Oral Tab Take 1 tablet (10 mg total) by mouth nightly. FOR CHOLESTEROL. 90 tablet 9    omeprazole 20 MG Oral Capsule Delayed Release Take 1 capsule (20 mg total) by mouth every morning before breakfast. FOR ACID REFLUX. 90 capsule 3    fluticasone-salmeterol 115-21 MCG/ACT Inhalation Aerosol Inhale 2 puffs into the lungs 2 (two) times daily. FOR ASTHMA 1 each 9    albuterol 108 (90 Base) MCG/ACT Inhalation Aero Soln Inhale 2-4 puffs into the lungs every 4 to 6 hours as needed for Wheezing or Shortness of Breath (cough, asthma, chest tightness). FOR ASTHMA. Pharmacist, please switch  to formulary alternative per insurance coverage, ok to replace with proair, ventolin, proventil, or any other albuterol inhaler. -drkp 3 each 9    cholecalciferol (VITAMIN D3) 125 MCG (5000 UT) Oral Cap Take 1 capsule (5,000 Units total) by mouth daily.         ALLERGIES:   Allergies[1]    REVIEW OF SYSTEMS:   Review of Systems   Constitutional: Negative.    HENT: Negative.    Eyes: Negative.    Respiratory: Negative.    Cardiovascular: Negative.    Gastrointestinal: Negative.    Genitourinary: Negative.    Musculoskeletal: As per HPI  Skin: Negative.    Neurological: As per HPI  Endo/Heme/Allergies: Negative.    Psychiatric/Behavioral: Negative.      All other systems reviewed and are negative. Pertinent positives and negatives noted in the HPI.        PHYSICAL EXAM:     There is no height or weight on file to calculate BMI.    General: No immediate distress  Head: Normocephalic/ Atraumatic  Eyes: Extra-occular movements intact  Ears/Nose/Throat:  External appearance identifies normal appearance without obvious deformity  Cardiovascular: No cyanosis, clubbing or edema  Respiratory: Non-labored respirations  Skin: No lesions noted   Neurological: alert & oriented x 3, attentive, able to follow commands, comprehention intact, spontaneous speech intact  Psychiatric: Mood and affect appropriate        Data  Hospital Outpatient Visit on 02/26/2025   Component Date Value Ref Range Status    POC GLUCOSE 02/26/2025 116   Final   [facility] Lab Encounter on 12/27/2024   Component Date Value Ref Range Status    HgbA1C 12/27/2024 6.2 (H)  <5.7 % Final    Estimated Average Glucose 12/27/2024 131 (H)  68 - 126 mg/dL Final    TSH 12/27/2024 0.436 (L)  0.550 - 4.780 uIU/mL Final    Free T4 12/27/2024 1.4  0.8 - 1.7 ng/dL Final    T3 Free 12/27/2024 3.71  2.40 - 4.20 pg/mL Final   Admission on 11/27/2024, Discharged on 11/27/2024   Component Date Value Ref Range Status    Rapid SARS-CoV-2 by PCR 11/27/2024 Not Detected  Not Detected  Final    POCT Rapid Strep 11/27/2024 Negative  Negative Final    Strep Culture 11/27/2024 No Beta Hemolytic Strep Isolated   Final   Admission on 09/25/2024, Discharged on 09/27/2024   Component Date Value Ref Range Status    WBC 09/25/2024 13.6 (H)  4.0 - 11.0 x10(3) uL Final    RBC 09/25/2024 5.18  3.80 - 5.30 x10(6)uL Final    HGB 09/25/2024 14.9  12.0 - 16.0 g/dL Final    HCT 09/25/2024 44.4  35.0 - 48.0 % Final    MCV 09/25/2024 85.7  80.0 - 100.0 fL Final    MCH 09/25/2024 28.8  26.0 - 34.0 pg Final    MCHC 09/25/2024 33.6  31.0 - 37.0 g/dL Final    RDW-SD 09/25/2024 39.7  35.1 - 46.3 fL Final    RDW 09/25/2024 12.8  11.0 - 15.0 % Final    PLT 09/25/2024 359.0  150.0 - 450.0 10(3)uL Final    Neutrophil Absolute Prelim 09/25/2024 11.62 (H)  1.50 - 7.70 x10 (3) uL Final    Neutrophil Absolute 09/25/2024 11.62 (H)  1.50 - 7.70 x10(3) uL Final    Lymphocyte Absolute 09/25/2024 1.25  1.00 - 4.00 x10(3) uL Final    Monocyte Absolute 09/25/2024 0.62  0.10 - 1.00 x10(3) uL Final    Eosinophil Absolute 09/25/2024 0.01  0.00 - 0.70 x10(3) uL Final    Basophil Absolute 09/25/2024 0.03  0.00 - 0.20 x10(3) uL Final    Immature Granulocyte Absolute 09/25/2024 0.09  0.00 - 1.00 x10(3) uL Final    Neutrophil % 09/25/2024 85.2  % Final    Lymphocyte % 09/25/2024 9.2  % Final    Monocyte % 09/25/2024 4.6  % Final    Eosinophil % 09/25/2024 0.1  % Final    Basophil % 09/25/2024 0.2  % Final    Immature Granulocyte % 09/25/2024 0.7  % Final    Glucose 09/25/2024 142 (H)  70 - 99 mg/dL Final    Sodium 09/25/2024 135 (L)  136 - 145 mmol/L Final    Potassium 09/25/2024 4.5  3.5 - 5.1 mmol/L Final    Chloride 09/25/2024 107  98 - 112 mmol/L Final    CO2 09/25/2024 24.0  21.0 - 32.0 mmol/L Final    Anion Gap 09/25/2024 4  0 - 18 mmol/L Final    BUN 09/25/2024 18  9 - 23 mg/dL Final    Creatinine 09/25/2024 0.84  0.55 - 1.02 mg/dL Final    BUN/CREA Ratio 09/25/2024 21.4 (H)  10.0 - 20.0 Final    Calcium, Total 09/25/2024 9.2  8.7 -  10.4 mg/dL Final    Calculated Osmolality 09/25/2024 284  275 - 295 mOsm/kg Final    eGFR-Cr 09/25/2024 80  >=60 mL/min/1.73m2 Final    AST 09/25/2024 18  <34 U/L Final    ALT 09/25/2024 26  10 - 49 U/L Final    Alkaline Phosphatase 09/25/2024 133 (H)  46 - 118 U/L Final    Bilirubin, Total 09/25/2024 0.5  0.3 - 1.2 mg/dL Final    Bilirubin, Direct 09/25/2024 0.2  <=0.3 mg/dL Final    Total Protein 09/25/2024 6.9  5.7 - 8.2 g/dL Final    Albumin 09/25/2024 4.2  3.2 - 4.8 g/dL Final    Lipase 09/25/2024 33  12 - 53 U/L Final    Urine Color 09/25/2024 Colorless (A)  Yellow Final    Clarity Urine 09/25/2024 Clear  Clear Final    Spec Gravity 09/25/2024 1.008  1.005 - 1.030 Final    Glucose Urine 09/25/2024 Normal  Normal mg/dL Final    Bilirubin Urine 09/25/2024 Negative  Negative Final    Ketones Urine 09/25/2024 Negative  Negative mg/dL Final    Blood Urine 09/25/2024 Negative  Negative Final    pH Urine 09/25/2024 7.0  5.0 - 8.0 Final    Protein Urine 09/25/2024 Negative  Negative mg/dL Final    Urobilinogen Urine 09/25/2024 Normal  Normal Final    Nitrite Urine 09/25/2024 Negative  Negative Final    Leukocyte Esterase Urine 09/25/2024 Negative  Negative Final    Microscopic 09/25/2024 Microscopic not indicated   Final    WBC 09/26/2024 9.0  4.0 - 11.0 x10(3) uL Final    RBC 09/26/2024 4.55  3.80 - 5.30 x10(6)uL Final    HGB 09/26/2024 13.0  12.0 - 16.0 g/dL Final    HCT 09/26/2024 39.5  35.0 - 48.0 % Final    MCV 09/26/2024 86.8  80.0 - 100.0 fL Final    MCH 09/26/2024 28.6  26.0 - 34.0 pg Final    MCHC 09/26/2024 32.9  31.0 - 37.0 g/dL Final    RDW-SD 09/26/2024 41.2  35.1 - 46.3 fL Final    RDW 09/26/2024 13.1  11.0 - 15.0 % Final    PLT 09/26/2024 287.0  150.0 - 450.0 10(3)uL Final    Neutrophil Absolute Prelim 09/26/2024 3.80  1.50 - 7.70 x10 (3) uL Final    Neutrophil Absolute 09/26/2024 3.80  1.50 - 7.70 x10(3) uL Final    Lymphocyte Absolute 09/26/2024 4.08 (H)  1.00 - 4.00 x10(3) uL Final    Monocyte  Absolute 09/26/2024 0.83  0.10 - 1.00 x10(3) uL Final    Eosinophil Absolute 09/26/2024 0.18  0.00 - 0.70 x10(3) uL Final    Basophil Absolute 09/26/2024 0.04  0.00 - 0.20 x10(3) uL Final    Immature Granulocyte Absolute 09/26/2024 0.08  0.00 - 1.00 x10(3) uL Final    Neutrophil % 09/26/2024 42.2  % Final    Lymphocyte % 09/26/2024 45.3  % Final    Monocyte % 09/26/2024 9.2  % Final    Eosinophil % 09/26/2024 2.0  % Final    Basophil % 09/26/2024 0.4  % Final    Immature Granulocyte % 09/26/2024 0.9  % Final    Glucose 09/26/2024 88  70 - 99 mg/dL Final    Sodium 09/26/2024 140  136 - 145 mmol/L Final    Potassium 09/26/2024 3.9  3.5 - 5.1 mmol/L Final    Chloride 09/26/2024 109  98 - 112 mmol/L Final    CO2 09/26/2024 25.0  21.0 - 32.0 mmol/L Final    Anion Gap 09/26/2024 6  0 - 18 mmol/L Final    BUN 09/26/2024 17  9 - 23 mg/dL Final    Creatinine 09/26/2024 0.72  0.55 - 1.02 mg/dL Final    BUN/CREA Ratio 09/26/2024 23.6 (H)  10.0 - 20.0 Final    Calcium, Total 09/26/2024 8.7  8.7 - 10.4 mg/dL Final    Calculated Osmolality 09/26/2024 291  275 - 295 mOsm/kg Final    eGFR-Cr 09/26/2024 97  >=60 mL/min/1.73m2 Final    ALT 09/26/2024 18  10 - 49 U/L Final    AST 09/26/2024 9  <34 U/L Final    Alkaline Phosphatase 09/26/2024 104  46 - 118 U/L Final    Bilirubin, Total 09/26/2024 0.5  0.3 - 1.2 mg/dL Final    Total Protein 09/26/2024 5.8  5.7 - 8.2 g/dL Final    Albumin 09/26/2024 3.6  3.2 - 4.8 g/dL Final    Globulin  09/26/2024 2.2  2.0 - 3.5 g/dL Final    A/G Ratio 09/26/2024 1.6  1.0 - 2.0 Final   [facility] Lab Encounter on 09/24/2024   Component Date Value Ref Range Status    D-Dimer 09/24/2024 0.48  <0.58 ug/mL FEU Final   ]      Radiology Imaging:  I reviewed with the patient her MRI of the lumbar spine   MRI SPINE LUMBAR (CPT=72148)  Narrative: PROCEDURE: MRI SPINE LUMBAR (CPT=72148)     COMPARISON: [facility], CT ABDOMEN+PELVIS KIDNEYSTONE 2D RNDR(NO IV,NO ORAL)(CPT=74176), 3/03/2024, 0:09 AM.   [facility], MRI SPINE LUMBAR (CPT=72148), 2/11/2017, 1:06 PM.     INDICATIONS: M51.34 Bulge of thoracic disc without myelopathy G89.29 Chronic midline thoracic back pain M54.6 Chronic midline thoracic back pain S76.019A Strain of gluteus *     TECHNIQUE: A variety of imaging planes and parameters were utilized for visualization of suspected pathology.     FINDINGS:   PARASPINAL AREA: Normal with no visible mass.    BONES: A partial transitional L5 vertebral body with the right side sacralized.  There are bilateral L4 pars defects.  Vertebral bodies are intact.  No suspicious bone lesion or acute fracture.  CORD/CAUDA EQUINA: Normal caliber, contour, and signal intensity.    OTHER: Incidental simple left upper pole renal cyst.     LUMBAR DISC LEVELS:  L1-L2: No significant disc/facet abnormality, spinal stenosis, or foraminal stenosis.    L2-L3: No significant disc/facet abnormality, spinal stenosis, or foraminal stenosis.    L3-L4: Minimal spondylotic disc bulge accentuated laterally.  Mild facet arthrosis and ligamentum flavum hypertrophy.  No central narrowing.  Minimal caudal foraminal narrowing..  L4-L5: Decrease in disc height with a spondylotic disc bulge.  A small central superior subligamentous disc extrusion which is exaggerated by a grade 1 spondylolisthesis.  Bilateral L4 pars defects.  Moderate right and mild left foraminal narrowing.  L5-S1: Partial transitional L5 vertebral body with sacralized right-side is associated with a narrowed right extraforaminal zone related to the sacralization on the right.  No other stenosis.              Impression: CONCLUSION:   1. L4-5:  Moderate to advanced disc degeneration/spondylosis with a small superior subligamentous disc extrusion exaggerated by a grade 1 spondylolisthesis.  Bilateral L4 pars defects.  Moderate right and mild left foraminal narrowing.           Dictated by (CST): Gasper Roy MD on 12/21/2024 at 0:57 AM       Finalized by  (CST): Gasper Roy MD on 12/21/2024 at 1:27 AM              ASSESSMENT AND PLAN:  The patient is a pleasant 59-year-old female presents for follow-up of her right-sided low back pain with radiation down the right leg status post right L5 and right S1 transforaminal epidural steroid injections December 2025.  She improved tremendously as a pertains to her right leg pain but continues to have low back pain.  Her MRI does demonstrate a spondylolisthesis at L4-L5 with instability at L4-L5.  I am recommending bilateral L4-L5 and L5-S1 medial branch blocks under local anesthesia as her pain is bilateral.  She should use Tylenol for pain.  Depending on how she does with the medial branch blocks will be if we decide to repeat the medial branch blocks and followed up by radiofrequency ablation or if we decide to repeat the L5 and S1 transforaminal epidural steroid injection.  She will follow-up with me about 2 days after the medial branch blocks and let us know.       RTC in 2 days after medial branch blocks  Discharge Instructions were provided as documented in AVS summary.  The patient was in agreement with the assessment and plan.  All questions were answered.  There were no barriers to learning.         1. Lumbar radiculopathy    2. 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 Facet syndrome, lumbar    4. Mechanical low back pain    5. Lumbar spondylosis    6. Degeneration of intervertebral disc of lumbosacral region with discogenic back pain and lower extremity pain    7. Lumbar foraminal stenosis    8. Bulge of lumbar disc without myelopathy    9. Myalgia    10. Strain of gluteus medius, unspecified laterality, initial encounter    11. Spondylolisthesis of lumbar region        Alex B. Behar MD  Physical Medicine and Rehabilitation/Sports Medicine  [facility]    [employer] Cures Act Notice to Patient:  Medical documents like this are made available to patients in the interest of transparency. However, be advised this is a medical document and it is intended as [id number] communication between your medical providers. This medical document may contain abbreviations, assessments, medical data, and results or other terms that are unfamiliar. Medical documents are intended to carry relevant information, facts as evident, and the clinical opinion of the practitioner. As such, this medical document may be written in language that appears blunt or direct. You are encouraged to contact your medical provider and/or [facility] Patient Experience if you have any questions about this medical document.          [1]   Allergies  Allergen Reactions    Augmentin [Amoxicillin-Pot Clavulanate] NAUSEA AND VOMITING    Coconut Oil NAUSEA AND VOMITING    Cortisone DIZZINESS and OTHER (SEE COMMENTS)     Blood drop, dizziness, flushed    Adhesive Tape RASH     Needs paper tape

## 2025-03-31 ENCOUNTER — OFFICE VISIT (OUTPATIENT)
Dept: INTERNAL MEDICINE CLINIC | Facility: CLINIC | Age: 60
End: 2025-03-31

## 2025-03-31 VITALS — SYSTOLIC BLOOD PRESSURE: 120 MMHG | HEART RATE: 77 BPM | DIASTOLIC BLOOD PRESSURE: 73 MMHG

## 2025-03-31 DIAGNOSIS — E11.9 TYPE 2 DIABETES MELLITUS WITHOUT COMPLICATION, WITHOUT LONG-TERM CURRENT USE OF INSULIN (HCC): Primary | ICD-10-CM

## 2025-03-31 DIAGNOSIS — E66.01 MORBID OBESITY WITH BMI OF 50.0-59.9, ADULT (HCC): ICD-10-CM

## 2025-03-31 DIAGNOSIS — J32.9 RECURRENT SINUSITIS: ICD-10-CM

## 2025-03-31 LAB
CARTRIDGE EXPIRATION DATE: ABNORMAL DATE
HEMOGLOBIN A1C: 6.9 % (ref 4.3–5.6)

## 2025-03-31 PROCEDURE — 99214 OFFICE O/P EST MOD 30 MIN: CPT | Performed by: STUDENT IN AN ORGANIZED HEALTH CARE EDUCATION/TRAINING PROGRAM

## 2025-03-31 PROCEDURE — 3074F SYST BP LT 130 MM HG: CPT | Performed by: STUDENT IN AN ORGANIZED HEALTH CARE EDUCATION/TRAINING PROGRAM

## 2025-03-31 PROCEDURE — 3078F DIAST BP <80 MM HG: CPT | Performed by: STUDENT IN AN ORGANIZED HEALTH CARE EDUCATION/TRAINING PROGRAM

## 2025-03-31 PROCEDURE — 83036 HEMOGLOBIN GLYCOSYLATED A1C: CPT | Performed by: STUDENT IN AN ORGANIZED HEALTH CARE EDUCATION/TRAINING PROGRAM

## 2025-03-31 RX ORDER — SEMAGLUTIDE 0.68 MG/ML
INJECTION, SOLUTION SUBCUTANEOUS
Qty: 2 EACH | Refills: 0 | Status: SHIPPED | OUTPATIENT
Start: 2025-03-31 | End: 2025-05-26

## 2025-03-31 RX ORDER — METFORMIN HYDROCHLORIDE 500 MG/1
500 TABLET, EXTENDED RELEASE ORAL
Qty: 90 TABLET | Refills: 1 | Status: SHIPPED | OUTPATIENT
Start: 2025-03-31

## 2025-03-31 RX ORDER — AZELASTINE HYDROCHLORIDE 137 UG/1
1-2 SPRAY, METERED NASAL 2 TIMES DAILY
Qty: 30 ML | Refills: 11 | Status: SHIPPED | OUTPATIENT
Start: 2025-03-31

## 2025-03-31 NOTE — PROGRESS NOTES
OFFICE NOTE       The following individual(s) verbally consented to be recorded using ambient AI listening technology and understand that they can each withdraw their consent to this listening technology at any point by asking the clinician to turn off or pause the recording:    Patient name: Radha Rodrigez  Additional names:            Patient ID: Radha Rodrigez is a 59 year old female.  Today's Date: 03/31/25  Chief Complaint: Thyroid Problem and Pre-diabetes      History of Present Illness  Radha Rodrigez is a 59 year old female with diabetes who presents for diabetes follow-up.    Her A1c today is 6.9, which has increased despite ongoing lifestyle interventions such as dieting and exercising. She was previously on Ozempic but was discontinued due to insurance issues and lack of weight loss.    She experiences headaches almost daily for the past three weeks. These are described as frontal headaches lasting one to two hours, sometimes occurring upon waking. They are relieved by Tylenol and are not associated with sound or light sensitivity. She is currently using Flonase and azelastine as needed for allergies, and takes a generic [employer] sinus medication daily.    She has a history of chronic sinusitis and is managing her symptoms with over-the-counter medications and nasal sprays. There is no indication of a sinus infection, and symptoms may be related to seasonal allergies.    She has a history of hypothyroidism and is currently on thyroid medication, which she refills regularly. No changes in her thyroid management are necessary at this time.    She is scheduled to receive nerve block injections for back pain, having already received the first set of injections.    She does not have an eye doctor but has noticed changes in her vision, which she attributes to her diabetes.     A1C 6.6    Vitals:    03/31/25 1433   BP: 120/73   Pulse: 77     body mass index is unknown because there is no  height or weight on file.  BP Readings from Last 3 Encounters:   03/31/25 120/73   02/26/25 136/72   02/10/25 128/83     The 10-year ASCVD risk score (Tri RYAN, et al., 2019) is: 5%    Values used to calculate the score:      Age: 59 years      Sex: Female      Is Non- : No      Diabetic: Yes      Tobacco smoker: No      Systolic Blood Pressure: 120 mmHg      Is BP treated: No      HDL Cholesterol: 38 mg/dL      Total Cholesterol: 144 mg/dL  Results  LABS  A1c: 6.9% (03/31/2025)       Medications reviewed:  Current Outpatient Medications   Medication Sig Dispense Refill    metFORMIN  MG Oral Tablet 24 Hr Take 1 tablet (500 mg total) by mouth daily with breakfast. 90 tablet 1    semaglutide (OZEMPIC, 0.25 OR 0.5 MG/DOSE,) 2 MG/3ML Subcutaneous Solution Pen-injector Inject 0.25 mg into the skin once a week for 28 days, THEN 0.5 mg once a week for 28 days. 2 each 0    [START ON 5/26/2025] semaglutide 4 MG/3ML Subcutaneous Solution Pen-injector Inject 1 mg into the skin once a week. 1 each 1    azelastine 137 MCG/SPRAY Nasal Solution 1-2 sprays by Nasal route in the morning and 1-2 sprays before bedtime. FOR SINUS SYMPTOMS/NASAL CONGESTION.. 30 mL 11    fluticasone propionate 50 MCG/ACT Nasal Suspension 2 sprays by Each Nare route daily. FOR NASAL CONGESTION/SINUS SYMPTOMS. 1 each 11    levothyroxine 150 MCG Oral Tab Take 1 tablet (150 mcg total) by mouth daily. 90 tablet 3    naproxen (NAPROSYN) 500 MG Oral Tab Take 1 tablet (500 mg total) by mouth 2 (two) times daily with meals. Take for 2 weeks as directed and then as needed. 60 tablet 3    baclofen 10 MG Oral Tab Take 1 tablet (10 mg total) by mouth nightly as needed.      gabapentin 100 MG Oral Cap Take 1 capsule (100 mg total) by mouth 3 (three) times daily.      rosuvastatin 10 MG Oral Tab Take 1 tablet (10 mg total) by mouth nightly. FOR CHOLESTEROL. 90 tablet 9    omeprazole 20 MG Oral Capsule Delayed Release Take 1 capsule (20 mg  total) by mouth every morning before breakfast. FOR ACID REFLUX. 90 capsule 3    fluticasone-salmeterol 115-21 MCG/ACT Inhalation Aerosol Inhale 2 puffs into the lungs 2 (two) times daily. FOR ASTHMA 1 each 9    albuterol 108 (90 Base) MCG/ACT Inhalation Aero Soln Inhale 2-4 puffs into the lungs every 4 to 6 hours as needed for Wheezing or Shortness of Breath (cough, asthma, chest tightness). FOR ASTHMA. Pharmacist, please switch to formulary alternative per insurance coverage, ok to replace with proair, ventolin, proventil, or any other albuterol inhaler. -drkp 3 each 9    cholecalciferol (VITAMIN D3) 125 MCG (5000 UT) Oral Cap Take 1 capsule (5,000 Units total) by mouth daily.      methylPREDNISolone 4 MG Oral Tablet Therapy Pack Take as directed with food. (Patient not taking: Reported on 3/31/2025) 1 each 0         Assessment & Plan    1. Type 2 diabetes mellitus without complication, without long-term current use of insulin (HCC) (Primary)  -     POC Hemoglobin A1C  -     metFORMIN HCl ER; Take 1 tablet (500 mg total) by mouth daily with breakfast.  Dispense: 90 tablet; Refill: 1  -     Ozempic (0.25 or 0.5 MG/DOSE); Inject 0.25 mg into the skin once a week for 28 days, THEN 0.5 mg once a week for 28 days.  Dispense: 2 each; Refill: 0  -     Semaglutide (1 MG/DOSE); Inject 1 mg into the skin once a week.  Dispense: 1 each; Refill: 1  -     Diabetic Retinopathy Exam; Future; Expected date: 03/31/2025  2. Recurrent sinusitis  -     Azelastine HCl; 1-2 sprays by Nasal route in the morning and 1-2 sprays before bedtime. FOR SINUS SYMPTOMS/NASAL CONGESTION..  Dispense: 30 mL; Refill: 11    Assessment & Plan  Type 2 Diabetes Mellitus  A1c has increased to 6.9% despite lifestyle interventions, indicating suboptimal glycemic control. Previously on Ozempic, discontinued due to insurance issues and lack of weight loss. Now considered diabetic with A1c above 6.5%. Initiating dual therapy with metformin and Ozempic to  improve glycemic control. Discussed potential side effects of Ozempic, including nausea and indigestion, and advised dietary adjustments to manage these effects.  - Start metformin 500 mg once daily with breakfast monitor for GI side effects  - Initiate Ozempic 0.25 mg weekly for 4 weeks, then increase to 0.5 mg weekly for 4 weeks, and subsequently to 1 mg weekly  - Monitor for side effects such as nausea and indigestion; adjust metformin if necessary  - Encourage protein intake and small meals to manage potential side effects  - Schedule follow-up in 3 months to reassess A1c and treatment efficacy    Tension Headaches  Reports daily frontal headaches lasting 1-2 hours, relieved by NSAIDs. No associated photophobia or phonophobia, suggesting tension-type rather than migraine headaches. Headaches possibly related to stress or allergies.  - Continue using over-the-counter NSAIDs as needed for headache relief  - Use Flonase and azelastine for allergy management  - Consider adding xylocaine nasal spray once or twice daily as needed for symptom relief    Chronic Sinusitis  Experiences congestion and frontal headaches, possibly related to allergies. Managed with Flonase and over-the-counter allergy medications. No signs of acute sinus infection. Avoiding antibiotics to prevent gastrointestinal issues unless symptoms suggest bacterial infection.  - Continue Flonase and over-the-counter allergy medications  - Use azelastine as needed for severe symptoms  - Avoid antibiotics unless symptoms suggest bacterial infection    Hypothyroidism  On thyroid medication with adequate supply. No changes in management discussed.    General Health Maintenance  Now considered diabetic, necessitating regular ophthalmologic evaluations to monitor for diabetic retinopathy. Discussed the importance of seeing an ophthalmologist and the availability of an in-office retinal eye exam.  - Refer to ophthalmologist for diabetic retinal eye exam  -  Consider retinal eye exam in-office if preferred  - Ensure appointment with an eye doctor within 6 months       Follow Up: As needed/if symptoms worsen or Return in about 3 months (around 6/27/2025) for Annual Physical Exam..     Objective/ Results:   Physical Exam  Constitutional:       Appearance: She is well-developed.   Cardiovascular:      Rate and Rhythm: Normal rate and regular rhythm.      Heart sounds: Normal heart sounds.   Pulmonary:      Effort: Pulmonary effort is normal.      Breath sounds: Normal breath sounds.   Abdominal:      General: Bowel sounds are normal.      Palpations: Abdomen is soft.   Skin:     General: Skin is warm and dry.   Neurological:      Mental Status: She is alert and oriented to person, place, and time.      Deep Tendon Reflexes: Reflexes are normal and symmetric.        Physical Exam       Reviewed:    Patient Active Problem List    Diagnosis    Lumbar foraminal stenosis    Lumbar radiculopathy    Bulge of lumbar disc without myelopathy    Neural foraminal stenosis of thoracic spine    Dilation of common bile duct    Abdominal pain    Malabsorption syndrome (HCC)    S/P cholecystectomy    Right lower quadrant abdominal pain    Calcium oxalate calculus    Left flank pain    Pain due to ureteral stent, initial encounter    Acute cystitis with hematuria    Ureterolithiasis    Pyelonephritis    Encounter for therapeutic drug monitoring    Type 2 diabetes mellitus without complication, without long-term current use of insulin (HCC)    Pre-diabetes    Stress    Essential hypertension    Morbid obesity with BMI of 45.0-49.9, adult (HCC)    Diverticulosis of colon    Esophageal reflux    Seasonal allergies    Hypothyroidism    Leiomyoma of uterus      Allergies[1]     Social History     Socioeconomic History    Marital status:    Tobacco Use    Smoking status: Never     Passive exposure: Never    Smokeless tobacco: Never   Vaping Use    Vaping status: Never Used   Substance and  Sexual Activity    Alcohol use: Not Currently    Drug use: Never     Social Drivers of Health     Food Insecurity: No Food Insecurity (9/25/2024)    Food Insecurity     Food Insecurity: Never true   Transportation Needs: No Transportation Needs (9/25/2024)    Transportation Needs     Lack of Transportation: No   Housing Stability: Low Risk  (9/25/2024)    Housing Stability     Housing Instability: No      Review of Systems   Constitutional: Negative.    Respiratory: Negative.     Cardiovascular: Negative.    Gastrointestinal: Negative.    Skin: Negative.    Neurological: Negative.        All other systems negative unless otherwise stated in ROS or HPI above.       Guille Miramontes MD  Internal Medicine       Call office with any questions or seek emergency care if necessary.   Patient understands and agrees to follow directions and advice.      ----------------------------------------- PATIENT INSTRUCTIONS-----------------------------------------     There are no Patient Instructions on file for this visit.        The 21st Century Cures Act makes medical notes available to patients in the interest of transparency.  However, please be advised that this is a medical document.  It is intended as a peer to peer communication.  It is written in medical language and may contain abbreviations or verbiage that are technical and unfamiliar.  It may appear blunt or direct.  Medical documents are intended to carry relevant information, facts as evident, and the clinical opinion of the practitioner.          [1]   Allergies  Allergen Reactions    Augmentin [Amoxicillin-Pot Clavulanate] NAUSEA AND VOMITING    Coconut Oil NAUSEA AND VOMITING    Cortisone DIZZINESS and OTHER (SEE COMMENTS)     Blood drop, dizziness, flushed    Adhesive Tape RASH     Needs paper tape

## 2025-04-01 ENCOUNTER — TELEPHONE (OUTPATIENT)
Dept: INTERNAL MEDICINE CLINIC | Facility: CLINIC | Age: 60
End: 2025-04-01

## 2025-04-01 NOTE — TELEPHONE ENCOUNTER
Approved    Prior authorization approved  Payer: [facility] [place] Case ID: [id number]    [phone]    [phone]  Note from payer: Your PA request has been approved.  Additional information will be provided in the approval communication. (Message [phone])  Approval Details    Authorized from April 1, 2025 to March 31, 2028        Patient notified via [employer].   PROCEDURE NOTE   A size 7.5 endotracheal tube was successfully placed using a size 3 blade. The Lot number for he endotracheal tube was [id number]. There were breath sounds, positive color change, and confirmed cxr. ETT is at 23cm at the lip.

## 2025-04-02 PROBLEM — M54.59 MECHANICAL LOW BACK PAIN: Status: ACTIVE | Noted: 2025-04-02

## 2025-04-02 PROBLEM — M47.816 LUMBAR SPONDYLOSIS: Status: ACTIVE | Noted: 2025-04-02

## 2025-04-02 PROBLEM — M47.816 LUMBAR FACET JOINT SYNDROME: Status: ACTIVE | Noted: 2025-04-02

## 2025-04-02 RX ORDER — SEMAGLUTIDE 2.68 MG/ML
2 INJECTION, SOLUTION SUBCUTANEOUS WEEKLY
Qty: 3 ML | Refills: 0 | OUTPATIENT
Start: 2025-04-02

## 2025-04-04 ENCOUNTER — PATIENT MESSAGE (OUTPATIENT)
Dept: PHYSICAL MEDICINE AND REHAB | Facility: CLINIC | Age: 60
End: 2025-04-04

## 2025-04-04 DIAGNOSIS — M54.16 LUMBAR RADICULOPATHY: Primary | ICD-10-CM

## 2025-04-09 NOTE — TELEPHONE ENCOUNTER
Patient follow up post 4/2/2025-Bilateral L4-L5 and L5-S1 Medial branch blocks stating 45% improvement, a day later.  C/o pain level 10/10.    Per MD's LOV: 3/31/2025(Telemed):   Depending on how she does with the medial branch blocks will be if we decide to repeat the medial branch blocks and followed up by radiofrequency ablation or if we decide to repeat the L5 and S1 transforaminal epidural steroid injection.     Will forward to provider for next steps.

## 2025-04-10 RX ORDER — NAPROXEN 500 MG/1
500 TABLET ORAL 2 TIMES DAILY WITH MEALS
Qty: 60 TABLET | Refills: 0 | Status: SHIPPED | OUTPATIENT
Start: 2025-04-10

## 2025-04-10 NOTE — TELEPHONE ENCOUNTER
Refill Request    LOV: 4/2/25 ([facility]) 3/11/2025 (tele) Behar, Alex, MD   Due back to clinic per last office note:  Per Dr. Behar, \"Depending on how she does with the medial branch blocks will be if we decide to repeat the medial branch blocks and followed up by radiofrequency ablation or if we decide to repeat the L5 and S1 transforaminal epidural steroid injection.\"  NOV: Visit date not found     Urine drug screen (if applicable): n/a  Pain contract: n/a     Medication request:   Requested Prescriptions     Pending Prescriptions Disp Refills    NAPROXEN 500 MG Oral Tab [Pharmacy Med Name: Naproxen 500 Mg Tab Hitesh] 60 tablet 0     Sig: TAKE 1 TABLET BY MOUTH 2 TIMES DAILY WITH MEALS TAKE FOR 2 WEEKS AS DIRECTED AND THEN AS NEEDED           [place] 3[place]5/25 #60 (30 days supply)   This will be the 5th fill. Filled: 12/5/24, 12/31/24, 2/8/25, 3/5/25    LOV plan (if weaning or changing medications): No mention of Naproxen, however Dr. Behar states \"She should use Tylenol for pain\".    Per Dr. Behar in 2/11/25 office visit, \"She should continue Naprosyn as needed for pain and gabapentin 200 mg at nighttime.\"    Routed to Behar for review and signature if appropriate.

## 2025-04-15 NOTE — TELEPHONE ENCOUNTER
Initiated authorization for Right L4 and L5 TFESI. CPT/HCPCS 47798, 79107 dx:M54.16 to be done at [facility] with [employer] portal.    Status: No action required  Reference/Authorization # [id number]  Valid: 4/15/25-7/15/25  Authorization is not required based on medical necessity, however, is not a guarantee of payment and may be subject to review once claim is submitted.

## 2025-04-18 ENCOUNTER — TELEPHONE (OUTPATIENT)
Dept: PHYSICAL MEDICINE AND REHAB | Facility: CLINIC | Age: 60
End: 2025-04-18

## 2025-04-18 NOTE — TELEPHONE ENCOUNTER
Patient requesting to hold off on procedure until a follow up is completed to discuss risk/benefits of this JAYRO.

## 2025-04-18 NOTE — TELEPHONE ENCOUNTER
nitiated authorization for Right L4 and L5 TFESI. CPT/HCPCS 10891, 82308 dx:M54.16 to be done at [facility] with [employer]ail[employer] portal.     Status: No action required  Reference/Authorization # [id number]  Valid: 4/15/25-7/15/25  Authorization is not required based on medical necessity, however, is not a guarantee of payment and may be subject to review once claim is submitted.

## 2025-05-03 DIAGNOSIS — E11.9 TYPE 2 DIABETES MELLITUS WITHOUT COMPLICATION, WITHOUT LONG-TERM CURRENT USE OF INSULIN (HCC): ICD-10-CM

## 2025-05-06 RX ORDER — SEMAGLUTIDE 0.68 MG/ML
INJECTION, SOLUTION SUBCUTANEOUS
Qty: 3 ML | Refills: 0 | OUTPATIENT
Start: 2025-05-06 | End: 2025-07-01

## 2025-05-08 ENCOUNTER — TELEMEDICINE (OUTPATIENT)
Dept: PHYSICAL MEDICINE AND REHAB | Facility: CLINIC | Age: 60
End: 2025-05-08
Payer: COMMERCIAL

## 2025-05-08 DIAGNOSIS — M47.816 LUMBAR SPONDYLOSIS: ICD-10-CM

## 2025-05-08 DIAGNOSIS — M17.10 PRIMARY OSTEOARTHRITIS OF KNEE, UNSPECIFIED LATERALITY: ICD-10-CM

## 2025-05-08 DIAGNOSIS — Z15.2 CLASS 3 OBESITY DUE TO DISRUPTION OF MC4R PATHWAY WITH SERIOUS COMORBIDITY AND BODY MASS INDEX (BMI) OF 45.0 TO 49.9 IN ADULT (HCC): ICD-10-CM

## 2025-05-08 DIAGNOSIS — G89.29 CHRONIC MIDLINE THORACIC BACK PAIN: ICD-10-CM

## 2025-05-08 DIAGNOSIS — M51.34 BULGE OF THORACIC DISC WITHOUT MYELOPATHY: ICD-10-CM

## 2025-05-08 DIAGNOSIS — M48.061 LUMBAR FORAMINAL STENOSIS: ICD-10-CM

## 2025-05-08 DIAGNOSIS — M51.372 DEGENERATION OF INTERVERTEBRAL DISC OF LUMBOSACRAL REGION WITH DISCOGENIC BACK PAIN AND LOWER EXTREMITY PAIN: ICD-10-CM

## 2025-05-08 DIAGNOSIS — M54.59 MECHANICAL LOW BACK PAIN: ICD-10-CM

## 2025-05-08 DIAGNOSIS — M54.16 LUMBAR RADICULOPATHY: Primary | ICD-10-CM

## 2025-05-08 DIAGNOSIS — E66.813 CLASS 3 OBESITY DUE TO DISRUPTION OF MC4R PATHWAY WITH SERIOUS COMORBIDITY AND BODY MASS INDEX (BMI) OF 45.0 TO 49.9 IN ADULT (HCC): ICD-10-CM

## 2025-05-08 DIAGNOSIS — M79.10 MYALGIA: ICD-10-CM

## 2025-05-08 DIAGNOSIS — S76.019A STRAIN OF GLUTEUS MEDIUS, UNSPECIFIED LATERALITY, INITIAL ENCOUNTER: ICD-10-CM

## 2025-05-08 DIAGNOSIS — E88.82 CLASS 3 OBESITY DUE TO DISRUPTION OF MC4R PATHWAY WITH SERIOUS COMORBIDITY AND BODY MASS INDEX (BMI) OF 45.0 TO 49.9 IN ADULT (HCC): ICD-10-CM

## 2025-05-08 DIAGNOSIS — M51.369 BULGE OF LUMBAR DISC WITHOUT MYELOPATHY: ICD-10-CM

## 2025-05-08 DIAGNOSIS — M43.16 SPONDYLOLISTHESIS OF LUMBAR REGION: ICD-10-CM

## 2025-05-08 DIAGNOSIS — M54.6 CHRONIC MIDLINE THORACIC BACK PAIN: ICD-10-CM

## 2025-05-08 DIAGNOSIS — M47.816 FACET SYNDROME, LUMBAR: ICD-10-CM

## 2025-05-08 DIAGNOSIS — M22.2X9 PATELLOFEMORAL PAIN SYNDROME, UNSPECIFIED LATERALITY: ICD-10-CM

## 2025-05-08 PROCEDURE — 98006 SYNCH AUDIO-VIDEO EST MOD 30: CPT | Performed by: PHYSICAL MEDICINE & REHABILITATION

## 2025-05-08 NOTE — PATIENT INSTRUCTIONS
1) Make an appointment with neurosurgery to discuss surgical options  2) Continue weight loss and home exercise program  3) Tylenol 500-1000 mg every 6-8 hours as needed for pain.  No more than 3000 mg daily.  4)  Ice 20 minutes at a time 3-4 times per day  5) If symptoms worsen and you would like to consider an injection, I would recommend a RIGHT L4 and RIGH L5 TFESI

## 2025-05-08 NOTE — PROGRESS NOTES
[facility]  Video Visit Progress Note  CHIEF COMPLAINT:  No chief complaint on file.      History of Present Illness:  The patient is a 59 year old RIGHT handed female with significant past medical history of thyroid disease who presents for follow up of their bilateral low back pain and right leg pain.   She is status post right L5 and right S1 transforaminal epidural steroid injection on 2/26/2025 with 90% improved in the leg and 50% in the low back.   since she had mor back pain, then we performed bilateral L4-L5 and L5-S1 MBB on 4/2/25.  Had a lot of pain for 3 days after the medial branch blocks at the site of the injection and similar to her previous back pain. She has numbness with pins and needles in her right leg which is worse with standing. She has been taking gabapentin at night and naprosyn daily. She has been performing her home exercise program.      PAST MEDICAL HISTORY:  Past Medical History[1]    SURGICAL HISTORY:  Past Surgical History[2]    SOCIAL HISTORY:   Social History     Occupational History    Not on file   Tobacco Use    Smoking status: Never     Passive exposure: Never    Smokeless tobacco: Never   Vaping Use    Vaping status: Never Used   Substance and Sexual Activity    Alcohol use: Not Currently    Drug use: Never    Sexual activity: Not on file       FAMILY HISTORY:   Family History[3]    CURRENT MEDICATIONS:   Current Medications[4]    ALLERGIES:   Allergies[5]    REVIEW OF SYSTEMS:   Review of Systems   Constitutional: Negative.    HENT: Negative.    Eyes: Negative.    Respiratory: Negative.    Cardiovascular: Negative.    Gastrointestinal: Negative.    Genitourinary: Negative.    Musculoskeletal: As per HPI  Skin: Negative.    Neurological: As per HPI  Endo/Heme/Allergies: Negative.    Psychiatric/Behavioral: Negative.      All other systems reviewed and are negative. Pertinent positives and negatives noted in the  HPI.        PHYSICAL EXAM:     There is no height or weight on file to calculate BMI.    General: No immediate distress  Head: Normocephalic/ Atraumatic  Eyes: Extra-occular movements intact  Ears/Nose/Throat:  External appearance identifies normal appearance without obvious deformity  Cardiovascular: No cyanosis, clubbing or edema  Respiratory: Non-labored respirations  Skin: No lesions noted   Neurological: alert & oriented x 3, attentive, able to follow commands, comprehention intact, spontaneous speech intact  Psychiatric: Mood and affect appropriate        Data  Hospital Outpatient Visit on 04/02/2025   Component Date Value Ref Range Status    POC GLUCOSE 04/02/2025 106   Final   Office Visit on 03/31/2025   Component Date Value Ref Range Status    HEMOGLOBIN A1C 03/31/2025 6.9 (A)  4.3 - 5.6 % Final    Cartridge Lot# 03/31/2025 [id number]  Numeric Final    Cartridge Expiration Date 03/31/2025 52,825  Date Final   Hospital Outpatient Visit on 02/26/2025   Component Date Value Ref Range Status    POC GLUCOSE 02/26/2025 116   Final   [facility] Lab Encounter on 12/27/2024   Component Date Value Ref Range Status    HgbA1C 12/27/2024 6.2 (H)  <5.7 % Final    Estimated Average Glucose 12/27/2024 131 (H)  68 - 126 mg/dL Final    TSH 12/27/2024 0.436 (L)  0.550 - 4.780 uIU/mL Final    Free T4 12/27/2024 1.4  0.8 - 1.7 ng/dL Final    T3 Free 12/27/2024 3.71  2.40 - 4.20 pg/mL Final   Admission on 11/27/2024, Discharged on 11/27/2024   Component Date Value Ref Range Status    Rapid SARS-CoV-2 by PCR 11/27/2024 Not Detected  Not Detected Final    POCT Rapid Strep 11/27/2024 Negative  Negative Final    Strep Culture 11/27/2024 No Beta Hemolytic Strep Isolated   Final   ]      Radiology Imaging:  I reviewed with the patient her MRI of the lumbar spine  MRI SPINE LUMBAR (CPT=72148)  Narrative: PROCEDURE: MRI SPINE LUMBAR (CPT=72148)     COMPARISON: [facility], CT ABDOMEN+PELVIS KIDNEYSTONE 2D RNDR(NO IV,NO  ORAL)(CPT=74176), 3/03/2024, 0:09 AM.  [facility], MRI SPINE LUMBAR (CPT=72148), 2/11/2017, 1:06 PM.     INDICATIONS: M51.34 Bulge of thoracic disc without myelopathy G89.29 Chronic midline thoracic back pain M54.6 Chronic midline thoracic back pain S76.019A Strain of gluteus *     TECHNIQUE: A variety of imaging planes and parameters were utilized for visualization of suspected pathology.     FINDINGS:   PARASPINAL AREA: Normal with no visible mass.    BONES: A partial transitional L5 vertebral body with the right side sacralized.  There are bilateral L4 pars defects.  Vertebral bodies are intact.  No suspicious bone lesion or acute fracture.  CORD/CAUDA EQUINA: Normal caliber, contour, and signal intensity.    OTHER: Incidental simple left upper pole renal cyst.     LUMBAR DISC LEVELS:  L1-L2: No significant disc/facet abnormality, spinal stenosis, or foraminal stenosis.    L2-L3: No significant disc/facet abnormality, spinal stenosis, or foraminal stenosis.    L3-L4: Minimal spondylotic disc bulge accentuated laterally.  Mild facet arthrosis and ligamentum flavum hypertrophy.  No central narrowing.  Minimal caudal foraminal narrowing..  L4-L5: Decrease in disc height with a spondylotic disc bulge.  A small central superior subligamentous disc extrusion which is exaggerated by a grade 1 spondylolisthesis.  Bilateral L4 pars defects.  Moderate right and mild left foraminal narrowing.  L5-S1: Partial transitional L5 vertebral body with sacralized right-side is associated with a narrowed right extraforaminal zone related to the sacralization on the right.  No other stenosis.              Impression: CONCLUSION:   1. L4-5:  Moderate to advanced disc degeneration/spondylosis with a small superior subligamentous disc extrusion exaggerated by a grade 1 spondylolisthesis.  Bilateral L4 pars defects.  Moderate right and mild left foraminal narrowing.           Dictated by (CST): Gasper Roy MD on  12/21/2024 at 0:57 AM       Finalized by (CST): Gasper Roy MD on 12/21/2024 at 1:27 AM              ASSESSMENT AND PLAN:  Radha is a pleasant 59-year-old female presents for follow-up of her right-sided low back pain with radiation down the right leg which is due to a lumbar radiculopathy stemming from her lumbar spondylolisthesis.  I am recommending either repeating the right L4 and right L5 transforaminal epidural steroid injection or surgical consultation.  We have had a discussion regarding both options.  She would like to speak to surgery as well as try to lose weight and continue her home exercise program before repeating any further injections.  She should use ice and she will follow-up with me in about 2 months if needed.       RTC in 2 months as needed  Discharge Instructions were provided as documented in AVS summary.  The patient was in agreement with the assessment and plan.  All questions were answered.  There were no barriers to learning.         1. Lumbar radiculopathy    2. Lumbar spondylosis    3. 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 Facet syndrome, lumbar    5. Mechanical low back pain    6. Degeneration of intervertebral disc of lumbosacral region with discogenic back pain and lower extremity pain    7. Lumbar foraminal stenosis    8. Bulge of lumbar disc without myelopathy    9. Myalgia    10. Strain of gluteus medius, unspecified laterality, initial encounter    11. Spondylolisthesis of lumbar region    12. Chronic midline thoracic back pain    13. Bulge of thoracic disc without myelopathy    14. Patellofemoral pain syndrome, unspecified laterality    15. Primary osteoarthritis of knee, unspecified laterality        Alex B. Behar MD  Physical Medicine and Rehabilitation/Sports Medicine  [facility]    [employer] Cures Act Notice to Patient: Medical documents like this  are made available to patients in the interest of transparency. However, be advised this is a medical document and it is intended as [id number] communication between your medical providers. This medical document may contain abbreviations, assessments, medical data, and results or other terms that are unfamiliar. Medical documents are intended to carry relevant information, facts as evident, and the clinical opinion of the practitioner. As such, this medical document may be written in language that appears blunt or direct. You are encouraged to contact your medical provider and/or [facility] Patient Experience if you have any questions about this medical document.          [1]   Past Medical History:   Back problem    Calculus of kidney    Disorder of thyroid    Diverticulosis of large intestine    Esophageal reflux    Hx of motion sickness    Morbid obesity with BMI of 50.0-59.9, adult (HCC)    Pneumonia due to organism    PONV (postoperative nausea and vomiting)    Pre-diabetes    Seasonal allergies    Thyroid disease    Vitamin D deficiency   [2]   Past Surgical History:  Procedure Laterality Date    Colonoscopy  11/30/2021    Hernia surgery      Hysterectomy  12/21/2012    Other surgical history Left 03/12/2024    Cystoscopy left ureteroscopy, laser lithotripsy, retrograde pyelogram, stent placement    Removal gallbladder      Repair ing hernia,5+y/o,reducibl     [3]   Family History  Problem Relation Age of Onset    Ovarian Cancer Mother     Breast Cancer Sister 27   [4]   Current Outpatient Medications   Medication Sig Dispense Refill    NAPROXEN 500 MG Oral Tab TAKE 1 TABLET BY MOUTH 2 TIMES DAILY WITH MEALS TAKE FOR 2 WEEKS AS DIRECTED AND THEN AS NEEDED 60 tablet 0    metFORMIN  MG Oral Tablet 24 Hr Take 1 tablet (500 mg total) by mouth daily with breakfast. 90 tablet 1    semaglutide (OZEMPIC, 0.25 OR 0.5 MG/DOSE,) 2 MG/3ML Subcutaneous Solution Pen-injector Inject 0.25 mg into the skin once a  week for 28 days, THEN 0.5 mg once a week for 28 days. 2 each 0    [START ON 5/26/2025] semaglutide 4 MG/3ML Subcutaneous Solution Pen-injector Inject 1 mg into the skin once a week. 1 each 1    azelastine 137 MCG/SPRAY Nasal Solution 1-2 sprays by Nasal route in the morning and 1-2 sprays before bedtime. FOR SINUS SYMPTOMS/NASAL CONGESTION.. 30 mL 11    fluticasone propionate 50 MCG/ACT Nasal Suspension 2 sprays by Each Nare route daily. FOR NASAL CONGESTION/SINUS SYMPTOMS. 1 each 11    methylPREDNISolone 4 MG Oral Tablet Therapy Pack Take as directed with food. (Patient not taking: Reported on 3/31/2025) 1 each 0    levothyroxine 150 MCG Oral Tab Take 1 tablet (150 mcg total) by mouth daily. 90 tablet 3    baclofen 10 MG Oral Tab Take 1 tablet (10 mg total) by mouth nightly as needed.      gabapentin 100 MG Oral Cap Take 1 capsule (100 mg total) by mouth 3 (three) times daily.      rosuvastatin 10 MG Oral Tab Take 1 tablet (10 mg total) by mouth nightly. FOR CHOLESTEROL. 90 tablet 9    omeprazole 20 MG Oral Capsule Delayed Release Take 1 capsule (20 mg total) by mouth every morning before breakfast. FOR ACID REFLUX. 90 capsule 3    fluticasone-salmeterol 115-21 MCG/ACT Inhalation Aerosol Inhale 2 puffs into the lungs 2 (two) times daily. FOR ASTHMA 1 each 9    albuterol 108 (90 Base) MCG/ACT Inhalation Aero Soln Inhale 2-4 puffs into the lungs every 4 to 6 hours as needed for Wheezing or Shortness of Breath (cough, asthma, chest tightness). FOR ASTHMA. Pharmacist, please switch to formulary alternative per insurance coverage, ok to replace with proair, ventolin, proventil, or any other albuterol inhaler. -kp 3 each 9    cholecalciferol (VITAMIN D3) 125 MCG (5000 UT) Oral Cap Take 1 capsule (5,000 Units total) by mouth daily.     [5]   Allergies  Allergen Reactions    Augmentin [Amoxicillin-Pot Clavulanate] NAUSEA AND VOMITING    Coconut Oil NAUSEA AND VOMITING    Cortisone DIZZINESS and OTHER (SEE COMMENTS)      Blood drop, dizziness, flushed    Adhesive Tape RASH     Needs paper tape      Upper and Lower right side drain

## 2025-06-26 DIAGNOSIS — E11.9 TYPE 2 DIABETES MELLITUS WITHOUT COMPLICATION, WITHOUT LONG-TERM CURRENT USE OF INSULIN (HCC): ICD-10-CM

## 2025-06-30 DIAGNOSIS — E11.9 TYPE 2 DIABETES MELLITUS WITHOUT COMPLICATION, WITHOUT LONG-TERM CURRENT USE OF INSULIN (HCC): ICD-10-CM

## 2025-06-30 RX ORDER — SEMAGLUTIDE 1.34 MG/ML
1 INJECTION, SOLUTION SUBCUTANEOUS WEEKLY
Qty: 3 ML | Refills: 0 | Status: SHIPPED | OUTPATIENT
Start: 2025-06-30

## 2025-07-03 ENCOUNTER — NURSE TRIAGE (OUTPATIENT)
Dept: INTERNAL MEDICINE CLINIC | Facility: CLINIC | Age: 60
End: 2025-07-03

## 2025-07-03 ENCOUNTER — APPOINTMENT (OUTPATIENT)
Dept: CT IMAGING | Facility: HOSPITAL | Age: 60
End: 2025-07-03
Attending: EMERGENCY MEDICINE
Payer: COMMERCIAL

## 2025-07-03 ENCOUNTER — HOSPITAL ENCOUNTER (EMERGENCY)
Facility: HOSPITAL | Age: 60
Discharge: HOME | End: 2025-07-03
Attending: EMERGENCY MEDICINE
Payer: COMMERCIAL

## 2025-07-03 VITALS
TEMPERATURE: 97 F | HEIGHT: 62 IN | WEIGHT: 263 LBS | HEART RATE: 69 BPM | BODY MASS INDEX: 48.4 KG/M2 | SYSTOLIC BLOOD PRESSURE: 141 MMHG | OXYGEN SATURATION: 100 % | DIASTOLIC BLOOD PRESSURE: 79 MMHG | RESPIRATION RATE: 20 BRPM

## 2025-07-03 DIAGNOSIS — R10.9 ABDOMINAL PAIN, RIGHT LATERAL: Primary | ICD-10-CM

## 2025-07-03 LAB
ALBUMIN SERPL-MCNC: 4.9 G/DL (ref 3.2–4.8)
ALBUMIN/GLOB SERPL: 2 {RATIO} (ref 1–2)
ALP LIVER SERPL-CCNC: 119 U/L (ref 46–118)
ALT SERPL-CCNC: 31 U/L (ref 10–49)
ANION GAP SERPL CALC-SCNC: 9 MMOL/L (ref 0–18)
AST SERPL-CCNC: 35 U/L (ref ?–34)
BASOPHILS # BLD AUTO: 0.07 X10(3) UL (ref 0–0.2)
BASOPHILS NFR BLD AUTO: 1.1 %
BILIRUB SERPL-MCNC: 0.4 MG/DL (ref 0.3–1.2)
BILIRUB UR QL: NEGATIVE
BUN BLD-MCNC: 12 MG/DL (ref 9–23)
BUN/CREAT SERPL: 14.3 (ref 10–20)
CALCIUM BLD-MCNC: 9.7 MG/DL (ref 8.7–10.4)
CHLORIDE SERPL-SCNC: 103 MMOL/L (ref 98–112)
CLARITY UR: CLEAR
CO2 SERPL-SCNC: 27 MMOL/L (ref 21–32)
COLOR UR: COLORLESS
CREAT BLD-MCNC: 0.84 MG/DL (ref 0.55–1.02)
DEPRECATED RDW RBC AUTO: 38.5 FL (ref 35.1–46.3)
EGFRCR SERPLBLD CKD-EPI 2021: 80 ML/MIN/1.73M2 (ref 60–?)
EOSINOPHIL # BLD AUTO: 0.22 X10(3) UL (ref 0–0.7)
EOSINOPHIL NFR BLD AUTO: 3.4 %
ERYTHROCYTE [DISTWIDTH] IN BLOOD BY AUTOMATED COUNT: 12.4 % (ref 11–15)
GLOBULIN PLAS-MCNC: 2.5 G/DL (ref 2–3.5)
GLUCOSE BLD-MCNC: 93 MG/DL (ref 70–99)
GLUCOSE UR-MCNC: NORMAL MG/DL
HCT VFR BLD AUTO: 45 % (ref 35–48)
HGB BLD-MCNC: 15.2 G/DL (ref 12–16)
HGB UR QL STRIP.AUTO: NEGATIVE
IMM GRANULOCYTES # BLD AUTO: 0.02 X10(3) UL (ref 0–1)
IMM GRANULOCYTES NFR BLD: 0.3 %
KETONES UR-MCNC: NEGATIVE MG/DL
LEUKOCYTE ESTERASE UR QL STRIP.AUTO: 250
LIPASE SERPL-CCNC: 37 U/L (ref 12–53)
LYMPHOCYTES # BLD AUTO: 3.05 X10(3) UL (ref 1–4)
LYMPHOCYTES NFR BLD AUTO: 46.7 %
MCH RBC QN AUTO: 28.7 PG (ref 26–34)
MCHC RBC AUTO-ENTMCNC: 33.8 G/DL (ref 31–37)
MCV RBC AUTO: 85.1 FL (ref 80–100)
MONOCYTES # BLD AUTO: 0.65 X10(3) UL (ref 0.1–1)
MONOCYTES NFR BLD AUTO: 10 %
NEUTROPHILS # BLD AUTO: 2.52 X10 (3) UL (ref 1.5–7.7)
NEUTROPHILS # BLD AUTO: 2.52 X10(3) UL (ref 1.5–7.7)
NEUTROPHILS NFR BLD AUTO: 38.5 %
NITRITE UR QL STRIP.AUTO: NEGATIVE
OSMOLALITY SERPL CALC.SUM OF ELEC: 287 MOSM/KG (ref 275–295)
PH UR: 5 [PH] (ref 5–8)
PLATELET # BLD AUTO: 302 10(3)UL (ref 150–450)
POTASSIUM SERPL-SCNC: 4.7 MMOL/L (ref 3.5–5.1)
PROT SERPL-MCNC: 7.4 G/DL (ref 5.7–8.2)
PROT UR-MCNC: NEGATIVE MG/DL
RBC # BLD AUTO: 5.29 X10(6)UL (ref 3.8–5.3)
SODIUM SERPL-SCNC: 139 MMOL/L (ref 136–145)
SP GR UR STRIP: 1.01 (ref 1–1.03)
UROBILINOGEN UR STRIP-ACNC: NORMAL
WBC # BLD AUTO: 6.5 X10(3) UL (ref 4–11)

## 2025-07-03 PROCEDURE — 96374 THER/PROPH/DIAG INJ IV PUSH: CPT

## 2025-07-03 PROCEDURE — 83690 ASSAY OF LIPASE: CPT | Performed by: EMERGENCY MEDICINE

## 2025-07-03 PROCEDURE — 87086 URINE CULTURE/COLONY COUNT: CPT | Performed by: EMERGENCY MEDICINE

## 2025-07-03 PROCEDURE — 81001 URINALYSIS AUTO W/SCOPE: CPT | Performed by: EMERGENCY MEDICINE

## 2025-07-03 PROCEDURE — 85025 COMPLETE CBC W/AUTO DIFF WBC: CPT | Performed by: EMERGENCY MEDICINE

## 2025-07-03 PROCEDURE — 80053 COMPREHEN METABOLIC PANEL: CPT | Performed by: EMERGENCY MEDICINE

## 2025-07-03 PROCEDURE — 99284 EMERGENCY DEPT VISIT MOD MDM: CPT

## 2025-07-03 PROCEDURE — 99285 EMERGENCY DEPT VISIT HI MDM: CPT

## 2025-07-03 PROCEDURE — 74176 CT ABD & PELVIS W/O CONTRAST: CPT | Performed by: RADIOLOGY

## 2025-07-03 PROCEDURE — 96375 TX/PRO/DX INJ NEW DRUG ADDON: CPT

## 2025-07-03 PROCEDURE — 96361 HYDRATE IV INFUSION ADD-ON: CPT

## 2025-07-03 RX ORDER — KETOROLAC TROMETHAMINE 15 MG/ML
15 INJECTION, SOLUTION INTRAMUSCULAR; INTRAVENOUS ONCE
Status: COMPLETED | OUTPATIENT
Start: 2025-07-03 | End: 2025-07-03

## 2025-07-03 RX ORDER — LIDOCAINE 50 MG/G
1 PATCH TOPICAL EVERY 24 HOURS
Qty: 7 PATCH | Refills: 0 | Status: SHIPPED | OUTPATIENT
Start: 2025-07-03 | End: 2025-07-07

## 2025-07-03 RX ORDER — CYCLOBENZAPRINE HCL 10 MG
10 TABLET ORAL 3 TIMES DAILY PRN
Qty: 20 TABLET | Refills: 0 | Status: SHIPPED | OUTPATIENT
Start: 2025-07-03 | End: 2025-07-07

## 2025-07-03 RX ORDER — ONDANSETRON 2 MG/ML
4 INJECTION INTRAMUSCULAR; INTRAVENOUS ONCE
Status: COMPLETED | OUTPATIENT
Start: 2025-07-03 | End: 2025-07-03

## 2025-07-03 NOTE — ED PROVIDER NOTES
Patient Seen in: [facility] Emergency Department        History  Chief Complaint   Patient presents with    Abdomen/Flank Pain     Stated Complaint: flank pain    Subjective:   HPI          59-year-old female with history of cholecystectomy, renal colic, GERD, prediabetes, obesity, presents for evaluation of right low back pain.  Symptoms started this morning.  She has nausea without vomiting.  No fever or chills.  No dysuria hematuria.  She is also had 2 weeks of anterior right-sided abdominal pain which is unchanged.  No diarrhea or constipation.  No chest pain or dyspnea.      Objective:     Past Medical History:    Back problem    Calculus of kidney    Disorder of thyroid    Diverticulosis of large intestine    Esophageal reflux    Hx of motion sickness    Morbid obesity with BMI of 50.0-59.9, adult (HCC)    Pneumonia due to organism    PONV (postoperative nausea and vomiting)    Pre-diabetes    Seasonal allergies    Thyroid disease    Vitamin D deficiency              Past Surgical History:   Procedure Laterality Date    Colonoscopy  11/30/2021    Hernia surgery      Hysterectomy  12/21/2012    Other surgical history Left 03/12/2024    Cystoscopy left ureteroscopy, laser lithotripsy, retrograde pyelogram, stent placement    Removal gallbladder      Repair ing hernia,5+y/o,reducibl                  Social History     Socioeconomic History    Marital status:    Tobacco Use    Smoking status: Never     Passive exposure: Never    Smokeless tobacco: Never   Vaping Use    Vaping status: Never Used   Substance and Sexual Activity    Alcohol use: Not Currently    Drug use: Never     Social Drivers of Health     Food Insecurity: No Food Insecurity (9/25/2024)    Food Insecurity     Food Insecurity: Never true   Transportation Needs: No Transportation Needs (9/25/2024)    Transportation Needs     Lack of Transportation: No   Housing Stability: Low Risk  (9/25/2024)    Housing Stability     Housing  Instability: No                                Physical Exam    ED Triage Vitals   BP 07/03/25 1018 (!) 161/76   Pulse 07/03/25 1018 74   Resp 07/03/25 1018 20   Temp 07/03/25 1018 98 °F (36.7 °C)   Temp src 07/03/25 1018 Oral   SpO2 07/03/25 1018 97 %   O2 Device 07/03/25 1230 None (Room air)       Current Vitals:   Vital Signs  BP: 148/89  Pulse: 74  Resp: 20  Temp: 98 °F (36.7 °C)  Temp src: Oral  MAP (mmHg): (!) 107    Oxygen Therapy  SpO2: 98 %  O2 Device: None (Room air)            Physical Exam  Vitals and nursing note reviewed.   Constitutional:       Appearance: She is well-developed.   HENT:      Head: Normocephalic and atraumatic.   Eyes:      Extraocular Movements: Extraocular movements intact.   Cardiovascular:      Rate and Rhythm: Normal rate and regular rhythm.      Heart sounds: Normal heart sounds.   Pulmonary:      Effort: Pulmonary effort is normal.      Breath sounds: Normal breath sounds.   Abdominal:      General: There is no distension.      Palpations: Abdomen is soft.      Tenderness: There is no abdominal tenderness. There is no right CVA tenderness or left CVA tenderness.   Musculoskeletal:         General: Normal range of motion.      Cervical back: Normal range of motion.      Comments: Tender to palpation in the right lumbar paraspinal musculature, nontender bilateral CVA   Skin:     General: Skin is warm.      Capillary Refill: Capillary refill takes less than 2 seconds.   Neurological:      Mental Status: She is alert.      Comments: No focal deficits       Differential diagnose includes but is not limited to renal colic, obstructive uropathy, pyelonephritis, cystitis, diverticulitis, musculoskeletal back pain, lumbar thoracic radiculopathy        ED Course  Labs Reviewed   COMP METABOLIC PANEL (14) - Abnormal; Notable for the following components:       Result Value    AST 35 (*)     Alkaline Phosphatase 119 (*)     Albumin 4.9 (*)     All other components within normal limits    URINALYSIS WITH CULTURE REFLEX - Abnormal; Notable for the following components:    Urine Color Colorless (*)     Leukocyte Esterase Urine 250 (*)     WBC Urine 6-10 (*)     All other components within normal limits   LIPASE - Normal   CBC WITH DIFFERENTIAL WITH PLATELET   URINE CULTURE, ROUTINE          CT ABDOMEN+PELVIS KIDNEYSTONE 2D RNDR(NO IV,NO ORAL)(CPT=74176)  Result Date: 7/3/2025  CONCLUSION: No acute abnormality identified within the abdomen/pelvis. Incidental nonacute findings are present and are described within the body of the report. Electronically Verified and Signed by Attending Radiologist: Edu Henriquez MD 7/3/2025 1:35 PM Workstation: [employer]                  [facility]           Medical Decision Making  Vitals are stable in the ED, patient is well-appearing, nontoxic.  CMP, CBC, lipase, unremarkable.  Urinalysis without clear UTI.  Per my independent interpretation CT and pelvis, no clear obstructing ureteral stone.  Other radiology findings noted as above.  Suspect musculoskeletal etiology of low back pain versus thoracic radiculopathy.  Discussed continuation of naproxen twice daily at home, Flexeril and lidocaine patch added.  PCP follow-up advised.  Return precautions provided and discussed and she is comfortable with this plan.    Problems Addressed:  Abdominal pain, right lateral: complicated acute illness or injury with systemic symptoms    Amount and/or Complexity of Data Reviewed  External Data Reviewed: notes.     Details:  Reviewed hospital discharge summary from 9/27/2024: Patient had right upper quadrant and right scapular pain felt to be related to neural foraminal stenosis of thoracic spine, was treated with Toradol, Solu-Medrol, and Neurontin, Norco, prednisone taper upon discharge     Labs: ordered. Decision-making details documented in ED Course.  Radiology: ordered and independent interpretation performed. Decision-making details documented in ED Course.    Risk  Prescription  drug management.        Disposition and Plan     Clinical Impression:  1. Abdominal pain, right lateral         Disposition:  Discharge  7/3/2025  2:05 pm    Follow-up:  Guille Miramontes MD  [address] [place] [address]  [phone]    Follow up in 1 week(s)            Medications Prescribed:  Current Discharge Medication List        START taking these medications    Details   cyclobenzaprine 10 MG Oral Tab Take 1 tablet (10 mg total) by mouth 3 (three) times daily as needed for Muscle spasms.  Qty: 20 tablet, Refills: 0      lidocaine 5 % External Patch Place 1 patch onto the skin daily.  Qty: 7 patch, Refills: 0                   Supplementary Documentation:

## 2025-07-03 NOTE — TELEPHONE ENCOUNTER
Action Requested: Summary for Provider     []  Critical Lab, Recommendations Needed  [] Need Additional Advice  []   FYI    []   Need Orders  [] Need Medications Sent to Pharmacy  [x]  Other     SUMMARY: Patient calling, having some right sided flank pain, hx of stones about year ago with stent placement, feels similar. Nauseated. No blood and able to pass urine. Pain comes and goes but it's intense. Lasts 45 min or so, goes away and comes back.   Rates pain 8 and symptoms started yesterday. Pushing fluids.   Agreeable to ER for evaluation, will go to [place] ER for care now, soon.    States understanding and agreeable.       Reason for call: Flank Pain  Onset: last night       Reason for Disposition   SEVERE pain (e.g., excruciating, scale 8-10) and present > 1 hour    Protocols used: Flank Pain-A-[place]

## 2025-07-03 NOTE — TELEPHONE ENCOUNTER
semaglutide (OZEMPIC, 1 MG/DOSE,) 4 MG/3ML Subcutaneous Solution Pen-injector 3 mL 0 6/30/2025 --    Sig - Route: Inject 1 mg into the skin once a week. - Subcutaneous    Sent to pharmacy as: Ozempic (1 MG/DOSE) 4 MG/3ML Subcutaneous Solution Pen-injector (semaglutide)    E-Prescribing Status: Receipt confirmed by pharmacy (6/30/2025  5:08 PM CDT)      Associated Diagnoses    Type 2 diabetes mellitus without complication, without long-term current use of insulin ([facility])        Pharmacy    [id number] - [facility], [place] - [address] [facility] [phone], [phone]

## 2025-07-07 ENCOUNTER — OFFICE VISIT (OUTPATIENT)
Dept: INTERNAL MEDICINE CLINIC | Facility: CLINIC | Age: 60
End: 2025-07-07

## 2025-07-07 ENCOUNTER — TELEPHONE (OUTPATIENT)
Dept: INTERNAL MEDICINE CLINIC | Facility: CLINIC | Age: 60
End: 2025-07-07

## 2025-07-07 VITALS
SYSTOLIC BLOOD PRESSURE: 136 MMHG | WEIGHT: 262 LBS | BODY MASS INDEX: 48.21 KG/M2 | HEIGHT: 62 IN | HEART RATE: 75 BPM | DIASTOLIC BLOOD PRESSURE: 77 MMHG

## 2025-07-07 DIAGNOSIS — M51.369 BULGE OF LUMBAR DISC WITHOUT MYELOPATHY: ICD-10-CM

## 2025-07-07 DIAGNOSIS — M48.061 LUMBAR FORAMINAL STENOSIS: ICD-10-CM

## 2025-07-07 DIAGNOSIS — E88.82 CLASS 3 OBESITY DUE TO DISRUPTION OF MC4R PATHWAY WITH SERIOUS COMORBIDITY AND BODY MASS INDEX (BMI) OF 45.0 TO 49.9 IN ADULT (HCC): ICD-10-CM

## 2025-07-07 DIAGNOSIS — M51.34 BULGE OF THORACIC DISC WITHOUT MYELOPATHY: ICD-10-CM

## 2025-07-07 DIAGNOSIS — S76.019A STRAIN OF GLUTEUS MEDIUS, UNSPECIFIED LATERALITY, INITIAL ENCOUNTER: ICD-10-CM

## 2025-07-07 DIAGNOSIS — E66.813 CLASS 3 OBESITY DUE TO DISRUPTION OF MC4R PATHWAY WITH SERIOUS COMORBIDITY AND BODY MASS INDEX (BMI) OF 45.0 TO 49.9 IN ADULT (HCC): ICD-10-CM

## 2025-07-07 DIAGNOSIS — M54.59 MECHANICAL LOW BACK PAIN: ICD-10-CM

## 2025-07-07 DIAGNOSIS — G89.29 CHRONIC MIDLINE THORACIC BACK PAIN: ICD-10-CM

## 2025-07-07 DIAGNOSIS — K80.50 CHOLEDOCHOLITHIASIS: ICD-10-CM

## 2025-07-07 DIAGNOSIS — R10.11 RUQ ABDOMINAL PAIN: ICD-10-CM

## 2025-07-07 DIAGNOSIS — E11.9 TYPE 2 DIABETES MELLITUS WITHOUT COMPLICATION, WITHOUT LONG-TERM CURRENT USE OF INSULIN (HCC): ICD-10-CM

## 2025-07-07 DIAGNOSIS — M54.6 CHRONIC MIDLINE THORACIC BACK PAIN: ICD-10-CM

## 2025-07-07 DIAGNOSIS — M51.372 DEGENERATION OF INTERVERTEBRAL DISC OF LUMBOSACRAL REGION WITH DISCOGENIC BACK PAIN AND LOWER EXTREMITY PAIN: ICD-10-CM

## 2025-07-07 DIAGNOSIS — M47.816 LUMBAR SPONDYLOSIS: ICD-10-CM

## 2025-07-07 DIAGNOSIS — R74.8 ELEVATED ALKALINE PHOSPHATASE LEVEL: ICD-10-CM

## 2025-07-07 DIAGNOSIS — M79.10 MYALGIA: ICD-10-CM

## 2025-07-07 DIAGNOSIS — Z12.4 CERVICAL CANCER SCREENING: ICD-10-CM

## 2025-07-07 DIAGNOSIS — Z00.00 ANNUAL PHYSICAL EXAM: Primary | ICD-10-CM

## 2025-07-07 DIAGNOSIS — Z15.2 CLASS 3 OBESITY DUE TO DISRUPTION OF MC4R PATHWAY WITH SERIOUS COMORBIDITY AND BODY MASS INDEX (BMI) OF 45.0 TO 49.9 IN ADULT (HCC): ICD-10-CM

## 2025-07-07 DIAGNOSIS — M47.816 FACET SYNDROME, LUMBAR: ICD-10-CM

## 2025-07-07 DIAGNOSIS — M54.16 LUMBAR RADICULOPATHY: ICD-10-CM

## 2025-07-07 DIAGNOSIS — Z12.31 ENCOUNTER FOR SCREENING MAMMOGRAM FOR BREAST CANCER: ICD-10-CM

## 2025-07-07 DIAGNOSIS — B37.31 YEAST INFECTION INVOLVING THE VAGINA AND SURROUNDING AREA: ICD-10-CM

## 2025-07-07 DIAGNOSIS — K21.9 GASTROESOPHAGEAL REFLUX DISEASE WITHOUT ESOPHAGITIS: ICD-10-CM

## 2025-07-07 DIAGNOSIS — E55.9 VITAMIN D DEFICIENCY: ICD-10-CM

## 2025-07-07 RX ORDER — SULFAMETHOXAZOLE AND TRIMETHOPRIM 800; 160 MG/1; MG/1
1 TABLET ORAL 2 TIMES DAILY
Qty: 28 TABLET | Refills: 0 | Status: SHIPPED | OUTPATIENT
Start: 2025-07-07 | End: 2025-07-21

## 2025-07-07 RX ORDER — NYSTATIN 100000 U/G
1 CREAM TOPICAL 2 TIMES DAILY
Qty: 30 G | Refills: 5 | Status: SHIPPED | OUTPATIENT
Start: 2025-07-07

## 2025-07-07 RX ORDER — OMEPRAZOLE 20 MG/1
20 CAPSULE, DELAYED RELEASE ORAL
Qty: 90 CAPSULE | Refills: 3 | Status: SHIPPED | OUTPATIENT
Start: 2025-07-07

## 2025-07-07 RX ORDER — GABAPENTIN 100 MG/1
100 CAPSULE ORAL 3 TIMES DAILY
Qty: 270 CAPSULE | Refills: 3 | Status: SHIPPED | OUTPATIENT
Start: 2025-07-07

## 2025-07-07 RX ORDER — LEVOTHYROXINE SODIUM 150 UG/1
150 TABLET ORAL DAILY
Qty: 90 TABLET | Refills: 3 | Status: SHIPPED | OUTPATIENT
Start: 2025-07-07

## 2025-07-07 RX ORDER — SEMAGLUTIDE 1.34 MG/ML
1 INJECTION, SOLUTION SUBCUTANEOUS WEEKLY
Qty: 3 ML | Refills: 5 | Status: SHIPPED | OUTPATIENT
Start: 2025-07-07

## 2025-07-07 NOTE — PROGRESS NOTES
OFFICE NOTE       The following individual(s) verbally consented to be recorded using ambient AI listening technology and understand that they can each withdraw their consent to this listening technology at any point by asking the clinician to turn off or pause the recording:    Patient name: Radha Rodrigez  Additional names:            Patient ID: Radha Rodrigez is a 59 year old female.  Today's Date: 07/07/25  Chief Complaint: Physical and ER F/U (RUQ abdominal pain and R kidney pain)    History of Present Illness  The patient, with a history of cholecystectomy and kidney stones, presents with right upper quadrant and right kidney pain.    She experiences severe right upper quadrant and right kidney pain, described as accompanied by a feeling of swelling in the stomach and nausea. The pain radiates to her back and has persisted since her gallbladder removal two years ago.    She has a history of kidney stones and has had stents placed in the past. The current pain is similar to previous kidney stone episodes but is more chronic. A recent urinalysis indicated a urinary tract infection (UTI) with white blood cells present, and she has symptoms consistent with a UTI.    Her medical history includes cholecystectomy, renal colic, gastroesophageal reflux disease (GERD), diabetes, obesity, and right lower back pain. A CT scan on July 3rd for right flank pain revealed a fatty liver and degenerative changes at the L4-L5 level of the spine.    She is currently on Ozempic 1 mg since April for diabetes management and has lost five pounds. She is also taking omeprazole, levothyroxine 150 mcg, vitamin D 5000 units daily, and rosuvastatin, which has been temporarily paused. She has been prescribed Bactrim for the UTI.    She is concerned about her liver function, noting that her alkaline phosphatase and AST levels were mildly elevated. She questions whether her liver issues could be affecting her weight loss  efforts.       Vitals:    07/07/25 1023   BP: 136/77   Pulse: 75   Weight: 262 lb (118.8 kg)   Height: 5' 2\" (1.575 m)     body mass index is 47.92 kg/m².  BP Readings from Last 3 Encounters:   07/07/25 136/77   07/03/25 141/79   04/02/25 (!) 117/102     The 10-year ASCVD risk score (Tri DK, et al., 2019) is: 6.3%    Values used to calculate the score:      Age: 59 years      Sex: Female      Is Non- : No      Diabetic: Yes      Tobacco smoker: No      Systolic Blood Pressure: 136 mmHg      Is BP treated: No      HDL Cholesterol: 38 mg/dL      Total Cholesterol: 144 mg/dL  Results  LABS  A1c: 6.9 (04/07/2025)  WBC: 6-10 (07/03/2025)  Alkaline phosphatase: mildly elevated (07/03/2025)  AST: mildly elevated (07/03/2025)    RADIOLOGY  CT scan abdomen: No acute abnormality, hepatic steatosis, surgically absent gallbladder, no renal mass or obstruction, no adrenal mass, no retroperitoneal mass or adenopathy, no abdominal hernia, no focal wall thickening in the urinary bladder, no pelvic adenopathy, severe degenerative changes of L4-L5 with osteophyte formation and vacuum disc phenomenon, grade one retrolisthesis of L5 on L4 (07/03/2025)       Medications reviewed:  Current Medications[1]      Assessment & Plan    1. Annual physical exam (Primary)  -     Lipid Panel; Future; Expected date: 07/07/2025  -     TSH W Reflex To Free T4; Future; Expected date: 07/07/2025  -     Hemoglobin A1C; Future; Expected date: 07/07/2025  -     Comp Metabolic Panel (14); Future; Expected date: 07/07/2025  -     CBC With Differential With Platelet; Future; Expected date: 07/07/2025  -     Vitamin D; Future; Expected date: 07/07/2025  2. Encounter for screening mammogram for breast cancer  -     [facility] DONNELL 2D+3D SCREENING BILAT (CPT=77067/78827); Future; Expected date: 07/07/2025  3. Vitamin D deficiency  -     Vitamin D; Future; Expected date: 07/07/2025  4. Cervical cancer screening  -     OBG - INTERNAL  5. Type  2 diabetes mellitus without complication, without long-term current use of insulin (HCC)  -     Hemoglobin A1C; Future; Expected date: 07/07/2025  -     Microalb/Creat Ratio, Random Urine; Future; Expected date: 07/07/2025  -     Gastro Referral - In Network  6. RUQ abdominal pain  -     US LIVER WITH ELASTOGRAPHY(CPT=76705,33513); Future; Expected date: 07/07/2025  -     Hepatology Referral - Contracted Specialist - Deborah  7. Elevated alkaline phosphatase level  -     GGT (Gamma Glutamyl Transpeptidase); Future; Expected date: 07/07/2025  8. Choledocholithiasis  -     GGT (Gamma Glutamyl Transpeptidase); Future; Expected date: 07/07/2025  Other orders  -     Sulfamethoxazole-Trimethoprim; Take 1 tablet by mouth 2 (two) times daily for 14 days.  Dispense: 28 tablet; Refill: 0    Assessment & Plan  Right Upper Quadrant Pain  Chronic pain post-cholecystectomy with fatty liver. Elevated alkaline phosphatase and AST suggest possible biliary involvement. Differential includes choledocholithiasis, liver dysfunction, or pancreatic issues. Potential biliary obstruction considered.  - Order liver ultrasound with elastography.  - Refer to hepatologist Dr. Cabrera.  - Consider choledocholithiasis.  - Monitor liver function tests.  - Stop rosuvastatin temporarily.  - Avoid MRIs unless necessary.    Urinary Tract Infection (UTI)  Symptoms with right flank pain and positive urinalysis. Despite negative culture, treatment warranted.  - Prescribe Bactrim for 2 weeks.  - Advise taking Bactrim with food.  - Consider continuation of Azel for pain.    Diabetes Mellitus  Diabetes with A1c of 6.9. On Ozempic 1 mg with minimal weight loss. Concerns about medication impact on liver function.  - Continue Ozempic 1 mg.  - Monitor A1c and lipid panel.  - Discuss potential side effects of Ozempic.  - Pause rosuvastatin.    Obesity  Obesity with minimal weight loss. Concerns about fatty liver impact.  - Encourage dietary modifications.  - Monitor  weight and liver function.  - Discuss weight impact on fatty liver.  - Pause rosuvastatin.    Degenerative Disc Disease  Advanced changes at L4-L5 with chronic back pain. Delay surgical intervention to focus on weight loss and strengthening.  - Follow up with Dr. Behar.  - Encourage weight loss and strengthening exercises.    General Health Maintenance  Due for routine screenings. Emphasized lifestyle modifications.  - Order mammogram.  - Order lipid panel, TSH, A1c, CMP, CBC, and vitamin D level.  - Advise on lifestyle modifications.    Follow-up  Plans discussed to monitor issues and treatment efficacy.  - Follow up in 3 months.  - Contact if symptoms worsen.  - Schedule ultrasound within weeks.       Follow Up: As needed/if symptoms worsen or Return in about 3 months (around 10/7/2025) for A1C and lipid panel (off statin)..     Objective/ Results:   Physical Exam  Constitutional:       Appearance: She is well-developed.   Cardiovascular:      Rate and Rhythm: Normal rate and regular rhythm.      Heart sounds: Normal heart sounds.   Pulmonary:      Effort: Pulmonary effort is normal.      Breath sounds: Normal breath sounds.   Abdominal:      General: Bowel sounds are normal.      Palpations: Abdomen is soft.      Tenderness: There is abdominal tenderness (RUQ).   Musculoskeletal:         General: Tenderness (Right flank/cva) present.   Skin:     General: Skin is warm and dry.   Neurological:      Mental Status: She is alert and oriented to person, place, and time.      Deep Tendon Reflexes: Reflexes are normal and symmetric.        Physical Exam       Reviewed:    Patient Active Problem List    Diagnosis    Lumbar spondylosis    Mechanical low back pain    Lumbar facet joint syndrome    Lumbar foraminal stenosis    Lumbar radiculopathy    Bulge of lumbar disc without myelopathy    Neural foraminal stenosis of thoracic spine    Dilation of common bile duct    Abdominal pain    Malabsorption syndrome (HCC)    S/P  cholecystectomy    Right lower quadrant abdominal pain    Calcium oxalate calculus    Left flank pain    Pain due to ureteral stent, initial encounter    Acute cystitis with hematuria    Ureterolithiasis    Pyelonephritis    Encounter for therapeutic drug monitoring    Type 2 diabetes mellitus without complication, without long-term current use of insulin (HCC)    Pre-diabetes    Stress    Essential hypertension    Morbid obesity with BMI of 45.0-49.9, adult (HCC)    Diverticulosis of colon    Esophageal reflux    Seasonal allergies    Hypothyroidism    Leiomyoma of uterus      Allergies[2]   Short Social Hx on File[3]   Review of Systems   Constitutional: Negative.    HENT: Negative.     Eyes: Negative.    Respiratory: Negative.     Cardiovascular: Negative.    Gastrointestinal:  Positive for abdominal pain (RUQ).   Genitourinary: Negative.    Musculoskeletal:  Positive for back pain (Right flank).   Skin: Negative.    Neurological: Negative.    Psychiatric/Behavioral: Negative.         All other systems negative unless otherwise stated in ROS or HPI above.       Guille Miramontes MD  Internal Medicine       Call office with any questions or seek emergency care if necessary.   Patient understands and agrees to follow directions and advice.      ----------------------------------------- PATIENT INSTRUCTIONS-----------------------------------------     There are no Patient Instructions on file for this visit.        The 21st Century Cures Act makes medical notes available to patients in the interest of transparency.  However, please be advised that this is a medical document.  It is intended as a peer to peer communication.  It is written in medical language and may contain abbreviations or verbiage that are technical and unfamiliar.  It may appear blunt or direct.  Medical documents are intended to carry relevant information, facts as evident, and the clinical opinion of the practitioner.          [1]   Current  Outpatient Medications   Medication Sig Dispense Refill    sulfamethoxazole-trimethoprim -160 MG Oral Tab per tablet Take 1 tablet by mouth 2 (two) times daily for 14 days. 28 tablet 0    semaglutide (OZEMPIC, 1 MG/DOSE,) 4 MG/3ML Subcutaneous Solution Pen-injector Inject 1 mg into the skin once a week. 3 mL 0    NAPROXEN 500 MG Oral Tab TAKE 1 TABLET BY MOUTH 2 TIMES DAILY WITH MEALS TAKE FOR 2 WEEKS AS DIRECTED AND THEN AS NEEDED (Patient taking differently: Take 1 tablet (500 mg total) by mouth 2 (two) times daily with meals. As  needed) 60 tablet 0    azelastine 137 MCG/SPRAY Nasal Solution 1-2 sprays by Nasal route in the morning and 1-2 sprays before bedtime. FOR SINUS SYMPTOMS/NASAL CONGESTION.. 30 mL 11    fluticasone propionate 50 MCG/ACT Nasal Suspension 2 sprays by Each Nare route daily. FOR NASAL CONGESTION/SINUS SYMPTOMS. 1 each 11    levothyroxine 150 MCG Oral Tab Take 1 tablet (150 mcg total) by mouth daily. 90 tablet 3    gabapentin 100 MG Oral Cap Take 1 capsule (100 mg total) by mouth 3 (three) times daily.      omeprazole 20 MG Oral Capsule Delayed Release Take 1 capsule (20 mg total) by mouth every morning before breakfast. FOR ACID REFLUX. 90 capsule 3    fluticasone-salmeterol 115-21 MCG/ACT Inhalation Aerosol Inhale 2 puffs into the lungs 2 (two) times daily. FOR ASTHMA 1 each 9    albuterol 108 (90 Base) MCG/ACT Inhalation Aero Soln Inhale 2-4 puffs into the lungs every 4 to 6 hours as needed for Wheezing or Shortness of Breath (cough, asthma, chest tightness). FOR ASTHMA. Pharmacist, please switch to formulary alternative per insurance coverage, ok to replace with proair, ventolin, proventil, or any other albuterol inhaler. -drkp 3 each 9    cholecalciferol (VITAMIN D3) 125 MCG (5000 UT) Oral Cap Take 1 capsule (5,000 Units total) by mouth daily.      cyclobenzaprine 10 MG Oral Tab Take 1 tablet (10 mg total) by mouth 3 (three) times daily as needed for Muscle spasms. 20 tablet 0     lidocaine 5 % External Patch Place 1 patch onto the skin daily. 7 patch 0    metFORMIN  MG Oral Tablet 24 Hr Take 1 tablet (500 mg total) by mouth daily with breakfast. 90 tablet 1    methylPREDNISolone 4 MG Oral Tablet Therapy Pack Take as directed with food. (Patient not taking: Reported on 3/31/2025) 1 each 0    baclofen 10 MG Oral Tab Take 1 tablet (10 mg total) by mouth nightly as needed.     [2]   Allergies  Allergen Reactions    Augmentin [Amoxicillin-Pot Clavulanate] NAUSEA AND VOMITING    Coconut Oil NAUSEA AND VOMITING    Cortisone DIZZINESS and OTHER (SEE COMMENTS)     Blood drop, dizziness, flushed    Adhesive Tape RASH     Needs paper tape   [3]   Social History  Socioeconomic History    Marital status:    Tobacco Use    Smoking status: Never     Passive exposure: Never    Smokeless tobacco: Never   Vaping Use    Vaping status: Never Used   Substance and Sexual Activity    Alcohol use: Not Currently    Drug use: Never     Social Drivers of Health     Food Insecurity: No Food Insecurity (9/25/2024)    Food Insecurity     Food Insecurity: Never true   Transportation Needs: No Transportation Needs (9/25/2024)    Transportation Needs     Lack of Transportation: No   Housing Stability: Low Risk  (9/25/2024)    Housing Stability     Housing Instability: No

## 2025-07-07 NOTE — TELEPHONE ENCOUNTER
Patient states she needs the referral from Dr. Miramontes for Dr. Cabrera faxed to [phone] [facility]. Referral faxed. Confirmation received.

## 2025-07-11 ENCOUNTER — LAB ENCOUNTER (OUTPATIENT)
Dept: LAB | Age: 60
End: 2025-07-11
Attending: STUDENT IN AN ORGANIZED HEALTH CARE EDUCATION/TRAINING PROGRAM
Payer: COMMERCIAL

## 2025-07-11 DIAGNOSIS — E55.9 VITAMIN D DEFICIENCY: ICD-10-CM

## 2025-07-11 DIAGNOSIS — K80.50 CHOLEDOCHOLITHIASIS: ICD-10-CM

## 2025-07-11 DIAGNOSIS — R74.8 ELEVATED ALKALINE PHOSPHATASE LEVEL: ICD-10-CM

## 2025-07-11 DIAGNOSIS — E11.9 TYPE 2 DIABETES MELLITUS WITHOUT COMPLICATION, WITHOUT LONG-TERM CURRENT USE OF INSULIN (HCC): ICD-10-CM

## 2025-07-11 DIAGNOSIS — Z00.00 ANNUAL PHYSICAL EXAM: ICD-10-CM

## 2025-07-11 LAB
ALBUMIN SERPL-MCNC: 4.4 G/DL (ref 3.2–4.8)
ALBUMIN/GLOB SERPL: 1.9 {RATIO} (ref 1–2)
ALP LIVER SERPL-CCNC: 113 U/L (ref 46–118)
ALT SERPL-CCNC: 33 U/L (ref 10–49)
ANION GAP SERPL CALC-SCNC: 9 MMOL/L (ref 0–18)
AST SERPL-CCNC: 30 U/L (ref ?–34)
BASOPHILS # BLD AUTO: 0.06 X10(3) UL (ref 0–0.2)
BASOPHILS NFR BLD AUTO: 1.2 %
BILIRUB SERPL-MCNC: 0.2 MG/DL (ref 0.3–1.2)
BUN BLD-MCNC: 13 MG/DL (ref 9–23)
BUN/CREAT SERPL: 10.9 (ref 10–20)
CALCIUM BLD-MCNC: 9.2 MG/DL (ref 8.7–10.4)
CHLORIDE SERPL-SCNC: 104 MMOL/L (ref 98–112)
CHOLEST SERPL-MCNC: 134 MG/DL (ref ?–200)
CO2 SERPL-SCNC: 25 MMOL/L (ref 21–32)
CREAT BLD-MCNC: 1.19 MG/DL (ref 0.55–1.02)
CREAT UR-SCNC: 135.8 MG/DL
DEPRECATED RDW RBC AUTO: 39.7 FL (ref 35.1–46.3)
EGFRCR SERPLBLD CKD-EPI 2021: 53 ML/MIN/1.73M2 (ref 60–?)
EOSINOPHIL # BLD AUTO: 0.24 X10(3) UL (ref 0–0.7)
EOSINOPHIL NFR BLD AUTO: 4.8 %
ERYTHROCYTE [DISTWIDTH] IN BLOOD BY AUTOMATED COUNT: 12.7 % (ref 11–15)
EST. AVERAGE GLUCOSE BLD GHB EST-MCNC: 123 MG/DL (ref 68–126)
FASTING PATIENT LIPID ANSWER: NO
FASTING STATUS PATIENT QL REPORTED: NO
GGT SERPL-CCNC: 12 U/L (ref ?–38)
GLOBULIN PLAS-MCNC: 2.3 G/DL (ref 2–3.5)
GLUCOSE BLD-MCNC: 104 MG/DL (ref 70–99)
HBA1C MFR BLD: 5.9 % (ref ?–5.7)
HCT VFR BLD AUTO: 40.7 % (ref 35–48)
HDLC SERPL-MCNC: 39 MG/DL (ref 40–59)
HGB BLD-MCNC: 13.4 G/DL (ref 12–16)
IMM GRANULOCYTES # BLD AUTO: 0.01 X10(3) UL (ref 0–1)
IMM GRANULOCYTES NFR BLD: 0.2 %
LDLC SERPL CALC-MCNC: 71 MG/DL (ref ?–100)
LYMPHOCYTES # BLD AUTO: 2.4 X10(3) UL (ref 1–4)
LYMPHOCYTES NFR BLD AUTO: 48.5 %
MCH RBC QN AUTO: 28.3 PG (ref 26–34)
MCHC RBC AUTO-ENTMCNC: 32.9 G/DL (ref 31–37)
MCV RBC AUTO: 85.9 FL (ref 80–100)
MICROALBUMIN UR-MCNC: <0.3 MG/DL
MONOCYTES # BLD AUTO: 0.58 X10(3) UL (ref 0.1–1)
MONOCYTES NFR BLD AUTO: 11.7 %
NEUTROPHILS # BLD AUTO: 1.66 X10 (3) UL (ref 1.5–7.7)
NEUTROPHILS # BLD AUTO: 1.66 X10(3) UL (ref 1.5–7.7)
NEUTROPHILS NFR BLD AUTO: 33.6 %
NONHDLC SERPL-MCNC: 95 MG/DL (ref ?–130)
OSMOLALITY SERPL CALC.SUM OF ELEC: 286 MOSM/KG (ref 275–295)
PLATELET # BLD AUTO: 317 10(3)UL (ref 150–450)
POTASSIUM SERPL-SCNC: 4.4 MMOL/L (ref 3.5–5.1)
PROT SERPL-MCNC: 6.7 G/DL (ref 5.7–8.2)
RBC # BLD AUTO: 4.74 X10(6)UL (ref 3.8–5.3)
SODIUM SERPL-SCNC: 138 MMOL/L (ref 136–145)
TRIGL SERPL-MCNC: 139 MG/DL (ref 30–149)
TSI SER-ACNC: 0.56 UIU/ML (ref 0.55–4.78)
VIT D+METAB SERPL-MCNC: 49.1 NG/ML (ref 30–100)
VLDLC SERPL CALC-MCNC: 21 MG/DL (ref 0–30)
WBC # BLD AUTO: 5 X10(3) UL (ref 4–11)

## 2025-07-11 PROCEDURE — 36415 COLL VENOUS BLD VENIPUNCTURE: CPT

## 2025-07-11 PROCEDURE — 82977 ASSAY OF GGT: CPT

## 2025-07-11 PROCEDURE — 84443 ASSAY THYROID STIM HORMONE: CPT

## 2025-07-11 PROCEDURE — 85025 COMPLETE CBC W/AUTO DIFF WBC: CPT

## 2025-07-11 PROCEDURE — 82570 ASSAY OF URINE CREATININE: CPT

## 2025-07-11 PROCEDURE — 82306 VITAMIN D 25 HYDROXY: CPT

## 2025-07-11 PROCEDURE — 83036 HEMOGLOBIN GLYCOSYLATED A1C: CPT

## 2025-07-11 PROCEDURE — 80053 COMPREHEN METABOLIC PANEL: CPT

## 2025-07-11 PROCEDURE — 82043 UR ALBUMIN QUANTITATIVE: CPT

## 2025-07-11 PROCEDURE — 80061 LIPID PANEL: CPT

## 2025-08-18 ENCOUNTER — HOSPITAL ENCOUNTER (OUTPATIENT)
Dept: ULTRASOUND IMAGING | Age: 60
Discharge: HOME | End: 2025-08-18
Attending: STUDENT IN AN ORGANIZED HEALTH CARE EDUCATION/TRAINING PROGRAM

## 2025-08-18 DIAGNOSIS — R10.11 RUQ ABDOMINAL PAIN: ICD-10-CM

## 2025-08-18 PROCEDURE — 76981 USE PARENCHYMA: CPT | Performed by: STUDENT IN AN ORGANIZED HEALTH CARE EDUCATION/TRAINING PROGRAM

## 2025-08-18 PROCEDURE — 76705 ECHO EXAM OF ABDOMEN: CPT | Performed by: STUDENT IN AN ORGANIZED HEALTH CARE EDUCATION/TRAINING PROGRAM

## (undated) DEVICE — FIBER LSR 200UM 2J 80HZ 60W DL FOR LITHO

## (undated) DEVICE — NITINOL WIRE WITH HYDROPHILIC TIP: Brand: SENSOR

## (undated) DEVICE — JELLY,LUBE,STERILE,FLIP TOP,TUBE,2-OZ: Brand: MEDLINE

## (undated) DEVICE — GAMMEX® PI HYBRID SIZE 8, STERILE POWDER-FREE SURGICAL GLOVE, POLYISOPRENE AND NEOPRENE BLEND: Brand: GAMMEX

## (undated) DEVICE — NITINOL STONE RETRIEVAL BASKET: Brand: ZERO TIP

## (undated) DEVICE — CYSTO PACK: Brand: MEDLINE INDUSTRIES, INC.

## (undated) DEVICE — SOLUTION IRRIG 1000ML ST H2O AQUALITE PLAS

## (undated) DEVICE — CATHETER URET 5FR L70CM FLX OPN TIP NONPORTED

## (undated) DEVICE — 3M™ STERI-STRIP™ REINFORCED ADHESIVE SKIN CLOSURES, R1547, 1/2 IN X 4 IN (12 MM X 100 MM), 6 STRIPS/ENVELOPE: Brand: 3M™ STERI-STRIP™

## (undated) DEVICE — MAT TRANSFER HALFMATS 39 X 49

## (undated) DEVICE — CONTAINER,SPECIMEN,OR STERILE,4OZ: Brand: MEDLINE

## (undated) DEVICE — CATHETER URET L70CM 4FR POLYUR CONE TIP DISP

## (undated) DEVICE — GUIDEWIRE ENDOSCP L150CM DIA0.035IN TIP 3CM

## (undated) DEVICE — UROLOGY DRAIN BAG

## (undated) DEVICE — GAMMEX® PI HYBRID SIZE 7, STERILE POWDER-FREE SURGICAL GLOVE, POLYISOPRENE AND NEOPRENE BLEND: Brand: GAMMEX

## (undated) DEVICE — SOLUTION IRRIG 1000ML 0.9% NACL USP BTL

## (undated) DEVICE — 20 ML SYRINGE LUER-LOCK TIP: Brand: MONOJECT

## (undated) DEVICE — SOLUTION IRRIG 3000ML 0.9% NACL FLX CONT

## (undated) DEVICE — SLEEVE COMPR M KNEE LEN SGL USE KENDALL SCD

## (undated) DEVICE — GAMMEX® PI HYBRID SIZE 6, STERILE POWDER-FREE SURGICAL GLOVE, POLYISOPRENE AND NEOPRENE BLEND: Brand: GAMMEX

## (undated) DEVICE — SINGLE-USE DIGITAL FLEXIBLE URETEROSCOPE: Brand: LITHOVUE

## (undated) DEVICE — 9534HP TRANSPARENT DRSG W/FRAME: Brand: 3M™ TEGADERM™

## (undated) DEVICE — SNAP KOVER: Brand: UNBRANDED

## (undated) DEVICE — URETERAL ACCESS SHEATH SET: Brand: NAVIGATOR HD

## (undated) DEVICE — ADHESIVE LIQ 2/3ML VI MASTISOL

## (undated) NOTE — Clinical Note
Spoke with pt today for HFU. Pt confirms cysto tomorrow, as scheduled and 3/14/2024 non-TCM (readmit) HFU with you. Thank you.  Future Appointments 3/14/2024  9:20 AM    Guille Miramontes MD [facility]             [facility]O 3/28/2024  8:40 AM    Guille Miramontes MD [facility]             EC ADO 8/20/2024  2:30 PM    Jairo Nelson MD           [id number]

## (undated) NOTE — LETTER
Date & Time: 11/7/2023, 5:44 PM  Patient: Mami Ba  Encounter Provider(s):    ANGELA Sharp       To Whom It May Concern:    Ariadna Ledbetter was seen and treated in our department on 11/7/2023. She should not return to work until Friday, November 10, 2023 .     If you have any questions or concerns, please do not hesitate to call.        _____________________________  Physician/APC Signature

## (undated) NOTE — LETTER
[facility], [address]  [address]  [facility]: [phone]  FAX: [phone]    11/30/23          To Whom It May Concern:      Ariadna Ledbetter, is currently under my medical care. She may return to work on Monday, 12/4/23. If you require additional information please contact our office.             Sincerely,          Harsha Gordon MD.

## (undated) NOTE — LETTER
February 1, 2017    Patient: Paulina Ramirez   Date of Visit: 2/1/2017       To Whom It May Concern:    Paulina Ramirez was here with his mother in our emergency department on 2/1/2017. Please excuse his absence.     If you have any questions or concerns,

## (undated) NOTE — LETTER
3/6/2024          To Whom It May Concern:    Radha Rodrigez is currently under my medical care and may not return to work at this time.    She may return to work on April 1st, 2024 pending further clinical evaluation/surgery and clinical improvement.    If you require additional information please contact our office.        Sincerely,    Guille Miramontes MD          Document generated by:  Guille Miramontes MD

## (undated) NOTE — LETTER
February 1, 2017    Patient: Ayanna Lowery   Date of Visit: 2/1/2017       To Whom It May Concern:    Andrea Mckeon was seen and treated in our emergency department on 2/1/2017.  She should not return to work until 2/4/2017    If you have any ques

## (undated) NOTE — LETTER
[address]  [facility] 5  Dept: [phone]  Dept Fax: [phone]  Loc: [phone]      February 10, 2018    Patient: Abraham Mcleod   Date of Visit: 2/10/2018       To Whom It May Concern:

## (undated) NOTE — LETTER
[facility], [address], [place]  [address]  [facility]: [phone]  FAX: [phone]    11/13/23            To Whom It May Concern:    Chun Has, is currently under my medical care. She may go back to work on Thursday, 11/16/23. If you require additional information please contact our office.               Sincerely,        Epifanio Gage MD.

## (undated) NOTE — LETTER
[facility]  Administration of Anesthesia  IRadha agree to be cared for by a physician anesthesiologist alone and/or with a nurse anesthetist, who is specially trained to monitor me and give me medicine to put me to sleep or keep me comfortable during my procedure    I understand that my anesthesiologist and/or anesthetist is not an employee or agent of [facility] or [employer]. He or she works for [facility]    As the patient asking for anesthesia services, I agree to:  Allow the anesthesiologist (anesthesia doctor) to give me medicine and do additional procedures as necessary. Some examples are: Starting or using an “IV” to give me medicine, fluids or blood during my procedure, and having a breathing tube placed to help me breathe when I’m asleep (intubation). In the event that my heart stops working properly, I understand that my anesthesiologist will make every effort to sustain my life, unless otherwise directed by [facility] Do Not Resuscitate documents.  Tell my anesthesia doctor before my procedure:  If I am pregnant.  The last time that I ate or drank.  iii. All of the medicines I take (including prescriptions, herbal supplements, and pills I can buy without a prescription (including street drugs/illegal medications). Failure to inform my anesthesiologist about these medicines may increase my risk of anesthetic complications.  iv.If I am allergic to anything or have had a reaction to anesthesia before.  I understand how the anesthesia medicine will help me (benefits).  I understand that with any type of anesthesia medicine there are risks:  The most common risks are: nausea, vomiting, sore throat, muscle soreness, damage to my eyes, mouth, or teeth (from breathing tube placement).  Rare risks include: remembering what happened during my procedure, allergic reactions to medications, injury to my airway, heart, lungs, vision, nerves, or  muscles and in extremely rare instances death.  My doctor has explained to me other choices available to me for my care (alternatives).  Pregnant Patients (“epidural”):  I understand that the risks of having an epidural (medicine given into my back to help control pain during labor), include itching, low blood pressure, difficulty urinating, headache or slowing of the baby’s heart. Very rare risks include infection, bleeding, seizure, irregular heart rhythms and nerve injury.  Regional Anesthesia (“spinal”, “epidural”, & “nerve blocks”):  I understand that rare but potential complications include headache, bleeding, infection, seizure, irregular heart rhythms, and nerve injury.    _____________________________________________________________________________  Patient (or Representative) Signature/Relationship to Patient  Date   Time    _____________________________________________________________________________   Name (if used)    Language/Organization   Time    _____________________________________________________________________________  Nurse Anesthetist Signature     Date   Time  _____________________________________________________________________________  Anesthesiologist Signature     Date   Time  I have discussed the procedure and information above with the patient (or patient’s representative) and answered their questions. The patient or their representative has agreed to have anesthesia services.    _____________________________________________________________________________  Witness        Date   Time  I have verified that the signature is that of the patient or patient’s representative, and that it was signed before the procedure  Patient Name: Radha Rodrigez     : 1965                 Printed: 3/3/2024 at 3:55 PM    Medical Record #: [id number]                                            Page 1 of 1  ----------ANESTHESIA CONSENT----------

## (undated) NOTE — LETTER
12/4/2023      To Whom It May Concern:        Melissa Moe, is currently under my medical care. She may return to work on Tuesday 12/5/23. If you require additional information please contact our office.                  Sincerely,              Tracy Ritchie MD.    Document generated by:  Shannan ROQUE RN

## (undated) NOTE — ED AVS SNAPSHOT
[facility] Emergency Department    [address]     [address] [place] [address]    Phone:  [phone]    Fax:  [address]   MRN: [id number]    Department:  [facility] Emergency Department   Date of Visit:  2/ - HYDROcodone-acetaminophen 5-325 MG Tabs  - methocarbamol 750 MG Tabs            Discharge References/Attachments     BACK SPRAIN/STRAIN (ENGLISH)    LUMBAR STRETCH (FLEXIBILITY) (ENGLISH)      Disclosure     Insurance plans vary and the physician(s) ref IF THERE IS ANY CHANGE OR WORSENING OF YOUR CONDITION, CALL YOUR PRIMARY CARE PHYSICIAN AT ONCE OR RETURN IMMEDIATELY TO THE EMERGENCY DEPARTMENT.     If you have been prescribed any medication(s), please fill your prescription right away and begin taking t - If you don’t have insurance, Veronica Falcon has partnered with [facility] to help you get signed up for insurance coverage.   [facility] is a Federal Navigator program that can help with your Affordable Care Act cover

## (undated) NOTE — LETTER
2/10/2025          To Whom It May Concern:    Radha Rodrigez is currently under my medical care and may not return to work at this time.    On Wednesday February 12th 2025 pending clinical improvement.     If you require additional information please contact our office.        Sincerely,    Guille Miramontes MD          Document generated by:  Guille Miramontes MD

## (undated) NOTE — ED AVS SNAPSHOT
[facility] Emergency Department    [address] [address]    Phone:  [phone]    Fax:  [address]   MRN: [id number]    Department:  [facility] Emergency Department   Date of Visit:  6/ and Class Registration line at [phone] or find a doctor online by visiting [email or web address].    IF THERE IS ANY CHANGE OR WORSENING OF YOUR CONDITION, CALL YOUR PRIMARY CARE PHYSICIAN AT ONCE OR RETURN IMMEDIATELY TO [address] [place] [address].     If

## (undated) NOTE — LETTER
[facility]  [address], [place], [place]  Authorization for Surgical Operation and Procedure                                                                                           I hereby authorize Scooby Quiles MD, my physician and his/her assistants (if applicable), which may include medical students, residents, and/or fellows, to perform the following surgical operation/ procedure and administer such anesthesia as may be determined necessary by my physician: Operation/Procedure name (s) CYSTOSCOPY, LEFT URETERAL STENT INSERTION on Radha MYA Rodrigez   2.   I recognize that during the surgical operation/procedure, unforeseen conditions may necessitate additional or different procedures than those listed above.  I, therefore, further authorize and request that the above-named surgeon, assistants, or designees perform such procedures as are, in their judgment, necessary and desirable.    3.   My surgeon/physician has discussed prior to my surgery the potential benefits, risks and side effects of this procedure; the likelihood of achieving goals; and potential problems that might occur during recuperation.  They also discussed reasonable alternatives to the procedure, including risks, benefits, and side effects related to the alternatives and risks related to not receiving this procedure.  I have had all my questions answered and I acknowledge that no guarantee has been made as to the result that may be obtained.    4.   Should the need arise during my operation/procedure, which includes change of level of care prior to discharge, I also consent to the administration of blood and/or blood products.  Further, I understand that despite careful testing and screening of blood or blood products by collecting agencies, I may still be subject to ill effects as a result of receiving a blood transfusion and/or blood products.  The following are some, but not all, of the potential risks that can occur:  fever and allergic reactions, hemolytic reactions, transmission of diseases such as Hepatitis, AIDS and Cytomegalovirus (CMV) and fluid overload.  In the event that I wish to have an autologous transfusion of my own blood, or a directed donor transfusion, I will discuss this with my physician.  Check only if Refusing Blood or Blood Products  I understand refusal of blood or blood products as deemed necessary by my physician may have serious consequences to my condition to include possible death. I hereby assume responsibility for my refusal and release the hospital, its personnel, and my physicians from any responsibility for the consequences of my refusal.    o  Refuse   5.   I authorize the use of any specimen, organs, tissues, body parts or foreign objects that may be removed from my body during the operation/procedure for diagnosis, research or teaching purposes and their subsequent disposal by hospital authorities.  I also authorize the release of specimen test results and/or written reports to my treating physician on the hospital medical staff or other referring or consulting physicians involved in my care, at the discretion of the Pathologist or my treating physician.    6.   I consent to the photographing or videotaping of the operations or procedures to be performed, including appropriate portions of my body for medical, scientific, or educational purposes, provided my identity is not revealed by the pictures or by descriptive texts accompanying them.  If the procedure has been photographed/videotaped, the surgeon will obtain the original picture, image, videotape or CD.  The hospital will not be responsible for storage, release or maintenance of the picture, image, tape or CD.    7.   I consent to the presence of a  or observers in the operating room as deemed necessary by my physician or their designees.    8.   I recognize that in the event my procedure results in extended  X-Ray/fluoroscopy time, I may develop a skin reaction.    9. If I have a Do Not Attempt Resuscitation (DNAR) order in place, that status will be suspended while in the operating room, procedural suite, and during the recovery period unless otherwise explicitly stated by me (or a person authorized to consent on my behalf). The surgeon or my attending physician will determine when the applicable recovery period ends for purposes of reinstating the DNAR order.  10. Patients having a sterilization procedure: I understand that if the procedure is successful the results will be permanent and it will therefore be impossible for me to inseminate, conceive, or bear children.  I also understand that the procedure is intended to result in sterility, although the result has not been guaranteed.   11. I acknowledge that my physician has explained sedation/analgesia administration to me including the risk and benefits I consent to the administration of sedation/analgesia as may be necessary or desirable in the judgment of my physician.    I CERTIFY THAT I HAVE READ AND FULLY UNDERSTAND THE ABOVE CONSENT TO OPERATION and/or OTHER PROCEDURE.     _________________________________________ _________________________________     ___________________________________  Signature of Patient     Signature of Responsible Person                   Printed Name of Responsible Person                              _________________________________________ ______________________________        ___________________________________  Signature of Witness         Date  Time         Relationship to Patient    STATEMENT OF PHYSICIAN My signature below affirms that prior to the time of the procedure; I have explained to the patient and/or his/her legal representative, the risks and benefits involved in the proposed treatment and any reasonable alternative to the proposed treatment. I have also explained the risks and benefits involved in refusal of the  proposed treatment and alternatives to the proposed treatment and have answered the patient's questions. If I have a significant financial interest in a co-management agreement or a significant financial interest in any product or implant, or other significant relationship used in this procedure/surgery, I have disclosed this and had a discussion with my patient.     _______________________________________________________________ _____________________________  (Signature of Physician)                                                                                         (Date)                                   (Time)  Patient Name: Radha MYA Rodrigez    : 1965   Printed: 3/3/2024      Medical Record #: [id number]                                              Page 1 of 1

## (undated) NOTE — LETTER
[facility], [address]  [facility] [address] [place] [address]: [phone]  FAX: [phone]    12/04/23            To Whom It May Concern:      Eduardo Long, is currently under my medical care. She should return next Tuesday, 12/12/23 if no improvement. If you require additional information please contact our office.                   Sincerely,        Cortney Oconnor MD.

## (undated) NOTE — LETTER
10/1/2024          To Whom It May Concern:    Radha Rodrigez is currently under my medical care and has ongoing medical issues.   Activity is restricted as follows: no lifting due to chronic medical conditions.     If you require additional information please contact our office.        Sincerely,    uGille Miramontes MD          Document generated by:  Guille Miramontes MD

## (undated) NOTE — LETTER
3/28/2024          To Whom It May Concern:    Radha Rodrigez is currently under my medical care and may not return to work at this time. Was re-evaluated in clinic today march 28th, 2024  She may return to work on April 1st, 2024.    If you require additional information please contact our office.        Sincerely,    Guille Miramontes MD          Document generated by:  Guille Miramontes MD

## (undated) NOTE — Clinical Note
TCM assessment completed.  Please see notes.  A telephone encounter has been sent to clinical staff with a condition update.  Thank you.

## (undated) NOTE — LETTER
[facility], [address]  [address]  [facility]: [phone]  FAX: [phone]    12/11/23        To Whom It May Concern:      Lakshmi Pacheco, is currently under my medical care. She may return to work on 12/12/23. If you require additional information please contact our office.                 Sincerely,            Young Anguiano MD.

## (undated) NOTE — ED AVS SNAPSHOT
[facility] Emergency Department    [facility] [address] [place] [address]    Phone:  [phone]    Fax:  [phone]   MRN: [id number]    Department:  [facility] Emergency Department   Date of Visit:  6/ directed. Follow-up with your doctor. Drink plenty of fluids.     Discharge References/Attachments     KIDNEY STONE, PASSED (ENGLISH)    URINARY TRACT INFECTIONS (UTIS), UNDERSTANDING (ENGLISH)      Disclosure     Insurance plans vary and the physician(s) IF THERE IS ANY CHANGE OR WORSENING OF YOUR CONDITION, CALL YOUR PRIMARY CARE PHYSICIAN AT ONCE OR RETURN IMMEDIATELY TO THE EMERGENCY DEPARTMENT.     If you have been prescribed any medication(s), please fill your prescription right away and begin taking t - If you have concerns related to behavioral health issues or thoughts of harming yourself, contact [facility] at [phone].     - If you don’t have insurance, Veronica Falcon has partnered with [facility]

## (undated) NOTE — ED AVS SNAPSHOT
[facility] Emergency Department    [address]     [address] [address]    Phone:  [phone]    Fax:  [phone]   MRN: [id number]    Department:  [facility] Emergency Department   Date of Visit:  2/ and Class Registration line at [phone] or find a doctor online by visiting [email or web address].    IF THERE IS ANY CHANGE OR WORSENING OF YOUR CONDITION, CALL YOUR PRIMARY CARE PHYSICIAN AT ONCE OR RETURN IMMEDIATELY TO [address] [place] [address].     If

## (undated) NOTE — ED AVS SNAPSHOT
Tanya Orr   MRN: [id number]    Department:  [facility] Emergency Department   Date of Visit:  11/18/2017           Disclosure     Insurance plans vary and the physician(s) referred by the ER may not be covered by your plan.  Please conta CARE PHYSICIAN AT ONCE OR RETURN IMMEDIATELY TO THE EMERGENCY DEPARTMENT. If you have been prescribed any medication(s), please fill your prescription right away and begin taking the medication(s) as directed.   If you believe that any of the medications